# Patient Record
Sex: FEMALE | Race: WHITE | NOT HISPANIC OR LATINO | Employment: OTHER | URBAN - NONMETROPOLITAN AREA
[De-identification: names, ages, dates, MRNs, and addresses within clinical notes are randomized per-mention and may not be internally consistent; named-entity substitution may affect disease eponyms.]

---

## 2017-01-09 ENCOUNTER — TRANSCRIBE ORDERS (OUTPATIENT)
Dept: PULMONOLOGY | Facility: CLINIC | Age: 52
End: 2017-01-09

## 2017-01-09 ENCOUNTER — HOSPITAL ENCOUNTER (OUTPATIENT)
Dept: GENERAL RADIOLOGY | Facility: HOSPITAL | Age: 52
Discharge: HOME | End: 2017-01-09
Attending: INTERNAL MEDICINE | Admitting: INTERNAL MEDICINE

## 2017-01-09 DIAGNOSIS — R06.02 SOB (SHORTNESS OF BREATH): ICD-10-CM

## 2017-01-09 DIAGNOSIS — R06.02 SOB (SHORTNESS OF BREATH): Primary | ICD-10-CM

## 2017-01-09 PROCEDURE — 71020 HC CHEST PA AND LATERAL: CPT

## 2017-01-09 PROCEDURE — 71020 XR CHEST PA AND LATERAL: CPT | Performed by: RADIOLOGY

## 2017-01-11 ENCOUNTER — OFFICE VISIT (OUTPATIENT)
Dept: PULMONOLOGY | Facility: CLINIC | Age: 52
End: 2017-01-11

## 2017-01-11 VITALS
TEMPERATURE: 98.2 F | DIASTOLIC BLOOD PRESSURE: 84 MMHG | HEIGHT: 72 IN | WEIGHT: 251 LBS | HEART RATE: 90 BPM | OXYGEN SATURATION: 90 % | SYSTOLIC BLOOD PRESSURE: 142 MMHG | BODY MASS INDEX: 34 KG/M2

## 2017-01-11 DIAGNOSIS — J18.9 RECURRENT PNEUMONIA: ICD-10-CM

## 2017-01-11 DIAGNOSIS — G47.33 OSA (OBSTRUCTIVE SLEEP APNEA): ICD-10-CM

## 2017-01-11 DIAGNOSIS — R91.8 PULMONARY INFILTRATE: Primary | ICD-10-CM

## 2017-01-11 DIAGNOSIS — F17.200 SMOKER: ICD-10-CM

## 2017-01-11 DIAGNOSIS — R06.02 SHORTNESS OF BREATH: ICD-10-CM

## 2017-01-11 DIAGNOSIS — G47.33 OBSTRUCTIVE SLEEP APNEA: ICD-10-CM

## 2017-01-11 DIAGNOSIS — R91.8 LUNG MASS: ICD-10-CM

## 2017-01-11 DIAGNOSIS — R05.9 COUGH: ICD-10-CM

## 2017-01-11 PROCEDURE — 99214 OFFICE O/P EST MOD 30 MIN: CPT | Performed by: INTERNAL MEDICINE

## 2017-01-11 PROCEDURE — 99407 BEHAV CHNG SMOKING > 10 MIN: CPT | Performed by: INTERNAL MEDICINE

## 2017-01-11 RX ORDER — GUAIFENESIN 200 MG/10ML
200 LIQUID ORAL 3 TIMES DAILY PRN
Qty: 120 ML | Refills: 0 | Status: SHIPPED | OUTPATIENT
Start: 2017-01-11 | End: 2017-01-21

## 2017-01-11 NOTE — MR AVS SNAPSHOT
Lynette Stein   1/11/2017 1:40 PM   Office Visit    Dept Phone:  [phone]   Encounter #:  [id number]    Provider:  Teto Acuna MD   Department:  [facility] PULMONOLOGY CRITICAL CARE                Your Full Care Plan              Today's Medication Changes          These changes are accurate as of: 1/11/17  1:28 PM.  If you have any questions, ask your nurse or doctor.               New Medication(s)Ordered:     guaifenesin 100 MG/5ML liquid   Commonly known as:  ROBITUSSIN   Take 10 mL by mouth 3 (Three) Times a Day As Needed for cough for up to 10 days.   Started by:  Teto Acuna MD            Where to Get Your Medications      These medications were sent to [facility] [address] [place] [address] [phone] [facility] [phone]   [address] [place] [address]     Phone:  [phone]     guaifenesin 100 MG/5ML liquid                  Your Updated Medication List          This list is accurate as of: 1/11/17  1:28 PM.  Always use your most recent med list.                estradiol 2 MG tablet   Commonly known as:  ESTRACE       estrogens (conjugated) 0.45 MG tablet   Commonly known as:  PREMARIN       fenofibrate 145 MG tablet   Commonly known as:  TRICOR       guaifenesin 100 MG/5ML liquid   Commonly known as:  ROBITUSSIN   Take 10 mL by mouth 3 (Three) Times a Day As Needed for cough for up to 10 days.       hyoscyamine 0.125 MG tablet   Commonly known as:  ANASPAZ,LEVSIN       ibuprofen 800 MG tablet   Commonly known as:  ADVIL,MOTRIN       lisinopril 30 MG tablet   Commonly known as:  PRINIVIL,ZESTRIL       LORazepam 2 MG tablet   Commonly known as:  ATIVAN       metoprolol tartrate 50 MG tablet   Commonly known as:  LOPRESSOR       phenazopyridine 200 MG tablet   Commonly known as:  PYRIDIUM       pravastatin 40 MG tablet   Commonly known as:  PRAVACHOL       pregabalin 300 MG capsule   Commonly known as:  LYRICA       QUEtiapine  MG 24 hr  tablet   Commonly known as:  SEROquel XR       rosuvastatin 5 MG tablet   Commonly known as:  CRESTOR       sertraline 100 MG tablet   Commonly known as:  ZOLOFT       solifenacin 5 MG tablet   Commonly known as:  VESICARE       SPIRIVA HANDIHALER 18 MCG per inhalation capsule   Generic drug:  tiotropium       traMADol 50 MG tablet   Commonly known as:  ULTRAM       UCERIS 9 MG tablet sustained-release 24 hour   Generic drug:  Budesonide       VENTOLIN  (90 BASE) MCG/ACT inhaler   Generic drug:  albuterol               We Performed the Following     Ambulatory Referral to Cardiothoracic Surgery       You Were Diagnosed With        Codes Comments    Pulmonary infiltrate    -  Primary ICD-10-CM: R91.8  ICD-9-CM: 793.19     Lung mass     ICD-10-CM: R91.8  ICD-9-CM: 786.6     Obstructive sleep apnea     ICD-10-CM: G47.33  ICD-9-CM: 327.23     Shortness of breath     ICD-10-CM: R06.02  ICD-9-CM: 786.05     Cough     ICD-10-CM: R05  ICD-9-CM: 786.2     Recurrent pneumonia     ICD-10-CM: J18.9  ICD-9-CM: 486     DARON (obstructive sleep apnea)     ICD-10-CM: G47.33  ICD-9-CM: 327.23     Smoker     ICD-10-CM: F17.200  ICD-9-CM: 305.1       Instructions     None    Patient Instructions History      Upcoming Appointments     Visit Type Date Time Department    FOLLOW UP 1/11/2017  1:40 PM [facility]    FOLLOW UP 3/17/2017 10:30 AM [facility]    LAB 3/17/2017 10:00 AM [facility]    FOLLOW UP 4/12/2017  1:20 PM [facility]      MyChart Signup     Our records indicate that your [facility] [employer] account has been deactivated. If you would like to reactivate your account, please email [email or web address] or call [phone] to talk to our [employer] staff.             Other Info from Your Visit           Your Appointments     Jan 11, 2017  1:40 PM EST   Follow Up with Teto Acuna MD   [facility] PULMONOLOGY CRITICAL CARE (--)    [address]  [place]  "[phone]   [phone]           Arrive 15 minutes prior to appointment.            Mar 17, 2017 10:00 AM EDT   LAB with [facility] NURSE LAB   [facility] HEMATOLOGY  AND ONCOLOGY ([place])    [address] [place] [id number]   [phone]            Mar 17, 2017 10:30 AM EDT   Follow Up with Apple Menezes MD   [facility] HEMATOLOGY  AND ONCOLOGY ([place])    [address] [place] [phone]   [phone]           Arrive 15 minutes prior to appointment.            Apr 12, 2017  1:20 PM EDT   Follow Up with Teto Acuna MD   [facility] PULMONOLOGY CRITICAL CARE (--)    [address] [place] [id number]   [phone]           Arrive 15 minutes prior to appointment.              Allergies     Penicillins        Reason for Visit     Shortness of Breath           Vital Signs     Blood Pressure Pulse Temperature Height Weight Oxygen Saturation    142/84 (BP Location: Left arm, Patient Position: Sitting, Cuff Size: Adult) 90 98.2 °F (36.8 °C) (Oral) 72\" (182.9 cm) 251 lb (114 kg) 90%    Body Mass Index Smoking Status                34.04 kg/m2 Current Every Day Smoker          Problems and Diagnoses Noted     Cough    Sleep apnea    DARON (obstructive sleep apnea)    Recurrent pneumonia    Shortness of breath    Smoker    Abnormal chest x-ray    -  Primary    Lung mass            "

## 2017-01-11 NOTE — PROGRESS NOTES
Interval history since last visit: Cough    Recent hospitalizations: None    Investigations (imaging, PFT's, labs, sleep study, record requests, etc.) CXR    Have you had the Influenza Vaccine? yes    Would you like to receive this Vaccine today? no    Have you had the Pneumonia Vaccine?  yes   Would you like to receive this Vaccine today? no    Subjective    Lynette Stein presents for the following Shortness of Breath      History of Present Illness     patient is here for a follow-up visit. Currently having cough and sinus congestion.  Bronchoscopy results were discussed with her in great detail.  Chest x-ray did not reveal any major pulmonary findings.        Review of Systems   Constitutional: Negative for activity change, fatigue and unexpected weight change.   HENT: Negative for congestion, postnasal drip and rhinorrhea.    Respiratory: Positive for cough and shortness of breath. Negative for apnea, chest tightness and wheezing.    Cardiovascular: Negative for chest pain and palpitations.   Gastrointestinal: Negative for nausea.   Allergic/Immunologic: Negative for environmental allergies.   Psychiatric/Behavioral: Negative for agitation and confusion.       Active Problems:  Problem List Items Addressed This Visit        Respiratory    Obstructive sleep apnea    Shortness of breath    Cough    Recurrent pneumonia    Relevant Medications    guaifenesin (ROBITUSSIN) 100 MG/5ML liquid    DARON (obstructive sleep apnea)       Other    Smoker      Other Visit Diagnoses     Pulmonary infiltrate    -  Primary    Relevant Orders    NM Pet Skull Base To Mid Thigh    Ambulatory Referral to Cardiothoracic Surgery    Lung mass        Relevant Orders    NM Pet Skull Base To Mid Thigh          Past Medical History:  Past Medical History   Diagnosis Date   • Arthritis    • Chronic cystitis    • Colitis    • COPD (chronic obstructive pulmonary disease)    • Diabetes mellitus    • Fibromyalgia    • Hyperlipidemia    •  Hypertension        Family History:  Family History   Problem Relation Age of Onset   • Cancer Other    • Diabetes Other    • Gout Other    • Heart disease Other    • Hypertension Other    • Other Other      osteoarthritis,osteoporosis,rheumatoid arthritis   • Jaundice Father    • Cancer Sister        Social History:  Social History   Substance Use Topics   • Smoking status: Current Every Day Smoker     Packs/day: 0.50     Years: 40.00     Types: Cigarettes   • Smokeless tobacco: Not on file      Comment: 1/2 ppd   • Alcohol use No       Current Medications:  Current Outpatient Prescriptions   Medication Sig Dispense Refill   • estradiol (ESTRACE) 2 MG tablet Take 2 mg by mouth Daily.     • estrogens, conjugated, (PREMARIN) 0.45 MG tablet Take  by mouth.     • fenofibrate (TRICOR) 145 MG tablet Take  by mouth.     • guaifenesin (ROBITUSSIN) 100 MG/5ML liquid Take 10 mL by mouth 3 (Three) Times a Day As Needed for cough for up to 10 days. 120 mL 0   • hyoscyamine (ANASPAZ,LEVSIN) 0.125 MG tablet Take 0.125 mg by mouth Every 4 (Four) Hours As Needed for cramping.     • ibuprofen (ADVIL,MOTRIN) 800 MG tablet Take 800 mg by mouth Every 6 (Six) Hours As Needed for mild pain (1-3).     • lisinopril (PRINIVIL,ZESTRIL) 30 MG tablet Take  by mouth.     • LORazepam (ATIVAN) 2 MG tablet Take  by mouth.     • metoprolol tartrate (LOPRESSOR) 50 MG tablet Take 50 mg by mouth 2 (Two) Times a Day.     • phenazopyridine (PYRIDIUM) 200 MG tablet Take  by mouth.     • pravastatin (PRAVACHOL) 40 MG tablet Take 40 mg by mouth Daily.     • pregabalin (LYRICA) 300 MG capsule Take  by mouth.     • QUEtiapine XR (SEROquel XR) 200 MG 24 hr tablet Take 200 mg by mouth Every Night.     • rosuvastatin (CRESTOR) 5 MG tablet Take  by mouth.     • sertraline (ZOLOFT) 100 MG tablet Take 100 mg by mouth Daily.     • solifenacin (VESICARE) 5 MG tablet Take  by mouth.     • tiotropium (SPIRIVA HANDIHALER) 18 MCG per inhalation capsule Place 1 capsule  "into inhaler and inhale 1 (one) time daily.     • traMADol (ULTRAM) 50 MG tablet Take 50 mg by mouth Every 6 (Six) Hours As Needed for moderate pain (4-6).     • UCERIS 9 MG tablet sustained-release 24 hour   5   • VENTOLIN  (90 BASE) MCG/ACT inhaler        No current facility-administered medications for this visit.        Allergies:  Allergies   Allergen Reactions   • Penicillins        Vitals:  Visit Vitals   • /84 (BP Location: Left arm, Patient Position: Sitting, Cuff Size: Adult)   • Pulse 90   • Temp 98.2 °F (36.8 °C) (Oral)   • Ht 72\" (182.9 cm)   • Wt 251 lb (114 kg)   • SpO2 90%   • BMI 34.04 kg/m2       Imaging:    Imaging Results (most recent)     None          Pulmonary Functions Testing Results:    No results found for: FEV1, FVC, FKI5DTB, TLC, DLCO    Results for orders placed or performed during the hospital encounter of 12/01/16   Fungus Culture   Result Value Ref Range    Fungus Stain Final report     KOH/Calcofluor preparation Comment     Culture Final report     Fungus (Mycology) Result 1 Comment    AFB Culture   Result Value Ref Range    AFB Specimen Processing Concentration     Acid Fast Smear Negative    Respiratory Culture   Result Value Ref Range    Respiratory Culture Normal Respiratory Melva     Gram Stain Result No white cells  or organisms seen    Fungus Culture   Result Value Ref Range    Fungus Stain Final report     KOH/Calcofluor preparation Comment     Culture Final report     Fungus (Mycology) Result 1 Candida albicans    AFB Culture   Result Value Ref Range    AFB Specimen Processing Concentration     Acid Fast Smear Negative    Respiratory Culture   Result Value Ref Range    Respiratory Culture Normal Respiratory Melva     Gram Stain Result No WBCs or organisms seen    Fungus Culture   Result Value Ref Range    Fungus Stain Final report     KOH/Calcofluor preparation Comment     Culture Final report     Fungus (Mycology) Result 1 Comment    AFB Culture   Result Value " Ref Range    AFB Specimen Processing Concentration     Acid Fast Smear Negative    Respiratory Culture   Result Value Ref Range    Respiratory Culture Normal Respiratory Melva     Gram Stain Result No white cells  or organisms seen    Fungus Culture   Result Value Ref Range    Fungus Stain Final report     KOH/Calcofluor preparation Comment     Culture Final report     Fungus (Mycology) Result 1 Candida albicans     Fungus (Mycology) Result 2 Candida glabrata    AFB Culture   Result Value Ref Range    AFB Specimen Processing Concentration     Acid Fast Smear Negative    Respiratory Culture   Result Value Ref Range    Respiratory Culture Normal Respiratory Melva     Gram Stain Result Rare (1+) WBCs seen     Gram Stain Result       Few (2+) Gram positive cocci in pairs, chains and clusters    Gram Stain Result Rare (1+) Gram positive bacilli, extracellular    Protime-INR   Result Value Ref Range    Protime 10.0 9.8 - 11.9 Seconds    INR 0.89 0.80 - 1.10   CBC Auto Differential   Result Value Ref Range    WBC 17.07 (H) 4.50 - 12.50 10*3/mm3    RBC 4.43 4.20 - 5.40 10*6/mm3    Hemoglobin 13.0 12.0 - 16.0 g/dL    Hematocrit 40.9 37.0 - 47.0 %    MCV 92.3 80.0 - 94.0 fL    MCH 29.3 27.0 - 33.0 pg    MCHC 31.8 (L) 33.0 - 37.0 g/dL    RDW 14.7 (H) 11.5 - 14.5 %    RDW-SD 48.0 37.0 - 54.0 fl    MPV 10.5 (H) 6.0 - 10.0 fL    Platelets 453 (H) 130 - 400 10*3/mm3    Neutrophil % 53.8 30.0 - 70.0 %    Lymphocyte % 36.4 21.0 - 51.0 %    Monocyte % 7.5 0.0 - 10.0 %    Eosinophil % 1.2 0.0 - 5.0 %    Basophil % 0.2 0.0 - 2.0 %    Immature Grans % 0.9 (H) 0.0 - 0.5 %    Neutrophils, Absolute 9.20 (H) 1.40 - 6.50 10*3/mm3    Lymphocytes, Absolute 6.21 (H) 1.00 - 3.00 10*3/mm3    Monocytes, Absolute 1.28 (H) 0.10 - 0.90 10*3/mm3    Eosinophils, Absolute 0.20 0.00 - 0.70 10*3/mm3    Basophils, Absolute 0.03 0.00 - 0.30 10*3/mm3    Immature Grans, Absolute 0.15 (H) 0.00 - 0.03 10*3/mm3   Non-gynecologic Cytology   Result Value Ref Range     Case Report       [facility] Non-Gyn Cytology                           Case: [id number]                                  Authorizing Provider:  Teto Acuna MD         Collected:           12/01/2016 09:03 AM          Ordering Location:     [facility]      Received:            12/01/2016 10:23 AM                                 OPERATING ROOM DEPARTMENT                                                    Pathologist:           Ramy Jeffery MD                                                      Specimens:   1) - Lung, Right Upper Lobe, RUL BAL                                                                2) - Lung, Lingula, LINGULAR BAL                                                                    3) - Lung, Left Upper Lobe, SUSANA BAL                                                                 4) - Bronchus, BRONCH WASHINGS                                                             Embedded Images         Objective   Physical Exam     General- chronically ill in appearance, not in any acute distress    HEENT- pupils equally reactive to light, normal in size, no scleral icterus    Neck-supple    Chest-respirations normal-on auscultation no wheezing no crackles    S1 and S2 normal    Abdomen-nontender nondistended bowel sounds positive    CNS-nonfocal    Extremities-no edema    Psychiatric-mood good, good eye contact, alert awake oriented    Assessment/Plan           Shortness off breath-  Likely multifactorial related to her underlying lung disease.  Patient continues to smoke. l      Patient had pulmonary function test which did not reveal any obstructive or restrictive lung disease.  Results were shown and discussed with wes today.    2-D echo showed normal ejection fraction with normal RV function.    Sleep study showed severe sleep apnea.  She was prescribed AutoPap.  We'll check compliance on next visit.               Bronchoscopy culturese and cytology was negative. Will get a  PET/CT and refer her to cardiac thoracic surgery for VATS procedure for lung biopsy      Has history of blood disorder managed byhematology oncology.      Obstructive sleep apnea-continue AutoPap.    Educated patient on the complications associated with untreated sleep apnea -- that it increases risk of MI, stroke, depression, hypertension, heart failure, arrhythmias, coronary artery disease, and potential death due to complication of that mentioned previously.    Also patient was educated  about the hazards of driving if there sleep deprived and has sleep apnea.  Was instructed not to involving driving or any activity which involves higher level of mental function- like operating a machine-        Smoker-.  Still smoking half a pack a day. Again did extensive smoking cessation counseling over 10 minutes.  Patient is willing to quit and will cut down in next 4-6 weeks    Obesity-exercise and diet counseling not done on this visit.      · The study is technically difficult for diagnosis.  · Left ventricular function is normal. Estimated EF = 65%.  · There is no evidence of pericardial effusion.    Results for ELKIN YUAN (MRN [id number]) as of 11/10/2016 15:51   Ref. Range 5/12/2016 10:46   Hep A IgM Latest Ref Range: NonReactive  NonReactive   Hepatitis B Surface Ag Latest Ref Range: NonReactive  NonReactive   Hep B C IgM Latest Ref Range: NonReactive  NonReactive   Hep C Virus Ab Latest Ref Range: NonReactive  NonReactive   IgA Latest Ref Range: 87 - 352 mg/dL 186   IgG Latest Ref Range: 700 - 1600 mg/dL 616 (L)   IgM Latest Ref Range: 26 - 217 mg/dL 75       Recurrent pneumonia-Patient's IgG level is below normal.  Did not see the allergist and immunologist.  I asked her to make an appointment as soon as possible as patient's respiratory symptoms could be due to immunodeficiency state     cough-likely due to underlying lung disease and postnasal drip.  Will give her Robitussin.        ICD-10-CM ICD-9-CM   1.  Pulmonary infiltrate R91.8 793.19   2. Lung mass R91.8 786.6   3. Obstructive sleep apnea G47.33 327.23   4. Shortness of breath R06.02 786.05   5. Cough R05 786.2   6. Recurrent pneumonia J18.9 486   7. DARON (obstructive sleep apnea) G47.33 327.23   8. Smoker F17.200 305.1       Return in about 3 months (around 4/11/2017).

## 2017-01-20 ENCOUNTER — HOSPITAL ENCOUNTER (OUTPATIENT)
Dept: PET IMAGING | Facility: HOSPITAL | Age: 52
Discharge: HOME | End: 2017-01-20
Attending: INTERNAL MEDICINE | Admitting: INTERNAL MEDICINE

## 2017-01-20 DIAGNOSIS — R91.8 LUNG MASS: ICD-10-CM

## 2017-01-20 DIAGNOSIS — R91.8 PULMONARY INFILTRATE: ICD-10-CM

## 2017-01-20 PROCEDURE — 78815 PET IMAGE W/CT SKULL-THIGH: CPT

## 2017-01-20 PROCEDURE — A9552 F18 FDG: HCPCS | Performed by: INTERNAL MEDICINE

## 2017-01-20 PROCEDURE — 78815 PET IMAGE W/CT SKULL-THIGH: CPT | Performed by: RADIOLOGY

## 2017-01-20 PROCEDURE — 0 FLUDEOXYGLUCOSE F18 SOLUTION: Performed by: INTERNAL MEDICINE

## 2017-01-20 RX ADMIN — FLUDEOXYGLUCOSE F18 1 DOSE: 300 INJECTION INTRAVENOUS at 14:57

## 2017-02-08 ENCOUNTER — OFFICE VISIT (OUTPATIENT)
Dept: CARDIAC SURGERY | Facility: CLINIC | Age: 52
End: 2017-02-08

## 2017-02-08 VITALS
WEIGHT: 250 LBS | HEART RATE: 83 BPM | BODY MASS INDEX: 33.86 KG/M2 | SYSTOLIC BLOOD PRESSURE: 131 MMHG | OXYGEN SATURATION: 91 % | HEIGHT: 72 IN | DIASTOLIC BLOOD PRESSURE: 72 MMHG

## 2017-02-08 DIAGNOSIS — J18.9 RECURRENT PNEUMONIA: Primary | ICD-10-CM

## 2017-02-08 PROCEDURE — 99203 OFFICE O/P NEW LOW 30 MIN: CPT | Performed by: THORACIC SURGERY (CARDIOTHORACIC VASCULAR SURGERY)

## 2017-02-08 RX ORDER — METRONIDAZOLE 500 MG/1
500 TABLET ORAL 3 TIMES DAILY
COMMUNITY
End: 2019-11-19

## 2017-02-16 ENCOUNTER — HOSPITAL ENCOUNTER (OUTPATIENT)
Dept: CT IMAGING | Facility: HOSPITAL | Age: 52
Discharge: HOME | End: 2017-02-16
Admitting: THORACIC SURGERY (CARDIOTHORACIC VASCULAR SURGERY)

## 2017-02-16 DIAGNOSIS — J18.9 RECURRENT PNEUMONIA: ICD-10-CM

## 2017-02-16 LAB — CREAT BLDA-MCNC: 0.6 MG/DL (ref 0.6–1.3)

## 2017-02-16 PROCEDURE — 71270 CT THORAX DX C-/C+: CPT | Performed by: RADIOLOGY

## 2017-02-16 PROCEDURE — 71270 CT THORAX DX C-/C+: CPT

## 2017-02-16 PROCEDURE — 82565 ASSAY OF CREATININE: CPT

## 2017-02-16 PROCEDURE — 0 IOVERSOL 68 % SOLUTION: Performed by: THORACIC SURGERY (CARDIOTHORACIC VASCULAR SURGERY)

## 2017-02-16 RX ADMIN — IOVERSOL 80 ML: 678 INJECTION INTRA-ARTERIAL; INTRAVENOUS at 13:13

## 2017-02-24 ENCOUNTER — OFFICE VISIT (OUTPATIENT)
Dept: PULMONOLOGY | Facility: CLINIC | Age: 52
End: 2017-02-24

## 2017-02-24 VITALS
HEART RATE: 82 BPM | WEIGHT: 250 LBS | BODY MASS INDEX: 33.86 KG/M2 | OXYGEN SATURATION: 90 % | HEIGHT: 72 IN | DIASTOLIC BLOOD PRESSURE: 76 MMHG | SYSTOLIC BLOOD PRESSURE: 128 MMHG | TEMPERATURE: 98.1 F

## 2017-02-24 DIAGNOSIS — R05.9 COUGH: ICD-10-CM

## 2017-02-24 DIAGNOSIS — E66.01 MORBID OBESITY DUE TO EXCESS CALORIES (HCC): ICD-10-CM

## 2017-02-24 DIAGNOSIS — R06.02 SHORTNESS OF BREATH: ICD-10-CM

## 2017-02-24 DIAGNOSIS — F17.200 SMOKER: Primary | ICD-10-CM

## 2017-02-24 DIAGNOSIS — R93.89 ABNORMAL CT SCAN, CHEST: ICD-10-CM

## 2017-02-24 DIAGNOSIS — G47.33 OBSTRUCTIVE SLEEP APNEA: ICD-10-CM

## 2017-02-24 PROCEDURE — 99407 BEHAV CHNG SMOKING > 10 MIN: CPT | Performed by: INTERNAL MEDICINE

## 2017-02-24 PROCEDURE — 99214 OFFICE O/P EST MOD 30 MIN: CPT | Performed by: INTERNAL MEDICINE

## 2017-02-24 RX ORDER — LEVOFLOXACIN 500 MG/1
500 TABLET, FILM COATED ORAL DAILY
Qty: 7 TABLET | Refills: 1 | Status: SHIPPED | OUTPATIENT
Start: 2017-02-24 | End: 2017-04-24

## 2017-02-24 NOTE — PROGRESS NOTES
Interval history since last visit: Sore Throat, Swollen Glands    Recent hospitalizations: None    Investigations (imaging, PFT's, labs, sleep study, record requests, etc.) PET Scan    Have you had the Influenza Vaccine? yes    Would you like to receive this Vaccine today? no    Have you had the Pneumonia Vaccine?  yes   Would you like to receive this Vaccine today? no    Subjective    Lynette Stein presents for the following Sore Throat      History of Present Illness     Here for a follow up.Seen Ct surgery.   Repeat ct chest is same. Images seen and shown to patient.        Review of Systems   Constitutional: Negative for activity change, fatigue and unexpected weight change.   HENT: Positive for sinus pressure and sore throat. Negative for congestion, postnasal drip and rhinorrhea.    Respiratory: Positive for shortness of breath. Negative for apnea, cough, chest tightness and wheezing.    Cardiovascular: Negative for chest pain and palpitations.   Gastrointestinal: Negative for nausea.   Allergic/Immunologic: Negative for environmental allergies.   Psychiatric/Behavioral: Negative for agitation and confusion.       Active Problems:  Problem List Items Addressed This Visit        Respiratory    Obstructive sleep apnea    Shortness of breath    Cough       Digestive    Obesity       Other    Smoker - Primary    Abnormal CT scan, chest          Past Medical History:  Past Medical History   Diagnosis Date   • Anxiety and depression    • Arthritis    • Chronic cystitis    • Colitis    • COPD (chronic obstructive pulmonary disease)    • Diabetes mellitus    • Fibromyalgia    • GERD (gastroesophageal reflux disease)    • Hyperlipidemia    • Hypertension    • UTI (urinary tract infection)        Family History:  Family History   Problem Relation Age of Onset   • Cancer Other    • Diabetes Other    • Gout Other    • Heart disease Other    • Hypertension Other    • Other Other      osteoarthritis,osteoporosis,rheumatoid  arthritis   • COPD Mother    • Heart failure Mother    • Diabetes Mother    • Jaundice Father    • Heart failure Father    • Cancer Sister        Social History:  Social History   Substance Use Topics   • Smoking status: Current Every Day Smoker     Packs/day: 0.50     Years: 40.00     Types: Cigarettes   • Smokeless tobacco: Never Used      Comment: 1/2 ppd   • Alcohol use No       Current Medications:  Current Outpatient Prescriptions   Medication Sig Dispense Refill   • estradiol (ESTRACE) 2 MG tablet Take 2 mg by mouth Daily.     • estrogens, conjugated, (PREMARIN) 0.45 MG tablet Take  by mouth.     • fenofibrate (TRICOR) 145 MG tablet Take  by mouth.     • hyoscyamine (ANASPAZ,LEVSIN) 0.125 MG tablet Take 0.125 mg by mouth Every 4 (Four) Hours As Needed for cramping.     • ibuprofen (ADVIL,MOTRIN) 800 MG tablet Take 800 mg by mouth Every 6 (Six) Hours As Needed for mild pain (1-3).     • levoFLOXacin (LEVAQUIN) 500 MG tablet Take 1 tablet by mouth Daily. 7 tablet 1   • lisinopril (PRINIVIL,ZESTRIL) 30 MG tablet Take  by mouth.     • LORazepam (ATIVAN) 2 MG tablet Take  by mouth.     • metoprolol tartrate (LOPRESSOR) 50 MG tablet Take 50 mg by mouth 2 (Two) Times a Day.     • metroNIDAZOLE (FLAGYL) 500 MG tablet Take 500 mg by mouth 3 (Three) Times a Day.     • phenazopyridine (PYRIDIUM) 200 MG tablet Take  by mouth.     • pravastatin (PRAVACHOL) 40 MG tablet Take 40 mg by mouth Daily.     • pregabalin (LYRICA) 300 MG capsule Take  by mouth.     • QUEtiapine XR (SEROquel XR) 200 MG 24 hr tablet Take 200 mg by mouth Every Night.     • rosuvastatin (CRESTOR) 5 MG tablet Take  by mouth.     • sertraline (ZOLOFT) 100 MG tablet Take 100 mg by mouth Daily.     • solifenacin (VESICARE) 5 MG tablet Take  by mouth.     • terconazole (TERAZOL 3) 0.8 % vaginal cream Insert 1 applicator into the vagina Every Night.     • tiotropium (SPIRIVA HANDIHALER) 18 MCG per inhalation capsule Place 1 capsule into inhaler and inhale 1  "(one) time daily.     • traMADol (ULTRAM) 50 MG tablet Take 50 mg by mouth Every 6 (Six) Hours As Needed for moderate pain (4-6).     • UCERIS 9 MG tablet sustained-release 24 hour   5   • VENTOLIN  (90 BASE) MCG/ACT inhaler        No current facility-administered medications for this visit.        Allergies:  Allergies   Allergen Reactions   • Penicillins        Vitals:  Visit Vitals   • /76 (BP Location: Left arm, Patient Position: Sitting, Cuff Size: Adult)   • Pulse 82   • Temp 98.1 °F (36.7 °C) (Oral)   • Ht 72\" (182.9 cm)   • Wt 250 lb (113 kg)   • SpO2 90%   • BMI 33.91 kg/m2       Imaging:    Imaging Results (most recent)     None          Pulmonary Functions Testing Results:    No results found for: FEV1, FVC, [id number], TLC, DLCO    Results for orders placed or performed during the hospital encounter of 02/16/17   POC Creatinine   Result Value Ref Range    Creatinine 0.60 0.60 - 1.30 mg/dL       Objective   Physical Exam   General- normal in appearance, not in any acute distress ,obese     HEENT- pupils equally reactive to light, normal in size, no scleral icterus    Neck-supple    Chest-respirations normal-on auscultation no wheezing no crackles    S1 and S2 normal    Abdomen-nontender nondistended bowel sounds positive    CNS-nonfocal    Extremities-no edema    Psychiatric-mood good, good eye contact, alert awake oriented    Assessment/Plan      Abnormal CT chest-     REASON FOR EXAM: Pneumonia, infiltrate; J18.9-Pneumonia, unspecified  organism.      COMPARISON: Today's CT of the chest is compared with a previous CT from  November 2016.      FINDINGS: Scans were obtained from the apices of the lung and extended  to the diaphragm. On the lung windows, there are persistent patchy areas  of alveolar infiltrate predominantly in the left lung. Radiographically,  these have not changed from November 2016. No new areas have developed.  There were no pleural effusions. In the mediastinum, there were " no  enlarged lymph nodes or masses. The liver continues to show marked  diffuse fatty infiltration.      IMPRESSION:  Patchy scattered areas of alveolar infiltrate persist within  the lungs predominantly on the left, radiographically unchanged from  November. There is moderate fatty change throughout the liver.      The radiation dose reduction device was utilized for each scan per the  ALARA (as low as reasonably achievable) protocol.    Bronchoscopy culturese and cytology was negative. Results reviewed again.      Will discuss case with CT surgery    Has history of blood disorder managed by hematology oncology.      Obstructive sleep apnea- compliant. Continue current treatment.        Educated patient on the complications associated with untreated sleep apnea -- that it increases risk of MI, stroke, depression, hypertension, heart failure, arrhythmias, coronary artery disease, and potential death due to complication of that mentioned previously.    Also patient was educated  about the hazards of driving if there sleep deprived and has sleep apnea.  Was instructed not to involving driving or any activity which involves higher level of mental function- like operating a machine.          Smoker-.  I advised the patient of the risks in continuing to use tobacco, and I provided this patient with smoking cessation educational materials.  I also discussed how to quit smoking and the patient has expressed the willingness to quit.      During this visit, I spent 11 minutes counseling the patient regarding smoking cessation.       Obesity-exercise and diet counseling again not done on this visit.      · The study is technically difficult for diagnosis.  · Left ventricular function is normal. Estimated EF = 65%.  · There is no evidence of pericardial effusion.    Results for ELKIN YUAN (MRN [id number]) as of 11/10/2016 15:51   Ref. Range 5/12/2016 10:46   Hep A IgM Latest Ref Range: NonReactive  NonReactive   Hepatitis  B Surface Ag Latest Ref Range: NonReactive  NonReactive   Hep B C IgM Latest Ref Range: NonReactive  NonReactive   Hep C Virus Ab Latest Ref Range: NonReactive  NonReactive   IgA Latest Ref Range: 87 - 352 mg/dL 186   IgG Latest Ref Range: 700 - 1600 mg/dL 616 (L)   IgM Latest Ref Range: 26 - 217 mg/dL 75       Recurrent pneumonia-Patient's IgG level is below normal.  Again didn't see immunologist. Will refer her again. Her IgG is low.         Cough- better but still present. Smokers cough. Again asked her to quit smoking.    PFT- Normal spirometry with no significant bronchodilator response seen on this occasion. Normal lung volumes and normal dlco.  Flow volume loop is normal  Will repeat on next visit        ICD-10-CM ICD-9-CM   1. Smoker F17.200 305.1   2. Abnormal CT scan, chest R93.8 793.2   3. Obstructive sleep apnea G47.33 327.23   4. Shortness of breath R06.02 786.05   5. Cough R05 786.2   6. Morbid obesity due to excess calories E66.01 278.01       Return in about 3 months (around 5/24/2017).

## 2017-03-24 ENCOUNTER — TRANSCRIBE ORDERS (OUTPATIENT)
Dept: ADMINISTRATIVE | Facility: HOSPITAL | Age: 52
End: 2017-03-24

## 2017-03-24 DIAGNOSIS — Z12.31 VISIT FOR SCREENING MAMMOGRAM: Primary | ICD-10-CM

## 2017-03-24 DIAGNOSIS — Z13.820 SCREENING FOR OSTEOPOROSIS: ICD-10-CM

## 2017-04-03 ENCOUNTER — TRANSCRIBE ORDERS (OUTPATIENT)
Dept: ADMINISTRATIVE | Facility: HOSPITAL | Age: 52
End: 2017-04-03

## 2017-04-03 DIAGNOSIS — Z12.31 VISIT FOR SCREENING MAMMOGRAM: Primary | ICD-10-CM

## 2017-04-24 ENCOUNTER — LAB (OUTPATIENT)
Dept: ONCOLOGY | Facility: CLINIC | Age: 52
End: 2017-04-24

## 2017-04-24 ENCOUNTER — OFFICE VISIT (OUTPATIENT)
Dept: ONCOLOGY | Facility: CLINIC | Age: 52
End: 2017-04-24

## 2017-04-24 VITALS
SYSTOLIC BLOOD PRESSURE: 121 MMHG | BODY MASS INDEX: 34.75 KG/M2 | OXYGEN SATURATION: 96 % | RESPIRATION RATE: 18 BRPM | DIASTOLIC BLOOD PRESSURE: 70 MMHG | TEMPERATURE: 97.7 F | WEIGHT: 256.2 LBS | HEART RATE: 88 BPM

## 2017-04-24 DIAGNOSIS — D72.820 LYMPHOCYTOSIS: Primary | ICD-10-CM

## 2017-04-24 DIAGNOSIS — Z72.0 TOBACCO ABUSE: ICD-10-CM

## 2017-04-24 DIAGNOSIS — B99.9 RECURRENT INFECTIONS: ICD-10-CM

## 2017-04-24 DIAGNOSIS — D75.839 THROMBOCYTOSIS: ICD-10-CM

## 2017-04-24 DIAGNOSIS — D72.820 LYMPHOCYTOSIS: ICD-10-CM

## 2017-04-24 LAB
BASOPHILS # BLD AUTO: 0.05 10*3/MM3 (ref 0–0.3)
BASOPHILS NFR BLD AUTO: 0.3 % (ref 0–2)
DEPRECATED RDW RBC AUTO: 44.5 FL (ref 37–54)
EOSINOPHIL # BLD AUTO: 0.29 10*3/MM3 (ref 0–0.7)
EOSINOPHIL NFR BLD AUTO: 1.7 % (ref 0–5)
ERYTHROCYTE [DISTWIDTH] IN BLOOD BY AUTOMATED COUNT: 13.7 % (ref 11.5–14.5)
HCT VFR BLD AUTO: 42.5 % (ref 37–47)
HGB BLD-MCNC: 13.8 G/DL (ref 12–16)
IMM GRANULOCYTES # BLD: 0.18 10*3/MM3 (ref 0–0.03)
IMM GRANULOCYTES NFR BLD: 1 % (ref 0–0.5)
LYMPHOCYTES # BLD AUTO: 6.57 10*3/MM3 (ref 1–3)
LYMPHOCYTES NFR BLD AUTO: 38.3 % (ref 21–51)
MCH RBC QN AUTO: 29.4 PG (ref 27–33)
MCHC RBC AUTO-ENTMCNC: 32.5 G/DL (ref 33–37)
MCV RBC AUTO: 90.4 FL (ref 80–94)
MONOCYTES # BLD AUTO: 1.07 10*3/MM3 (ref 0.1–0.9)
MONOCYTES NFR BLD AUTO: 6.2 % (ref 0–10)
NEUTROPHILS # BLD AUTO: 8.99 10*3/MM3 (ref 1.4–6.5)
NEUTROPHILS NFR BLD AUTO: 52.5 % (ref 30–70)
PLATELET # BLD AUTO: 425 10*3/MM3 (ref 130–400)
PMV BLD AUTO: 10.9 FL (ref 6–10)
RBC # BLD AUTO: 4.7 10*6/MM3 (ref 4.2–5.4)
WBC NRBC COR # BLD: 17.15 10*3/MM3 (ref 4.5–12.5)

## 2017-04-24 PROCEDURE — 85025 COMPLETE CBC W/AUTO DIFF WBC: CPT | Performed by: INTERNAL MEDICINE

## 2017-04-24 PROCEDURE — 99214 OFFICE O/P EST MOD 30 MIN: CPT | Performed by: INTERNAL MEDICINE

## 2017-04-24 PROCEDURE — 36415 COLL VENOUS BLD VENIPUNCTURE: CPT | Performed by: INTERNAL MEDICINE

## 2017-04-24 NOTE — PROGRESS NOTES
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    Ms. Stein reports that she was discovered to have leukocytosis during the time of her initial consultation but believes that she may have actually had an elevated white blood cell count for quite some time prior to that visit. When she initially saw me she was diagnosed with bronchitis and had recently completed a course of antibiotic treatment. She has a history of chronic cystitis and is on antibiotics at least once a month for urinary tract infections. She also has issues with chronic abdominal pain and diarrhea and was diagnosed with collagenous colitis in 2015 and has been following with Dr. Steel. Upon further review of our medical system she has had thrombocytosis since 2007 and a leukocytosis since 2013 which at times has been normal. I did preform a flow cytometry as well as a peripheral blood smear which was significant for a monotypical CD5+/- B cell population (~4% of leukocytes) with no specific immunophenotype, but variant B cell SLL or mantle cell lymphoma could not be ruled out. Given these findings I did perform a bone marrow biopsy to assess for an underlying hematological disorder. There was a continued presence of a monotypic B cell population which was around 2% of leukocytes however there was no evidence of a hematological neoplasia or overt lymphoma that was seen.     Since her last visit with me she has had pneumonia as well as recurrent urinary tract infections and most recently completed  antibiotics last week with improvement in her symptoms. She reports chronic allergies and has daily sinus pressure from it. She denies of any fevers, night sweats, weight loss, or lymphadenopathy.          Allergies   Allergen Reactions   • Penicillins        Past Medical History:   Diagnosis Date   • Anxiety and depression    • Arthritis    • Chronic cystitis    • Colitis    • COPD (chronic obstructive pulmonary disease)    • Diabetes mellitus    • Fibromyalgia    • GERD (gastroesophageal reflux disease)    • Hyperlipidemia    • Hypertension    • UTI (urinary tract infection)        Past Surgical History:   Procedure Laterality Date   • BRONCHOSCOPY N/A 12/1/2016    Procedure: BRONCHOSCOPY;  Surgeon: Teto Acuna MD;  Location: [facility];  Service:    • CHOLECYSTECTOMY     • CYSTOSCOPY BLADDER BIOPSY     • FACIAL RECONSTRUCTION SURGERY     • HAND SURGERY Right     middle and ring finger   • HYSTERECTOMY  2013   • PILONIDAL CYST / SINUS EXCISION         Social History     Social History   • Marital status:      Spouse name: N/A   • Number of children: 0   • Years of education: N/A     Occupational History   •  Disabled          Social History Main Topics   • Smoking status: Current Every Day Smoker     Packs/day: 0.50     Years: 40.00     Types: Cigarettes   • Smokeless tobacco: Never Used      Comment: 1/2 ppd   • Alcohol use No   • Drug use: No   • Sexual activity: Defer     Other Topics Concern   • Not on file     Social History Narrative    L[place] in [place] [facility] with [place]       Family History   Problem Relation Age of Onset   • Cancer Other    • Diabetes Other    • Gout Other    • Heart disease Other    • Hypertension Other    • Other Other      osteoarthritis,osteoporosis,rheumatoid arthritis   • COPD Mother    • Heart failure Mother    • Diabetes Mother    • Jaundice Father    • Heart failure Father    • Cancer Sister          Current Outpatient Prescriptions:   •  estradiol (ESTRACE) 2 MG  tablet, Take 2 mg by mouth Daily., Disp: , Rfl:   •  estrogens, conjugated, (PREMARIN) 0.45 MG tablet, Take  by mouth., Disp: , Rfl:   •  fenofibrate (TRICOR) 145 MG tablet, Take  by mouth., Disp: , Rfl:   •  hyoscyamine (ANASPAZ,LEVSIN) 0.125 MG tablet, Take 0.125 mg by mouth Every 4 (Four) Hours As Needed for cramping., Disp: , Rfl:   •  ibuprofen (ADVIL,MOTRIN) 800 MG tablet, Take 800 mg by mouth Every 6 (Six) Hours As Needed for mild pain (1-3)., Disp: , Rfl:   •  lisinopril (PRINIVIL,ZESTRIL) 30 MG tablet, Take  by mouth., Disp: , Rfl:   •  LORazepam (ATIVAN) 2 MG tablet, Take  by mouth., Disp: , Rfl:   •  metoprolol tartrate (LOPRESSOR) 50 MG tablet, Take 50 mg by mouth 2 (Two) Times a Day., Disp: , Rfl:   •  metroNIDAZOLE (FLAGYL) 500 MG tablet, Take 500 mg by mouth 3 (Three) Times a Day., Disp: , Rfl:   •  phenazopyridine (PYRIDIUM) 200 MG tablet, Take  by mouth., Disp: , Rfl:   •  pravastatin (PRAVACHOL) 40 MG tablet, Take 40 mg by mouth Daily., Disp: , Rfl:   •  pregabalin (LYRICA) 300 MG capsule, Take  by mouth., Disp: , Rfl:   •  QUEtiapine XR (SEROquel XR) 200 MG 24 hr tablet, Take 200 mg by mouth Every Night., Disp: , Rfl:   •  rosuvastatin (CRESTOR) 5 MG tablet, Take  by mouth., Disp: , Rfl:   •  sertraline (ZOLOFT) 100 MG tablet, Take 100 mg by mouth Daily., Disp: , Rfl:   •  solifenacin (VESICARE) 5 MG tablet, Take  by mouth., Disp: , Rfl:   •  terconazole (TERAZOL 3) 0.8 % vaginal cream, Insert 1 applicator into the vagina Every Night., Disp: , Rfl:   •  tiotropium (SPIRIVA HANDIHALER) 18 MCG per inhalation capsule, Place 1 capsule into inhaler and inhale 1 (one) time daily., Disp: , Rfl:   •  traMADol (ULTRAM) 50 MG tablet, Take 50 mg by mouth Every 6 (Six) Hours As Needed for moderate pain (4-6)., Disp: , Rfl:   •  UCERIS 9 MG tablet sustained-release 24 hour, , Disp: , Rfl: 5  •  VENTOLIN  (90 BASE) MCG/ACT inhaler, , Disp: , Rfl:         Review of Systems  Constitutional: no fevers and  chills, no night sweats or weight loss.   HEENT: +sinus headaches, vision changes or hearing changes, no sinus drainage, sore throat.   Cardiovascular:  No palpitations, chest pain, syncopal episodes or edema.   Pulmonary:  + shortness of breath and cough improved.   Gastrointestinal:  No nausea or vomiting.  + chronic diarrhea. No change in appetite. +chronic abdominal pain. No melena or hematochezia.   Genitourinary:  No hematuria, chronic UTIs  Musculoskeletal:  +chronic arthritic pain  Skin: No rashes or pruritus.   Endocrine:  No hot flashes or chills   Hematologic:  No history of free bleeding, spontaneous bleeding or clotting problems. No lymphadenopathy.    Immunologic:  +seasonal allergies and frequent infections.   Neurologic: No numbness, tingling, or weakness.   Psychiatric:  +anxiety      Physical Exam  Vital Signs: These were reviewed and listed as per patient’s electronic medical chart  Vitals:    04/24/17 1033   BP: 121/70   Pulse: 88   Resp: 18   Temp: 97.7 °F (36.5 °C)   SpO2: 96%     General: Awake, alert and oriented, in no distress   HEENT: Head is atraumatic, normocephalic, extraocular movements full, oropharynx clear, no scleral icterus, pink moist mucous membranes   Neck: supple, no jvd, lymphadenopathy or masses   Cardiovascular: regular rate and rhythm without murmurs, rubs or gallops   Pulmonary: clear to auscultation bilaterally   Abdomen: soft, non tender no guarding, non-distended, normal active bowel sounds present, no organomegaly however difficult to palpate given size   Extremities: No clubbing, cyanosis or edema  Neurologic: Mental status as above, alert, awake and oriented, grossly non-focal exam   Skin: warm, dry, intact  Lymph nodes: no cervical, supraclavicular, axillary lymph nodes      Labs & Studies:   PATHOLOGY:   04/11/16: BCR ABL PCR: <0.001%   04/11/16: Peripheral Smear: Leukocytosis with absolute monocytosis and lymphocytosis. Neutrophils   are also increased in  absolute numbers. No significant atypia is noted in the circulating white blood cells.   The findings combined with thrombocytosis suggests possible response to infection/inflammation.   Essentially normochromic, normocytic red blood cells without anemia. Mild thrombocytosis with normal   platelet morphology.   16: Flow Cytometry PB: Monotypical CD5+/- B cell population (~4% of leukocytes). The   immunophenotype is not specific, but variant B cell SLL or mantle cell lymphoma cannot be ruled out.   16: CLL FISH Panel: The CLL FISH panel analysis was normal. There were no cells with CCND1-IGH   fusion.   16: JAK2 V617F Mutation: Negative   16: COURTNEY Exon 12 Mutation: Negative   16: Bone Marrow Aspirate Flow Cytometry: Continued presence of a monotypic B cell population (~2% of leukocytes)   Fish Report BM: No assay specific abnormalities detected by CLL and MPN/CML panels   BM Aspira te and clot sections: No morphologic diagnostic evidence of hematopoietic neoplasia or overt lymphoma identified (~2% monotypic B cell population with a nonspecific immunophenotype by flow analysis). 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 Submandibular glands are symmetric. No evidence of neck adenopathy. No prevertebral soft tissue swelling is seen. No extrinsic deviation of the airway. IMPRESSION- Unremarkable CT appearance of the neck. No adenopathy.     16: CT Chest: Small mediastinal lymph nodes below threshold. No pericardial or pleural effusion is present. The lung window setting images show airspace disease in the left upper lobe which has a CT appearance suggestive of pneumonia.  No focal mass identified. IMPRESSION- Airspace disease in the left upper lobe on image #63 and 64 of the axial series. CT appearance is most suggestive of pneumonia. I would recommend follow-up to resolution.     05/11/16: CT A/P: Lung bases are clear, no pleural effusions. Liver is homogeneous, but decreased in attenuation compatible with fatty infiltration of the liver. The spleen is homogeneous. No peripancreatic stranding or pancreatic head mass. No adrenal enlargement. No evidence of hydronephrosis or hydroureter or distal ureteral stones. No free fluid or walled off fluid collections. No bowel wall thickening or mesenteric stranding. There is arthritic change in the spine. No mesenteric or retroperitoneal adenopathy is noted. IMPRESSION- No definitive source for the patient's symptoms identified on today's exam. Arthritic change in the lumbar spine.     06/27/16: PET/CT: The CT and PET images through the neck area were unremarkable. In the chest the CT showed no enlarged lymph nodes. There were some patchy areas of alveolar infiltrate in the upper lobes of both lungs that has somewhat of a groundglass appearance. These areas have some very minimal glucose uptake just above background. Radiographically this would be consistent with some inflammatory infiltrate in the lung. No lung nodules or pleural effusions are seen. In the abdomen the liver is enlarged but shows homogeneous glucose uptake. The spleen was normal. There was no adenopathy or hypermetabolic activity in the retroperitoneal or mesenteric lymph nodes. In the pelvis there were no masses or fluid collections. IMPRESSION: There are patchy areas of groundglass alveolar infiltrate in both upper lobes which show very mild increased glucose uptake slightly above background. These are in the 1 cm size range and felt to be inflammatory in nature.       LABWORK:   03/01/16: CBC: WBC 16.1, Hb 13.9, Hct 42.6, Plt 445, ANC 8.8, ALC 6.9, AMC 0.4   04/11/16: CRP:  0.71, ESR: 16   CBC: WBC 19.6, Hb 14.5, Hct 44.5, MCV 91, Plt 475, ANC 10, ALC 7.8, AMC 1.4   16: Beta-2 Microglobulin: 1.6, LDH: 128, Uric Acid: 7.2   CMP: BUN 8, Cr 0.65, Ca 9.6, AST 28, ALT 51, , TBil 0.5, TPro 7.4, Alb 4.5   CBC: WBC 13.8, Hb 13.6, Hct 41.6, MCV 90.4, Plt 447, ANC 8.3, ALC 4.4, AMC 0.9   16: Immunoglobulins: IgM: 75, Ig (L), IgA: 186   Acute Hepatitis Panel: Non reactive, HIV: Non reactive   CBC: WBC 14.5, Hb 12.8, Hct 40.8, MCV 93, Plt 426, ANC 5.8, ALC 7.6, AMC 0.8   16: CBC: WBC 16, Hb 13.5, Hct 41.4, MCV 92, Plt 423, ANC 7.1, ALC 7.5, AMC 1.1             Assessment/Plan   Ms. 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    1. 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 A BCR ABL PCR was obtained which was <0.001% as well as a flow cytometry which was concerning for a monotypical CD5+ B cell population with nonspecific immunophenotype, but significant for a variant B cell SLL or mantle cell lymphoma that could not be ruled out. CLL FISH was then obtained which was negative and CCND1/IGH - t(11:14) was not detected. Given her leukocytosis and thrombocytosis I did assess for JAK2 V617 and JAK2 exon 12 mutation which were negative. A CT N/C/A/P was obtained showing left upper lobe airspace disease that was concerning for pneumonia and small lymph nodes that were below threshold otherwise no other lymphadenopathy seen, she did have fatty liver disease but no splenomegaly. Acute hepatitis panel and HIV test were also nonreactive. Given the findings of her flow cytometry and thus far negative workup I performed a bone marrow biopsy to  assess for an underlying hematological disorder that may be contributing. There was continued presence of a monotypic B cell population which was around 2% of leukocytes however there was no evidence of a hematological neoplasia or overt lymphoma that was seen. She also had an abnormal female karyotype which is not associated with any particular hematological disorder but can be indicative of a neoplastic process in the marrow. 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 Given the negative findings of her CT scan I did obtain a PET scan to assess for hypermetabolic lymphadenopathy, which was negative for lymphadenopathy and therefore I will continue to monitor complete blood counts to assess for progression of B cell lymphocytosis of undetermined significance. 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    2. Decreased Immunoglobulins/Recurrent infections   Given her recurrent infections I also obtained an immunoglobulin panel to further evaluate IgG was slightly low however >400. Patient was previously referred to an immunologist by Dr. Hahn office however she reports that she never received an appointment. Will re-refer to see if she may benefit from IVIG or further evaluation.    3. Lung infiltrate  Patient did have pneumonia on last scan and most recent PET shows inflammatory alveolar changes. Given her ongoing symptoms and findings on CT scan I did refer her to pulmonary for further evaluation and she has been following with Dr. Acuna and has undergone bronchoscopy. She has also been seen by Dr. Quintero in CT surgery in regards to these abnormalities.     4.  Tobacco Abuse   She has been advised of the possible detrimental health affects on what tobacco abuse may have on ones health, and was again counseled on tobacco cessation. She currently has cut down from 1/2ppd to 8 cigarettes daily and is currently utilizing nicotine patches.       I will have Ms. Stein return in follow up appointment in 4 months. She understands that should she have any questions or concerns prior to her appointment she should give us a call at any time. It was a pleasure to see Ms. Stein in clinic today, thank you for allowing me to participate in the care of this patient.     I spent 25 minutes in regards to this patient's care today. More than 18 minutes of the time was spent in direct interaction explaining and discussing the above problems with the patient.       Apple Menezes MD  04/24/17  10:49 AM

## 2017-04-28 ENCOUNTER — TRANSCRIBE ORDERS (OUTPATIENT)
Dept: ADMINISTRATIVE | Facility: HOSPITAL | Age: 52
End: 2017-04-28

## 2017-05-18 ENCOUNTER — HOSPITAL ENCOUNTER (OUTPATIENT)
Dept: MAMMOGRAPHY | Facility: HOSPITAL | Age: 52
End: 2017-05-18

## 2017-07-10 DIAGNOSIS — J18.9 RECURRENT PNEUMONIA: Primary | ICD-10-CM

## 2017-07-24 ENCOUNTER — TRANSCRIBE ORDERS (OUTPATIENT)
Dept: ADMINISTRATIVE | Facility: HOSPITAL | Age: 52
End: 2017-07-24

## 2017-07-24 DIAGNOSIS — J18.9 RECURRENT PNEUMONIA: Primary | ICD-10-CM

## 2017-07-25 ENCOUNTER — OFFICE VISIT (OUTPATIENT)
Dept: PULMONOLOGY | Facility: CLINIC | Age: 52
End: 2017-07-25

## 2017-07-25 VITALS
OXYGEN SATURATION: 92 % | HEART RATE: 95 BPM | WEIGHT: 252 LBS | DIASTOLIC BLOOD PRESSURE: 80 MMHG | BODY MASS INDEX: 34.13 KG/M2 | TEMPERATURE: 98.1 F | SYSTOLIC BLOOD PRESSURE: 132 MMHG | HEIGHT: 72 IN

## 2017-07-25 DIAGNOSIS — R06.02 SHORTNESS OF BREATH: ICD-10-CM

## 2017-07-25 DIAGNOSIS — G47.33 OBSTRUCTIVE SLEEP APNEA: Primary | ICD-10-CM

## 2017-07-25 DIAGNOSIS — F17.200 SMOKER: ICD-10-CM

## 2017-07-25 DIAGNOSIS — J18.9 RECURRENT PNEUMONIA: ICD-10-CM

## 2017-07-25 DIAGNOSIS — R05.9 COUGH: ICD-10-CM

## 2017-07-25 PROCEDURE — 99214 OFFICE O/P EST MOD 30 MIN: CPT | Performed by: NURSE PRACTITIONER

## 2017-07-25 PROCEDURE — 99407 BEHAV CHNG SMOKING > 10 MIN: CPT | Performed by: NURSE PRACTITIONER

## 2017-07-25 RX ORDER — ALBUTEROL SULFATE 90 UG/1
2 AEROSOL, METERED RESPIRATORY (INHALATION) EVERY 4 HOURS PRN
Qty: 1 INHALER | Refills: 11 | Status: SHIPPED | OUTPATIENT
Start: 2017-07-25 | End: 2018-11-09

## 2017-07-25 NOTE — PROGRESS NOTES
Interval history since last visit: None    Recent hospitalizations: None    Investigations (imaging, PFT's, labs, sleep study, record requests, etc.) Labs    Have you had the Influenza Vaccine? yes    Would you like to receive this Vaccine today? no    Have you had the Pneumonia Vaccine?  yes   Would you like to receive this Vaccine today? no    Subjective    Lynette Stein presents for the following Shortness of Breath      History of Present Illness     Ms. Stein is here today to follow-up on shortness of breath.  She states that since her last visit she has been feeling well.  She reports that she had some lab work done at [facility].  We'll see if we can obtain records for these.  She states that she is short of breath but it is no different than her baseline.  She reports no recent illnesses or hospitalizations.  Review of Systems   Constitutional: Negative for activity change, fatigue and unexpected weight change.   HENT: Negative for congestion, postnasal drip and rhinorrhea.    Respiratory: Positive for cough, shortness of breath and wheezing. Negative for apnea and chest tightness.    Cardiovascular: Negative for chest pain and palpitations.   Gastrointestinal: Negative for nausea.   Allergic/Immunologic: Negative for environmental allergies.   Psychiatric/Behavioral: Negative for agitation and confusion.       Active Problems:  Problem List Items Addressed This Visit     Obstructive sleep apnea - Primary    Shortness of breath    Relevant Orders    Full Pulmonary Function Test With Bronchodilator    Smoker    Cough    Relevant Orders    Full Pulmonary Function Test With Bronchodilator    Recurrent pneumonia    Relevant Medications    tiotropium (SPIRIVA HANDIHALER) 18 MCG per inhalation capsule    VENTOLIN  (90 BASE) MCG/ACT inhaler          Past Medical History:  Past Medical History:   Diagnosis Date   • Anxiety and depression    • Arthritis    • Chronic cystitis    • Colitis    • COPD  (chronic obstructive pulmonary disease)    • Diabetes mellitus    • Fibromyalgia    • GERD (gastroesophageal reflux disease)    • Hyperlipidemia    • Hypertension    • Pulmonary infiltrate    • UTI (urinary tract infection)        Family History:  Family History   Problem Relation Age of Onset   • Cancer Other    • Diabetes Other    • Gout Other    • Heart disease Other    • Hypertension Other    • Other Other      osteoarthritis,osteoporosis,rheumatoid arthritis   • COPD Mother    • Heart failure Mother    • Diabetes Mother    • Jaundice Father    • Heart failure Father    • Cancer Sister        Social History:  Social History   Substance Use Topics   • Smoking status: Current Every Day Smoker     Packs/day: 0.50     Years: 40.00     Types: Cigarettes   • Smokeless tobacco: Never Used      Comment: 1/2 ppd   • Alcohol use No       Current Medications:  Current Outpatient Prescriptions   Medication Sig Dispense Refill   • estradiol (ESTRACE) 2 MG tablet Take 2 mg by mouth Daily.     • estrogens, conjugated, (PREMARIN) 0.45 MG tablet Take  by mouth.     • fenofibrate (TRICOR) 145 MG tablet Take  by mouth.     • hyoscyamine (ANASPAZ,LEVSIN) 0.125 MG tablet Take 0.125 mg by mouth Every 4 (Four) Hours As Needed for cramping.     • ibuprofen (ADVIL,MOTRIN) 800 MG tablet Take 800 mg by mouth Every 6 (Six) Hours As Needed for mild pain (1-3).     • lisinopril (PRINIVIL,ZESTRIL) 30 MG tablet Take  by mouth.     • LORazepam (ATIVAN) 2 MG tablet Take  by mouth.     • metFORMIN (GLUCOPHAGE) 250 MG half tablet Take 250 mg by mouth 2 (Two) Times a Day With Meals.     • metoprolol tartrate (LOPRESSOR) 50 MG tablet Take 50 mg by mouth 2 (Two) Times a Day.     • metroNIDAZOLE (FLAGYL) 500 MG tablet Take 500 mg by mouth 3 (Three) Times a Day.     • phenazopyridine (PYRIDIUM) 200 MG tablet Take  by mouth.     • pravastatin (PRAVACHOL) 40 MG tablet Take 40 mg by mouth Daily.     • pregabalin (LYRICA) 300 MG capsule Take  by mouth.    "  • QUEtiapine XR (SEROquel XR) 200 MG 24 hr tablet Take 200 mg by mouth Every Night.     • rosuvastatin (CRESTOR) 5 MG tablet Take  by mouth.     • sertraline (ZOLOFT) 100 MG tablet Take 100 mg by mouth Daily.     • solifenacin (VESICARE) 5 MG tablet Take  by mouth.     • terconazole (TERAZOL 3) 0.8 % vaginal cream Insert 1 applicator into the vagina Every Night.     • tiotropium (SPIRIVA HANDIHALER) 18 MCG per inhalation capsule Place 1 capsule into inhaler and inhale Daily. 30 capsule 11   • traMADol (ULTRAM) 50 MG tablet Take 50 mg by mouth Every 6 (Six) Hours As Needed for moderate pain (4-6).     • UCERIS 9 MG tablet sustained-release 24 hour   5   • VENTOLIN  (90 BASE) MCG/ACT inhaler Inhale 2 puffs Every 4 (Four) Hours As Needed for Wheezing. 1 inhaler 11   • nicotine ([employer] NICOTINE) 7 MG/24HR patch Place 1 patch on the skin Daily. 30 patch 1     No current facility-administered medications for this visit.        Allergies:  Allergies   Allergen Reactions   • Penicillins        Vitals:  /80 (BP Location: Left arm, Patient Position: Sitting, Cuff Size: Adult)  Pulse 95  Temp 98.1 °F (36.7 °C) (Oral)   Ht 72\" (182.9 cm)  Wt 252 lb (114 kg)  SpO2 92%  BMI 34.18 kg/m2    Imaging:    Imaging Results (most recent)     None          Pulmonary Functions Testing Results:    No results found for: FEV1, FVC, CIO8EGT, TLC, DLCO    Results for orders placed or performed in visit on 04/24/17   CBC Auto Differential   Result Value Ref Range    WBC 17.15 (H) 4.50 - 12.50 10*3/mm3    RBC 4.70 4.20 - 5.40 10*6/mm3    Hemoglobin 13.8 12.0 - 16.0 g/dL    Hematocrit 42.5 37.0 - 47.0 %    MCV 90.4 80.0 - 94.0 fL    MCH 29.4 27.0 - 33.0 pg    MCHC 32.5 (L) 33.0 - 37.0 g/dL    RDW 13.7 11.5 - 14.5 %    RDW-SD 44.5 37.0 - 54.0 fl    MPV 10.9 (H) 6.0 - 10.0 fL    Platelets 425 (H) 130 - 400 10*3/mm3    Neutrophil % 52.5 30.0 - 70.0 %    Lymphocyte % 38.3 21.0 - 51.0 %    Monocyte % 6.2 0.0 - 10.0 %    Eosinophil % " 1.7 0.0 - 5.0 %    Basophil % 0.3 0.0 - 2.0 %    Immature Grans % 1.0 (H) 0.0 - 0.5 %    Neutrophils, Absolute 8.99 (H) 1.40 - 6.50 10*3/mm3    Lymphocytes, Absolute 6.57 (H) 1.00 - 3.00 10*3/mm3    Monocytes, Absolute 1.07 (H) 0.10 - 0.90 10*3/mm3    Eosinophils, Absolute 0.29 0.00 - 0.70 10*3/mm3    Basophils, Absolute 0.05 0.00 - 0.30 10*3/mm3    Immature Grans, Absolute 0.18 (H) 0.00 - 0.03 10*3/mm3       Objective   Physical Exam     GENERAL APPEARANCE: Well developed, well nourished, alert and cooperative, and appears to be in no acute distress.    HEAD: normocephalic.    EYES: PERRL, EOMI. Fundi normal, vision is grossly intact.    THROAT: Oral cavity and pharynx normal. No inflammation, swelling, exudate, or lesions.     NECK: Neck supple.     CARDIAC: Normal S1 and S2. No S3, S4 or murmurs. Rhythm is regular.     RESPIRATORY: Clear to auscultation without rales, rhonchi, wheezing or diminished breath sounds.    GI: Positive bowel sounds. Soft, nondistended, nontender.     MUSCULOSKELETAL: No significant deformity or joint abnormality. No edema. Peripheral pulses intact. No varicosities.    NEUROLOGICAL: Strength and sensation symmetric and intact throughout.     PSYCHIATRIC: The mental examination revealed the patient was oriented to person, place, and time.       Assessment/Plan        Shortness of breath: Likely related to recurrent pneumonia, body habitus, smoking history, and deconditioning.    We will repeat PFT.  PFT that was done last year shows normal spirometry.    Obstructive sleep apnea: Reports that she is not using her CPAP machine at this time the cause it ayo her.  Asked patient to call her [employer] company to bring her a different type of mask to see if that helps.    Educated patient on the complications associated with untreated sleep apnea -- that it increases risk of MI, stroke, depression, hypertension, heart failure, arrhythmias, coronary artery disease, and potential death due to  complication of that mentioned previously.    Also patient was educated  about the hazards of driving if there sleep deprived and has sleep apnea.  Was instructed not to involving driving or any activity which involves higher level of mental function- like operating a machine-  if they are sleep deprived and not  Wearing  their CPAP and sleep apnea is not treated.        Smoker:  Patient reports that she tried to quit using patches, but that she did not do well with this.  Smoking cessation counseling done for over 10 minutes.      Obesity:  Discussed diet and exercise with patient.  Encouraged her to create an exercise regimen that she can follow and she's healthy foods as her shortness of breath could likely be related to her body habitus.    Recurrent pneumonia: Patient's IgG level is low.  She still has not seen an immunologist.  Discussed this with her in detail.  Right now she does not want to go see anyone else she wants to see if she gets pneumonia again.  Educated her that she is immunodeficient and could likely be treated so that her risk of getting pneumonia again decreases.    Patient follows with Dr. Menezes for a history of a blood disorder.               ICD-10-CM ICD-9-CM   1. Obstructive sleep apnea G47.33 327.23   2. Shortness of breath R06.02 786.05   3. Cough R05 786.2   4. Recurrent pneumonia J18.9 486   5. Smoker F17.200 305.1       Return in about 3 months (around 10/25/2017).

## 2017-07-28 ENCOUNTER — APPOINTMENT (OUTPATIENT)
Dept: CT IMAGING | Facility: HOSPITAL | Age: 52
End: 2017-07-28

## 2017-07-28 ENCOUNTER — HOSPITAL ENCOUNTER (OUTPATIENT)
Dept: CT IMAGING | Facility: HOSPITAL | Age: 52
Discharge: HOME | End: 2017-07-28
Admitting: PHYSICIAN ASSISTANT

## 2017-07-28 DIAGNOSIS — J18.9 RECURRENT PNEUMONIA: ICD-10-CM

## 2017-07-28 LAB — CREAT BLDA-MCNC: 1 MG/DL (ref 0.6–1.3)

## 2017-07-28 PROCEDURE — 71270 CT THORAX DX C-/C+: CPT

## 2017-07-28 PROCEDURE — 0 IOPAMIDOL 61 % SOLUTION: Performed by: PHYSICIAN ASSISTANT

## 2017-07-28 PROCEDURE — 71270 CT THORAX DX C-/C+: CPT | Performed by: RADIOLOGY

## 2017-07-28 PROCEDURE — 82565 ASSAY OF CREATININE: CPT

## 2017-07-28 RX ADMIN — IOPAMIDOL 100 ML: 612 INJECTION, SOLUTION INTRAVENOUS at 10:45

## 2017-08-02 ENCOUNTER — OFFICE VISIT (OUTPATIENT)
Dept: CARDIAC SURGERY | Facility: CLINIC | Age: 52
End: 2017-08-02

## 2017-08-02 VITALS
HEIGHT: 72 IN | SYSTOLIC BLOOD PRESSURE: 107 MMHG | BODY MASS INDEX: 34 KG/M2 | OXYGEN SATURATION: 94 % | DIASTOLIC BLOOD PRESSURE: 68 MMHG | HEART RATE: 88 BPM | WEIGHT: 251 LBS

## 2017-08-02 DIAGNOSIS — R91.8 PULMONARY INFILTRATES: ICD-10-CM

## 2017-08-02 DIAGNOSIS — R93.89 ABNORMAL CT SCAN, CHEST: Primary | ICD-10-CM

## 2017-08-02 PROCEDURE — 99406 BEHAV CHNG SMOKING 3-10 MIN: CPT | Performed by: THORACIC SURGERY (CARDIOTHORACIC VASCULAR SURGERY)

## 2017-08-02 PROCEDURE — 99213 OFFICE O/P EST LOW 20 MIN: CPT | Performed by: THORACIC SURGERY (CARDIOTHORACIC VASCULAR SURGERY)

## 2017-08-02 RX ORDER — ONDANSETRON 4 MG/1
4 TABLET, FILM COATED ORAL EVERY 8 HOURS PRN
COMMUNITY
End: 2022-01-01

## 2017-08-02 RX ORDER — CETIRIZINE HYDROCHLORIDE 10 MG/1
10 TABLET ORAL DAILY
Status: ON HOLD | COMMUNITY
End: 2018-05-31 | Stop reason: ALTCHOICE

## 2017-08-02 RX ORDER — OMEPRAZOLE 40 MG/1
40 CAPSULE, DELAYED RELEASE ORAL 2 TIMES DAILY
COMMUNITY
End: 2022-01-01 | Stop reason: HOSPADM

## 2017-08-02 RX ORDER — SULFAMETHOXAZOLE AND TRIMETHOPRIM 800; 160 MG/1; MG/1
1 TABLET ORAL 2 TIMES DAILY
COMMUNITY
End: 2018-05-22

## 2017-08-02 RX ORDER — FLUCONAZOLE 150 MG/1
150 TABLET ORAL ONCE
COMMUNITY
End: 2019-11-19

## 2017-08-02 RX ORDER — FEXOFENADINE HCL AND PSEUDOEPHEDRINE HCI 60; 120 MG/1; MG/1
1 TABLET, EXTENDED RELEASE ORAL 2 TIMES DAILY
COMMUNITY
End: 2019-12-17

## 2017-08-02 RX ORDER — HYDROCORTISONE ACETATE 25 MG/1
25 SUPPOSITORY RECTAL 2 TIMES DAILY
COMMUNITY
End: 2022-01-01

## 2017-08-02 RX ORDER — OXYBUTYNIN CHLORIDE 10 MG/1
10 TABLET, EXTENDED RELEASE ORAL DAILY
COMMUNITY
End: 2022-01-01 | Stop reason: HOSPADM

## 2017-09-04 PROBLEM — R91.8 PULMONARY INFILTRATES: Status: ACTIVE | Noted: 2017-09-04

## 2017-09-11 ENCOUNTER — TRANSCRIBE ORDERS (OUTPATIENT)
Dept: ADMINISTRATIVE | Facility: HOSPITAL | Age: 52
End: 2017-09-11

## 2017-09-11 DIAGNOSIS — Z12.31 VISIT FOR SCREENING MAMMOGRAM: Primary | ICD-10-CM

## 2017-09-13 ENCOUNTER — OFFICE VISIT (OUTPATIENT)
Dept: ONCOLOGY | Facility: CLINIC | Age: 52
End: 2017-09-13

## 2017-09-13 VITALS
OXYGEN SATURATION: 95 % | SYSTOLIC BLOOD PRESSURE: 172 MMHG | BODY MASS INDEX: 34.45 KG/M2 | TEMPERATURE: 97.6 F | DIASTOLIC BLOOD PRESSURE: 91 MMHG | RESPIRATION RATE: 16 BRPM | WEIGHT: 254 LBS | HEART RATE: 94 BPM

## 2017-09-13 DIAGNOSIS — R76.8 LOW IMMUNOGLOBULIN LEVEL: ICD-10-CM

## 2017-09-13 DIAGNOSIS — D75.839 THROMBOCYTOSIS: ICD-10-CM

## 2017-09-13 DIAGNOSIS — D72.820 LYMPHOCYTOSIS: Primary | ICD-10-CM

## 2017-09-13 LAB
ALBUMIN SERPL-MCNC: 4.4 G/DL (ref 3.5–5)
ALBUMIN/GLOB SERPL: 1.7 G/DL (ref 1.5–2.5)
ALP SERPL-CCNC: 132 U/L (ref 35–104)
ALT SERPL W P-5'-P-CCNC: 75 U/L (ref 10–36)
ANION GAP SERPL CALCULATED.3IONS-SCNC: 7 MMOL/L (ref 3.6–11.2)
AST SERPL-CCNC: 55 U/L (ref 10–30)
BASOPHILS # BLD AUTO: 0.05 10*3/MM3 (ref 0–0.3)
BASOPHILS NFR BLD AUTO: 0.3 % (ref 0–2)
BILIRUB SERPL-MCNC: 0.2 MG/DL (ref 0.2–1.8)
BUN BLD-MCNC: 10 MG/DL (ref 7–21)
BUN/CREAT SERPL: 15.4 (ref 7–25)
CALCIUM SPEC-SCNC: 9.4 MG/DL (ref 7.7–10)
CHLORIDE SERPL-SCNC: 105 MMOL/L (ref 99–112)
CO2 SERPL-SCNC: 27 MMOL/L (ref 24.3–31.9)
CREAT BLD-MCNC: 0.65 MG/DL (ref 0.43–1.29)
DEPRECATED RDW RBC AUTO: 46.6 FL (ref 37–54)
EOSINOPHIL # BLD AUTO: 0.24 10*3/MM3 (ref 0–0.7)
EOSINOPHIL NFR BLD AUTO: 1.5 % (ref 0–5)
ERYTHROCYTE [DISTWIDTH] IN BLOOD BY AUTOMATED COUNT: 14.6 % (ref 11.5–14.5)
GFR SERPL CREATININE-BSD FRML MDRD: 96 ML/MIN/1.73
GLOBULIN UR ELPH-MCNC: 2.6 GM/DL
GLUCOSE BLD-MCNC: 125 MG/DL (ref 70–110)
HCT VFR BLD AUTO: 39.6 % (ref 37–47)
HGB BLD-MCNC: 12.4 G/DL (ref 12–16)
IMM GRANULOCYTES # BLD: 0.12 10*3/MM3 (ref 0–0.03)
IMM GRANULOCYTES NFR BLD: 0.7 % (ref 0–0.5)
LYMPHOCYTES # BLD AUTO: 5.69 10*3/MM3 (ref 1–3)
LYMPHOCYTES NFR BLD AUTO: 34.7 % (ref 21–51)
MCH RBC QN AUTO: 28.3 PG (ref 27–33)
MCHC RBC AUTO-ENTMCNC: 31.3 G/DL (ref 33–37)
MCV RBC AUTO: 90.4 FL (ref 80–94)
MONOCYTES # BLD AUTO: 1.14 10*3/MM3 (ref 0.1–0.9)
MONOCYTES NFR BLD AUTO: 6.9 % (ref 0–10)
NEUTROPHILS # BLD AUTO: 9.17 10*3/MM3 (ref 1.4–6.5)
NEUTROPHILS NFR BLD AUTO: 55.9 % (ref 30–70)
OSMOLALITY SERPL CALC.SUM OF ELEC: 278.1 MOSM/KG (ref 273–305)
PLATELET # BLD AUTO: 477 10*3/MM3 (ref 130–400)
PMV BLD AUTO: 10.5 FL (ref 6–10)
POTASSIUM BLD-SCNC: 3.8 MMOL/L (ref 3.5–5.3)
PROT SERPL-MCNC: 7 G/DL (ref 6–8)
RBC # BLD AUTO: 4.38 10*6/MM3 (ref 4.2–5.4)
SODIUM BLD-SCNC: 139 MMOL/L (ref 135–153)
WBC NRBC COR # BLD: 16.41 10*3/MM3 (ref 4.5–12.5)

## 2017-09-13 PROCEDURE — 99214 OFFICE O/P EST MOD 30 MIN: CPT | Performed by: INTERNAL MEDICINE

## 2017-09-13 PROCEDURE — 85025 COMPLETE CBC W/AUTO DIFF WBC: CPT | Performed by: INTERNAL MEDICINE

## 2017-09-13 PROCEDURE — 80053 COMPREHEN METABOLIC PANEL: CPT | Performed by: INTERNAL MEDICINE

## 2017-09-13 NOTE — PROGRESS NOTES
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    Ms. Stein reports that she was discovered to have leukocytosis during the time of her initial consultation but believes that she may have actually had an elevated white blood cell count for quite some time prior to that visit. When she initially saw me she was diagnosed with bronchitis and had recently completed a course of antibiotic treatment. She has a history of chronic cystitis and is on antibiotics at least once a month for urinary tract infections. She also has issues with chronic abdominal pain and diarrhea and was diagnosed with collagenous colitis in 2015 and has been following with Dr. Steel. Upon further review of our medical system she has had thrombocytosis since 2007 and a leukocytosis since 2013 which at times has been normal. I did preform a flow cytometry as well as a peripheral blood smear which was significant for a monotypical CD5+/- B cell population (~4% of leukocytes) with no specific immunophenotype, but variant B cell SLL or mantle cell lymphoma could not be ruled out. Given these findings I did perform a bone marrow biopsy to assess for an underlying hematological disorder. There was a continued presence of a monotypic B cell population which was around 2% of leukocytes however there was no evidence of a hematological neoplasia or overt lymphoma that was seen.     Since has had frequent upper respiratory tract infections as well as pneumonias and recurrent urinary tract infections in the last one year.  However she notes that this has improved in the last couple of months and has not required any antibiotics. She denies of any fevers, weight loss, or lymphadenopathy. She does feel hot all the time in which she wears a fan around her neck and does complain of night sweats. She has been following with Dr. Quintero and Dr. Acuna for bilateral lung infiltrates.      Allergies   Allergen Reactions   • Penicillins        Past Medical History:   Diagnosis Date   • Anxiety and depression    • Arthritis    • Chronic cystitis    • Colitis    • COPD (chronic obstructive pulmonary disease)    • Diabetes mellitus    • Fibromyalgia    • GERD (gastroesophageal reflux disease)    • Hemorrhoids    • Hyperlipidemia    • Hypertension    • Pulmonary infiltrate    • UTI (urinary tract infection)        Past Surgical History:   Procedure Laterality Date   • BRONCHOSCOPY N/A 12/1/2016    Procedure: BRONCHOSCOPY;  Surgeon: Teto Acuna MD;  Location: [facility];  Service:    • CHOLECYSTECTOMY     • CYSTOSCOPY BLADDER BIOPSY     • FACIAL RECONSTRUCTION SURGERY     • HAND SURGERY Right     middle and ring finger   • HYSTERECTOMY  2013   • PILONIDAL CYST / SINUS EXCISION         Social History     Social History   • Marital status:      Spouse name: N/A   • Number of children: 0   • Years of education: N/A     Occupational History   •  Disabled          Social History Main Topics   • Smoking status: Current Every Day Smoker     Packs/day: 0.50     Years: 40.00     Types: Cigarettes   • Smokeless tobacco: Never Used      Comment: 1/2 ppd   • Alcohol use No   • Drug use: No   • Sexual activity: Defer     Other Topics Concern   • Not on file     Social History Narrative    Lives in [facility] with [facility]       Family History   Problem Relation Age of Onset   • Cancer Other    • Diabetes Other    • Gout Other    • Heart disease Other    • Hypertension Other    • Other Other      osteoarthritis,osteoporosis,rheumatoid arthritis    • COPD Mother    • Heart failure Mother    • Diabetes Mother    • Jaundice Father    • Heart failure Father    • Cancer Sister          Current Outpatient Prescriptions:   •  cetirizine (zyrTEC) 10 MG tablet, Take 10 mg by mouth Daily., Disp: , Rfl:   •  DICLOFENAC SODIUM PO, Take  by mouth. 75 mg, Disp: , Rfl:   •  estradiol (ESTRACE) 2 MG tablet, Take 2 mg by mouth Daily., Disp: , Rfl:   •  estrogens, conjugated, (PREMARIN) 0.45 MG tablet, Take  by mouth., Disp: , Rfl:   •  fenofibrate (TRICOR) 145 MG tablet, Take  by mouth., Disp: , Rfl:   •  fexofenadine-pseudoephedrine (ALLEGRA-D)  MG per 12 hr tablet, Take 1 tablet by mouth 2 (Two) Times a Day., Disp: , Rfl:   •  fluconazole (DIFLUCAN) 150 MG tablet, Take 150 mg by mouth 1 (One) Time., Disp: , Rfl:   •  hydrocortisone (ANUSOL-HC) 25 MG suppository, Insert 25 mg into the rectum 2 (Two) Times a Day., Disp: , Rfl:   •  hyoscyamine (ANASPAZ,LEVSIN) 0.125 MG tablet, Take 0.125 mg by mouth Every 4 (Four) Hours As Needed for cramping., Disp: , Rfl:   •  ibuprofen (ADVIL,MOTRIN) 800 MG tablet, Take 800 mg by mouth Every 6 (Six) Hours As Needed for mild pain (1-3)., Disp: , Rfl:   •  lisinopril (PRINIVIL,ZESTRIL) 30 MG tablet, Take  by mouth., Disp: , Rfl:   •  LORazepam (ATIVAN) 2 MG tablet, Take  by mouth., Disp: , Rfl:   •  metFORMIN (GLUCOPHAGE) 250 MG half tablet, Take 250 mg by mouth 2 (Two) Times a Day With Meals., Disp: , Rfl:   •  metoprolol tartrate (LOPRESSOR) 50 MG tablet, Take 50 mg by mouth 2 (Two) Times a Day., Disp: , Rfl:   •  metroNIDAZOLE (FLAGYL) 500 MG tablet, Take 500 mg by mouth 3 (Three) Times a Day., Disp: , Rfl:   •  mupirocin (BACTROBAN) 2 % ointment, Apply  topically 3 (Three) Times a Day., Disp: , Rfl:   •  nicotine ([employer] NICOTINE) 7 MG/24HR patch, Place 1 patch on the skin Daily., Disp: 30 patch, Rfl: 1  •  omeprazole (priLOSEC) 40 MG capsule, Take 40 mg by mouth Daily., Disp: , Rfl:   •  ondansetron (ZOFRAN) 4 MG tablet, Take 4 mg  by mouth Every 8 (Eight) Hours As Needed for Nausea or Vomiting., Disp: , Rfl:   •  oxybutynin XL (DITROPAN-XL) 10 MG 24 hr tablet, Take 10 mg by mouth Daily., Disp: , Rfl:   •  phenazopyridine (PYRIDIUM) 200 MG tablet, Take  by mouth., Disp: , Rfl:   •  pravastatin (PRAVACHOL) 40 MG tablet, Take 40 mg by mouth Daily., Disp: , Rfl:   •  pregabalin (LYRICA) 300 MG capsule, Take  by mouth., Disp: , Rfl:   •  QUEtiapine XR (SEROquel XR) 200 MG 24 hr tablet, Take 200 mg by mouth Every Night., Disp: , Rfl:   •  rosuvastatin (CRESTOR) 5 MG tablet, Take  by mouth., Disp: , Rfl:   •  sertraline (ZOLOFT) 100 MG tablet, Take 100 mg by mouth Daily., Disp: , Rfl:   •  solifenacin (VESICARE) 5 MG tablet, Take  by mouth., Disp: , Rfl:   •  sulfamethoxazole-trimethoprim (BACTRIM DS,SEPTRA DS) 800-160 MG per tablet, Take 1 tablet by mouth 2 (Two) Times a Day., Disp: , Rfl:   •  terconazole (TERAZOL 3) 0.8 % vaginal cream, Insert 1 applicator into the vagina Every Night., Disp: , Rfl:   •  tiotropium (SPIRIVA HANDIHALER) 18 MCG per inhalation capsule, Place 1 capsule into inhaler and inhale Daily., Disp: 30 capsule, Rfl: 11  •  traMADol (ULTRAM) 50 MG tablet, Take 50 mg by mouth Every 6 (Six) Hours As Needed for moderate pain (4-6)., Disp: , Rfl:   •  UCERIS 9 MG tablet sustained-release 24 hour, , Disp: , Rfl: 5  •  VENTOLIN  (90 BASE) MCG/ACT inhaler, Inhale 2 puffs Every 4 (Four) Hours As Needed for Wheezing., Disp: 1 inhaler, Rfl: 11        Review of Systems  Constitutional: no fevers and chills, no night sweats or weight loss.   HEENT: no headaches, vision changes or hearing changes, no sinus drainage, sore throat.   Cardiovascular:  No palpitations, chest pain, syncopal episodes or edema.   Pulmonary:  No SOA or cough  Gastrointestinal:  No nausea or vomiting.  +chronic changes in bowel movements. No change in appetite. +chronic abdominal pain. No melena or hematochezia.   Genitourinary:  No hematuria, chronic  UTIs  Musculoskeletal:  +chronic arthritic pain  Skin: No rashes or pruritus.   Endocrine:  No hot flashes or chills   Hematologic:  No history of free bleeding, spontaneous bleeding or clotting problems. No lymphadenopathy.    Immunologic:  +seasonal allergies and frequent infections.   Neurologic: No numbness, tingling, or weakness.   Psychiatric:  +anxiety      Physical Exam  Vital Signs: These were reviewed and listed as per patient’s electronic medical chart  Vitals:    09/13/17 1132   BP: 172/91   Pulse: 94   Resp: 16   Temp: 97.6 °F (36.4 °C)   SpO2: 95%     General: Awake, alert and oriented, in no distress, obese   HEENT: Head is atraumatic, normocephalic, extraocular movements full, oropharynx clear, no scleral icterus, pink moist mucous membranes   Neck: supple, no jvd, lymphadenopathy or masses   Cardiovascular: regular rate and rhythm without murmurs, rubs or gallops   Pulmonary: clear to auscultation bilaterally   Abdomen: soft, non tender no guarding, non-distended, normal active bowel sounds present, no organomegaly however difficult to palpate given size   Extremities: No clubbing, cyanosis or edema  Neurologic: Mental status as above, alert, awake and oriented, grossly non-focal exam   Skin: warm, dry, intact  Lymph nodes: no cervical, supraclavicular lymph nodes      Labs & Studies:   PATHOLOGY:   04/11/16: BCR ABL PCR: <0.001%   04/11/16: Peripheral Smear: Leukocytosis with absolute monocytosis and lymphocytosis. Neutrophils   are also increased in absolute numbers. No significant atypia is noted in the circulating white blood cells.   The findings combined with thrombocytosis suggests possible response to infection/inflammation.   Essentially normochromic, normocytic red blood cells without anemia. Mild thrombocytosis with normal   platelet morphology.   04/11/16: Flow Cytometry PB: Monotypical CD5+/- B cell population (~4% of leukocytes). The   immunophenotype is not specific, but variant B  cell SLL or mantle cell lymphoma cannot be ruled out.   16: CLL FISH Panel: The CLL FISH panel analysis was normal. There were no cells with CCND1-IGH   fusion.   16: JAK2 V617F Mutation: Negative   16: COURTNEY Exon 12 Mutation: Negative   16: Bone Marrow Aspirate Flow Cytometry: Continued presence of a monotypic B cell population (~2% of leukocytes)   Fish Report BM: No assay specific abnormalities detected by CLL and MPN/CML panels   BM Aspira te and clot sections: No morphologic diagnostic evidence of hematopoietic neoplasia or overt lymphoma identified (~2% monotypic B cell population with a nonspecific immunophenotype by flow analysis). 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 Submandibular glands are symmetric. No evidence of neck adenopathy. No prevertebral soft tissue swelling is seen. No extrinsic deviation of the airway. IMPRESSION- Unremarkable CT appearance of the neck. No adenopathy.     16: CT Chest: Small mediastinal lymph nodes below threshold. No pericardial or pleural effusion is present. The lung window setting images show airspace disease in the left upper lobe which has a CT appearance suggestive of pneumonia. No focal mass identified. IMPRESSION- Airspace disease in the left upper lobe on image #63 and 64 of the axial series. CT appearance is most suggestive of pneumonia. I would recommend follow-up to resolution.     16: CT A/P: Lung bases are clear, no pleural effusions. Liver is homogeneous, but decreased in attenuation compatible with fatty infiltration of the liver. The spleen is homogeneous. No peripancreatic stranding or pancreatic head  mass. No adrenal enlargement. No evidence of hydronephrosis or hydroureter or distal ureteral stones. No free fluid or walled off fluid collections. No bowel wall thickening or mesenteric stranding. There is arthritic change in the spine. No mesenteric or retroperitoneal adenopathy is noted. IMPRESSION- No definitive source for the patient's symptoms identified on today's exam. Arthritic change in the lumbar spine.     16: PET/CT: The CT and PET images through the neck area were unremarkable. In the chest the CT showed no enlarged lymph nodes. There were some patchy areas of alveolar infiltrate in the upper lobes of both lungs that has somewhat of a groundglass appearance. These areas have some very minimal glucose uptake just above background. Radiographically this would be consistent with some inflammatory infiltrate in the lung. No lung nodules or pleural effusions are seen. In the abdomen the liver is enlarged but shows homogeneous glucose uptake. The spleen was normal. There was no adenopathy or hypermetabolic activity in the retroperitoneal or mesenteric lymph nodes. In the pelvis there were no masses or fluid collections. IMPRESSION: There are patchy areas of groundglass alveolar infiltrate in both upper lobes which show very mild increased glucose uptake slightly above background. These are in the 1 cm size range and felt to be inflammatory in nature.       LABWORK:   16: CBC: WBC 16.1, Hb 13.9, Hct 42.6, Plt 445, ANC 8.8, ALC 6.9, AMC 0.4   16: CRP: 0.71, ESR: 16   CBC: WBC 19.6, Hb 14.5, Hct 44.5, MCV 91, Plt 475, ANC 10, ALC 7.8, AMC 1.4   16: Beta-2 Microglobulin: 1.6, LDH: 128, Uric Acid: 7.2   CMP: BUN 8, Cr 0.65, Ca 9.6, AST 28, ALT 51, , TBil 0.5, TPro 7.4, Alb 4.5   CBC: WBC 13.8, Hb 13.6, Hct 41.6, MCV 90.4, Plt 447, ANC 8.3, ALC 4.4, AMC 0.9   16: Immunoglobulins: IgM: 75, Ig (L), IgA: 186   Acute Hepatitis Panel: Non reactive, HIV: Non reactive   CBC: WBC  14.5, Hb 12.8, Hct 40.8, MCV 93, Plt 426, ANC 5.8, ALC 7.6, AMC 0.8   05/25/16: CBC: WBC 16, Hb 13.5, Hct 41.4, MCV 92, Plt 423, ANC 7.1, ALC 7.5, AMC 1.1     Results for ELKIN YUAN (MRN [id number]) as of 9/15/2017 07:24   Ref. Range 9/13/2017 12:13   Glucose Latest Ref Range: 70 - 110 mg/dL 125 (H)   Sodium Latest Ref Range: 135 - 153 mmol/L 139   Potassium Latest Ref Range: 3.5 - 5.3 mmol/L 3.8   CO2 Latest Ref Range: 24.3 - 31.9 mmol/L 27.0   Chloride Latest Ref Range: 99 - 112 mmol/L 105   Anion Gap Latest Ref Range: 3.6 - 11.2 mmol/L 7.0   Creatinine Latest Ref Range: 0.43 - 1.29 mg/dL 0.65   BUN Latest Ref Range: 7 - 21 mg/dL 10   BUN/Creatinine Ratio Latest Ref Range: 7.0 - 25.0  15.4   Calcium Latest Ref Range: 7.7 - 10.0 mg/dL 9.4   eGFR Non African Amer Latest Ref Range: >60 mL/min/1.73 96   Alkaline Phosphatase Latest Ref Range: 35 - 104 U/L 132 (H)   Total Protein Latest Ref Range: 6.0 - 8.0 g/dL 7.0   ALT (SGPT) Latest Ref Range: 10 - 36 U/L 75 (H)   AST (SGOT) Latest Ref Range: 10 - 30 U/L 55 (H)   Total Bilirubin Latest Ref Range: 0.2 - 1.8 mg/dL 0.2   Albumin Latest Ref Range: 3.50 - 5.00 g/dL 4.40   Globulin Latest Units: gm/dL 2.6   A/G Ratio Latest Ref Range: 1.5 - 2.5 g/dL 1.7   Osmolality Calc Latest Ref Range: 273.0 - 305.0 mOsm/kg 278.1   WBC Latest Ref Range: 4.50 - 12.50 10*3/mm3 16.41 (H)   RBC Latest Ref Range: 4.20 - 5.40 10*6/mm3 4.38   Hemoglobin Latest Ref Range: 12.0 - 16.0 g/dL 12.4   Hematocrit Latest Ref Range: 37.0 - 47.0 % 39.6   RDW Latest Ref Range: 11.5 - 14.5 % 14.6 (H)   MCV Latest Ref Range: 80.0 - 94.0 fL 90.4   MCH Latest Ref Range: 27.0 - 33.0 pg 28.3   MCHC Latest Ref Range: 33.0 - 37.0 g/dL 31.3 (L)   MPV Latest Ref Range: 6.0 - 10.0 fL 10.5 (H)   Platelets Latest Ref Range: 130 - 400 10*3/mm3 477 (H)   RDW-SD Latest Ref Range: 37.0 - 54.0 fl 46.6   Neutrophil % Latest Ref Range: 30.0 - 70.0 % 55.9   Lymphocyte % Latest Ref Range: 21.0 - 51.0 % 34.7   Monocyte %  Latest Ref Range: 0.0 - 10.0 % 6.9   Eosinophil % Latest Ref Range: 0.0 - 5.0 % 1.5   Basophil % Latest Ref Range: 0.0 - 2.0 % 0.3   Immature Grans % Latest Ref Range: 0.0 - 0.5 % 0.7 (H)   Neutrophils, Absolute Latest Ref Range: 1.40 - 6.50 10*3/mm3 9.17 (H)   Lymphocytes, Absolute Latest Ref Range: 1.00 - 3.00 10*3/mm3 5.69 (H)   Monocytes, Absolute Latest Ref Range: 0.10 - 0.90 10*3/mm3 1.14 (H)   Eosinophils, Absolute Latest Ref Range: 0.00 - 0.70 10*3/mm3 0.24   Basophils, Absolute Latest Ref Range: 0.00 - 0.30 10*3/mm3 0.05   Immature Grans, Absolute Latest Ref Range: 0.00 - 0.03 10*3/mm3 0.12 (H)   IgA Latest Ref Range: 87 - 352 mg/dL 203   IgG Latest Ref Range: 700 - 1600 mg/dL 523 (L)   IgM Latest Ref Range: 26 - 217 mg/dL 71           Assessment/Plan   Ms. 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    1. 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 A BCR ABL PCR was obtained which was <0.001% as well as a flow cytometry which was concerning for a monotypical CD5+ B cell population with nonspecific immunophenotype, but significant for a variant B cell SLL or mantle cell lymphoma that could not be ruled out. CLL FISH was then obtained which was negative and CCND1/IGH - t(11:14) was not detected. Given her leukocytosis and thrombocytosis I did assess for JAK2 V617 and JAK2 exon 12 mutation which were negative. A CT N/C/A/P was obtained showing left upper lobe airspace disease that was concerning for pneumonia and small lymph nodes that were below threshold otherwise no other lymphadenopathy seen, she did have fatty liver disease but no splenomegaly. Acute hepatitis panel and  HIV test were also nonreactive. Given the findings of her flow cytometry and thus far negative workup I performed a bone marrow biopsy to assess for an underlying hematological disorder that may be contributing. There was continued presence of a monotypic B cell population which was around 2% of leukocytes however there was no evidence of a hematological neoplasia or overt lymphoma that was seen. She also had an abnormal female karyotype which is not associated with any particular hematological disorder but can be indicative of a neoplastic process in the marrow. 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 Given the negative findings of her CT scan I did obtain a PET scan to assess for hypermetabolic lymphadenopathy, which was negative for lymphadenopathy and therefore I will continue to monitor complete blood counts to assess for progression of B cell lymphocytosis of undetermined significance. 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 If she continues to have thrombocytosis will also order CALR and MPL mutations.     2. Decreased Immunoglobulins/Recurrent infections   Given her recurrent infections I also obtained an immunoglobulin panel to further evaluate IgG was slightly low however >400. Patient was previously referred to an immunologist by Dr. Hahn office however she reports that she never received an appointment. Will once again re-refer to see if she may benefit from IVIG or further evaluation as she still has not followed with them.    3. Lung infiltrate  Patient did have pneumonia on last scan and most recent PET shows inflammatory alveolar changes. Given  her ongoing symptoms and findings on CT scan I did refer her to pulmonary for further evaluation and she has been following with Dr. Acuna and has undergone bronchoscopy. She has also been seen by Dr. Quintero in CT surgery in regards to these abnormalities.     4. Tobacco Abuse   She has been advised of the possible detrimental health affects on what tobacco abuse may have on ones health, and was again counseled on tobacco cessation. She currently has cut down from and had been utilizing nicotine patches.       I will have Ms. Stein return in follow up appointment in 4 months. She understands that should she have any questions or concerns prior to her appointment she should give us a call at any time. It was a pleasure to see Ms. Stein in clinic today, thank you for allowing me to participate in the care of this patient.     I spent 26 minutes in regards to this patient's care today. More than 19 minutes of the time was spent in direct interaction explaining and discussing the above problems with the patient.       Apple Menezes MD  09/13/17  11:49 AM

## 2017-09-14 LAB
IGA SERPL-MCNC: 203 MG/DL (ref 87–352)
IGG SERPL-MCNC: 523 MG/DL (ref 700–1600)
IGM SERPL-MCNC: 71 MG/DL (ref 26–217)

## 2017-10-25 ENCOUNTER — OFFICE VISIT (OUTPATIENT)
Dept: PULMONOLOGY | Facility: CLINIC | Age: 52
End: 2017-10-25

## 2017-10-25 VITALS
OXYGEN SATURATION: 91 % | HEART RATE: 97 BPM | DIASTOLIC BLOOD PRESSURE: 78 MMHG | TEMPERATURE: 98.2 F | SYSTOLIC BLOOD PRESSURE: 148 MMHG | WEIGHT: 252 LBS | BODY MASS INDEX: 34.13 KG/M2 | HEIGHT: 72 IN

## 2017-10-25 DIAGNOSIS — R09.02 HYPOXIA: ICD-10-CM

## 2017-10-25 DIAGNOSIS — R91.8 PULMONARY INFILTRATES: ICD-10-CM

## 2017-10-25 DIAGNOSIS — R05.9 COUGH: ICD-10-CM

## 2017-10-25 DIAGNOSIS — R06.02 SHORTNESS OF BREATH: Primary | ICD-10-CM

## 2017-10-25 DIAGNOSIS — E66.09 CLASS 1 OBESITY DUE TO EXCESS CALORIES WITH BODY MASS INDEX (BMI) OF 34.0 TO 34.9 IN ADULT, UNSPECIFIED WHETHER SERIOUS COMORBIDITY PRESENT: ICD-10-CM

## 2017-10-25 DIAGNOSIS — G47.33 OSA (OBSTRUCTIVE SLEEP APNEA): ICD-10-CM

## 2017-10-25 PROCEDURE — 94664 DEMO&/EVAL PT USE INHALER: CPT | Performed by: INTERNAL MEDICINE

## 2017-10-25 PROCEDURE — 99407 BEHAV CHNG SMOKING > 10 MIN: CPT | Performed by: INTERNAL MEDICINE

## 2017-10-25 PROCEDURE — 99214 OFFICE O/P EST MOD 30 MIN: CPT | Performed by: INTERNAL MEDICINE

## 2017-10-25 RX ORDER — PREDNISONE 10 MG/1
TABLET ORAL
Qty: 42 TABLET | Refills: 0 | Status: ON HOLD | OUTPATIENT
Start: 2017-10-25 | End: 2018-05-31

## 2017-10-25 RX ORDER — AZITHROMYCIN 250 MG/1
TABLET, FILM COATED ORAL
Qty: 6 TABLET | Refills: 0 | Status: SHIPPED | OUTPATIENT
Start: 2017-10-25 | End: 2018-05-22

## 2017-10-25 NOTE — PROGRESS NOTES
Subjective    Lynette Stein presents for the following Cough      History of Present Illness     Interval history since last visit:    Mrs. Stein is a known patient to the clinic who presents today for cough and congestion going on for a few days to a week. She denies fever, non productive cough.     Recent hospitalizations: None    Investigations (imaging, PFT's, labs, sleep study, record requests, etc.) None    Have you had the Influenza Vaccine? no   Would you like to receive this Vaccine today? no    Have you had the Pneumonia Vaccine?  no  Would you like to receive this Vaccine today? no      Review of Systems   Constitutional: Negative for activity change, fatigue and unexpected weight change.   HENT: Positive for congestion, ear pain, postnasal drip, sneezing and sore throat. Negative for rhinorrhea.    Respiratory: Positive for cough, shortness of breath and wheezing. Negative for apnea and chest tightness.    Cardiovascular: Negative for chest pain and palpitations.   Gastrointestinal: Negative for nausea.   Allergic/Immunologic: Negative for environmental allergies.   Psychiatric/Behavioral: Negative for agitation and confusion.       Active Problems:  Problem List Items Addressed This Visit     Shortness of breath - Primary    Relevant Orders    XR Chest 2 View    Cough    Obesity    DARON (obstructive sleep apnea)    Pulmonary infiltrates    Hypoxia    Relevant Orders    Miscellaneous DME          Past Medical History:  Past Medical History:   Diagnosis Date   • Anxiety and depression    • Arthritis    • Chronic cystitis    • Colitis    • COPD (chronic obstructive pulmonary disease)    • Diabetes mellitus    • Fibromyalgia    • GERD (gastroesophageal reflux disease)    • Hemorrhoids    • Hyperlipidemia    • Hypertension    • Pulmonary infiltrate    • UTI (urinary tract infection)        Family History:  Family History   Problem Relation Age of Onset   • Cancer Other    • Diabetes Other    • Gout Other     • Heart disease Other    • Hypertension Other    • Other Other      osteoarthritis,osteoporosis,rheumatoid arthritis   • COPD Mother    • Heart failure Mother    • Diabetes Mother    • Jaundice Father    • Heart failure Father    • Cancer Sister        Social History:  Social History   Substance Use Topics   • Smoking status: Current Every Day Smoker     Packs/day: 0.50     Years: 40.00     Types: Cigarettes   • Smokeless tobacco: Never Used      Comment: 1/2 ppd   • Alcohol use No       Current Medications:  Current Outpatient Prescriptions   Medication Sig Dispense Refill   • cetirizine (zyrTEC) 10 MG tablet Take 10 mg by mouth Daily.     • DICLOFENAC SODIUM PO Take  by mouth. 75 mg     • estradiol (ESTRACE) 2 MG tablet Take 2 mg by mouth Daily.     • estrogens, conjugated, (PREMARIN) 0.45 MG tablet Take  by mouth.     • fenofibrate (TRICOR) 145 MG tablet Take  by mouth.     • fexofenadine-pseudoephedrine (ALLEGRA-D)  MG per 12 hr tablet Take 1 tablet by mouth 2 (Two) Times a Day.     • fluconazole (DIFLUCAN) 150 MG tablet Take 150 mg by mouth 1 (One) Time.     • hydrocortisone (ANUSOL-HC) 25 MG suppository Insert 25 mg into the rectum 2 (Two) Times a Day.     • hyoscyamine (ANASPAZ,LEVSIN) 0.125 MG tablet Take 0.125 mg by mouth Every 4 (Four) Hours As Needed for cramping.     • ibuprofen (ADVIL,MOTRIN) 800 MG tablet Take 800 mg by mouth Every 6 (Six) Hours As Needed for mild pain (1-3).     • lisinopril (PRINIVIL,ZESTRIL) 30 MG tablet Take  by mouth.     • LORazepam (ATIVAN) 2 MG tablet Take  by mouth.     • metFORMIN (GLUCOPHAGE) 250 MG half tablet Take 250 mg by mouth 2 (Two) Times a Day With Meals.     • metoprolol tartrate (LOPRESSOR) 50 MG tablet Take 50 mg by mouth 2 (Two) Times a Day.     • metroNIDAZOLE (FLAGYL) 500 MG tablet Take 500 mg by mouth 3 (Three) Times a Day.     • mupirocin (BACTROBAN) 2 % ointment Apply  topically 3 (Three) Times a Day.     • nicotine ([facility] NICOTINE) 7 MG/24HR patch  "Place 1 patch on the skin Daily. 30 patch 1   • omeprazole (priLOSEC) 40 MG capsule Take 40 mg by mouth Daily.     • ondansetron (ZOFRAN) 4 MG tablet Take 4 mg by mouth Every 8 (Eight) Hours As Needed for Nausea or Vomiting.     • oxybutynin XL (DITROPAN-XL) 10 MG 24 hr tablet Take 10 mg by mouth Daily.     • phenazopyridine (PYRIDIUM) 200 MG tablet Take  by mouth.     • pravastatin (PRAVACHOL) 40 MG tablet Take 40 mg by mouth Daily.     • pregabalin (LYRICA) 300 MG capsule Take  by mouth.     • QUEtiapine XR (SEROquel XR) 200 MG 24 hr tablet Take 200 mg by mouth Every Night.     • rosuvastatin (CRESTOR) 5 MG tablet Take  by mouth.     • sertraline (ZOLOFT) 100 MG tablet Take 100 mg by mouth Daily.     • solifenacin (VESICARE) 5 MG tablet Take  by mouth.     • sulfamethoxazole-trimethoprim (BACTRIM DS,SEPTRA DS) 800-160 MG per tablet Take 1 tablet by mouth 2 (Two) Times a Day.     • terconazole (TERAZOL 3) 0.8 % vaginal cream Insert 1 applicator into the vagina Every Night.     • tiotropium (SPIRIVA HANDIHALER) 18 MCG per inhalation capsule Place 1 capsule into inhaler and inhale Daily. 30 capsule 11   • traMADol (ULTRAM) 50 MG tablet Take 50 mg by mouth Every 6 (Six) Hours As Needed for moderate pain (4-6).     • UCERIS 9 MG tablet sustained-release 24 hour   5   • VENTOLIN  (90 BASE) MCG/ACT inhaler Inhale 2 puffs Every 4 (Four) Hours As Needed for Wheezing. 1 inhaler 11   • azithromycin (ZITHROMAX) 250 MG tablet Take 2 by mouth today then 1 daily for 4 days 6 tablet 0   • predniSONE (DELTASONE) 10 MG tablet 6 daily x 2 days, 5 daily x 2 days, 4 daily x 2 days, 3 daily x 2 days, 2 daily x 2 days, 1 daily x 2 days 42 tablet 0     No current facility-administered medications for this visit.        Allergies:  Allergies   Allergen Reactions   • Penicillins        Vitals:  /78  Pulse 97  Temp 98.2 °F (36.8 °C) (Oral)   Ht 72\" (182.9 cm)  Wt 252 lb (114 kg)  SpO2 91%  BMI 34.18 " kg/m2    Imaging:    Imaging Results (most recent)     None          Pulmonary Functions Testing Results:    No results found for: FEV1, FVC, RVF3OCO, TLC, DLCO    Results for orders placed or performed in visit on 09/13/17   Comprehensive Metabolic Panel   Result Value Ref Range    Glucose 125 (H) 70 - 110 mg/dL    BUN 10 7 - 21 mg/dL    Creatinine 0.65 0.43 - 1.29 mg/dL    Sodium 139 135 - 153 mmol/L    Potassium 3.8 3.5 - 5.3 mmol/L    Chloride 105 99 - 112 mmol/L    CO2 27.0 24.3 - 31.9 mmol/L    Calcium 9.4 7.7 - 10.0 mg/dL    Total Protein 7.0 6.0 - 8.0 g/dL    Albumin 4.40 3.50 - 5.00 g/dL    ALT (SGPT) 75 (H) 10 - 36 U/L    AST (SGOT) 55 (H) 10 - 30 U/L    Alkaline Phosphatase 132 (H) 35 - 104 U/L    Total Bilirubin 0.2 0.2 - 1.8 mg/dL    eGFR Non African Amer 96 >60 mL/min/1.73    Globulin 2.6 gm/dL    A/G Ratio 1.7 1.5 - 2.5 g/dL    BUN/Creatinine Ratio 15.4 7.0 - 25.0    Anion Gap 7.0 3.6 - 11.2 mmol/L   IgG   Result Value Ref Range    IgG 523 (L) 700 - 1600 mg/dL   IgM   Result Value Ref Range    IgM 71 26 - 217 mg/dL   IgA   Result Value Ref Range    IgA 203 87 - 352 mg/dL   CBC Auto Differential   Result Value Ref Range    WBC 16.41 (H) 4.50 - 12.50 10*3/mm3    RBC 4.38 4.20 - 5.40 10*6/mm3    Hemoglobin 12.4 12.0 - 16.0 g/dL    Hematocrit 39.6 37.0 - 47.0 %    MCV 90.4 80.0 - 94.0 fL    MCH 28.3 27.0 - 33.0 pg    MCHC 31.3 (L) 33.0 - 37.0 g/dL    RDW 14.6 (H) 11.5 - 14.5 %    RDW-SD 46.6 37.0 - 54.0 fl    MPV 10.5 (H) 6.0 - 10.0 fL    Platelets 477 (H) 130 - 400 10*3/mm3    Neutrophil % 55.9 30.0 - 70.0 %    Lymphocyte % 34.7 21.0 - 51.0 %    Monocyte % 6.9 0.0 - 10.0 %    Eosinophil % 1.5 0.0 - 5.0 %    Basophil % 0.3 0.0 - 2.0 %    Immature Grans % 0.7 (H) 0.0 - 0.5 %    Neutrophils, Absolute 9.17 (H) 1.40 - 6.50 10*3/mm3    Lymphocytes, Absolute 5.69 (H) 1.00 - 3.00 10*3/mm3    Monocytes, Absolute 1.14 (H) 0.10 - 0.90 10*3/mm3    Eosinophils, Absolute 0.24 0.00 - 0.70 10*3/mm3    Basophils,  Absolute 0.05 0.00 - 0.30 10*3/mm3    Immature Grans, Absolute 0.12 (H) 0.00 - 0.03 10*3/mm3   Osmolality, Calculated   Result Value Ref Range    Osmolality Calc 278.1 273.0 - 305.0 mOsm/kg       Objective   Physical Exam    General- normal in appearance, not in any acute distress    HEENT- pupils equally reactive to light, normal in size, no scleral icterus    Neck-supple    Respiratory-respirations normal-on auscultation no wheezing no crackles,     Cardiovascular-  Normal S1 and S2. No S3, S4 or murmurs. No JVD, no carotid bruit and no edema, pulses normal bilaterally     GI-nontender nondistended bowel sounds positive    CNS-nonfocal    Musculoskeletal -no edema    Psychiatric-mood good, good eye contact, alert awake oriented      Assessment/Plan      Shortness of breath: likely related to body habitus, smoking and recurrent pneumonia and deconditioning. Continue current inhalers.  Inhalational technique was checked in the office today and is appropriate.    We will order a PFT at her next visit.     Obstructive sleep apnea:  Patient reports she is still not using her AutoPap.    Educated patient on the complications associated with untreated sleep apnea -- that it increases risk of MI, stroke, depression, hypertension, heart failure, arrhythmias, coronary artery disease, and potential death due to complication of that mentioned previously.    Also patient was educated  about the hazards of driving if there sleep deprived and has sleep apnea.  Was instructed not to involving driving or any activity which involves higher level of mental function- like operating a machine-  if they are sleep deprived and not  Wearing  their CPAP and sleep apnea is not treated.    Smoker:  Patient educated and counseled on the dangers of smoking and urged to quit. >10 minutes spent.     Obesity:  Weight loss encouraged, diet and exercise discussed.     Recurrent pneumonia: patient is coughing and not feeling well again today. No  fever. We have ordered a chest xray and sent her a zpack and prednisone to the pharmacy. We will see her back in week.     Discussed with her that if her breathing worsens, develops a fever or no further improvement over the next 24-48 hours report back sooner or go to the ER.         WILL START HER ON IVIG ON next visit      Hypoxia: ambulatory pulse ox completed, while sitting was 91-92%, on ambulation she did drop to 84%. Order sent for oxygen with portable tank.    Patient follows with Dr. Menezes for leukocytosis/ thrombocytosis      ICD-10-CM ICD-9-CM   1. Shortness of breath R06.02 786.05   2. Hypoxia R09.02 799.02   3. Class 1 obesity due to excess calories with body mass index (BMI) of 34.0 to 34.9 in adult, unspecified whether serious comorbidity present E66.09 278.00    Z68.34 V85.34   4. Pulmonary infiltrates R91.8 793.19   5. DARON (obstructive sleep apnea) G47.33 327.23   6. Cough R05 786.2       Return in about 1 week (around 11/1/2017).     Scribed for Dr. Acuna by ALDEN Rebolledo.          I, Teto Acuna M.D. attest that the above note accurately reflects the work and decisions made  by me.  Patient was seen and evaluated by Dr. Acuna, including history of present illness, physical exam, assessment, and treatment plan.  The above note was reviewed and edited by Dr. Acuna.

## 2017-10-26 ENCOUNTER — HOSPITAL ENCOUNTER (OUTPATIENT)
Dept: GENERAL RADIOLOGY | Facility: HOSPITAL | Age: 52
Discharge: HOME | End: 2017-10-26
Admitting: NURSE PRACTITIONER

## 2017-10-26 DIAGNOSIS — R06.02 SHORTNESS OF BREATH: ICD-10-CM

## 2017-10-26 PROCEDURE — 71020 HC CHEST PA AND LATERAL: CPT

## 2017-10-26 PROCEDURE — 71020 XR CHEST 2 VW: CPT | Performed by: RADIOLOGY

## 2017-11-03 ENCOUNTER — OFFICE VISIT (OUTPATIENT)
Dept: PULMONOLOGY | Facility: CLINIC | Age: 52
End: 2017-11-03

## 2017-11-03 VITALS
TEMPERATURE: 98.4 F | OXYGEN SATURATION: 93 % | SYSTOLIC BLOOD PRESSURE: 138 MMHG | BODY MASS INDEX: 32.78 KG/M2 | DIASTOLIC BLOOD PRESSURE: 78 MMHG | HEART RATE: 84 BPM | HEIGHT: 72 IN | WEIGHT: 242 LBS

## 2017-11-03 DIAGNOSIS — J18.9 RECURRENT PNEUMONIA: ICD-10-CM

## 2017-11-03 DIAGNOSIS — G47.33 OBSTRUCTIVE SLEEP APNEA: ICD-10-CM

## 2017-11-03 DIAGNOSIS — R06.02 SHORTNESS OF BREATH: Primary | ICD-10-CM

## 2017-11-03 DIAGNOSIS — R05.9 COUGH: ICD-10-CM

## 2017-11-03 PROCEDURE — 99213 OFFICE O/P EST LOW 20 MIN: CPT | Performed by: NURSE PRACTITIONER

## 2017-11-03 PROCEDURE — 99407 BEHAV CHNG SMOKING > 10 MIN: CPT | Performed by: NURSE PRACTITIONER

## 2017-11-03 NOTE — PROGRESS NOTES
"Interval history since last visit: Says she is feeling \"out of her mind\"     Recent hospitalizations: None    Investigations (imaging, PFT's, labs, sleep study, record requests, etc.) CXR    Have you had the Influenza Vaccine? no   Would you like to receive this Vaccine today? yes     Have you had the Pneumonia Vaccine?  no  Would you like to receive this Vaccine today? yes     Subjective    Lynette Stein presents for the following Shortness of Breath      History of Present Illness     Ms. Stein is here today to follow up on shortness of breath.  She was seen in the office last week and sent home with a steroid taper, z pack and chest ray order.  She states that today she is feeling some better.  She has completed z pack and steroids.  Chest xray was discussed in detail.    Review of Systems   Constitutional: Negative for activity change, fatigue and unexpected weight change.   HENT: Negative for congestion, postnasal drip and rhinorrhea.    Respiratory: Positive for cough and shortness of breath. Negative for apnea, chest tightness and wheezing.    Cardiovascular: Negative for chest pain and palpitations.   Gastrointestinal: Negative for nausea.   Allergic/Immunologic: Negative for environmental allergies.   Psychiatric/Behavioral: Positive for confusion. Negative for agitation.       Active Problems:  Problem List Items Addressed This Visit     Obstructive sleep apnea    Shortness of breath - Primary    Cough    Recurrent pneumonia          Past Medical History:  Past Medical History:   Diagnosis Date   • Anxiety and depression    • Arthritis    • Chronic cystitis    • Colitis    • COPD (chronic obstructive pulmonary disease)    • Diabetes mellitus    • Fibromyalgia    • GERD (gastroesophageal reflux disease)    • Hemorrhoids    • Hyperlipidemia    • Hypertension    • Pulmonary infiltrate    • UTI (urinary tract infection)        Family History:  Family History   Problem Relation Age of Onset   • Cancer Other  "   • Diabetes Other    • Gout Other    • Heart disease Other    • Hypertension Other    • Other Other      osteoarthritis,osteoporosis,rheumatoid arthritis   • COPD Mother    • Heart failure Mother    • Diabetes Mother    • Jaundice Father    • Heart failure Father    • Cancer Sister        Social History:  Social History   Substance Use Topics   • Smoking status: Current Every Day Smoker     Packs/day: 0.50     Years: 40.00     Types: Cigarettes   • Smokeless tobacco: Never Used      Comment: 1/2 ppd   • Alcohol use No       Current Medications:  Current Outpatient Prescriptions   Medication Sig Dispense Refill   • azithromycin (ZITHROMAX) 250 MG tablet Take 2 by mouth today then 1 daily for 4 days 6 tablet 0   • cetirizine (zyrTEC) 10 MG tablet Take 10 mg by mouth Daily.     • DICLOFENAC SODIUM PO Take  by mouth. 75 mg     • estradiol (ESTRACE) 2 MG tablet Take 2 mg by mouth Daily.     • estrogens, conjugated, (PREMARIN) 0.45 MG tablet Take  by mouth.     • fenofibrate (TRICOR) 145 MG tablet Take  by mouth.     • fexofenadine-pseudoephedrine (ALLEGRA-D)  MG per 12 hr tablet Take 1 tablet by mouth 2 (Two) Times a Day.     • fluconazole (DIFLUCAN) 150 MG tablet Take 150 mg by mouth 1 (One) Time.     • hydrocortisone (ANUSOL-HC) 25 MG suppository Insert 25 mg into the rectum 2 (Two) Times a Day.     • hyoscyamine (ANASPAZ,LEVSIN) 0.125 MG tablet Take 0.125 mg by mouth Every 4 (Four) Hours As Needed for cramping.     • ibuprofen (ADVIL,MOTRIN) 800 MG tablet Take 800 mg by mouth Every 6 (Six) Hours As Needed for mild pain (1-3).     • lisinopril (PRINIVIL,ZESTRIL) 30 MG tablet Take  by mouth.     • LORazepam (ATIVAN) 2 MG tablet Take  by mouth.     • metFORMIN (GLUCOPHAGE) 250 MG half tablet Take 250 mg by mouth 2 (Two) Times a Day With Meals.     • metoprolol tartrate (LOPRESSOR) 50 MG tablet Take 50 mg by mouth 2 (Two) Times a Day.     • metroNIDAZOLE (FLAGYL) 500 MG tablet Take 500 mg by mouth 3 (Three) Times a  Day.     • mupirocin (BACTROBAN) 2 % ointment Apply  topically 3 (Three) Times a Day.     • nicotine ([facility] NICOTINE) 7 MG/24HR patch Place 1 patch on the skin Daily. 30 patch 1   • omeprazole (priLOSEC) 40 MG capsule Take 40 mg by mouth Daily.     • ondansetron (ZOFRAN) 4 MG tablet Take 4 mg by mouth Every 8 (Eight) Hours As Needed for Nausea or Vomiting.     • oxybutynin XL (DITROPAN-XL) 10 MG 24 hr tablet Take 10 mg by mouth Daily.     • phenazopyridine (PYRIDIUM) 200 MG tablet Take  by mouth.     • pravastatin (PRAVACHOL) 40 MG tablet Take 40 mg by mouth Daily.     • predniSONE (DELTASONE) 10 MG tablet 6 daily x 2 days, 5 daily x 2 days, 4 daily x 2 days, 3 daily x 2 days, 2 daily x 2 days, 1 daily x 2 days 42 tablet 0   • pregabalin (LYRICA) 300 MG capsule Take  by mouth.     • QUEtiapine XR (SEROquel XR) 200 MG 24 hr tablet Take 200 mg by mouth Every Night.     • rosuvastatin (CRESTOR) 5 MG tablet Take  by mouth.     • sertraline (ZOLOFT) 100 MG tablet Take 100 mg by mouth Daily.     • solifenacin (VESICARE) 5 MG tablet Take  by mouth.     • sulfamethoxazole-trimethoprim (BACTRIM DS,SEPTRA DS) 800-160 MG per tablet Take 1 tablet by mouth 2 (Two) Times a Day.     • terconazole (TERAZOL 3) 0.8 % vaginal cream Insert 1 applicator into the vagina Every Night.     • tiotropium (SPIRIVA HANDIHALER) 18 MCG per inhalation capsule Place 1 capsule into inhaler and inhale Daily. 30 capsule 11   • traMADol (ULTRAM) 50 MG tablet Take 50 mg by mouth Every 6 (Six) Hours As Needed for moderate pain (4-6).     • UCERIS 9 MG tablet sustained-release 24 hour   5   • VENTOLIN  (90 BASE) MCG/ACT inhaler Inhale 2 puffs Every 4 (Four) Hours As Needed for Wheezing. 1 inhaler 11     No current facility-administered medications for this visit.        Allergies:  Allergies   Allergen Reactions   • Penicillins        Vitals:  /78 (BP Location: Right arm, Patient Position: Sitting, Cuff Size: Adult)  Pulse 84  Temp 98.4 °F  "(36.9 °C)  Ht 72\" (182.9 cm)  Wt 242 lb (110 kg)  SpO2 93%  BMI 32.82 kg/m2    Imaging:    Imaging Results (most recent)     None          Pulmonary Functions Testing Results:    No results found for: FEV1, FVC, [id number], TLC, DLCO    Results for orders placed or performed in visit on 09/13/17   Comprehensive Metabolic Panel   Result Value Ref Range    Glucose 125 (H) 70 - 110 mg/dL    BUN 10 7 - 21 mg/dL    Creatinine 0.65 0.43 - 1.29 mg/dL    Sodium 139 135 - 153 mmol/L    Potassium 3.8 3.5 - 5.3 mmol/L    Chloride 105 99 - 112 mmol/L    CO2 27.0 24.3 - 31.9 mmol/L    Calcium 9.4 7.7 - 10.0 mg/dL    Total Protein 7.0 6.0 - 8.0 g/dL    Albumin 4.40 3.50 - 5.00 g/dL    ALT (SGPT) 75 (H) 10 - 36 U/L    AST (SGOT) 55 (H) 10 - 30 U/L    Alkaline Phosphatase 132 (H) 35 - 104 U/L    Total Bilirubin 0.2 0.2 - 1.8 mg/dL    eGFR Non African Amer 96 >60 mL/min/1.73    Globulin 2.6 gm/dL    A/G Ratio 1.7 1.5 - 2.5 g/dL    BUN/Creatinine Ratio 15.4 7.0 - 25.0    Anion Gap 7.0 3.6 - 11.2 mmol/L   IgG   Result Value Ref Range    IgG 523 (L) 700 - 1600 mg/dL   IgM   Result Value Ref Range    IgM 71 26 - 217 mg/dL   IgA   Result Value Ref Range    IgA 203 87 - 352 mg/dL   CBC Auto Differential   Result Value Ref Range    WBC 16.41 (H) 4.50 - 12.50 10*3/mm3    RBC 4.38 4.20 - 5.40 10*6/mm3    Hemoglobin 12.4 12.0 - 16.0 g/dL    Hematocrit 39.6 37.0 - 47.0 %    MCV 90.4 80.0 - 94.0 fL    MCH 28.3 27.0 - 33.0 pg    MCHC 31.3 (L) 33.0 - 37.0 g/dL    RDW 14.6 (H) 11.5 - 14.5 %    RDW-SD 46.6 37.0 - 54.0 fl    MPV 10.5 (H) 6.0 - 10.0 fL    Platelets 477 (H) 130 - 400 10*3/mm3    Neutrophil % 55.9 30.0 - 70.0 %    Lymphocyte % 34.7 21.0 - 51.0 %    Monocyte % 6.9 0.0 - 10.0 %    Eosinophil % 1.5 0.0 - 5.0 %    Basophil % 0.3 0.0 - 2.0 %    Immature Grans % 0.7 (H) 0.0 - 0.5 %    Neutrophils, Absolute 9.17 (H) 1.40 - 6.50 10*3/mm3    Lymphocytes, Absolute 5.69 (H) 1.00 - 3.00 10*3/mm3    Monocytes, Absolute 1.14 (H) 0.10 - 0.90 " 10*3/mm3    Eosinophils, Absolute 0.24 0.00 - 0.70 10*3/mm3    Basophils, Absolute 0.05 0.00 - 0.30 10*3/mm3    Immature Grans, Absolute 0.12 (H) 0.00 - 0.03 10*3/mm3   Osmolality, Calculated   Result Value Ref Range    Osmolality Calc 278.1 273.0 - 305.0 mOsm/kg       Objective   Physical Exam     GENERAL APPEARANCE: Well developed, well nourished, alert and cooperative, and appears to be in no acute distress.    HEAD: normocephalic.    EYES: PERRL, EOMI. Fundi normal, vision is grossly intact.    THROAT: Oral cavity and pharynx normal. No inflammation, swelling, exudate, or lesions.     NECK: Neck supple.     CARDIAC: Normal S1 and S2. No S3, S4 or murmurs. Rhythm is regular. There is no peripheral edema, cyanosis or pallor. Extremities are warm and well perfused. Capillary refill is less than 2 seconds. No carotid bruits.    RESPIRATORY: Clear to auscultation without rales, rhonchi, wheezing or diminished breath sounds.    GI: Positive bowel sounds. Soft, nondistended, nontender.     MUSCULOSKELETAL: No significant deformity or joint abnormality. No edema. Peripheral pulses intact. No varicosities.    NEUROLOGICAL: Strength and sensation symmetric and intact throughout.     PSYCHIATRIC: The mental examination revealed the patient was oriented to person, place, and time.       Assessment/Plan      Shortness of breath: likely related to body habitus, smoking and recurrent pneumonia and deconditioning. Continue current inhalers.  Inhalational technique was checked in the office today and is appropriate.    We will order a PFT at her next visit.     Obstructive sleep apnea:  Patient reports she is still not using her AutoPap.    Educated patient on the complications associated with untreated sleep apnea -- that it increases risk of MI, stroke, depression, hypertension, heart failure, arrhythmias, coronary artery disease, and potential death due to complication of that mentioned previously.    Also patient was  educated  about the hazards of driving if there sleep deprived and has sleep apnea.  Was instructed not to involving driving or any activity which involves higher level of mental function- like operating a machine-  if they are sleep deprived and not  Wearing  their CPAP and sleep apnea is not treated.    Smoker:  Patient educated and counseled on the dangers of smoking and urged to quit. >10 minutes spent.     Obesity:  Weight loss encouraged, diet and exercise discussed.     Recurrent pneumonia: patient states that she is feeling some better today.  She has completed z pack and steroids that she was given last week.  Reviewed her chest xray and discussed it with her in detail.  Educated her to continue to use her oxygen and that if she gets worse or starts to feel bad again to go to the ED.        ICD-10-CM ICD-9-CM   1. Shortness of breath R06.02 786.05   2. Obstructive sleep apnea G47.33 327.23   3. Cough R05 786.2   4. Recurrent pneumonia J18.9 486       Return in about 3 months (around 2/3/2018).

## 2018-01-15 ENCOUNTER — TELEPHONE (OUTPATIENT)
Dept: CARDIAC SURGERY | Facility: CLINIC | Age: 53
End: 2018-01-15

## 2018-02-14 ENCOUNTER — TELEPHONE (OUTPATIENT)
Dept: CARDIAC SURGERY | Facility: CLINIC | Age: 53
End: 2018-02-14

## 2018-02-16 ENCOUNTER — TELEPHONE (OUTPATIENT)
Dept: CARDIAC SURGERY | Facility: CLINIC | Age: 53
End: 2018-02-16

## 2018-02-16 ENCOUNTER — OFFICE VISIT (OUTPATIENT)
Dept: PULMONOLOGY | Facility: CLINIC | Age: 53
End: 2018-02-16

## 2018-02-16 VITALS
DIASTOLIC BLOOD PRESSURE: 70 MMHG | HEIGHT: 72 IN | BODY MASS INDEX: 32.72 KG/M2 | OXYGEN SATURATION: 93 % | TEMPERATURE: 98.1 F | SYSTOLIC BLOOD PRESSURE: 132 MMHG | HEART RATE: 91 BPM | WEIGHT: 241.6 LBS

## 2018-02-16 DIAGNOSIS — R09.02 HYPOXIA: ICD-10-CM

## 2018-02-16 DIAGNOSIS — G47.33 OBSTRUCTIVE SLEEP APNEA: ICD-10-CM

## 2018-02-16 DIAGNOSIS — J18.9 RECURRENT PNEUMONIA: ICD-10-CM

## 2018-02-16 DIAGNOSIS — R06.02 SHORTNESS OF BREATH: Primary | ICD-10-CM

## 2018-02-16 DIAGNOSIS — R91.8 LUNG NODULES: Primary | ICD-10-CM

## 2018-02-16 PROCEDURE — 99407 BEHAV CHNG SMOKING > 10 MIN: CPT | Performed by: NURSE PRACTITIONER

## 2018-02-16 PROCEDURE — 99214 OFFICE O/P EST MOD 30 MIN: CPT | Performed by: NURSE PRACTITIONER

## 2018-02-16 NOTE — PROGRESS NOTES
Interval history since last visit:     Recent hospitalizations: none    Investigations (imaging, PFT's, labs, sleep study, record requests, etc.) none    Have you had the Influenza Vaccine? no   Would you like to receive this Vaccine today? no    Have you had the Pneumonia Vaccine?  no  Would you like to receive this Vaccine today? no    Subjective    Lynette Stein presents for the following Sleep Apnea  .    History of Present Illness     Ms. Stein is here for follow up on sleep apnea. She state that she has not been wearing her CPAP machine because she feels like she is smothering when she has it on.  She reports no illnesses or hospitalizations since her last visit.    Review of Systems   Constitutional: Negative for appetite change, chills, diaphoresis and unexpected weight change.   HENT: Negative for sore throat, trouble swallowing and voice change.    Eyes: Negative for visual disturbance.   Respiratory: Negative for apnea, cough, choking, shortness of breath and wheezing.    Cardiovascular: Negative for chest pain, palpitations and leg swelling.   Gastrointestinal: Negative for abdominal pain, constipation, diarrhea, nausea and vomiting.   Endocrine: Negative for cold intolerance, heat intolerance, polydipsia, polyphagia and polyuria.   Genitourinary: Negative for difficulty urinating and dysuria.   Musculoskeletal: Negative for gait problem.   Skin: Negative for rash and wound.   Neurological: Negative for syncope and light-headedness.   Hematological: Negative for adenopathy.   Psychiatric/Behavioral: Negative for agitation, behavioral problems and confusion.   All other systems reviewed and are negative.      Active Problems:  Problem List Items Addressed This Visit        Respiratory    Obstructive sleep apnea    Shortness of breath - Primary    Relevant Orders    Full Pulmonary Function Test Without Bronchodilator    Recurrent pneumonia    Relevant Medications    Tiotropium Bromide Monohydrate 2.5  MCG/ACT aerosol solution    Hypoxia          Past Medical History:  Past Medical History:   Diagnosis Date   • Anxiety and depression    • Arthritis    • Chronic cystitis    • Colitis    • COPD (chronic obstructive pulmonary disease)    • Diabetes mellitus    • Fibromyalgia    • GERD (gastroesophageal reflux disease)    • Hemorrhoids    • Hyperlipidemia    • Hypertension    • Pulmonary infiltrate    • UTI (urinary tract infection)        Family History:  Family History   Problem Relation Age of Onset   • Cancer Other    • Diabetes Other    • Gout Other    • Heart disease Other    • Hypertension Other    • Other Other      osteoarthritis,osteoporosis,rheumatoid arthritis   • COPD Mother    • Heart failure Mother    • Diabetes Mother    • Jaundice Father    • Heart failure Father    • Cancer Sister        Social History:  Social History   Substance Use Topics   • Smoking status: Current Every Day Smoker     Packs/day: 0.50     Years: 45.00     Types: Cigarettes   • Smokeless tobacco: Never Used      Comment: 1/2 ppd   • Alcohol use No       Current Medications:  Current Outpatient Prescriptions   Medication Sig Dispense Refill   • cetirizine (zyrTEC) 10 MG tablet Take 10 mg by mouth Daily.     • DICLOFENAC SODIUM PO Take  by mouth. 75 mg     • estradiol (ESTRACE) 2 MG tablet Take 2 mg by mouth Daily.     • estrogens, conjugated, (PREMARIN) 0.45 MG tablet Take  by mouth.     • fenofibrate (TRICOR) 145 MG tablet Take  by mouth.     • fexofenadine-pseudoephedrine (ALLEGRA-D)  MG per 12 hr tablet Take 1 tablet by mouth 2 (Two) Times a Day.     • hydrocortisone (ANUSOL-HC) 25 MG suppository Insert 25 mg into the rectum 2 (Two) Times a Day.     • hyoscyamine (ANASPAZ,LEVSIN) 0.125 MG tablet Take 0.125 mg by mouth Every 4 (Four) Hours As Needed for cramping.     • ibuprofen (ADVIL,MOTRIN) 800 MG tablet Take 800 mg by mouth Every 6 (Six) Hours As Needed for mild pain (1-3).     • lisinopril (PRINIVIL,ZESTRIL) 30 MG  tablet Take  by mouth.     • LORazepam (ATIVAN) 2 MG tablet Take  by mouth.     • metFORMIN (GLUCOPHAGE) 250 MG half tablet Take 250 mg by mouth 2 (Two) Times a Day With Meals.     • metoprolol tartrate (LOPRESSOR) 50 MG tablet Take 50 mg by mouth 2 (Two) Times a Day.     • metroNIDAZOLE (FLAGYL) 500 MG tablet Take 500 mg by mouth 3 (Three) Times a Day.     • mupirocin (BACTROBAN) 2 % ointment Apply  topically 3 (Three) Times a Day.     • nicotine ([facility] NICOTINE) 7 MG/24HR patch Place 1 patch on the skin Daily. 30 patch 1   • omeprazole (priLOSEC) 40 MG capsule Take 40 mg by mouth Daily.     • ondansetron (ZOFRAN) 4 MG tablet Take 4 mg by mouth Every 8 (Eight) Hours As Needed for Nausea or Vomiting.     • oxybutynin XL (DITROPAN-XL) 10 MG 24 hr tablet Take 10 mg by mouth Daily.     • phenazopyridine (PYRIDIUM) 200 MG tablet Take  by mouth.     • pravastatin (PRAVACHOL) 40 MG tablet Take 40 mg by mouth Daily.     • pregabalin (LYRICA) 300 MG capsule Take  by mouth.     • QUEtiapine XR (SEROquel XR) 200 MG 24 hr tablet Take 200 mg by mouth Every Night.     • rosuvastatin (CRESTOR) 5 MG tablet Take  by mouth.     • sertraline (ZOLOFT) 100 MG tablet Take 100 mg by mouth Daily.     • solifenacin (VESICARE) 5 MG tablet Take  by mouth.     • sulfamethoxazole-trimethoprim (BACTRIM DS,SEPTRA DS) 800-160 MG per tablet Take 1 tablet by mouth 2 (Two) Times a Day.     • terconazole (TERAZOL 3) 0.8 % vaginal cream Insert 1 applicator into the vagina Every Night.     • tiotropium (SPIRIVA HANDIHALER) 18 MCG per inhalation capsule Place 1 capsule into inhaler and inhale Daily. 30 capsule 11   • traMADol (ULTRAM) 50 MG tablet Take 50 mg by mouth Every 6 (Six) Hours As Needed for moderate pain (4-6).     • UCERIS 9 MG tablet sustained-release 24 hour   5   • VENTOLIN  (90 BASE) MCG/ACT inhaler Inhale 2 puffs Every 4 (Four) Hours As Needed for Wheezing. 1 inhaler 11   • azithromycin (ZITHROMAX) 250 MG tablet Take 2 by mouth  "today then 1 daily for 4 days 6 tablet 0   • fluconazole (DIFLUCAN) 150 MG tablet Take 150 mg by mouth 1 (One) Time.     • predniSONE (DELTASONE) 10 MG tablet 6 daily x 2 days, 5 daily x 2 days, 4 daily x 2 days, 3 daily x 2 days, 2 daily x 2 days, 1 daily x 2 days 42 tablet 0   • Tiotropium Bromide Monohydrate 2.5 MCG/ACT aerosol solution Inhale 1 puff Daily. 1 inhaler 11     No current facility-administered medications for this visit.        Allergies:  Allergies   Allergen Reactions   • Penicillins        Vitals:  /70 (BP Location: Left arm, Patient Position: Sitting)  Pulse 91  Temp 98.1 °F (36.7 °C)  Ht 182.9 cm (72.01\")  Wt 110 kg (241 lb 9.6 oz)  SpO2 93%  BMI 32.76 kg/m2    Imaging:    Imaging Results (most recent)     None          Pulmonary Functions Testing Results:    No results found for: FEV1, FVC, XUP1INA, TLC, DLCO    Results for orders placed or performed in visit on 09/13/17   Comprehensive Metabolic Panel   Result Value Ref Range    Glucose 125 (H) 70 - 110 mg/dL    BUN 10 7 - 21 mg/dL    Creatinine 0.65 0.43 - 1.29 mg/dL    Sodium 139 135 - 153 mmol/L    Potassium 3.8 3.5 - 5.3 mmol/L    Chloride 105 99 - 112 mmol/L    CO2 27.0 24.3 - 31.9 mmol/L    Calcium 9.4 7.7 - 10.0 mg/dL    Total Protein 7.0 6.0 - 8.0 g/dL    Albumin 4.40 3.50 - 5.00 g/dL    ALT (SGPT) 75 (H) 10 - 36 U/L    AST (SGOT) 55 (H) 10 - 30 U/L    Alkaline Phosphatase 132 (H) 35 - 104 U/L    Total Bilirubin 0.2 0.2 - 1.8 mg/dL    eGFR Non African Amer 96 >60 mL/min/1.73    Globulin 2.6 gm/dL    A/G Ratio 1.7 1.5 - 2.5 g/dL    BUN/Creatinine Ratio 15.4 7.0 - 25.0    Anion Gap 7.0 3.6 - 11.2 mmol/L   IgG   Result Value Ref Range    IgG 523 (L) 700 - 1600 mg/dL   IgM   Result Value Ref Range    IgM 71 26 - 217 mg/dL   IgA   Result Value Ref Range    IgA 203 87 - 352 mg/dL   CBC Auto Differential   Result Value Ref Range    WBC 16.41 (H) 4.50 - 12.50 10*3/mm3    RBC 4.38 4.20 - 5.40 10*6/mm3    Hemoglobin 12.4 12.0 - 16.0 " g/dL    Hematocrit 39.6 37.0 - 47.0 %    MCV 90.4 80.0 - 94.0 fL    MCH 28.3 27.0 - 33.0 pg    MCHC 31.3 (L) 33.0 - 37.0 g/dL    RDW 14.6 (H) 11.5 - 14.5 %    RDW-SD 46.6 37.0 - 54.0 fl    MPV 10.5 (H) 6.0 - 10.0 fL    Platelets 477 (H) 130 - 400 10*3/mm3    Neutrophil % 55.9 30.0 - 70.0 %    Lymphocyte % 34.7 21.0 - 51.0 %    Monocyte % 6.9 0.0 - 10.0 %    Eosinophil % 1.5 0.0 - 5.0 %    Basophil % 0.3 0.0 - 2.0 %    Immature Grans % 0.7 (H) 0.0 - 0.5 %    Neutrophils, Absolute 9.17 (H) 1.40 - 6.50 10*3/mm3    Lymphocytes, Absolute 5.69 (H) 1.00 - 3.00 10*3/mm3    Monocytes, Absolute 1.14 (H) 0.10 - 0.90 10*3/mm3    Eosinophils, Absolute 0.24 0.00 - 0.70 10*3/mm3    Basophils, Absolute 0.05 0.00 - 0.30 10*3/mm3    Immature Grans, Absolute 0.12 (H) 0.00 - 0.03 10*3/mm3   Osmolality, Calculated   Result Value Ref Range    Osmolality Calc 278.1 273.0 - 305.0 mOsm/kg       Objective   Physical Exam     GENERAL APPEARANCE: Well developed, well nourished, alert and cooperative, and appears to be in no acute distress.    HEAD: normocephalic.    EYES: PERRL, EOMI. Fundi normal, vision is grossly intact.    THROAT: Oral cavity and pharynx normal. No inflammation, swelling, exudate, or lesions.     NECK: Neck supple.     CARDIAC: Normal S1 and S2. No S3, S4 or murmurs. Rhythm is regular. There is no peripheral edema, cyanosis or pallor. Extremities are warm and well perfused. Capillary refill is less than 2 seconds. No carotid bruits.    RESPIRATORY: Clear to auscultation without rales, rhonchi, wheezing or diminished breath sounds.    GI: Positive bowel sounds. Soft, nondistended, nontender.     MUSCULOSKELETAL: No significant deformity or joint abnormality. No edema. Peripheral pulses intact. No varicosities.    NEUROLOGICAL: Strength and sensation symmetric and intact throughout.     PSYCHIATRIC: The mental examination revealed the patient was oriented to person, place, and time.     Assessment/Plan  spiriva  respimat    Shortness of breath: likely related to body habitus, smoking and recurrent pneumonia and deconditioning.  PFT ordered.Continue current inhalers.  Refills of spiriva sent.    Obstructive sleep apnea: patient states that she does not use her cpap machine because she feels like she is smothering.    Educated patient on the complications associated with untreated sleep apnea -- that it increases risk of MI, stroke, depression, hypertension, heart failure, arrhythmias, coronary artery disease, and potential death due to complication of that mentioned previously.    Also patient was educated  about the hazards of driving if there sleep deprived and has sleep apnea.  Was instructed not to involving driving or any activity which involves higher level of mental function- like operating a machine-  if they are sleep deprived and not  Wearing  their CPAP and sleep apnea is not treated.    Hypoxia:  Uses oxygen at night.    Educated patient on the importance of wearing oxygen as prescribed, otherwise could become hypoxic, faint, fall, hit his/her head, cause a brain hemorrhage, and potentially die.    Smoker:  Still smoking about a 1/2 pack a day. Smoking cessation counseling done for over 10 minutes.    Obesity:  Diet and exercise counseling done.    Recurrent pneumonia: has been doing well.  No illnesses since her last visit.    Abnormal CT scan:  Has been following with Dr. Quintero. Will repeat CT scan of chest at next visit.        ICD-10-CM ICD-9-CM   1. Shortness of breath R06.02 786.05   2. Obstructive sleep apnea G47.33 327.23   3. Recurrent pneumonia J18.9 486   4. Hypoxia R09.02 799.02       Return in about 6 months (around 8/16/2018).

## 2018-03-07 ENCOUNTER — HOSPITAL ENCOUNTER (OUTPATIENT)
Dept: CT IMAGING | Facility: HOSPITAL | Age: 53
Discharge: HOME | End: 2018-03-07
Admitting: PHYSICIAN ASSISTANT

## 2018-03-07 DIAGNOSIS — R91.8 LUNG NODULES: ICD-10-CM

## 2018-03-07 LAB — CREAT BLDA-MCNC: 0.8 MG/DL (ref 0.6–1.3)

## 2018-03-07 PROCEDURE — 0 IOPAMIDOL 61 % SOLUTION: Performed by: PHYSICIAN ASSISTANT

## 2018-03-07 PROCEDURE — 71260 CT THORAX DX C+: CPT | Performed by: RADIOLOGY

## 2018-03-07 PROCEDURE — 71260 CT THORAX DX C+: CPT

## 2018-03-07 PROCEDURE — 82565 ASSAY OF CREATININE: CPT

## 2018-03-07 RX ADMIN — IOPAMIDOL 80 ML: 612 INJECTION, SOLUTION INTRAVENOUS at 11:15

## 2018-05-22 ENCOUNTER — OFFICE VISIT (OUTPATIENT)
Dept: SURGERY | Facility: CLINIC | Age: 53
End: 2018-05-22

## 2018-05-22 VITALS
WEIGHT: 241 LBS | BODY MASS INDEX: 32.64 KG/M2 | HEART RATE: 88 BPM | SYSTOLIC BLOOD PRESSURE: 90 MMHG | HEIGHT: 72 IN | DIASTOLIC BLOOD PRESSURE: 56 MMHG

## 2018-05-22 DIAGNOSIS — E66.09 CLASS 1 OBESITY DUE TO EXCESS CALORIES WITH BODY MASS INDEX (BMI) OF 34.0 TO 34.9 IN ADULT, UNSPECIFIED WHETHER SERIOUS COMORBIDITY PRESENT: Primary | ICD-10-CM

## 2018-05-22 DIAGNOSIS — K60.2 ANAL FISSURE: ICD-10-CM

## 2018-05-22 PROCEDURE — 99203 OFFICE O/P NEW LOW 30 MIN: CPT | Performed by: SURGERY

## 2018-05-22 NOTE — PROGRESS NOTES
Subjective   Lynette Stein is a 53 y.o. female.     History of Present Illness She had an anal fissure several years ago. She had botox once and surgery and may have been a sphincterotomy. She has some form of colitis that may be inflammatory disease . Her stools are loose.    The following portions of the patient's history were reviewed and updated as appropriate: current medications, past family history, past medical history, past social history, past surgical history and problem list.    Review of Systems   Constitutional: Negative for activity change, appetite change, chills, fever and unexpected weight change.   HENT: Negative for congestion, facial swelling and sore throat.    Eyes: Negative for photophobia and visual disturbance.   Respiratory: Negative for chest tightness, shortness of breath and wheezing.    Cardiovascular: Negative for chest pain, palpitations and leg swelling.   Gastrointestinal: Positive for anal bleeding and rectal pain. Negative for abdominal distention, abdominal pain, blood in stool, constipation, diarrhea, nausea and vomiting.   Endocrine: Negative for cold intolerance, heat intolerance, polydipsia and polyuria.   Genitourinary: Negative for difficulty urinating, dysuria, flank pain and urgency.   Musculoskeletal: Negative for back pain and myalgias.   Skin: Negative for rash and wound.   Allergic/Immunologic: Negative for immunocompromised state.   Neurological: Negative for dizziness, seizures, syncope, light-headedness, numbness and headaches.   Hematological: Negative for adenopathy. Does not bruise/bleed easily.   Psychiatric/Behavioral: Negative for behavioral problems and confusion. The patient is not nervous/anxious.        Objective   Physical Exam   Constitutional: She is oriented to person, place, and time. She appears well-developed and well-nourished. She does not appear ill. No distress.       HENT:   Head: Normocephalic. Head is without laceration. Hair is normal.    Right Ear: Hearing and ear canal normal.   Left Ear: Hearing and ear canal normal.   Nose: Nose normal. No sinus tenderness. No epistaxis. Right sinus exhibits no maxillary sinus tenderness and no frontal sinus tenderness. Left sinus exhibits no maxillary sinus tenderness and no frontal sinus tenderness.   Eyes: Conjunctivae and lids are normal. Pupils are equal, round, and reactive to light.   Neck: Normal range of motion. No JVD present. No tracheal tenderness present. No tracheal deviation present. No thyroid mass and no thyromegaly present.   Cardiovascular: Normal rate and regular rhythm.  Exam reveals no gallop.    No murmur heard.  Pulmonary/Chest: Effort normal and breath sounds normal. No stridor. She has no wheezes. She exhibits no tenderness.   Abdominal: Soft. Bowel sounds are normal. She exhibits no distension, no ascites and no mass. There is no tenderness. There is no rebound and no guarding. No hernia.   Musculoskeletal: She exhibits no edema or deformity.   Lymphadenopathy:     She has no cervical adenopathy.     She has no axillary adenopathy.        Right: No inguinal and no supraclavicular adenopathy present.        Left: No inguinal and no supraclavicular adenopathy present.   Neurological: She is alert and oriented to person, place, and time. She exhibits normal muscle tone.   Skin: Skin is warm, dry and intact. No rash noted. No erythema. No pallor.   Psychiatric: She has a normal mood and affect. Her behavior is normal. Thought content normal.   Vitals reviewed.      Assessment/Plan   Lynette was seen today for anal fistula.    Diagnoses and all orders for this visit:    Class 1 obesity due to excess calories with body mass index (BMI) of 34.0 to 34.9 in adult, unspecified whether serious comorbidity present    Anal fissure    Patient's Body mass index is 32.69 kg/m². BMI is above normal parameters. Recommendations include: nutrition counseling.    Anoscopy and dilation and try xylocaine

## 2018-05-30 RX ORDER — DICLOFENAC SODIUM 75 MG/1
75 TABLET, DELAYED RELEASE ORAL 2 TIMES DAILY
Status: ON HOLD | COMMUNITY
End: 2018-05-31 | Stop reason: ALTCHOICE

## 2018-05-31 ENCOUNTER — ANESTHESIA EVENT (OUTPATIENT)
Dept: PERIOP | Facility: HOSPITAL | Age: 53
End: 2018-05-31

## 2018-05-31 ENCOUNTER — ANESTHESIA (OUTPATIENT)
Dept: PERIOP | Facility: HOSPITAL | Age: 53
End: 2018-05-31

## 2018-05-31 ENCOUNTER — HOSPITAL ENCOUNTER (OUTPATIENT)
Facility: HOSPITAL | Age: 53
Setting detail: HOSPITAL OUTPATIENT SURGERY
Discharge: HOME | End: 2018-05-31
Attending: SURGERY | Admitting: SURGERY

## 2018-05-31 VITALS
WEIGHT: 230 LBS | SYSTOLIC BLOOD PRESSURE: 138 MMHG | OXYGEN SATURATION: 94 % | HEIGHT: 72 IN | BODY MASS INDEX: 31.15 KG/M2 | HEART RATE: 85 BPM | TEMPERATURE: 97.5 F | DIASTOLIC BLOOD PRESSURE: 68 MMHG | RESPIRATION RATE: 16 BRPM

## 2018-05-31 LAB — GLUCOSE BLDC GLUCOMTR-MCNC: 133 MG/DL (ref 70–130)

## 2018-05-31 PROCEDURE — 25010000002 MIDAZOLAM PER 1 MG: Performed by: NURSE ANESTHETIST, CERTIFIED REGISTERED

## 2018-05-31 PROCEDURE — 25010000002 PROPOFOL 10 MG/ML EMULSION: Performed by: NURSE ANESTHETIST, CERTIFIED REGISTERED

## 2018-05-31 PROCEDURE — 25010000002 FENTANYL CITRATE (PF) 100 MCG/2ML SOLUTION: Performed by: NURSE ANESTHETIST, CERTIFIED REGISTERED

## 2018-05-31 PROCEDURE — 94640 AIRWAY INHALATION TREATMENT: CPT

## 2018-05-31 PROCEDURE — 94760 N-INVAS EAR/PLS OXIMETRY 1: CPT

## 2018-05-31 PROCEDURE — 46946 INT HRHC LIG 2+HROID W/O IMG: CPT | Performed by: SURGERY

## 2018-05-31 PROCEDURE — 82962 GLUCOSE BLOOD TEST: CPT

## 2018-05-31 PROCEDURE — 94799 UNLISTED PULMONARY SVC/PX: CPT

## 2018-05-31 RX ORDER — OXYCODONE HYDROCHLORIDE AND ACETAMINOPHEN 5; 325 MG/1; MG/1
1 TABLET ORAL ONCE AS NEEDED
Status: DISCONTINUED | OUTPATIENT
Start: 2018-05-31 | End: 2018-05-31 | Stop reason: HOSPADM

## 2018-05-31 RX ORDER — PROPOFOL 10 MG/ML
VIAL (ML) INTRAVENOUS AS NEEDED
Status: DISCONTINUED | OUTPATIENT
Start: 2018-05-31 | End: 2018-05-31 | Stop reason: SURG

## 2018-05-31 RX ORDER — SODIUM CHLORIDE 0.9 % (FLUSH) 0.9 %
1-10 SYRINGE (ML) INJECTION AS NEEDED
Status: DISCONTINUED | OUTPATIENT
Start: 2018-05-31 | End: 2018-05-31 | Stop reason: HOSPADM

## 2018-05-31 RX ORDER — PROPOFOL 10 MG/ML
VIAL (ML) INTRAVENOUS CONTINUOUS PRN
Status: DISCONTINUED | OUTPATIENT
Start: 2018-05-31 | End: 2018-05-31 | Stop reason: SURG

## 2018-05-31 RX ORDER — MIDAZOLAM HYDROCHLORIDE 1 MG/ML
INJECTION INTRAMUSCULAR; INTRAVENOUS AS NEEDED
Status: DISCONTINUED | OUTPATIENT
Start: 2018-05-31 | End: 2018-05-31 | Stop reason: SURG

## 2018-05-31 RX ORDER — ONDANSETRON 2 MG/ML
4 INJECTION INTRAMUSCULAR; INTRAVENOUS ONCE AS NEEDED
Status: DISCONTINUED | OUTPATIENT
Start: 2018-05-31 | End: 2018-05-31 | Stop reason: HOSPADM

## 2018-05-31 RX ORDER — LIDOCAINE HYDROCHLORIDE 10 MG/ML
INJECTION, SOLUTION INFILTRATION; PERINEURAL AS NEEDED
Status: DISCONTINUED | OUTPATIENT
Start: 2018-05-31 | End: 2018-05-31 | Stop reason: SURG

## 2018-05-31 RX ORDER — HYDROCODONE BITARTRATE AND ACETAMINOPHEN 5; 325 MG/1; MG/1
1 TABLET ORAL EVERY 4 HOURS PRN
Status: DISCONTINUED | OUTPATIENT
Start: 2018-05-31 | End: 2018-05-31 | Stop reason: HOSPADM

## 2018-05-31 RX ORDER — FENTANYL CITRATE 50 UG/ML
INJECTION, SOLUTION INTRAMUSCULAR; INTRAVENOUS AS NEEDED
Status: DISCONTINUED | OUTPATIENT
Start: 2018-05-31 | End: 2018-05-31 | Stop reason: SURG

## 2018-05-31 RX ORDER — MAGNESIUM HYDROXIDE 1200 MG/15ML
LIQUID ORAL AS NEEDED
Status: DISCONTINUED | OUTPATIENT
Start: 2018-05-31 | End: 2018-05-31 | Stop reason: HOSPADM

## 2018-05-31 RX ORDER — HYDROCODONE BITARTRATE AND ACETAMINOPHEN 5; 325 MG/1; MG/1
1 TABLET ORAL EVERY 4 HOURS PRN
Qty: 20 TABLET | Refills: 0 | Status: SHIPPED | OUTPATIENT
Start: 2018-05-31 | End: 2018-06-10

## 2018-05-31 RX ORDER — SODIUM CHLORIDE, SODIUM LACTATE, POTASSIUM CHLORIDE, CALCIUM CHLORIDE 600; 310; 30; 20 MG/100ML; MG/100ML; MG/100ML; MG/100ML
125 INJECTION, SOLUTION INTRAVENOUS CONTINUOUS
Status: DISCONTINUED | OUTPATIENT
Start: 2018-05-31 | End: 2018-05-31 | Stop reason: HOSPADM

## 2018-05-31 RX ORDER — MEPERIDINE HYDROCHLORIDE 50 MG/ML
12.5 INJECTION INTRAMUSCULAR; INTRAVENOUS; SUBCUTANEOUS
Status: DISCONTINUED | OUTPATIENT
Start: 2018-05-31 | End: 2018-05-31 | Stop reason: HOSPADM

## 2018-05-31 RX ORDER — FENTANYL CITRATE 50 UG/ML
50 INJECTION, SOLUTION INTRAMUSCULAR; INTRAVENOUS
Status: DISCONTINUED | OUTPATIENT
Start: 2018-05-31 | End: 2018-05-31 | Stop reason: HOSPADM

## 2018-05-31 RX ORDER — IPRATROPIUM BROMIDE AND ALBUTEROL SULFATE 2.5; .5 MG/3ML; MG/3ML
3 SOLUTION RESPIRATORY (INHALATION) ONCE
Status: COMPLETED | OUTPATIENT
Start: 2018-05-31 | End: 2018-05-31

## 2018-05-31 RX ORDER — IPRATROPIUM BROMIDE AND ALBUTEROL SULFATE 2.5; .5 MG/3ML; MG/3ML
3 SOLUTION RESPIRATORY (INHALATION) ONCE AS NEEDED
Status: DISCONTINUED | OUTPATIENT
Start: 2018-05-31 | End: 2018-05-31 | Stop reason: HOSPADM

## 2018-05-31 RX ADMIN — PROPOFOL 50 MG: 10 INJECTION, EMULSION INTRAVENOUS at 11:12

## 2018-05-31 RX ADMIN — LIDOCAINE HYDROCHLORIDE 60 MG: 10 INJECTION, SOLUTION INFILTRATION; PERINEURAL at 11:12

## 2018-05-31 RX ADMIN — MIDAZOLAM HYDROCHLORIDE 2 MG: 1 INJECTION, SOLUTION INTRAMUSCULAR; INTRAVENOUS at 11:08

## 2018-05-31 RX ADMIN — FENTANYL CITRATE 50 MCG: 50 INJECTION INTRAMUSCULAR; INTRAVENOUS at 11:43

## 2018-05-31 RX ADMIN — OXYCODONE HYDROCHLORIDE AND ACETAMINOPHEN 1 TABLET: 5; 325 TABLET ORAL at 12:13

## 2018-05-31 RX ADMIN — SODIUM CHLORIDE, POTASSIUM CHLORIDE, SODIUM LACTATE AND CALCIUM CHLORIDE 125 ML/HR: 600; 310; 30; 20 INJECTION, SOLUTION INTRAVENOUS at 10:37

## 2018-05-31 RX ADMIN — PROPOFOL 140 MCG/KG/MIN: 10 INJECTION, EMULSION INTRAVENOUS at 11:12

## 2018-05-31 RX ADMIN — IPRATROPIUM BROMIDE AND ALBUTEROL SULFATE 3 ML: .5; 3 SOLUTION RESPIRATORY (INHALATION) at 10:53

## 2018-05-31 RX ADMIN — FENTANYL CITRATE 100 MCG: 50 INJECTION INTRAMUSCULAR; INTRAVENOUS at 11:08

## 2018-05-31 NOTE — ANESTHESIA POSTPROCEDURE EVALUATION
Patient: Lynette Stein    Procedure Summary     Date:  05/31/18 Room / Location:   [facility] /  [facility]    Anesthesia Start:  1108 Anesthesia Stop:  1132    Procedure:  ANAL SCOPE, [place] Hemorroids (N/A ) Diagnosis:       Anal fissure      (Anal fissure [K60.2])    Surgeon:  Abel Flor MD Provider:  Reagan Glover MD    Anesthesia Type:  general ASA Status:  3          Anesthesia Type: general  Last vitals  BP   129/64 (05/31/18 1148)   Temp   97 °F (36.1 °C) (05/31/18 1148)   Pulse   83 (05/31/18 1148)   Resp   16 (05/31/18 1148)     SpO2   94 % (05/31/18 1148)     Post Anesthesia Care and Evaluation    Patient location during evaluation: PHASE II  Patient participation: complete - patient participated  Level of consciousness: awake and alert  Pain score: 1  Pain management: adequate  Airway patency: patent  Anesthetic complications: No anesthetic complications  PONV Status: controlled  Cardiovascular status: acceptable  Respiratory status: acceptable  Hydration status: acceptable

## 2018-05-31 NOTE — ANESTHESIA PREPROCEDURE EVALUATION
Anesthesia Evaluation     Patient summary reviewed and Nursing notes reviewed   no history of anesthetic complications:  NPO Solid Status: > 8 hours  NPO Liquid Status: > 8 hours           Airway   Mallampati: I  TM distance: <3 FB  Neck ROM: full  no difficulty expected  Dental - normal exam     Pulmonary - normal exam   (+) pneumonia , a smoker Current Smoked day of surgery, COPD, shortness of breath, sleep apnea, decreased breath sounds,   (-) asthma  Cardiovascular - normal exam  Exercise tolerance: good (4-7 METS)    NYHA Classification: I  Patient on routine beta blocker and Beta blocker given within 24 hours of surgery    (+) hypertension, dysrhythmias, SALAZAR, hyperlipidemia,   (-) past MI, angina, CHF      Neuro/Psych  (+) psychiatric history Depression and Anxiety,     (-) seizures, CVA  GI/Hepatic/Renal/Endo    (+) obesity,  GERD,  diabetes mellitus,   (-) morbid obesity, liver disease, no renal disease    Musculoskeletal     (+) back pain, chronic pain, myalgias,   Abdominal  - normal exam    Bowel sounds: normal.   Substance History - negative use  (-) alcohol use, drug use     OB/GYN negative ob/gyn ROS         Other   (+) blood dyscrasia, arthritis   history of cancer                      Anesthesia Plan    ASA 3     general     intravenous induction   Anesthetic plan and risks discussed with patient and spouse/significant other.  Use of blood products discussed with patient and spouse/significant other  Consented to blood products.   Plan discussed with CRNA.

## 2018-06-18 ENCOUNTER — OFFICE VISIT (OUTPATIENT)
Dept: SURGERY | Facility: CLINIC | Age: 53
End: 2018-06-18

## 2018-06-18 VITALS — WEIGHT: 230 LBS | BODY MASS INDEX: 31.15 KG/M2 | HEIGHT: 72 IN

## 2018-06-18 DIAGNOSIS — K60.2 ANAL FISSURE: Primary | ICD-10-CM

## 2018-06-18 PROCEDURE — 99024 POSTOP FOLLOW-UP VISIT: CPT | Performed by: SURGERY

## 2018-06-18 NOTE — PROGRESS NOTES
Subjective   Lynette Stein is a 53 y.o. female.     History of Present Illness She had the anal dilation a couple of weeks ago and does have some hemorrhoids. Her bowels are working well and she is feeling.     The following portions of the patient's history were reviewed and updated as appropriate: current medications, past family history, past medical history, past social history, past surgical history and problem list.    Review of Systems anal fissure    Objective   Physical Exam hemorrhoids    Assessment/Plan   Lynette was seen today for post-op follow-up.    Diagnoses and all orders for this visit:    Anal fissure    Currently doing well will see her prn

## 2018-08-08 ENCOUNTER — OFFICE VISIT (OUTPATIENT)
Dept: CARDIAC SURGERY | Facility: CLINIC | Age: 53
End: 2018-08-08

## 2018-08-08 VITALS
DIASTOLIC BLOOD PRESSURE: 65 MMHG | BODY MASS INDEX: 30.85 KG/M2 | WEIGHT: 227.8 LBS | HEART RATE: 91 BPM | SYSTOLIC BLOOD PRESSURE: 115 MMHG | HEIGHT: 72 IN | OXYGEN SATURATION: 93 %

## 2018-08-08 DIAGNOSIS — R91.8 PULMONARY INFILTRATES: Primary | ICD-10-CM

## 2018-08-08 PROCEDURE — 99212 OFFICE O/P EST SF 10 MIN: CPT | Performed by: THORACIC SURGERY (CARDIOTHORACIC VASCULAR SURGERY)

## 2018-08-08 PROCEDURE — 99406 BEHAV CHNG SMOKING 3-10 MIN: CPT | Performed by: THORACIC SURGERY (CARDIOTHORACIC VASCULAR SURGERY)
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
## 2018-11-09 ENCOUNTER — OFFICE VISIT (OUTPATIENT)
Dept: PULMONOLOGY | Facility: CLINIC | Age: 53
End: 2018-11-09

## 2018-11-09 VITALS
OXYGEN SATURATION: 93 % | TEMPERATURE: 98.4 F | DIASTOLIC BLOOD PRESSURE: 74 MMHG | BODY MASS INDEX: 30.75 KG/M2 | WEIGHT: 227 LBS | HEIGHT: 72 IN | SYSTOLIC BLOOD PRESSURE: 119 MMHG | HEART RATE: 94 BPM

## 2018-11-09 DIAGNOSIS — R06.02 SHORTNESS OF BREATH: Primary | ICD-10-CM

## 2018-11-09 DIAGNOSIS — G47.33 OBSTRUCTIVE SLEEP APNEA: ICD-10-CM

## 2018-11-09 DIAGNOSIS — R09.02 HYPOXIA: ICD-10-CM

## 2018-11-09 DIAGNOSIS — R93.89 ABNORMAL CT SCAN, CHEST: ICD-10-CM

## 2018-11-09 DIAGNOSIS — F17.210 CIGARETTE NICOTINE DEPENDENCE WITHOUT COMPLICATION: ICD-10-CM

## 2018-11-09 PROCEDURE — 99407 BEHAV CHNG SMOKING > 10 MIN: CPT | Performed by: NURSE PRACTITIONER

## 2018-11-09 PROCEDURE — 99214 OFFICE O/P EST MOD 30 MIN: CPT | Performed by: NURSE PRACTITIONER

## 2018-11-09 PROCEDURE — 94664 DEMO&/EVAL PT USE INHALER: CPT | Performed by: NURSE PRACTITIONER

## 2018-11-09 RX ORDER — ALBUTEROL SULFATE 90 UG/1
2 AEROSOL, METERED RESPIRATORY (INHALATION) EVERY 4 HOURS PRN
Qty: 18 G | Refills: 5 | Status: SHIPPED | OUTPATIENT
Start: 2018-11-09 | End: 2021-01-08 | Stop reason: SDUPTHER

## 2018-11-09 NOTE — PROGRESS NOTES
Interval history since last visit: none     Recent hospitalizations:none   Investigations (imaging, PFT's, labs, sleep study, record requests, etc.) none     Have you had the Influenza Vaccine? no   Would you like to receive this Vaccine today? no    Have you had the Pneumonia Vaccine?  no  Would you like to receive this Vaccine today? no    Subjective    Lynette Stein presents for the following Shortness of Breath  .    History of Present Illness     Ms. Stein is here for a follow up on shortness of breath.  She state that her breathing has been at baseline since her last visit.  She states that she continues to use Spiriva daily and her rescue inhaler as needed.  She is still smoking about 1/2 ppd.  She is following with Dr. Quintero for an abnormal CT scan and seen him last in August 2018. He deferred a VATS procedure at this time and recommended further follow up with pulmonology and infectious disease.  She does not want a flu or pneumonia vaccines today. She reports no recent illnesses or hospitalizations.  She states that she is not using her cpap machine at night because it causes her to smother, but that she is using her supplemental oxygen.    Review of Systems   Constitutional: Positive for chills and fatigue. Negative for appetite change, diaphoresis and unexpected weight change.   HENT: Negative for sore throat, trouble swallowing and voice change.    Eyes: Negative for visual disturbance.   Respiratory: Positive for cough, shortness of breath and wheezing. Negative for apnea and choking.    Cardiovascular: Positive for leg swelling. Negative for chest pain and palpitations.   Gastrointestinal: Negative for abdominal pain, constipation, diarrhea, nausea and vomiting.   Endocrine: Negative for cold intolerance, heat intolerance, polydipsia, polyphagia and polyuria.   Genitourinary: Negative for difficulty urinating and dysuria.   Musculoskeletal: Negative for gait problem.   Skin: Negative for rash and  wound.   Neurological: Positive for headaches. Negative for syncope and light-headedness.   Hematological: Negative for adenopathy.   Psychiatric/Behavioral: Negative for agitation, behavioral problems and confusion.   All other systems reviewed and are negative.      Active Problems:  Problem List Items Addressed This Visit        Respiratory    Obstructive sleep apnea    Shortness of breath - Primary    Relevant Orders    Bronchial Challenge With Methacholine    Hypoxia       Other    Cigarette nicotine dependence without complication    Abnormal CT scan, chest          Past Medical History:  Past Medical History:   Diagnosis Date   • Anal fissure    • Anxiety and depression    • Arthritis    • Cancer (CMS/HCC)    • Chronic cystitis    • Chronic leukemia (CMS/HCC)    • Chronic pain    • Colitis    • COPD (chronic obstructive pulmonary disease) (CMS/HCC)    • Diabetes mellitus (CMS/HCC)    • Elevated cholesterol    • Fibromyalgia    • GERD (gastroesophageal reflux disease)    • Hemorrhoids    • History of pilonidal cyst    • Hyperlipidemia    • Hypertension    • Leukemia (CMS/HCC)    • Pulmonary infiltrate    • Sleep apnea    • Ulcerative colitis (CMS/HCC)    • UTI (urinary tract infection)        Family History:  Family History   Problem Relation Age of Onset   • Cancer Other    • Diabetes Other    • Gout Other    • Heart disease Other    • Hypertension Other    • Other Other         osteoarthritis,osteoporosis,rheumatoid arthritis   • COPD Mother    • Heart failure Mother    • Diabetes Mother    • Jaundice Father    • Heart failure Father    • Cancer Sister        Social History:  Social History   Substance Use Topics   • Smoking status: Current Every Day Smoker     Packs/day: 0.50     Years: 45.00     Types: Cigarettes   • Smokeless tobacco: Never Used      Comment: 1/2 ppd   • Alcohol use No       Current Medications:  Current Outpatient Prescriptions   Medication Sig Dispense Refill   • estradiol (ESTRACE) 2  MG tablet Take 2 mg by mouth Daily.     • fenofibrate (TRICOR) 145 MG tablet Take  by mouth.     • fexofenadine-pseudoephedrine (ALLEGRA-D)  MG per 12 hr tablet Take 1 tablet by mouth 2 (Two) Times a Day.     • fluconazole (DIFLUCAN) 150 MG tablet Take 150 mg by mouth 1 (One) Time.     • hydrocortisone (ANUSOL-HC) 25 MG suppository Insert 25 mg into the rectum 2 (Two) Times a Day.     • hyoscyamine (ANASPAZ,LEVSIN) 0.125 MG tablet Take 0.125 mg by mouth Every 4 (Four) Hours As Needed for cramping.     • ibuprofen (ADVIL,MOTRIN) 800 MG tablet Take 800 mg by mouth Every 6 (Six) Hours As Needed for mild pain (1-3).     • lisinopril (PRINIVIL,ZESTRIL) 30 MG tablet Take  by mouth.     • LORazepam (ATIVAN) 2 MG tablet Take  by mouth.     • metFORMIN (GLUCOPHAGE) 250 MG half tablet Take 250 mg by mouth 2 (Two) Times a Day With Meals.     • metoprolol tartrate (LOPRESSOR) 50 MG tablet Take 50 mg by mouth 2 (Two) Times a Day.     • metroNIDAZOLE (FLAGYL) 500 MG tablet Take 500 mg by mouth 3 (Three) Times a Day.     • mupirocin (BACTROBAN) 2 % ointment Apply  topically 3 (Three) Times a Day.     • nicotine ([facility] NICOTINE) 7 MG/24HR patch Place 1 patch on the skin Daily. 30 patch 1   • omeprazole (priLOSEC) 40 MG capsule Take 40 mg by mouth Daily.     • ondansetron (ZOFRAN) 4 MG tablet Take 4 mg by mouth Every 8 (Eight) Hours As Needed for Nausea or Vomiting.     • oxybutynin XL (DITROPAN-XL) 10 MG 24 hr tablet Take 10 mg by mouth Daily.     • Phenazopyridine HCl (AZO TABS PO) Take  by mouth.     • pravastatin (PRAVACHOL) 40 MG tablet Take 40 mg by mouth Daily.     • pregabalin (LYRICA) 300 MG capsule Take  by mouth.     • QUEtiapine XR (SEROquel XR) 200 MG 24 hr tablet Take 200 mg by mouth Every Night.     • rosuvastatin (CRESTOR) 5 MG tablet Take  by mouth.     • sertraline (ZOLOFT) 100 MG tablet Take 100 mg by mouth Daily.     • terconazole (TERAZOL 3) 0.8 % vaginal cream Insert 1 applicator into the vagina Every  "Night.     • traMADol (ULTRAM) 50 MG tablet Take 50 mg by mouth Every 6 (Six) Hours As Needed for moderate pain (4-6).     • UCERIS 9 MG tablet sustained-release 24 hour   5   • albuterol (PROVENTIL HFA;VENTOLIN HFA) 108 (90 Base) MCG/ACT inhaler Inhale 2 puffs Every 4 (Four) Hours As Needed for Wheezing. 18 g 5   • tiotropium bromide monohydrate (SPIRIVA RESPIMAT) 2.5 MCG/ACT aerosol solution inhaler Inhale 2 puffs Daily. 4 g 11     No current facility-administered medications for this visit.        Allergies:  Allergies   Allergen Reactions   • Penicillins Unknown (See Comments)     Pt states was in coma and does not know reaction         Vitals:  /74   Pulse 94   Temp 98.4 °F (36.9 °C)   Ht 185.4 cm (73\")   Wt 103 kg (227 lb)   SpO2 93%   BMI 29.95 kg/m²     Imaging:    Imaging Results (most recent)     None          Pulmonary Functions Testing Results:    No results found for: FEV1, FVC, [id number], TLC, DLCO    Results for orders placed or performed during the hospital encounter of 05/31/18   POC Glucose Once   Result Value Ref Range    Glucose 133 (H) 70 - 130 mg/dL       Objective   Physical Exam     GENERAL APPEARANCE: Well developed, well nourished, alert and cooperative, and appears to be in no acute distress.    HEAD: normocephalic.    EYES: PERRL, EOMI. Fundi normal, vision is grossly intact.    THROAT: Oral cavity and pharynx normal. No inflammation, swelling, exudate, or lesions.     NECK: Neck supple.     CARDIAC: Normal S1 and S2. No S3, S4 or murmurs. Rhythm is regular. There is no peripheral edema, cyanosis or pallor. Extremities are warm and well perfused. Capillary refill is less than 2 seconds. No carotid bruits.    RESPIRATORY: Clear to auscultation without rales, rhonchi, wheezing or diminished breath sounds.    GI: Positive bowel sounds. Soft, nondistended, nontender.     MUSCULOSKELETAL: No significant deformity or joint abnormality. No edema. Peripheral pulses intact. No " varicosities.    NEUROLOGICAL: Strength and sensation symmetric and intact throughout.     PSYCHIATRIC: The mental examination revealed the patient was oriented to person, place, and time.     Assessment/Plan       Shortness of breath:  Breathing is at baseline. She has no major complaints of shortness of breath.  She states that she is still using her Spiriva and needs refills of that and her albuterol inhaler. Will send these to the pharmacy.  Last PFT in 2016 showed a normal spirometry with no bronchodilator response.  DLCO was normal.  Will order another PFT with metacholine challenge to reassess lung function due to continued smoking and previous normal PFT.       - Inhaler technique demonstration/discussion:  I have extensively discussed the steps.  In summary, the steps were discussed in the following order.Patient was advised to rinse the mouth after steroid inhalation to prevent fungal mucositis.  · Remove the cap from the inhaler and shake well.    · Breathe out all the way.    · Place the mouthpiece of the inhaler between your teeth and seal your lips tightly around it.    · As you start to bleed in slowly, press down on the canister one time.   · Keep breathing in as slowly and deeply as you can.    · It should take about 5 seconds for you to completely breathe in.    · Hold your breath for 10 seconds(count to 10 slowly) to allow the medication to reach the airways of the lung.    · Repeat the above steps for each puff.    · Wait about 1 minute between the puffs.    · Replaced the cap on the inhaler when finished.      Obstructive sleep apnea: non compliant.  She states that she tries to use it but that it ayo her.    Educated patient on the complications associated with untreated sleep apnea -- that it increases risk of MI, stroke, depression, hypertension, heart failure, arrhythmias, coronary artery disease, and potential death due to complication of that mentioned previously.    Also patient was  educated  about the hazards of driving if they are sleep deprived and have sleep apnea.  Was instructed not to involving driving or any activity which involves higher level of mental function- like operating a machine-  if they are sleep deprived and not wearing  their CPAP and sleep apnea is not treated.    Hypoxia:  Complaint with oxygen use at night.  She does not wear it during the day.  Oxygen saturation is 93% on room air.    Educated patient on the importance of wearing oxygen as prescribed, otherwise she could become hypoxic, faint, fall, hit her head, cause a brain hemorrhage, and potentially die.    Smoker:  Smokes about 1/2 ppd. I advised Lynette of the risks of continuing to use tobacco, and I provided her with tobacco cessation educational materials in the After Visit Summary.     During this visit, I spent greater than 10 minutes counseling the patient regarding tobacco cessation.    Obesity:  Patient's Body mass index is 29.95 kg/m². BMI is above normal parameters. Recommendations include: exercise counseling and nutrition counseling.    Abnormal CT scan:  Following with Dr. Quintero.  He saw her in August 2018.  Deferred a VATS procedure at this time.  Will repeat CT scan at next visit.        ICD-10-CM ICD-9-CM   1. Shortness of breath R06.02 786.05   2. Obstructive sleep apnea G47.33 327.23   3. Hypoxia R09.02 799.02   4. Abnormal CT scan, chest R93.89 793.2   5. Cigarette nicotine dependence without complication F17.210 305.1       Return in about 6 months (around 5/9/2019).

## 2018-11-12 DIAGNOSIS — R06.02 SHORTNESS OF BREATH: Primary | ICD-10-CM

## 2019-05-14 ENCOUNTER — OFFICE VISIT (OUTPATIENT)
Dept: PULMONOLOGY | Facility: CLINIC | Age: 54
End: 2019-05-14

## 2019-05-14 VITALS
WEIGHT: 230 LBS | TEMPERATURE: 98 F | DIASTOLIC BLOOD PRESSURE: 66 MMHG | SYSTOLIC BLOOD PRESSURE: 132 MMHG | HEART RATE: 87 BPM | HEIGHT: 60 IN | BODY MASS INDEX: 45.16 KG/M2

## 2019-05-14 DIAGNOSIS — F17.210 CIGARETTE NICOTINE DEPENDENCE, UNCOMPLICATED: ICD-10-CM

## 2019-05-14 DIAGNOSIS — Z23 NEED FOR VACCINATION: ICD-10-CM

## 2019-05-14 DIAGNOSIS — J43.2 CENTRILOBULAR EMPHYSEMA (HCC): Primary | ICD-10-CM

## 2019-05-14 DIAGNOSIS — G47.33 OSA (OBSTRUCTIVE SLEEP APNEA): ICD-10-CM

## 2019-05-14 DIAGNOSIS — R91.8 GROUND GLASS OPACITY PRESENT ON IMAGING OF LUNG: ICD-10-CM

## 2019-05-14 DIAGNOSIS — G47.34 NOCTURNAL OXYGEN DESATURATION: ICD-10-CM

## 2019-05-14 PROCEDURE — 90732 PPSV23 VACC 2 YRS+ SUBQ/IM: CPT | Performed by: INTERNAL MEDICINE

## 2019-05-14 PROCEDURE — 94618 PULMONARY STRESS TESTING: CPT | Performed by: INTERNAL MEDICINE

## 2019-05-14 PROCEDURE — 99407 BEHAV CHNG SMOKING > 10 MIN: CPT | Performed by: INTERNAL MEDICINE

## 2019-05-14 PROCEDURE — 94664 DEMO&/EVAL PT USE INHALER: CPT | Performed by: INTERNAL MEDICINE

## 2019-05-14 PROCEDURE — 99214 OFFICE O/P EST MOD 30 MIN: CPT | Performed by: INTERNAL MEDICINE

## 2019-05-14 PROCEDURE — G0009 ADMIN PNEUMOCOCCAL VACCINE: HCPCS | Performed by: INTERNAL MEDICINE

## 2019-05-14 NOTE — PROGRESS NOTES
Subjective    Lynette Stein presents for the following Shortness of Breath  .    History of Present Illness   Ms. Stein is a 54 year old female with medical history of obstructive sleep apnea, obesity, and current smoking history.  She presents to pulmonary outpatient clinic for the evaluation and management of shortness of breath for last 8 months.      She was apparently well 9 months ago.  She started feeling shortness of breath 8 months ago.  The shortness of breath started gradually.  The shortness of breath initially started on exertion which has progressed to shortness of breath even at rest in last 6 months. The symptoms of shortness of breath are intermittent in nature.  Shortness of breath is not associated with chest pain.  Shortness of breath is associated with intermittent wheezing and dry cough from last 4 months.  She denies complains of subjective fevers and chills.  The symptoms of shortness of breath increases with activity and decreases on rest.  She denies discomfort while laying down flat.  She denies any attacks of severe shortness of breath and coughing at nighttime.  She denies any night sweats or weight loss.  She denies any history of blood clot in the leg or in the lung.     Cough is dry. Sputum color is white and has mucoid consistency. Patient denies any blood in the sputum.     Wheezing happens during both day and night. Wheezing responses to bronchodilators.    She does not complain of runny nose, nasal stuffiness, headache and symptoms of excess mucus that drips down the back of nose and throat.     She does not complain of morning headaches, excessive daytime sleepiness and unrefreshed sleep.     She does not complain symptoms of heartburn, stomach getting bloated, feel heavy after meal and bitterly quit taste coming up to the throat.         Review of system  General ROS: negative for - chills, fatigue, fever, malaise, night sweats or sleep disturbance  Psychological ROS: negative  for - anxiety, behavioral disorder, depression or memory difficulties  Ophthalmic ROS: negative for - blurry vision, decreased vision, double vision or dry eyes  ENT ROS: negative for - epistaxis, hearing change or sneezing  Allergy and Immunology ROS: negative for - hives, itchy/watery eyes or nasal congestion  Hematological and Lymphatic ROS: negative for - bleeding problems, blood clots, blood transfusions, jaundice or night sweats  Endocrine ROS: negative for - malaise/lethargy, mood swings, polydipsia/polyuria or temperature intolerance  Respiratory ROS: positive for shortness of breath on exertion.  Positive for dry cough and wheezing  negative for - orthopnea, pleuritic pain or sputum changes  Cardiovascular ROS: no chest pain.  Positive for dyspnea on exertion  Gastrointestinal ROS: no abdominal pain, change in bowel habits, or black or bloody stools  Musculoskeletal ROS: negative for - joint pain, joint stiffness or joint swelling  Neurological ROS: no TIA or stroke symptoms  Dermatological ROS: negative for acne, dry skin and eczema      Past Medical History:  Past Medical History:   Diagnosis Date   • Anal fissure    • Anxiety and depression    • Arthritis    • Cancer (CMS/HCC)    • Chronic cystitis    • Chronic leukemia (CMS/HCC)    • Chronic pain    • Colitis    • COPD (chronic obstructive pulmonary disease) (CMS/HCC)    • Diabetes mellitus (CMS/HCC)    • Elevated cholesterol    • Fibromyalgia    • GERD (gastroesophageal reflux disease)    • Hemorrhoids    • History of pilonidal cyst    • Hyperlipidemia    • Hypertension    • Leukemia (CMS/HCC)    • Pulmonary infiltrate    • Sleep apnea    • Ulcerative colitis (CMS/HCC)    • UTI (urinary tract infection)        Family History:  Family History   Problem Relation Age of Onset   • Cancer Other    • Diabetes Other    • Gout Other    • Heart disease Other    • Hypertension Other    • Other Other         osteoarthritis,osteoporosis,rheumatoid arthritis   •  COPD Mother    • Heart failure Mother    • Diabetes Mother    • Jaundice Father    • Heart failure Father    • Cancer Sister        Social History:  Social History     Tobacco Use   • Smoking status: Current Every Day Smoker     Packs/day: 0.50     Years: 45.00     Pack years: 22.50     Types: Cigarettes   • Smokeless tobacco: Never Used   • Tobacco comment: 1/2 ppd   Substance Use Topics   • Alcohol use: No       Current Medications:  Current Outpatient Medications   Medication Sig Dispense Refill   • albuterol (PROVENTIL HFA;VENTOLIN HFA) 108 (90 Base) MCG/ACT inhaler Inhale 2 puffs Every 4 (Four) Hours As Needed for Wheezing. 18 g 5   • estradiol (ESTRACE) 2 MG tablet Take 2 mg by mouth Daily.     • fenofibrate (TRICOR) 145 MG tablet Take  by mouth.     • fexofenadine-pseudoephedrine (ALLEGRA-D)  MG per 12 hr tablet Take 1 tablet by mouth 2 (Two) Times a Day.     • fluconazole (DIFLUCAN) 150 MG tablet Take 150 mg by mouth 1 (One) Time.     • hydrocortisone (ANUSOL-HC) 25 MG suppository Insert 25 mg into the rectum 2 (Two) Times a Day.     • hyoscyamine (ANASPAZ,LEVSIN) 0.125 MG tablet Take 0.125 mg by mouth Every 4 (Four) Hours As Needed for cramping.     • ibuprofen (ADVIL,MOTRIN) 800 MG tablet Take 800 mg by mouth Every 6 (Six) Hours As Needed for mild pain (1-3).     • lisinopril (PRINIVIL,ZESTRIL) 30 MG tablet Take  by mouth.     • LORazepam (ATIVAN) 2 MG tablet Take  by mouth.     • metFORMIN (GLUCOPHAGE) 250 MG half tablet Take 250 mg by mouth 2 (Two) Times a Day With Meals.     • metoprolol tartrate (LOPRESSOR) 50 MG tablet Take 50 mg by mouth 2 (Two) Times a Day.     • metroNIDAZOLE (FLAGYL) 500 MG tablet Take 500 mg by mouth 3 (Three) Times a Day.     • mupirocin (BACTROBAN) 2 % ointment Apply  topically 3 (Three) Times a Day.     • nicotine ([facility] NICOTINE) 7 MG/24HR patch Place 1 patch on the skin Daily. 30 patch 1   • omeprazole (priLOSEC) 40 MG capsule Take 40 mg by mouth Daily.     • ondansetron  "(ZOFRAN) 4 MG tablet Take 4 mg by mouth Every 8 (Eight) Hours As Needed for Nausea or Vomiting.     • oxybutynin XL (DITROPAN-XL) 10 MG 24 hr tablet Take 10 mg by mouth Daily.     • Phenazopyridine HCl (AZO TABS PO) Take  by mouth.     • pravastatin (PRAVACHOL) 40 MG tablet Take 40 mg by mouth Daily.     • pregabalin (LYRICA) 300 MG capsule Take  by mouth.     • QUEtiapine XR (SEROquel XR) 200 MG 24 hr tablet Take 200 mg by mouth Every Night.     • rosuvastatin (CRESTOR) 5 MG tablet Take  by mouth.     • sertraline (ZOLOFT) 100 MG tablet Take 100 mg by mouth Daily.     • terconazole (TERAZOL 3) 0.8 % vaginal cream Insert 1 applicator into the vagina Every Night.     • tiotropium bromide monohydrate (SPIRIVA RESPIMAT) 2.5 MCG/ACT aerosol solution inhaler Inhale 2 puffs Daily. 4 g 11   • traMADol (ULTRAM) 50 MG tablet Take 50 mg by mouth Every 6 (Six) Hours As Needed for moderate pain (4-6).     • UCERIS 9 MG tablet sustained-release 24 hour   5     No current facility-administered medications for this visit.        Allergies:  Allergies   Allergen Reactions   • Penicillins Unknown (See Comments)     Pt states was in coma and does not know reaction         Vitals:  /66   Pulse 87   Temp 98 °F (36.7 °C)   Ht 152.4 cm (60\")   Wt 104 kg (230 lb)   PF 93 L/min   BMI 44.92 kg/m²     Results for orders placed or performed during the hospital encounter of 05/31/18   POC Glucose Once   Result Value Ref Range    Glucose 133 (H) 70 - 130 mg/dL       Objective   Physical Exam:  Constitutional:  oriented to person, place, and time. appears well-developed and well-nourished. No distress.   HENT:   Head: Normocephalic and atraumatic.   Right Ear: External ear normal.   Left Ear: External ear normal.   Nose: Nose normal.   Eyes: Pupils are equal, round, and reactive to light. Conjunctivae and EOM are normal. Right eye exhibits no discharge. Left eye exhibits no discharge. No scleral icterus.   Neck: Normal range of " motion. Neck supple. No thyromegaly present.   Cardiovascular: Normal rate, regular rhythm, normal heart sounds and intact distal pulses.  Exam reveals no friction rub.    No murmur heard.  Pulmonary/Chest: No stridor.   Bilateral air entry equal.  Decreased breath sounds in left and right posterior lower lung zones.  No rhonchi heard.  Wheezing absent.  No crackles appreciated.    Abdominal: Soft. Bowel sounds are normal.exhibits no distension. There is no tenderness.   Musculoskeletal: Normal range of motion. exhibits no deformity.     No lower extremity edema bilateral.   Neurological: alert and oriented to person, place, and time. No cranial nerve deficit. Coordination normal.   Skin: Skin is warm and dry. Capillary refill takes less than 2 seconds. not diaphoretic. No erythema.   Psychiatric:   normal mood and affect.   behavior is normal.         I have reviewed the past medical history, past surgical history, family history and social history of the patient.     I have personally reviewed following imaging and labs    Labs:  No results found for: PHART, UEZ3TLR, PO2ART, KDA6CGY, BASEEXCESS, S8YBGCVV  Lab Results   Component Value Date    GLUCOSE 125 (H) 09/13/2017    CALCIUM 9.4 09/13/2017     09/13/2017    K 3.8 09/13/2017    CO2 27.0 09/13/2017     09/13/2017    BUN 10 09/13/2017    CREATININE 0.80 03/07/2018    EGFRIFNONA 96 09/13/2017    BCR 15.4 09/13/2017    ANIONGAP 7.0 09/13/2017     Lab Results   Component Value Date    WBC 16.41 (H) 09/13/2017    HGB 12.4 09/13/2017    HCT 39.6 09/13/2017    MCV 90.4 09/13/2017     (H) 09/13/2017         Imaging  I have reviewed the images of CT scan of the chest that was performed on 3/7/2018 which showed increasing patchy groundglass nodular opacity of bilateral upper lobes.      Assessment/Plan    DARON (obstructive sleep apnea)  -Continue on positive airway pressure therapy.         Centrilobular emphysema (CMS/HCC)   -Ordered for pulmonary  function test with bronchodilator  -Patient underwent walk oximetry during clinic visit.  -Continue with albuterol nebs  -Continue with Spiriva nebs         Nocturnal oxygen desaturation      -Continue on 2 to 3 L/min of supplemental oxygen while sleeping to avoid nocturnal desaturation.      Ground glass opacity present on imaging of lung      -Ordered CT scan of the chest without contrast.      Need for vaccination        Pneumococcal Polysaccharide Vaccine 23-Valent (PPSV23) Greater Than or Equal To 1yo Subcutaneous / IM (Completed)    Cigarette nicotine dependence, uncomplicated        Ambulatory Referral to Specialty Pharmacy            Immunization:  Patient underwent immunization for pneumonia during clinic visit.    Walk oximetry during clinic visit  Patient underwent walk oximetry during clinic visit.  The results will be scanned in epic.    Smoking cessation counseling:  I extensively discussed consequences of smoking namely cardiovascular/metabolic, lung cancer, mouth cancer, pulmonary disorder including COPD and reduced quality of life.  At the end of the conversation, patient voices understanding of the risk involved in smoking.  Patient also understands the benefits of quitting.  I provided the patient smoking cessation material. Patient displayed understanding and stated that she will try to quit smoking.  During this visit I spent 13 minutes counseling the patient regarding the smoking cessation.  I advised Lynette of the risks of continuing to use tobacco, and I provided her with tobacco cessation educational materials in the After Visit Summary.     During this visit, I spent 13 minutes counseling the patient regarding tobacco cessation.      Inhaler technique demonstration/discussion:  I have extensively discussed the steps.  In summary, the steps were discussed in the following order. Patient was advised to rinse the mouth after steroid inhalation to prevent fungal mucositis.  · Remove the cap  from the inhaler and shake well.    · Breathe out all the way.    · Place the mouthpiece of the inhaler between your teeth and seal your lips tightly around it.    · As you start to breath in slowly, press down on the canister one time.   · Keep breathing in as slowly and deeply as you can.    · It should take about 5 seconds for you to completely breathe in.    · Hold your breath for 10 seconds(count to 10 slowly) to allow the medication to reach the airways of the lung.    · Repeat the above steps for each puff.    · Wait about 1 minute between the puffs.    · Replace the cap on the inhaler when finished.    Sleep apnea counseling  Patient was educated on positive airway pressure treatment.  As per CMS guidelines, more than 4 hours on 70% of observed nights is considered adherence. Patient was strongly encouraged to use CPAP as much as possible during sleep as more CPAP use is equal to more benefit. The patient was extensively educated on the consequences of untreated obstructive sleep apnea namely cardiovascular/metabolic disorder, neurocognitive deficit, daytime sleepiness, motor vehicle accidents, depression, mood disorders and reduced quality of life.  At the end of conversation, the patient voices understanding of the disease process and treatment modality.  Patient also understands the risk of untreated obstructive sleep apnea and benefit benefits of the treatment.      Follow up in 6 months and as needed.

## 2019-11-12 ENCOUNTER — OFFICE VISIT (OUTPATIENT)
Dept: PULMONOLOGY | Facility: CLINIC | Age: 54
End: 2019-11-12

## 2019-11-12 VITALS
HEART RATE: 96 BPM | BODY MASS INDEX: 32.23 KG/M2 | SYSTOLIC BLOOD PRESSURE: 102 MMHG | OXYGEN SATURATION: 93 % | HEIGHT: 72 IN | TEMPERATURE: 98.2 F | WEIGHT: 238 LBS | DIASTOLIC BLOOD PRESSURE: 62 MMHG

## 2019-11-12 DIAGNOSIS — R91.8 GROUND GLASS OPACITY PRESENT ON IMAGING OF LUNG: ICD-10-CM

## 2019-11-12 DIAGNOSIS — F17.210 CIGARETTE NICOTINE DEPENDENCE WITHOUT COMPLICATION: ICD-10-CM

## 2019-11-12 DIAGNOSIS — J43.2 CENTRILOBULAR EMPHYSEMA (HCC): ICD-10-CM

## 2019-11-12 DIAGNOSIS — G47.33 OSA (OBSTRUCTIVE SLEEP APNEA): Primary | ICD-10-CM

## 2019-11-12 DIAGNOSIS — E66.9 OBESITY (BMI 30-39.9): ICD-10-CM

## 2019-11-12 PROCEDURE — 99214 OFFICE O/P EST MOD 30 MIN: CPT | Performed by: NURSE PRACTITIONER

## 2019-11-12 NOTE — PROGRESS NOTES
Interval history since last visit: None    Recent hospitalizations: None    Investigations (imaging, PFT's, labs, sleep study, record requests, etc.) None    Have you had the Influenza Vaccine? no   Would you like to receive this Vaccine today? no    Have you had the Pneumonia Vaccine?  yes   Would you like to receive this Vaccine today? no    Subjective    Lynette Stein presents for the following Shortness of Breath      History of Present Illness     Ms. Stein is a 54 year old female that has a medical history significant for COPD, diabetes, fibromyalgia, GERD, hyperlipidemia, hypertension, and sleep apnea.    She presents today for a follow up on sleep apnea and COPD.  She states that she has been doing well since her last visit.  She denies any shortness of breath or coughing today.  She continues to take Spiriva once daily.  She is also using oxygen at night.  She states that she is not using a cpap due to claustrophobia.      Review of Systems   Constitutional: Negative for activity change, fatigue and unexpected weight change.   HENT: Negative for congestion, postnasal drip and rhinorrhea.    Respiratory: Negative for apnea, cough, chest tightness, shortness of breath and wheezing.    Cardiovascular: Negative for chest pain and palpitations.   Gastrointestinal: Negative for nausea.   Allergic/Immunologic: Negative for environmental allergies.   Psychiatric/Behavioral: Negative for agitation and confusion.       Active Problems:  Problem List Items Addressed This Visit        Respiratory    DARON (obstructive sleep apnea) - Primary    Centrilobular emphysema (CMS/HCC)       Digestive    Obesity (BMI 30-39.9)       Other    Cigarette nicotine dependence without complication    Ground glass opacity present on imaging of lung          Past Medical History:  Past Medical History:   Diagnosis Date   • Anal fissure    • Anxiety and depression    • Arthritis    • Cancer (CMS/HCC)    • Chronic cystitis    • Chronic  leukemia (CMS/HCC)    • Chronic pain    • Colitis    • COPD (chronic obstructive pulmonary disease) (CMS/HCC)    • Diabetes mellitus (CMS/HCC)    • Elevated cholesterol    • Fibromyalgia    • GERD (gastroesophageal reflux disease)    • Hemorrhoids    • History of pilonidal cyst    • Hyperlipidemia    • Hypertension    • Leukemia (CMS/HCC)    • Pulmonary infiltrate    • Sleep apnea    • Ulcerative colitis (CMS/HCC)    • UTI (urinary tract infection)        Family History:  Family History   Problem Relation Age of Onset   • Cancer Other    • Diabetes Other    • Gout Other    • Heart disease Other    • Hypertension Other    • Other Other         osteoarthritis,osteoporosis,rheumatoid arthritis   • COPD Mother    • Heart failure Mother    • Diabetes Mother    • Jaundice Father    • Heart failure Father    • Cancer Sister        Social History:  Social History     Tobacco Use   • Smoking status: Current Every Day Smoker     Packs/day: 0.50     Years: 45.00     Pack years: 22.50     Types: Cigarettes   • Smokeless tobacco: Never Used   • Tobacco comment: 1/2 ppd   Substance Use Topics   • Alcohol use: No       Current Medications:  Current Outpatient Medications   Medication Sig Dispense Refill   • albuterol (PROVENTIL HFA;VENTOLIN HFA) 108 (90 Base) MCG/ACT inhaler Inhale 2 puffs Every 4 (Four) Hours As Needed for Wheezing. 18 g 5   • estradiol (ESTRACE) 2 MG tablet Take 2 mg by mouth Daily.     • fenofibrate (TRICOR) 145 MG tablet Take  by mouth.     • fexofenadine-pseudoephedrine (ALLEGRA-D)  MG per 12 hr tablet Take 1 tablet by mouth 2 (Two) Times a Day.     • ibuprofen (ADVIL,MOTRIN) 800 MG tablet Take 800 mg by mouth Every 6 (Six) Hours As Needed for mild pain (1-3).     • lisinopril (PRINIVIL,ZESTRIL) 30 MG tablet Take  by mouth.     • LORazepam (ATIVAN) 2 MG tablet Take  by mouth.     • metFORMIN (GLUCOPHAGE) 250 MG half tablet Take 250 mg by mouth 2 (Two) Times a Day With Meals.     • metoprolol tartrate  (LOPRESSOR) 50 MG tablet Take 50 mg by mouth 2 (Two) Times a Day.     • metroNIDAZOLE (FLAGYL) 500 MG tablet Take 500 mg by mouth 3 (Three) Times a Day.     • omeprazole (priLOSEC) 40 MG capsule Take 40 mg by mouth Daily.     • ondansetron (ZOFRAN) 4 MG tablet Take 4 mg by mouth Every 8 (Eight) Hours As Needed for Nausea or Vomiting.     • oxybutynin XL (DITROPAN-XL) 10 MG 24 hr tablet Take 10 mg by mouth Daily.     • Phenazopyridine HCl (AZO TABS PO) Take  by mouth.     • pravastatin (PRAVACHOL) 40 MG tablet Take 40 mg by mouth Daily.     • pregabalin (LYRICA) 300 MG capsule Take  by mouth.     • QUEtiapine XR (SEROquel XR) 200 MG 24 hr tablet Take 200 mg by mouth Every Night.     • rosuvastatin (CRESTOR) 5 MG tablet Take  by mouth.     • sertraline (ZOLOFT) 100 MG tablet Take 100 mg by mouth Daily.     • terconazole (TERAZOL 3) 0.8 % vaginal cream Insert 1 applicator into the vagina Every Night.     • tiotropium bromide monohydrate (SPIRIVA RESPIMAT) 2.5 MCG/ACT aerosol solution inhaler Inhale 2 puffs Daily. 4 g 11   • traMADol (ULTRAM) 50 MG tablet Take 50 mg by mouth Every 6 (Six) Hours As Needed for moderate pain (4-6).     • UCERIS 9 MG tablet sustained-release 24 hour   5   • fluconazole (DIFLUCAN) 150 MG tablet Take 150 mg by mouth 1 (One) Time.     • hydrocortisone (ANUSOL-HC) 25 MG suppository Insert 25 mg into the rectum 2 (Two) Times a Day.     • hyoscyamine (ANASPAZ,LEVSIN) 0.125 MG tablet Take 0.125 mg by mouth Every 4 (Four) Hours As Needed for cramping.     • mupirocin (BACTROBAN) 2 % ointment Apply  topically 3 (Three) Times a Day.     • nicotine ([facility] NICOTINE) 7 MG/24HR patch Place 1 patch on the skin Daily. 30 patch 1     No current facility-administered medications for this visit.        Allergies:  Allergies   Allergen Reactions   • Penicillins Unknown (See Comments)     Pt states was in coma and does not know reaction         Vitals:  /62   Pulse 96   Temp 98.2 °F (36.8 °C) (Oral)   Ht  "182.9 cm (72\")   Wt 108 kg (238 lb)   SpO2 93%   BMI 32.28 kg/m²     Imaging:    Imaging Results (Most Recent)     None          Pulmonary Functions Testing Results:    No results found for: FEV1, FVC, OOU3RWI, TLC, DLCO    Results for orders placed or performed during the hospital encounter of 05/31/18   POC Glucose Once   Result Value Ref Range    Glucose 133 (H) 70 - 130 mg/dL       Objective   Physical Exam   GENERAL APPEARANCE: Well developed, well nourished, alert and cooperative, and appears to be in no acute distress.    HEAD: normocephalic. Atraumatic.    EYES: PERRL, EOMI.  vision is grossly intact.    THROAT: Oral cavity and pharynx normal. No inflammation, swelling, exudate, or lesions.     NECK: Neck supple.  No thyromegaly.    CARDIAC: Normal S1 and S2. No S3, S4 or murmurs. Rhythm is regular. There is no peripheral edema, cyanosis or pallor. Extremities are warm and well perfused. Capillary refill is less than 2 seconds. No carotid bruits.    RESPIRATORY:Bilateral air entry positive. Bilateral diminished breath sounds. No wheezing, crackles or rhonchi noted.    GI: Positive bowel sounds. Soft, nondistended, nontender.     MUSCULOSKELETAL: No significant deformity or joint abnormality. No edema. Peripheral pulses intact. No varicosities.    NEUROLOGICAL: Strength and sensation symmetric and intact throughout.     PSYCHIATRIC: The mental examination revealed the patient was oriented to person, place, and time.       Assessment/Plan      DARON:  - not using cpap due to claustrophobia.  -Will continue supplemental oxygen at night.  -Patient's Body mass index is 32.28 kg/m². BMI is above normal parameters. Recommendations include: exercise counseling and nutrition counseling.  - Patient was educated on positive airway pressure treatment.  As per CMS guidelines, more than 4 hours on 70% of observed nights is considered adherence. Patient was strongly encouraged to use CPAP as much as possible during sleep " as more CPAP use is equal to more benefit. Use of heated humidification in positive airway pressure treatment to improve the adherence to the device.  In case of claustrophobia, we will provide the patient cognitive behavioral therapy and desensitization. Oral appliances use will be discussed with the patient in case of mild to moderate sleep apnea or if the patient with severe disease fail positive airway pressure treatment.       The patient was extensively educated on the consequences of untreated obstructive sleep apnea namely cardiovascular/metabolic disorder, neurocognitive deficit, daytime sleepiness, motor vehicle accidents, depression, mood disorders and reduced quality of life.  At the end of conversation, the patient voices understanding of the disease process and treatment modality.  Patient also understands the risk of untreated obstructive sleep apnea and benefit benefits of the treatment.    Counseling time was greater than 10 minutes.      Centrilobular emphysema:  -Will continue Spirivia once daily, refills sent to pharmacy.  - PFT has never been completed by patient.  PFT was ordered at last visit.  Advised patient to call and schedule this test.  -Continue albuterol PRN.    Ground glass opacity present on imaging of lung  -CT was not completed.  Advised patient to call and schedule this test.    Vaccinations are up to date.    Current Smoker:  Lynette Stein is a current cigarettes user.  She currently smokes 0.5 pack of cigarettes per day for a duration of 45 years. I have educated her on the risk of diseases from using tobacco products such as cancer and heart diease and COPD.    I advised her to quit and she is not willing to quit.          ICD-10-CM ICD-9-CM   1. DARON (obstructive sleep apnea) G47.33 327.23   2. Cigarette nicotine dependence without complication F17.210 305.1   3. Ground glass opacity present on imaging of lung R91.8 793.19   4. Obesity (BMI 30-39.9) E66.9 278.00   5.  Centrilobular emphysema (CMS/HCC) J43.2 492.8       Return in about 8 months (around 7/12/2020).

## 2019-11-15 PROBLEM — R91.8 GROUND GLASS OPACITY PRESENT ON IMAGING OF LUNG: Status: ACTIVE | Noted: 2019-11-15

## 2019-11-15 PROBLEM — J43.2 CENTRILOBULAR EMPHYSEMA: Status: ACTIVE | Noted: 2019-11-15

## 2019-11-15 PROBLEM — E66.9 OBESITY (BMI 30-39.9): Status: ACTIVE | Noted: 2019-11-15

## 2019-11-19 ENCOUNTER — HOSPITAL ENCOUNTER (EMERGENCY)
Facility: HOSPITAL | Age: 54
Discharge: HOME | End: 2019-11-19
Attending: EMERGENCY MEDICINE | Admitting: EMERGENCY MEDICINE

## 2019-11-19 ENCOUNTER — APPOINTMENT (OUTPATIENT)
Dept: CT IMAGING | Facility: HOSPITAL | Age: 54
End: 2019-11-19

## 2019-11-19 VITALS
WEIGHT: 232 LBS | SYSTOLIC BLOOD PRESSURE: 143 MMHG | OXYGEN SATURATION: 95 % | TEMPERATURE: 98.4 F | HEART RATE: 96 BPM | DIASTOLIC BLOOD PRESSURE: 61 MMHG | BODY MASS INDEX: 31.42 KG/M2 | RESPIRATION RATE: 16 BRPM | HEIGHT: 72 IN

## 2019-11-19 DIAGNOSIS — K11.21 ACUTE PAROTITIS: Primary | ICD-10-CM

## 2019-11-19 LAB
ALBUMIN SERPL-MCNC: 4.06 G/DL (ref 3.5–5.2)
ALBUMIN/GLOB SERPL: 1.3 G/DL
ALP SERPL-CCNC: 121 U/L (ref 39–117)
ALT SERPL W P-5'-P-CCNC: 22 U/L (ref 1–33)
ANION GAP SERPL CALCULATED.3IONS-SCNC: 14.8 MMOL/L (ref 5–15)
AST SERPL-CCNC: 16 U/L (ref 1–32)
BASOPHILS # BLD AUTO: 0.06 10*3/MM3 (ref 0–0.2)
BASOPHILS NFR BLD AUTO: 0.5 % (ref 0–1.5)
BILIRUB SERPL-MCNC: 0.2 MG/DL (ref 0.2–1.2)
BUN BLD-MCNC: 5 MG/DL (ref 6–20)
BUN/CREAT SERPL: 8.8 (ref 7–25)
CALCIUM SPEC-SCNC: 9.5 MG/DL (ref 8.6–10.5)
CHLORIDE SERPL-SCNC: 104 MMOL/L (ref 98–107)
CO2 SERPL-SCNC: 22.2 MMOL/L (ref 22–29)
CREAT BLD-MCNC: 0.57 MG/DL (ref 0.57–1)
DEPRECATED RDW RBC AUTO: 44.6 FL (ref 37–54)
EOSINOPHIL # BLD AUTO: 0.77 10*3/MM3 (ref 0–0.4)
EOSINOPHIL NFR BLD AUTO: 5.9 % (ref 0.3–6.2)
ERYTHROCYTE [DISTWIDTH] IN BLOOD BY AUTOMATED COUNT: 13.6 % (ref 12.3–15.4)
GFR SERPL CREATININE-BSD FRML MDRD: 111 ML/MIN/1.73
GLOBULIN UR ELPH-MCNC: 3.1 GM/DL
GLUCOSE BLD-MCNC: 115 MG/DL (ref 65–99)
HCT VFR BLD AUTO: 35.4 % (ref 34–46.6)
HGB BLD-MCNC: 11.3 G/DL (ref 12–15.9)
IMM GRANULOCYTES # BLD AUTO: 0.1 10*3/MM3 (ref 0–0.05)
IMM GRANULOCYTES NFR BLD AUTO: 0.8 % (ref 0–0.5)
LYMPHOCYTES # BLD AUTO: 3.89 10*3/MM3 (ref 0.7–3.1)
LYMPHOCYTES NFR BLD AUTO: 29.7 % (ref 19.6–45.3)
MCH RBC QN AUTO: 28.3 PG (ref 26.6–33)
MCHC RBC AUTO-ENTMCNC: 31.9 G/DL (ref 31.5–35.7)
MCV RBC AUTO: 88.7 FL (ref 79–97)
MONOCYTES # BLD AUTO: 0.98 10*3/MM3 (ref 0.1–0.9)
MONOCYTES NFR BLD AUTO: 7.5 % (ref 5–12)
NEUTROPHILS # BLD AUTO: 7.3 10*3/MM3 (ref 1.7–7)
NEUTROPHILS NFR BLD AUTO: 55.6 % (ref 42.7–76)
NRBC BLD AUTO-RTO: 0 /100 WBC (ref 0–0.2)
PLATELET # BLD AUTO: 388 10*3/MM3 (ref 140–450)
PMV BLD AUTO: 10.1 FL (ref 6–12)
POTASSIUM BLD-SCNC: 3.8 MMOL/L (ref 3.5–5.2)
PROT SERPL-MCNC: 7.2 G/DL (ref 6–8.5)
RBC # BLD AUTO: 3.99 10*6/MM3 (ref 3.77–5.28)
SODIUM BLD-SCNC: 141 MMOL/L (ref 136–145)
WBC NRBC COR # BLD: 13.1 10*3/MM3 (ref 3.4–10.8)

## 2019-11-19 PROCEDURE — 96375 TX/PRO/DX INJ NEW DRUG ADDON: CPT

## 2019-11-19 PROCEDURE — 96366 THER/PROPH/DIAG IV INF ADDON: CPT

## 2019-11-19 PROCEDURE — 85025 COMPLETE CBC W/AUTO DIFF WBC: CPT | Performed by: EMERGENCY MEDICINE

## 2019-11-19 PROCEDURE — 70491 CT SOFT TISSUE NECK W/DYE: CPT | Performed by: RADIOLOGY

## 2019-11-19 PROCEDURE — 96365 THER/PROPH/DIAG IV INF INIT: CPT

## 2019-11-19 PROCEDURE — 0 IOVERSOL 68 % SOLUTION: Performed by: EMERGENCY MEDICINE

## 2019-11-19 PROCEDURE — 70491 CT SOFT TISSUE NECK W/DYE: CPT

## 2019-11-19 PROCEDURE — 87040 BLOOD CULTURE FOR BACTERIA: CPT | Performed by: EMERGENCY MEDICINE

## 2019-11-19 PROCEDURE — 80053 COMPREHEN METABOLIC PANEL: CPT | Performed by: EMERGENCY MEDICINE

## 2019-11-19 PROCEDURE — 99284 EMERGENCY DEPT VISIT MOD MDM: CPT

## 2019-11-19 PROCEDURE — 25010000002 VANCOMYCIN 5 G RECONSTITUTED SOLUTION 5,000 MG VIAL: Performed by: EMERGENCY MEDICINE

## 2019-11-19 RX ORDER — IBUPROFEN 400 MG/1
800 TABLET ORAL ONCE
Status: COMPLETED | OUTPATIENT
Start: 2019-11-19 | End: 2019-11-19

## 2019-11-19 RX ORDER — CLINDAMYCIN PHOSPHATE 600 MG/50ML
600 INJECTION INTRAVENOUS ONCE
Status: COMPLETED | OUTPATIENT
Start: 2019-11-19 | End: 2019-11-19

## 2019-11-19 RX ORDER — SODIUM CHLORIDE 0.9 % (FLUSH) 0.9 %
10 SYRINGE (ML) INJECTION AS NEEDED
Status: DISCONTINUED | OUTPATIENT
Start: 2019-11-19 | End: 2019-11-19 | Stop reason: HOSPADM

## 2019-11-19 RX ORDER — CLINDAMYCIN HYDROCHLORIDE 300 MG/1
300 CAPSULE ORAL 3 TIMES DAILY
Qty: 30 CAPSULE | Refills: 0 | Status: SHIPPED | OUTPATIENT
Start: 2019-11-19 | End: 2019-12-17

## 2019-11-19 RX ADMIN — CLINDAMYCIN IN 5 PERCENT DEXTROSE 600 MG: 12 INJECTION, SOLUTION INTRAVENOUS at 14:49

## 2019-11-19 RX ADMIN — IOVERSOL 100 ML: 678 INJECTION INTRA-ARTERIAL; INTRAVENOUS at 15:23

## 2019-11-19 RX ADMIN — VANCOMYCIN HYDROCHLORIDE 2000 MG: 5 INJECTION, POWDER, LYOPHILIZED, FOR SOLUTION INTRAVENOUS at 15:28

## 2019-11-19 RX ADMIN — IBUPROFEN 800 MG: 400 TABLET, FILM COATED ORAL at 14:49
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
## 2019-11-24 LAB
BACTERIA SPEC AEROBE CULT: NORMAL
BACTERIA SPEC AEROBE CULT: NORMAL

## 2019-12-17 ENCOUNTER — DOCUMENTATION (OUTPATIENT)
Dept: ONCOLOGY | Facility: HOSPITAL | Age: 54
End: 2019-12-17

## 2019-12-17 ENCOUNTER — CONSULT (OUTPATIENT)
Dept: ONCOLOGY | Facility: CLINIC | Age: 54
End: 2019-12-17

## 2019-12-17 VITALS
RESPIRATION RATE: 20 BRPM | OXYGEN SATURATION: 96 % | BODY MASS INDEX: 32.22 KG/M2 | DIASTOLIC BLOOD PRESSURE: 77 MMHG | HEART RATE: 79 BPM | SYSTOLIC BLOOD PRESSURE: 124 MMHG | WEIGHT: 237.9 LBS | TEMPERATURE: 98.2 F | HEIGHT: 72 IN

## 2019-12-17 DIAGNOSIS — D80.1 HYPOGAMMAGLOBULINEMIA (HCC): ICD-10-CM

## 2019-12-17 DIAGNOSIS — D72.829 LEUKOCYTOSIS, UNSPECIFIED TYPE: Primary | ICD-10-CM

## 2019-12-17 DIAGNOSIS — D75.839 THROMBOCYTOSIS: ICD-10-CM

## 2019-12-17 LAB
ALBUMIN SERPL-MCNC: 4.07 G/DL (ref 3.5–5.2)
ALBUMIN/GLOB SERPL: 1.4 G/DL
ALP SERPL-CCNC: 96 U/L (ref 39–117)
ALT SERPL W P-5'-P-CCNC: 36 U/L (ref 1–33)
ANION GAP SERPL CALCULATED.3IONS-SCNC: 15.3 MMOL/L (ref 5–15)
AST SERPL-CCNC: 28 U/L (ref 1–32)
BASOPHILS # BLD MANUAL: 0.37 10*3/MM3 (ref 0–0.2)
BASOPHILS NFR BLD AUTO: 3 % (ref 0–1.5)
BILIRUB SERPL-MCNC: 0.2 MG/DL (ref 0.2–1.2)
BUN BLD-MCNC: 8 MG/DL (ref 6–20)
BUN/CREAT SERPL: 9.9 (ref 7–25)
CALCIUM SPEC-SCNC: 9.3 MG/DL (ref 8.6–10.5)
CHLORIDE SERPL-SCNC: 104 MMOL/L (ref 98–107)
CO2 SERPL-SCNC: 20.7 MMOL/L (ref 22–29)
CREAT BLD-MCNC: 0.81 MG/DL (ref 0.57–1)
DEPRECATED RDW RBC AUTO: 45.1 FL (ref 37–54)
EOSINOPHIL # BLD MANUAL: 0.25 10*3/MM3 (ref 0–0.4)
EOSINOPHIL NFR BLD MANUAL: 2 % (ref 0.3–6.2)
ERYTHROCYTE [DISTWIDTH] IN BLOOD BY AUTOMATED COUNT: 14.1 % (ref 12.3–15.4)
GFR SERPL CREATININE-BSD FRML MDRD: 74 ML/MIN/1.73
GLOBULIN UR ELPH-MCNC: 2.9 GM/DL
GLUCOSE BLD-MCNC: 194 MG/DL (ref 65–99)
HCT VFR BLD AUTO: 36.6 % (ref 34–46.6)
HGB BLD-MCNC: 11.4 G/DL (ref 12–15.9)
HYPOCHROMIA BLD QL: ABNORMAL
IGA1 MFR SER: 192 MG/DL (ref 70–400)
IGG1 SER-MCNC: 630 MG/DL (ref 700–1600)
IGM SERPL-MCNC: 60 MG/DL (ref 40–230)
LYMPHOCYTES # BLD MANUAL: 5.69 10*3/MM3 (ref 0.7–3.1)
LYMPHOCYTES NFR BLD MANUAL: 3 % (ref 5–12)
LYMPHOCYTES NFR BLD MANUAL: 46 % (ref 19.6–45.3)
MCH RBC QN AUTO: 27.9 PG (ref 26.6–33)
MCHC RBC AUTO-ENTMCNC: 31.1 G/DL (ref 31.5–35.7)
MCV RBC AUTO: 89.5 FL (ref 79–97)
MONOCYTES # BLD AUTO: 0.37 10*3/MM3 (ref 0.1–0.9)
NEUTROPHILS # BLD AUTO: 5.69 10*3/MM3 (ref 1.7–7)
NEUTROPHILS NFR BLD MANUAL: 45 % (ref 42.7–76)
NEUTS BAND NFR BLD MANUAL: 1 % (ref 0–5)
PLATELET # BLD AUTO: 352 10*3/MM3 (ref 140–450)
PMV BLD AUTO: 10.1 FL (ref 6–12)
POTASSIUM BLD-SCNC: 3.1 MMOL/L (ref 3.5–5.2)
PROT SERPL-MCNC: 7 G/DL (ref 6–8.5)
RBC # BLD AUTO: 4.09 10*6/MM3 (ref 3.77–5.28)
SCAN SLIDE: NORMAL
SMALL PLATELETS BLD QL SMEAR: ADEQUATE
SODIUM BLD-SCNC: 140 MMOL/L (ref 136–145)
WBC NRBC COR # BLD: 12.36 10*3/MM3 (ref 3.4–10.8)

## 2019-12-17 PROCEDURE — 99214 OFFICE O/P EST MOD 30 MIN: CPT | Performed by: NURSE PRACTITIONER

## 2019-12-17 PROCEDURE — 80053 COMPREHEN METABOLIC PANEL: CPT | Performed by: NURSE PRACTITIONER

## 2019-12-17 PROCEDURE — 36415 COLL VENOUS BLD VENIPUNCTURE: CPT | Performed by: NURSE PRACTITIONER

## 2019-12-17 PROCEDURE — 85025 COMPLETE CBC W/AUTO DIFF WBC: CPT | Performed by: NURSE PRACTITIONER

## 2019-12-17 PROCEDURE — 85007 BL SMEAR W/DIFF WBC COUNT: CPT | Performed by: NURSE PRACTITIONER

## 2019-12-17 PROCEDURE — 82784 ASSAY IGA/IGD/IGG/IGM EACH: CPT | Performed by: NURSE PRACTITIONER

## 2019-12-17 NOTE — PROGRESS NOTES
"SS initiated social service assessment with patient this date.  Pt lives at home with spouse Shine and plans to return home with spouse.  Pt doesn't utilize any home health or durable medical equipment.      Advance Care Planning:  The patient and I discussed care planning \"Conversations that Matter.\" This service was offered, free of charge, for development of advance directives with a certified ACP facilitator. The patient does not have an up-to-date advance directive. The patient is not interested in an appointment with one of our facilitators to create or update their advanced directives.  Patient doesn't have a power of .    The patient and I discussed the results of her PHQ depression screening.    PHQ-9 Total Score:  16    Patient states that she takes Zoloft for depression, ativan for panic attacks, and Seroquel for sleep.  Pt declines services at [facility].      Pt's family provides transportation as needed.    Pt receives social security disability.  Pt and spouse have no children.    SS will follow as needed.  "
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
## 2020-07-27 ENCOUNTER — DOCUMENTATION (OUTPATIENT)
Dept: ONCOLOGY | Facility: HOSPITAL | Age: 55
End: 2020-07-27

## 2020-07-27 NOTE — PROGRESS NOTES
SS received call from patient this date [phone] who wanted to know if I could give her Dr. Steel phone number.  SS provided phone number [phone].  Patient states that she had my name and number from where she was seen in the office and had enjoyed talking to me.  Patient went on to say that she had recently lost her baby brother and talked about how hard that was.  No other needs at this time.    SS will follow.

## 2020-08-17 ENCOUNTER — HOSPITAL ENCOUNTER (OUTPATIENT)
Dept: WOUND CARE | Facility: HOSPITAL | Age: 55
Discharge: HOME | End: 2020-08-17
Admitting: NURSE PRACTITIONER

## 2020-08-17 VITALS
HEIGHT: 72 IN | BODY MASS INDEX: 30.88 KG/M2 | HEART RATE: 84 BPM | WEIGHT: 228 LBS | SYSTOLIC BLOOD PRESSURE: 121 MMHG | RESPIRATION RATE: 18 BRPM | DIASTOLIC BLOOD PRESSURE: 85 MMHG | TEMPERATURE: 98.7 F

## 2020-08-17 DIAGNOSIS — R23.8 OTHER SKIN CHANGES: Primary | ICD-10-CM

## 2020-08-17 PROCEDURE — G0463 HOSPITAL OUTPT CLINIC VISIT: HCPCS

## 2020-08-17 NOTE — PATIENT INSTRUCTIONS
APPLY BETADINE TO ALL ULCERS DAILY. WRAP LEGS WITH KERLEX IF ULCERS ARE DRAINING. RETURN IN ONE WEEK.
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
## 2020-08-24 ENCOUNTER — HOSPITAL ENCOUNTER (OUTPATIENT)
Dept: WOUND CARE | Facility: HOSPITAL | Age: 55
Discharge: HOME | End: 2020-08-24
Admitting: NURSE PRACTITIONER

## 2020-08-24 ENCOUNTER — HOSPITAL ENCOUNTER (OUTPATIENT)
Dept: GENERAL RADIOLOGY | Facility: HOSPITAL | Age: 55
Discharge: HOME | End: 2020-08-24

## 2020-08-24 VITALS
TEMPERATURE: 98.9 F | SYSTOLIC BLOOD PRESSURE: 144 MMHG | DIASTOLIC BLOOD PRESSURE: 97 MMHG | HEART RATE: 84 BPM | RESPIRATION RATE: 18 BRPM

## 2020-08-24 DIAGNOSIS — R60.9 EDEMA, UNSPECIFIED TYPE: Primary | ICD-10-CM

## 2020-08-24 PROCEDURE — 73630 X-RAY EXAM OF FOOT: CPT | Performed by: RADIOLOGY

## 2020-08-24 PROCEDURE — 73630 X-RAY EXAM OF FOOT: CPT

## 2020-08-24 RX ORDER — LIDOCAINE HYDROCHLORIDE 20 MG/ML
JELLY TOPICAL AS NEEDED
Status: DISCONTINUED | OUTPATIENT
Start: 2020-08-24 | End: 2020-08-25 | Stop reason: HOSPADM

## 2020-08-24 RX ADMIN — LIDOCAINE HYDROCHLORIDE 4 ML: 20 JELLY TOPICAL at 11:00

## 2020-08-26 LAB
LAB AP CASE REPORT: NORMAL
LAB AP CASE REPORT: NORMAL
PATH REPORT.FINAL DX SPEC: NORMAL
PATH REPORT.FINAL DX SPEC: NORMAL

## 2020-08-31 ENCOUNTER — HOSPITAL ENCOUNTER (OUTPATIENT)
Dept: CT IMAGING | Facility: HOSPITAL | Age: 55
Discharge: HOME | End: 2020-08-31
Admitting: NURSE PRACTITIONER

## 2020-08-31 ENCOUNTER — APPOINTMENT (OUTPATIENT)
Dept: CT IMAGING | Facility: HOSPITAL | Age: 55
End: 2020-08-31

## 2020-08-31 DIAGNOSIS — R60.9 EDEMA, UNSPECIFIED TYPE: ICD-10-CM

## 2020-08-31 PROCEDURE — 73700 CT LOWER EXTREMITY W/O DYE: CPT | Performed by: RADIOLOGY

## 2020-08-31 PROCEDURE — 73700 CT LOWER EXTREMITY W/O DYE: CPT

## 2021-01-01 ENCOUNTER — TELEPHONE (OUTPATIENT)
Dept: PULMONOLOGY | Facility: CLINIC | Age: 56
End: 2021-01-01

## 2021-01-01 ENCOUNTER — OFFICE VISIT (OUTPATIENT)
Dept: PULMONOLOGY | Facility: CLINIC | Age: 56
End: 2021-01-01

## 2021-01-01 ENCOUNTER — LAB (OUTPATIENT)
Dept: LAB | Facility: HOSPITAL | Age: 56
End: 2021-01-01

## 2021-01-01 ENCOUNTER — OFFICE VISIT (OUTPATIENT)
Dept: GASTROENTEROLOGY | Facility: CLINIC | Age: 56
End: 2021-01-01

## 2021-01-01 ENCOUNTER — HOSPITAL ENCOUNTER (OUTPATIENT)
Dept: WOUND CARE | Facility: HOSPITAL | Age: 56
Discharge: HOME | End: 2021-12-28
Admitting: NURSE PRACTITIONER

## 2021-01-01 ENCOUNTER — HOSPITAL ENCOUNTER (OUTPATIENT)
Dept: WOUND CARE | Facility: HOSPITAL | Age: 56
Discharge: HOME | End: 2021-12-06
Admitting: NURSE PRACTITIONER

## 2021-01-01 ENCOUNTER — HOSPITAL ENCOUNTER (OUTPATIENT)
Dept: RESPIRATORY THERAPY | Facility: HOSPITAL | Age: 56
Discharge: HOME | End: 2021-10-08
Admitting: PHYSICIAN ASSISTANT

## 2021-01-01 ENCOUNTER — HOSPITAL ENCOUNTER (OUTPATIENT)
Dept: CT IMAGING | Facility: HOSPITAL | Age: 56
Discharge: HOME | End: 2021-10-25
Admitting: PHYSICIAN ASSISTANT

## 2021-01-01 ENCOUNTER — HOSPITAL ENCOUNTER (OUTPATIENT)
Dept: WOUND CARE | Facility: HOSPITAL | Age: 56
Discharge: HOME | End: 2021-11-08
Admitting: NURSE PRACTITIONER

## 2021-01-01 ENCOUNTER — HOSPITAL ENCOUNTER (OUTPATIENT)
Dept: WOUND CARE | Facility: HOSPITAL | Age: 56
Discharge: HOME | End: 2021-11-22
Admitting: NURSE PRACTITIONER

## 2021-01-01 ENCOUNTER — APPOINTMENT (OUTPATIENT)
Dept: WOUND CARE | Facility: HOSPITAL | Age: 56
End: 2021-01-01

## 2021-01-01 ENCOUNTER — HOSPITAL ENCOUNTER (OUTPATIENT)
Dept: CT IMAGING | Facility: HOSPITAL | Age: 56
Discharge: HOME | End: 2021-11-17
Admitting: NURSE PRACTITIONER

## 2021-01-01 ENCOUNTER — TELEPHONE (OUTPATIENT)
Dept: GASTROENTEROLOGY | Facility: CLINIC | Age: 56
End: 2021-01-01

## 2021-01-01 VITALS
TEMPERATURE: 97.6 F | DIASTOLIC BLOOD PRESSURE: 56 MMHG | SYSTOLIC BLOOD PRESSURE: 108 MMHG | RESPIRATION RATE: 17 BRPM | HEART RATE: 76 BPM

## 2021-01-01 VITALS
WEIGHT: 234.4 LBS | BODY MASS INDEX: 31.75 KG/M2 | DIASTOLIC BLOOD PRESSURE: 62 MMHG | HEIGHT: 72 IN | SYSTOLIC BLOOD PRESSURE: 110 MMHG | HEART RATE: 82 BPM

## 2021-01-01 VITALS
HEART RATE: 87 BPM | WEIGHT: 233.6 LBS | DIASTOLIC BLOOD PRESSURE: 69 MMHG | HEIGHT: 72 IN | BODY MASS INDEX: 31.64 KG/M2 | SYSTOLIC BLOOD PRESSURE: 109 MMHG

## 2021-01-01 VITALS
DIASTOLIC BLOOD PRESSURE: 67 MMHG | HEART RATE: 98 BPM | SYSTOLIC BLOOD PRESSURE: 118 MMHG | RESPIRATION RATE: 18 BRPM | TEMPERATURE: 97.4 F

## 2021-01-01 VITALS
TEMPERATURE: 98.6 F | HEART RATE: 75 BPM | SYSTOLIC BLOOD PRESSURE: 143 MMHG | DIASTOLIC BLOOD PRESSURE: 80 MMHG | RESPIRATION RATE: 18 BRPM

## 2021-01-01 VITALS
OXYGEN SATURATION: 93 % | WEIGHT: 234 LBS | TEMPERATURE: 96.6 F | SYSTOLIC BLOOD PRESSURE: 126 MMHG | BODY MASS INDEX: 31.69 KG/M2 | DIASTOLIC BLOOD PRESSURE: 66 MMHG | HEART RATE: 87 BPM | HEIGHT: 72 IN

## 2021-01-01 VITALS — RESPIRATION RATE: 20 BRPM | OXYGEN SATURATION: 93 % | HEART RATE: 98 BPM

## 2021-01-01 DIAGNOSIS — Z01.818 OTHER SPECIFIED PRE-OPERATIVE EXAMINATION: Primary | ICD-10-CM

## 2021-01-01 DIAGNOSIS — T14.8XXD WOUND HEALING, DELAYED: Primary | ICD-10-CM

## 2021-01-01 DIAGNOSIS — E08.621 DIABETES MELLITUS DUE TO UNDERLYING CONDITION WITH FOOT ULCER, WITH LONG-TERM CURRENT USE OF INSULIN (HCC): ICD-10-CM

## 2021-01-01 DIAGNOSIS — D50.0 IRON DEFICIENCY ANEMIA DUE TO CHRONIC BLOOD LOSS: ICD-10-CM

## 2021-01-01 DIAGNOSIS — G47.33 OSA (OBSTRUCTIVE SLEEP APNEA): ICD-10-CM

## 2021-01-01 DIAGNOSIS — L97.509 DIABETES MELLITUS DUE TO UNDERLYING CONDITION WITH FOOT ULCER, WITH LONG-TERM CURRENT USE OF INSULIN (HCC): ICD-10-CM

## 2021-01-01 DIAGNOSIS — F17.210 CIGARETTE NICOTINE DEPENDENCE WITHOUT COMPLICATION: ICD-10-CM

## 2021-01-01 DIAGNOSIS — Z01.818 OTHER SPECIFIED PRE-OPERATIVE EXAMINATION: ICD-10-CM

## 2021-01-01 DIAGNOSIS — J96.11 CHRONIC RESPIRATORY FAILURE WITH HYPOXIA (HCC): ICD-10-CM

## 2021-01-01 DIAGNOSIS — R91.8 GROUND GLASS OPACITY PRESENT ON IMAGING OF LUNG: ICD-10-CM

## 2021-01-01 DIAGNOSIS — K59.04 CHRONIC IDIOPATHIC CONSTIPATION: Primary | ICD-10-CM

## 2021-01-01 DIAGNOSIS — E11.621 DIABETIC ULCER OF LEFT FOOT ASSOCIATED WITH TYPE 2 DIABETES MELLITUS, WITH FAT LAYER EXPOSED, UNSPECIFIED PART OF FOOT: Primary | ICD-10-CM

## 2021-01-01 DIAGNOSIS — Z79.4 DIABETES MELLITUS DUE TO UNDERLYING CONDITION WITH FOOT ULCER, WITH LONG-TERM CURRENT USE OF INSULIN (HCC): ICD-10-CM

## 2021-01-01 DIAGNOSIS — R23.8 OTHER SKIN CHANGES: ICD-10-CM

## 2021-01-01 DIAGNOSIS — J43.2 CENTRILOBULAR EMPHYSEMA (HCC): Primary | ICD-10-CM

## 2021-01-01 DIAGNOSIS — E08.621 DIABETIC ULCER OF TOE OF RIGHT FOOT ASSOCIATED WITH DIABETES MELLITUS DUE TO UNDERLYING CONDITION, LIMITED TO BREAKDOWN OF SKIN: ICD-10-CM

## 2021-01-01 DIAGNOSIS — R23.4 SCAB: ICD-10-CM

## 2021-01-01 DIAGNOSIS — T14.8XXD WOUND HEALING, DELAYED: ICD-10-CM

## 2021-01-01 DIAGNOSIS — J43.2 CENTRILOBULAR EMPHYSEMA (HCC): ICD-10-CM

## 2021-01-01 DIAGNOSIS — R07.9 CHEST PAIN, UNSPECIFIED TYPE: ICD-10-CM

## 2021-01-01 DIAGNOSIS — L97.522 DIABETIC ULCER OF LEFT FOOT ASSOCIATED WITH TYPE 2 DIABETES MELLITUS, WITH FAT LAYER EXPOSED, UNSPECIFIED PART OF FOOT: Primary | ICD-10-CM

## 2021-01-01 DIAGNOSIS — L97.511 DIABETIC ULCER OF TOE OF RIGHT FOOT ASSOCIATED WITH DIABETES MELLITUS DUE TO UNDERLYING CONDITION, LIMITED TO BREAKDOWN OF SKIN: ICD-10-CM

## 2021-01-01 LAB
ALBUMIN SERPL-MCNC: 3.9 G/DL (ref 3.5–5.2)
ALBUMIN/GLOB SERPL: 1.1 G/DL
ALP SERPL-CCNC: 117 U/L (ref 39–117)
ALT SERPL W P-5'-P-CCNC: 25 U/L (ref 1–33)
ANION GAP SERPL CALCULATED.3IONS-SCNC: 13.4 MMOL/L (ref 5–15)
AST SERPL-CCNC: 28 U/L (ref 1–32)
BILIRUB SERPL-MCNC: <0.2 MG/DL (ref 0–1.2)
BUN SERPL-MCNC: 6 MG/DL (ref 6–20)
BUN/CREAT SERPL: 7.6 (ref 7–25)
CALCIUM SPEC-SCNC: 9.4 MG/DL (ref 8.6–10.5)
CHLORIDE SERPL-SCNC: 104 MMOL/L (ref 98–107)
CO2 SERPL-SCNC: 20.6 MMOL/L (ref 22–29)
CREAT SERPL-MCNC: 0.79 MG/DL (ref 0.57–1)
DEPRECATED RDW RBC AUTO: 56.7 FL (ref 37–54)
ERYTHROCYTE [DISTWIDTH] IN BLOOD BY AUTOMATED COUNT: 20.2 % (ref 12.3–15.4)
FERRITIN SERPL-MCNC: 6.63 NG/ML (ref 13–150)
FLUAV RNA RESP QL NAA+PROBE: NOT DETECTED
FLUBV RNA RESP QL NAA+PROBE: NOT DETECTED
GFR SERPL CREATININE-BSD FRML MDRD: 76 ML/MIN/1.73
GLOBULIN UR ELPH-MCNC: 3.4 GM/DL
GLUCOSE SERPL-MCNC: 136 MG/DL (ref 65–99)
HCT VFR BLD AUTO: 34.7 % (ref 34–46.6)
HGB BLD-MCNC: 10.4 G/DL (ref 12–15.9)
IRON 24H UR-MRATE: 20 MCG/DL (ref 37–145)
IRON 24H UR-MRATE: 44 MCG/DL (ref 37–145)
IRON SATN MFR SERPL: 10 % (ref 20–50)
IRON SATN MFR SERPL: 4 % (ref 20–50)
MCH RBC QN AUTO: 24 PG (ref 26.6–33)
MCHC RBC AUTO-ENTMCNC: 30 G/DL (ref 31.5–35.7)
MCV RBC AUTO: 80.1 FL (ref 79–97)
PLATELET # BLD AUTO: 461 10*3/MM3 (ref 140–450)
PMV BLD AUTO: 11.4 FL (ref 6–12)
POTASSIUM SERPL-SCNC: 4 MMOL/L (ref 3.5–5.2)
PROT SERPL-MCNC: 7.3 G/DL (ref 6–8.5)
PTH-INTACT SERPL-MCNC: 38.8 PG/ML (ref 15–65)
RBC # BLD AUTO: 4.33 10*6/MM3 (ref 3.77–5.28)
SARS-COV-2 RNA RESP QL NAA+PROBE: NOT DETECTED
SODIUM SERPL-SCNC: 138 MMOL/L (ref 136–145)
TIBC SERPL-MCNC: 443 MCG/DL (ref 298–536)
TIBC SERPL-MCNC: 560 MCG/DL (ref 298–536)
TRANSFERRIN SERPL-MCNC: 297 MG/DL (ref 200–360)
TRANSFERRIN SERPL-MCNC: 376 MG/DL (ref 200–360)
WBC NRBC COR # BLD: 12.44 10*3/MM3 (ref 3.4–10.8)

## 2021-01-01 PROCEDURE — 73700 CT LOWER EXTREMITY W/O DYE: CPT | Performed by: RADIOLOGY

## 2021-01-01 PROCEDURE — 83540 ASSAY OF IRON: CPT | Performed by: PHYSICIAN ASSISTANT

## 2021-01-01 PROCEDURE — 99214 OFFICE O/P EST MOD 30 MIN: CPT | Performed by: PHYSICIAN ASSISTANT

## 2021-01-01 PROCEDURE — 85027 COMPLETE CBC AUTOMATED: CPT | Performed by: PHYSICIAN ASSISTANT

## 2021-01-01 PROCEDURE — 94727 GAS DIL/WSHOT DETER LNG VOL: CPT | Performed by: INTERNAL MEDICINE

## 2021-01-01 PROCEDURE — 84466 ASSAY OF TRANSFERRIN: CPT | Performed by: PHYSICIAN ASSISTANT

## 2021-01-01 PROCEDURE — C9803 HOPD COVID-19 SPEC COLLECT: HCPCS

## 2021-01-01 PROCEDURE — 71250 CT THORAX DX C-: CPT | Performed by: RADIOLOGY

## 2021-01-01 PROCEDURE — 71250 CT THORAX DX C-: CPT

## 2021-01-01 PROCEDURE — 97602 WOUND(S) CARE NON-SELECTIVE: CPT

## 2021-01-01 PROCEDURE — 87636 SARSCOV2 & INF A&B AMP PRB: CPT

## 2021-01-01 PROCEDURE — 94060 EVALUATION OF WHEEZING: CPT

## 2021-01-01 PROCEDURE — 83970 ASSAY OF PARATHORMONE: CPT | Performed by: NURSE PRACTITIONER

## 2021-01-01 PROCEDURE — 94729 DIFFUSING CAPACITY: CPT

## 2021-01-01 PROCEDURE — 94727 GAS DIL/WSHOT DETER LNG VOL: CPT

## 2021-01-01 PROCEDURE — 82728 ASSAY OF FERRITIN: CPT | Performed by: PHYSICIAN ASSISTANT

## 2021-01-01 PROCEDURE — 94060 EVALUATION OF WHEEZING: CPT | Performed by: INTERNAL MEDICINE

## 2021-01-01 PROCEDURE — G0463 HOSPITAL OUTPT CLINIC VISIT: HCPCS

## 2021-01-01 PROCEDURE — 94799 UNLISTED PULMONARY SVC/PX: CPT

## 2021-01-01 PROCEDURE — 99213 OFFICE O/P EST LOW 20 MIN: CPT | Performed by: PHYSICIAN ASSISTANT

## 2021-01-01 PROCEDURE — 73700 CT LOWER EXTREMITY W/O DYE: CPT

## 2021-01-01 PROCEDURE — 94640 AIRWAY INHALATION TREATMENT: CPT

## 2021-01-01 PROCEDURE — 80053 COMPREHEN METABOLIC PANEL: CPT | Performed by: PHYSICIAN ASSISTANT

## 2021-01-01 PROCEDURE — 94729 DIFFUSING CAPACITY: CPT | Performed by: INTERNAL MEDICINE

## 2021-01-01 RX ORDER — SODIUM HYPOCHLORITE 2.5 MG/ML
1 SOLUTION TOPICAL AS NEEDED
Status: CANCELLED | OUTPATIENT
Start: 2021-01-01

## 2021-01-01 RX ORDER — PNV NO.95/FERROUS FUM/FOLIC AC 28MG-0.8MG
325 TABLET ORAL 2 TIMES DAILY
Qty: 60 TABLET | Refills: 0 | Status: SHIPPED | OUTPATIENT
Start: 2021-01-01 | End: 2022-01-01

## 2021-01-01 RX ORDER — CASTOR OIL AND BALSAM, PERU 788; 87 MG/G; MG/G
1 OINTMENT TOPICAL AS NEEDED
Status: CANCELLED | OUTPATIENT
Start: 2021-01-01

## 2021-01-01 RX ORDER — SODIUM HYPOCHLORITE 1.25 MG/ML
1 SOLUTION TOPICAL AS NEEDED
Status: CANCELLED | OUTPATIENT
Start: 2021-01-01

## 2021-01-01 RX ORDER — SULFAMETHOXAZOLE AND TRIMETHOPRIM 800; 160 MG/1; MG/1
1 TABLET ORAL 2 TIMES DAILY
Qty: 20 TABLET | Refills: 0 | Status: SHIPPED | OUTPATIENT
Start: 2021-01-01 | End: 2021-01-01

## 2021-01-01 RX ORDER — LIDOCAINE HYDROCHLORIDE 20 MG/ML
JELLY TOPICAL AS NEEDED
Status: CANCELLED | OUTPATIENT
Start: 2021-01-01

## 2021-01-01 RX ORDER — LIDOCAINE HYDROCHLORIDE 20 MG/ML
JELLY TOPICAL AS NEEDED
Status: DISCONTINUED | OUTPATIENT
Start: 2021-01-01 | End: 2021-01-01 | Stop reason: HOSPADM

## 2021-01-01 RX ORDER — ALBUTEROL SULFATE 2.5 MG/3ML
2.5 SOLUTION RESPIRATORY (INHALATION) ONCE
Status: COMPLETED | OUTPATIENT
Start: 2021-01-01 | End: 2021-01-01

## 2021-01-01 RX ORDER — ALBUTEROL SULFATE 90 UG/1
2 AEROSOL, METERED RESPIRATORY (INHALATION) EVERY 4 HOURS PRN
Qty: 18 G | Refills: 5 | Status: SHIPPED | OUTPATIENT
Start: 2021-01-01 | End: 2022-01-01

## 2021-01-01 RX ORDER — MULTIVIT WITH MINERALS/LUTEIN
250 TABLET ORAL 2 TIMES DAILY
Qty: 60 TABLET | Refills: 0 | Status: SHIPPED | OUTPATIENT
Start: 2021-01-01 | End: 2022-01-01

## 2021-01-01 RX ADMIN — COLLAGENASE SANTYL 1 APPLICATION: 250 OINTMENT TOPICAL at 14:49

## 2021-01-01 RX ADMIN — ALBUTEROL SULFATE 2.5 MG: 2.5 SOLUTION RESPIRATORY (INHALATION) at 11:22

## 2021-01-08 ENCOUNTER — OFFICE VISIT (OUTPATIENT)
Dept: PULMONOLOGY | Facility: CLINIC | Age: 56
End: 2021-01-08

## 2021-01-08 VITALS
HEART RATE: 83 BPM | WEIGHT: 238 LBS | HEIGHT: 72 IN | TEMPERATURE: 97.7 F | OXYGEN SATURATION: 93 % | SYSTOLIC BLOOD PRESSURE: 118 MMHG | DIASTOLIC BLOOD PRESSURE: 72 MMHG | BODY MASS INDEX: 32.23 KG/M2

## 2021-01-08 DIAGNOSIS — R91.8 GROUND GLASS OPACITY PRESENT ON IMAGING OF LUNG: ICD-10-CM

## 2021-01-08 DIAGNOSIS — J96.11 CHRONIC RESPIRATORY FAILURE WITH HYPOXIA (HCC): ICD-10-CM

## 2021-01-08 DIAGNOSIS — G47.33 OSA (OBSTRUCTIVE SLEEP APNEA): ICD-10-CM

## 2021-01-08 DIAGNOSIS — J43.2 CENTRILOBULAR EMPHYSEMA (HCC): Primary | ICD-10-CM

## 2021-01-08 DIAGNOSIS — F17.200 CURRENT SMOKER: ICD-10-CM

## 2021-01-08 PROCEDURE — 90471 IMMUNIZATION ADMIN: CPT | Performed by: INTERNAL MEDICINE

## 2021-01-08 PROCEDURE — 90686 IIV4 VACC NO PRSV 0.5 ML IM: CPT | Performed by: INTERNAL MEDICINE

## 2021-01-08 PROCEDURE — 99214 OFFICE O/P EST MOD 30 MIN: CPT | Performed by: INTERNAL MEDICINE

## 2021-01-08 RX ORDER — ALBUTEROL SULFATE 90 UG/1
2 AEROSOL, METERED RESPIRATORY (INHALATION) EVERY 4 HOURS PRN
Qty: 18 G | Refills: 5 | Status: SHIPPED | OUTPATIENT
Start: 2021-01-08 | End: 2021-01-01 | Stop reason: SDUPTHER

## 2021-01-08 NOTE — PROGRESS NOTES
Chief complaint:   Chief Complaint   Patient presents with   • Emphysema       History of present illness:   Ms. Stein is a very pleasant 55-year-old female with a past medical history of COPD, currently on rescue inhaler Spiriva Respimat inhaler, DARON; nonadherent to CPAP machine and chronic hypoxic respiratory failure; use supplemental oxygen as needed.  She presents to pulmonary outpatient clinic as a regular follow-up.  She reports that her shortness of breath has improved since the initiation of Spiriva Respimat inhaler.  Her shortness of breath is at baseline.  Shortness of breath agree with activity and improved with inhalers.  She denies any fever chills rigors night sweats weight loss, cough or wheezing.    Review of Systems:   General ROS: negative for chills, fatigue or fever  Psychological ROS: negative for anxiety or depression  ENT ROS: negative for headaches, visual changes or vocal changes  Respiratory ROS: Negative for cough.  Baseline shortness of breath  Cardiovascular ROS: Negative for chest pain.  Positive for dyspnea on exertion  Gastrointestinal ROS: no abdominal pain, change in bowel habits, or black or bloody stools  Musculoskeletal ROS: negative for joint pain, joint stiffness or joint swelling  Neurological ROS: no TIA or stroke symptoms  Heme- no bleeding, no lumps and bumps in armpit  Skin- no bruises, no rash    Past medical history, past surgical history, social history and family history:  •  has a past medical history of Anal fissure, Anxiety and depression, Arthritis, Cancer (CMS/HCC), Chronic cystitis, Chronic leukemia (CMS/HCC), Chronic pain, Colitis, COPD (chronic obstructive pulmonary disease) (CMS/HCC), Diabetes mellitus (CMS/HCC), Elevated cholesterol, Fibromyalgia, GERD (gastroesophageal reflux disease), Heart disease, Hemorrhoids, History of pilonidal cyst, Hyperlipidemia, Hypertension, Leukemia (CMS/HCC), Migraines, Pulmonary infiltrate, Sleep apnea, Ulcerative colitis  (CMS/HCC), and UTI (urinary tract infection).  •  has a past surgical history that includes Cholecystectomy; Hand surgery (Right); Pilonidal cyst / sinus excision; cystoscopy bladder biopsy; Hysterectomy (2013); Bronchoscopy (N/A, 12/1/2016); Facial reconstruction surgery; Abdominal surgery; Colonoscopy; Esophagogastroduodenoscopy; Fracture surgery; Cosmetic surgery; and Anal Scope (N/A, 5/31/2018).  •  reports that she has been smoking cigarettes. She has a 22.50 pack-year smoking history. She has never used smokeless tobacco. She reports that she does not drink alcohol or use drugs.  • family history includes COPD in her mother; Cancer in her sister and another family member; Diabetes in her mother and another family member; Gout in an other family member; Heart disease in an other family member; Heart failure in her father and mother; Hypertension in an other family member; Jaundice in her father; Other in an other family member.     Medication and allergies:  • Active medication:  Current Outpatient Medications on File Prior to Visit   Medication Sig   • estradiol (ESTRACE) 2 MG tablet Take 2 mg by mouth Daily.   • fenofibrate (TRICOR) 145 MG tablet Take  by mouth.   • hydrocortisone (ANUSOL-HC) 25 MG suppository Insert 25 mg into the rectum 2 (Two) Times a Day.   • hyoscyamine (ANASPAZ,LEVSIN) 0.125 MG tablet Take 0.125 mg by mouth Every 4 (Four) Hours As Needed for cramping.   • ibuprofen (ADVIL,MOTRIN) 800 MG tablet Take 800 mg by mouth Every 6 (Six) Hours As Needed for mild pain (1-3).   • lisinopril (PRINIVIL,ZESTRIL) 30 MG tablet Take 30 mg by mouth Daily.   • Loratadine-Pseudoephedrine (CLARITIN-D 24 HOUR PO) Take  by mouth Daily.   • LORazepam (ATIVAN) 2 MG tablet Take  by mouth.   • metFORMIN (GLUCOPHAGE) 250 MG half tablet Take 250 mg by mouth 2 (Two) Times a Day With Meals.   • metoprolol tartrate (LOPRESSOR) 50 MG tablet Take 50 mg by mouth 2 (Two) Times a Day.   • mupirocin (BACTROBAN) 2 % ointment  "Apply  topically 3 (Three) Times a Day.   • omeprazole (priLOSEC) 40 MG capsule Take 40 mg by mouth Daily.   • ondansetron (ZOFRAN) 4 MG tablet Take 4 mg by mouth Every 8 (Eight) Hours As Needed for Nausea or Vomiting.   • oxybutynin XL (DITROPAN-XL) 10 MG 24 hr tablet Take 10 mg by mouth Daily.   • Phenazopyridine HCl (AZO TABS PO) Take  by mouth.   • pravastatin (PRAVACHOL) 40 MG tablet Take 40 mg by mouth Daily.   • pregabalin (LYRICA) 300 MG capsule Take  by mouth.   • QUEtiapine XR (SEROquel XR) 200 MG 24 hr tablet Take 200 mg by mouth Every Night.   • rosuvastatin (CRESTOR) 5 MG tablet Take  by mouth.   • sertraline (ZOLOFT) 100 MG tablet Take 100 mg by mouth Daily.   • terconazole (TERAZOL 3) 0.8 % vaginal cream Insert 1 applicator into the vagina Every Night.   • traMADol (ULTRAM) 50 MG tablet Take 50 mg by mouth Every 6 (Six) Hours As Needed for moderate pain (4-6).   • UCERIS 9 MG tablet sustained-release 24 hour    • [DISCONTINUED] albuterol (PROVENTIL HFA;VENTOLIN HFA) 108 (90 Base) MCG/ACT inhaler Inhale 2 puffs Every 4 (Four) Hours As Needed for Wheezing.   • [DISCONTINUED] tiotropium bromide monohydrate (SPIRIVA RESPIMAT) 2.5 MCG/ACT aerosol solution inhaler Inhale 2 puffs Daily.     No current facility-administered medications on file prior to visit.         Allergies:    Penicillins      Vital Signs    height is 182.9 cm (72\") and weight is 108 kg (238 lb). Her temporal temperature is 97.7 °F (36.5 °C). Her blood pressure is 118/72 and her pulse is 83. Her oxygen saturation is 93%.      Physical Exam:  Constitutional:  oriented to person, place, and time. No distress.   Head: Normocephalic and atraumatic.   Right Ear and left Ear : External ear normal.   Nose: Nose normal.   Eyes: Pupils are equal, round, and reactive to light.  No scleral icterus.   Neck: Normal range of motion. Neck supple.    Cardiovascular: Normal rate, regular rhythm, normal heart sounds. No murmur heard.  Pulmonary: Bilateral " air entry equal.  No wheezing.  No crackles.  Abdominal: Soft. Bowel sounds are normal. There is no tenderness.   Musculoskeletal: Normal range of motion. Exhibits no deformity.   Neurological: alert and oriented to person, place, and time. No cranial nerve deficit. Coordination normal.   Skin: Skin is warm and dry. No erythema.   Psychiatric:Normal mood and affect.   behavior is normal.     Result review: I reviewed the images of the CT scan of the chest that was performed.  In year 2018 which showed groundglass opacities       Assessment and plan:   Diagnosis Plan   1. Centrilobular emphysema (CMS/HCC)  Full Pulmonary Function Test With Bronchodilator    tiotropium bromide monohydrate (SPIRIVA RESPIMAT) 2.5 MCG/ACT aerosol solution inhaler    albuterol sulfate  (90 Base) MCG/ACT inhaler   2. Current smoker   smoking cessation counseling performed   3. Chronic respiratory failure with hypoxia (CMS/HCC)  Walking Oximetry  Titrate FiO2 to keep SPO2 around 90%.   4. DARON (obstructive sleep apnea)   patient is currently nonadherent with CPAP machine.   5. Ground glass opacity present on imaging of lung  CT Chest Without Contrast       Patient is receiving influenza vaccination during clinic visit.     Walk oximetry during clinic visit  Patient underwent walk oximetry during clinic visit.  The results will be scanned in the Epic EMR.     Smoking cessation counseling:  Encouraged smoking cessation. I have educated the patient on the risk of diseases from using tobacco product such as but not limited to cancer, COPD, heart diseases, and cataracts.  I strongly advised the patient to quit.  After thorough discussion, patient is willing to quit smoking.      Obstructive sleep apnea management counseling  I explained the patient that sleep apnea is a condition that makes you stop breathing for short, while you are asleep.  There are 2 type of sleep apnea.  One is called obstructive sleep apnea and the other is called  central sleep apnea.  In obstructive sleep apnea, you stop breathing because your throat narrows or closes.  In central sleep apnea, you stop breathing because your brain does not send the right signals to your muscles to make you breathe.  When people talk about sleep apnea, the are usually referring to obstructive sleep apnea.  The main symptoms of sleep apnea are loud snoring, tiredness and daytime sleepiness.  Weight loss can help if you are overweight or obese.  But losing weight can be challenging, it may take time to lose enough weight to help with your sleep apnea.  Most people need other treatment while they work on losing weight.  The most effective treatment for sleep apnea is a device that keeps your airway open while you sleep.  The treatment with device is cold continuous positive airway pressure on CPAP.  People getting CPAP we are of the face mask at night that keeps them breathing.  The patient with sleep apnea who used the CPAP machine feel more rested and generally feels better.  Untreated sleep apnea can be dangerous.  People with sleep apnea do not get good quality sleep, so they are often tired and not alert.  This puts them at risk for car accident and other type of accident.  Plus, studies show that people with sleep apnea are more likely than others to have high blood pressure, heart attacks, mood disorders, stroke and other serious heart problems.  In people with severe sleep apnea, getting treated can help prevent some of these problems.  As per CMS guidelines, more than 4 hours on 70% of observed nights is considered adherence. At the end of conversation, the patient voices understanding of the disease and treatment modality.  Patient also understands the risk of untreated obstructive sleep apnea and benefits of the treatment.        Follow up in 6 months and as needed.       Thank you for involving us in the care of the patient.  Ricardo Salinas MD  Pulmonary and Critical Care  Medicine

## 2021-02-09 ENCOUNTER — TRANSCRIBE ORDERS (OUTPATIENT)
Dept: ADMINISTRATIVE | Facility: HOSPITAL | Age: 56
End: 2021-02-09

## 2021-02-09 DIAGNOSIS — Z01.818 OTHER SPECIFIED PRE-OPERATIVE EXAMINATION: Primary | ICD-10-CM

## 2021-09-29 PROBLEM — J96.11 CHRONIC RESPIRATORY FAILURE WITH HYPOXIA: Status: ACTIVE | Noted: 2021-01-01

## 2021-09-29 NOTE — PROGRESS NOTES
"Chief Complaint  Emphysema    Subjective          Lynette Stein presents to [facility]  History of Present Illness     Patient presents today for follow-up of emphysema, chronic hypoxic respiratory failure, history of DARON noncompliant with AutoPap,, and cigarette nicotine dependence.  She has noted an approximately 40-year history with 1/2 pack/day average.  She states that she was never a heavy smoker.  She continues to smoke 1/2 pack/day currently.  Not interested in cessation at this time.  She reports some chest tightness today that has been ongoing over several weeks.  No recent wheezing or acute shortness of breath noted.  She requires supplemental oxygen as needed during the daytime and wears 2 to 3 L at nighttime.  Previously diagnosed with obstructive sleep apnea but not able to tolerate positive airway pressure therapy.  She is notable for history of recurrent pneumonia and groundglass opacities on CT imaging.  She underwent a bronchoscopy by Dr. Acuna in 2016 with cultures negative for infective source and cytology report overall negative.  She was referred to Dr. Quintero, who also recommended continued monitoring.  She continues to use Spiriva Respimat (via samples due to high cost) and reports symptom improvement with use.         Objective   Vital Signs:   /66   Pulse 87   Temp 96.6 °F (35.9 °C) (Temporal)   Ht 182.9 cm (72\")   Wt 106 kg (234 lb)   SpO2 93%   BMI 31.74 kg/m²     Physical Exam  Vitals reviewed.   Constitutional:       General: She is not in acute distress.     Appearance: She is well-developed. She is not diaphoretic.   HENT:      Head: Normocephalic and atraumatic.   Eyes:      Pupils: Pupils are equal, round, and reactive to light.   Neck:      Thyroid: No thyromegaly.   Cardiovascular:      Rate and Rhythm: Normal rate and regular rhythm.      Heart sounds: Normal heart sounds, S1 normal and S2 normal.   Pulmonary:    "   Effort: Pulmonary effort is normal.      Breath sounds: No wheezing, rhonchi or rales.   Neurological:      Mental Status: She is alert and oriented to person, place, and time.   Psychiatric:         Behavior: Behavior normal.          Result Review :   The following data was reviewed by: Margie Mera PA-C on 09/29/2021:  Reviewed previous culture results, cytology/pathology report from the bronchoscopy in 2016.   Data reviewed: Radiologic studies Previous CT chest, Cardiology studies Previous echocardiogram and Previous PFT       Assessment and Plan    Diagnoses and all orders for this visit:    1. Centrilobular emphysema (CMS/HCC) (Primary)  -     Full Pulmonary Function Test With Bronchodilator; Future  -     CT Chest Without Contrast; Future  -     albuterol sulfate  (90 Base) MCG/ACT inhaler; Inhale 2 puffs Every 4 (Four) Hours As Needed for Wheezing.  Dispense: 18 g; Refill: 5    2. Chronic respiratory failure with hypoxia (CMS/HCC)  -     Full Pulmonary Function Test With Bronchodilator; Future    3. DARON (obstructive sleep apnea)    4. Ground glass opacity present on imaging of lung  -     CT Chest Without Contrast; Future    5. Cigarette nicotine dependence without complication  -     CT Chest Without Contrast; Future    6. Chest pain, unspecified type  -     ECG 12 Lead; Future  -     CT Chest Without Contrast; Future    7. Centrilobular emphysema (HCC)  -     Full Pulmonary Function Test With Bronchodilator; Future  -     CT Chest Without Contrast; Future  -     albuterol sulfate  (90 Base) MCG/ACT inhaler; Inhale 2 puffs Every 4 (Four) Hours As Needed for Wheezing.  Dispense: 18 g; Refill: 5         COPD:  · Reordered PFT  · Continue albuterol inhaler as needed  · Continue Spiriva Respimat 2.5 mcg 2 puffs daily.      Chronic hypoxic respiratory failure:  · Patient reports use of supplemental oxygen as needed during daytime and 2 to 3 L/min at nighttime.  · Requested portable oxygen  concentrator machine.  Order placed, will fax to [employer].   POC device needed for ambulatory use on 2 - 3 L/m.       Groundglass opacities:  · Ordered repeat follow-up CT  · Previous order was not completed.      Cigarette nicotine dependence:  Continues to use 1/2 pack/day.  History notable for approximately 40-year use with 1/2 pack/day of average.  Not interested in complete cessation at this time.  · Does not qualify for low-dose CT chest due to pack per year average.      Follow Up   Return in about 3 months (around 12/29/2021), or as needed, for Next scheduled follow up.  Patient was given instructions and counseling regarding her condition or for health maintenance advice. Please see specific information pulled into the AVS if appropriate.

## 2021-10-22 NOTE — TELEPHONE ENCOUNTER
Called to update the patient concerning PFT results.  No true obstruction, only some restrictive findings with mildly reduced DLCO.    · We will continue with current inhalers  · Continue supplemental oxygen supplies as previously discussed.  Trying to obtain [employer] machine preferred over local supplier

## 2021-11-01 NOTE — TELEPHONE ENCOUNTER
Attempted to call patient with CT scan results.  Left a message requesting callback.  Number provided.    CT scan shows resolved opacities, noted on scan in 2018.  · We will plan to resume low-dose CT scan lung cancer screening.  This can be completed next October 2022.    We will discuss and order closer to that time if patient is agreeable.    I also contacted [employer] on 10/29. They will be faxing a paper to our office with more information (not yet received).    They stated that she would also need to update her information (phone or paper) before the POC can be ordered.

## 2021-11-02 NOTE — TELEPHONE ENCOUNTER
Discussed CT scan results and [employer] machine details with her today.     We will fax office note, previous PFT to [employer] ([phone]) as fax was not received.     If we have not heard from then in the next few weeks, asked Mrs. Stein to contact our office and we would order from a local company.
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
## 2021-11-09 PROBLEM — L97.911 ULCERS OF BOTH LOWER LEGS, LIMITED TO BREAKDOWN OF SKIN (HCC): Chronic | Status: ACTIVE | Noted: 2021-01-01

## 2021-11-09 PROBLEM — L97.519 DIABETIC ULCER OF RIGHT FOOT ASSOCIATED WITH TYPE 2 DIABETES MELLITUS (HCC): Status: ACTIVE | Noted: 2021-01-01

## 2021-11-09 PROBLEM — E11.621 DIABETIC ULCER OF RIGHT FOOT ASSOCIATED WITH TYPE 2 DIABETES MELLITUS: Status: ACTIVE | Noted: 2021-01-01

## 2021-11-09 PROBLEM — E11.621 DIABETIC ULCER OF LEFT FOOT ASSOCIATED WITH TYPE 2 DIABETES MELLITUS (HCC): Status: ACTIVE | Noted: 2021-01-01

## 2021-11-09 PROBLEM — L97.529 DIABETIC ULCER OF LEFT FOOT ASSOCIATED WITH TYPE 2 DIABETES MELLITUS: Status: ACTIVE | Noted: 2021-01-01

## 2021-11-09 PROBLEM — L97.921 ULCERS OF BOTH LOWER LEGS, LIMITED TO BREAKDOWN OF SKIN (HCC): Chronic | Status: ACTIVE | Noted: 2021-01-01
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
## 2021-12-20 NOTE — TELEPHONE ENCOUNTER
----- Message from Shun Herbert PA-C sent at 12/17/2021  1:11 PM EST -----  Can you please let Savanah know that her hemoglobin has dropped from 11.4 months ago to 10.4 the day she was in office?  Let her know that we can either go ahead and get her scheduled for an EGD/colonoscopy to review causes of anemia or wait another month and recheck levels.  ----- Message -----  From: Lab, Background User  Sent: 12/15/2021   5:53 AM EST  To: Shun Herbert PA-C

## 2022-01-01 ENCOUNTER — APPOINTMENT (OUTPATIENT)
Dept: RESPIRATORY THERAPY | Facility: HOSPITAL | Age: 57
End: 2022-01-01

## 2022-01-01 ENCOUNTER — APPOINTMENT (OUTPATIENT)
Dept: GENERAL RADIOLOGY | Facility: HOSPITAL | Age: 57
End: 2022-01-01

## 2022-01-01 ENCOUNTER — APPOINTMENT (OUTPATIENT)
Dept: NEUROLOGY | Facility: HOSPITAL | Age: 57
End: 2022-01-01

## 2022-01-01 ENCOUNTER — APPOINTMENT (OUTPATIENT)
Dept: CT IMAGING | Facility: HOSPITAL | Age: 57
End: 2022-01-01

## 2022-01-01 ENCOUNTER — APPOINTMENT (OUTPATIENT)
Dept: WOUND CARE | Facility: HOSPITAL | Age: 57
End: 2022-01-01

## 2022-01-01 ENCOUNTER — HOSPITAL ENCOUNTER (EMERGENCY)
Facility: HOSPITAL | Age: 57
Discharge: PSYCHIATRIC OR SUBSTANCE USE FACILITY | End: 2022-01-21
Attending: STUDENT IN AN ORGANIZED HEALTH CARE EDUCATION/TRAINING PROGRAM | Admitting: EMERGENCY MEDICINE

## 2022-01-01 ENCOUNTER — APPOINTMENT (OUTPATIENT)
Dept: ULTRASOUND IMAGING | Facility: HOSPITAL | Age: 57
End: 2022-01-01

## 2022-01-01 ENCOUNTER — HOSPITAL ENCOUNTER (INPATIENT)
Facility: HOSPITAL | Age: 57
LOS: 1 days | End: 2022-02-24
Attending: INTERNAL MEDICINE | Admitting: INTERNAL MEDICINE

## 2022-01-01 ENCOUNTER — APPOINTMENT (OUTPATIENT)
Dept: NUCLEAR MEDICINE | Facility: HOSPITAL | Age: 57
End: 2022-01-01

## 2022-01-01 ENCOUNTER — APPOINTMENT (OUTPATIENT)
Dept: MRI IMAGING | Facility: HOSPITAL | Age: 57
End: 2022-01-01

## 2022-01-01 ENCOUNTER — HOSPITAL ENCOUNTER (INPATIENT)
Facility: HOSPITAL | Age: 57
LOS: 19 days | Discharge: ANOTHER HOSPITAL | End: 2022-02-10
Attending: HOSPITALIST | Admitting: HOSPITALIST

## 2022-01-01 ENCOUNTER — APPOINTMENT (OUTPATIENT)
Dept: INTERVENTIONAL RADIOLOGY/VASCULAR | Facility: HOSPITAL | Age: 57
End: 2022-01-01

## 2022-01-01 ENCOUNTER — HOSPITAL ENCOUNTER (INPATIENT)
Facility: HOSPITAL | Age: 57
LOS: 1 days | Discharge: ANOTHER HOSPITAL | End: 2022-01-22
Attending: PSYCHIATRY & NEUROLOGY | Admitting: PSYCHIATRY & NEUROLOGY

## 2022-01-01 ENCOUNTER — APPOINTMENT (OUTPATIENT)
Dept: CARDIOLOGY | Facility: HOSPITAL | Age: 57
End: 2022-01-01

## 2022-01-01 ENCOUNTER — HOSPITAL ENCOUNTER (INPATIENT)
Facility: HOSPITAL | Age: 57
LOS: 13 days | Discharge: HOSPICE | End: 2022-02-23
Attending: INTERNAL MEDICINE | Admitting: INTERNAL MEDICINE

## 2022-01-01 VITALS
DIASTOLIC BLOOD PRESSURE: 64 MMHG | RESPIRATION RATE: 23 BRPM | HEART RATE: 148 BPM | SYSTOLIC BLOOD PRESSURE: 119 MMHG | OXYGEN SATURATION: 73 % | TEMPERATURE: 102.2 F

## 2022-01-01 VITALS
HEIGHT: 72 IN | OXYGEN SATURATION: 92 % | RESPIRATION RATE: 20 BRPM | BODY MASS INDEX: 29.2 KG/M2 | SYSTOLIC BLOOD PRESSURE: 143 MMHG | TEMPERATURE: 101.7 F | HEART RATE: 116 BPM | DIASTOLIC BLOOD PRESSURE: 83 MMHG | WEIGHT: 215.61 LBS

## 2022-01-01 VITALS
DIASTOLIC BLOOD PRESSURE: 104 MMHG | TEMPERATURE: 98.9 F | SYSTOLIC BLOOD PRESSURE: 180 MMHG | HEART RATE: 155 BPM | OXYGEN SATURATION: 97 % | RESPIRATION RATE: 42 BRPM

## 2022-01-01 VITALS
HEART RATE: 89 BPM | OXYGEN SATURATION: 94 % | WEIGHT: 233 LBS | SYSTOLIC BLOOD PRESSURE: 145 MMHG | BODY MASS INDEX: 32.62 KG/M2 | RESPIRATION RATE: 18 BRPM | HEIGHT: 71 IN | DIASTOLIC BLOOD PRESSURE: 85 MMHG | TEMPERATURE: 97.8 F

## 2022-01-01 VITALS
WEIGHT: 209.44 LBS | RESPIRATION RATE: 24 BRPM | BODY MASS INDEX: 28.37 KG/M2 | HEART RATE: 135 BPM | TEMPERATURE: 100.4 F | OXYGEN SATURATION: 87 % | DIASTOLIC BLOOD PRESSURE: 96 MMHG | SYSTOLIC BLOOD PRESSURE: 192 MMHG | HEIGHT: 72 IN

## 2022-01-01 DIAGNOSIS — E11.621 DIABETIC ULCER OF LEFT FOOT ASSOCIATED WITH TYPE 2 DIABETES MELLITUS, WITH FAT LAYER EXPOSED, UNSPECIFIED PART OF FOOT: Primary | ICD-10-CM

## 2022-01-01 DIAGNOSIS — L97.522 DIABETIC ULCER OF LEFT FOOT ASSOCIATED WITH TYPE 2 DIABETES MELLITUS, WITH FAT LAYER EXPOSED, UNSPECIFIED PART OF FOOT: Primary | ICD-10-CM

## 2022-01-01 DIAGNOSIS — F23 ACUTE PSYCHOSIS: Primary | ICD-10-CM

## 2022-01-01 LAB
A PHAGOCYTOPH IGG TITR SER IF: NEGATIVE {TITER}
A PHAGOCYTOPH IGM TITR SER IF: NEGATIVE {TITER}
A-A DO2: 103.2 MMHG (ref 0–300)
A-A DO2: 104.5 MMHG (ref 0–300)
A-A DO2: 106.5 MMHG (ref 0–300)
A-A DO2: 113.5 MMHG (ref 0–300)
A-A DO2: 116.6 MMHG (ref 0–300)
A-A DO2: 117 MMHG (ref 0–300)
A-A DO2: 119.9 MMHG (ref 0–300)
A-A DO2: 136 MMHG (ref 0–300)
A-A DO2: 152.4 MMHG (ref 0–300)
A-A DO2: 154 MMHG (ref 0–300)
A-A DO2: 158 MMHG (ref 0–300)
A-A DO2: 172.7 MMHG (ref 0–300)
A-A DO2: 219.9 MMHG (ref 0–300)
A-A DO2: 230.4 MMHG (ref 0–300)
A-A DO2: 35.9 MMHG (ref 0–300)
A-A DO2: 427 MMHG (ref 0–300)
A-A DO2: 45.8 MMHG (ref 0–300)
A-A DO2: 509.2 MMHG (ref 0–300)
A-A DO2: 92.6 MMHG (ref 0–300)
A-A DO2: 93.9 MMHG (ref 0–300)
A-A DO2: 95.2 MMHG (ref 0–300)
A-A DO2: 99.7 MMHG (ref 0–300)
ALBUMIN CSF-MCNC: NORMAL MG/DL
ALBUMIN SERPL-MCNC: 2.5 G/DL (ref 3.5–5.2)
ALBUMIN SERPL-MCNC: 2.6 G/DL (ref 3.5–5.2)
ALBUMIN SERPL-MCNC: 2.6 G/DL (ref 3.5–5.2)
ALBUMIN SERPL-MCNC: 2.7 G/DL (ref 3.5–5.2)
ALBUMIN SERPL-MCNC: 2.86 G/DL (ref 3.5–5.2)
ALBUMIN SERPL-MCNC: 2.91 G/DL (ref 3.5–5.2)
ALBUMIN SERPL-MCNC: 3 G/DL (ref 3.5–5.2)
ALBUMIN SERPL-MCNC: 3 G/DL (ref 3.5–5.2)
ALBUMIN SERPL-MCNC: 3.05 G/DL (ref 3.5–5.2)
ALBUMIN SERPL-MCNC: 3.11 G/DL (ref 3.5–5.2)
ALBUMIN SERPL-MCNC: 3.14 G/DL (ref 3.5–5.2)
ALBUMIN SERPL-MCNC: 3.19 G/DL (ref 3.5–5.2)
ALBUMIN SERPL-MCNC: 3.2 G/DL (ref 3.5–5.2)
ALBUMIN SERPL-MCNC: 3.26 G/DL (ref 3.5–5.2)
ALBUMIN SERPL-MCNC: 3.26 G/DL (ref 3.5–5.2)
ALBUMIN SERPL-MCNC: 3.3 G/DL (ref 3.5–5.2)
ALBUMIN SERPL-MCNC: 3.3 G/DL (ref 3.5–5.2)
ALBUMIN SERPL-MCNC: 3.36 G/DL (ref 3.5–5.2)
ALBUMIN SERPL-MCNC: 3.4 G/DL (ref 3.5–5.2)
ALBUMIN SERPL-MCNC: 3.45 G/DL (ref 3.5–5.2)
ALBUMIN SERPL-MCNC: 3.5 G/DL (ref 3.5–5.2)
ALBUMIN SERPL-MCNC: 3.51 G/DL (ref 3.5–5.2)
ALBUMIN SERPL-MCNC: 3.52 G/DL (ref 3.5–5.2)
ALBUMIN SERPL-MCNC: 3.56 G/DL (ref 3.5–5.2)
ALBUMIN SERPL-MCNC: 3.62 G/DL (ref 3.5–5.2)
ALBUMIN SERPL-MCNC: 3.62 G/DL (ref 3.5–5.2)
ALBUMIN SERPL-MCNC: 3.63 G/DL (ref 3.5–5.2)
ALBUMIN SERPL-MCNC: 3.69 G/DL (ref 3.5–5.2)
ALBUMIN SERPL-MCNC: 3.73 G/DL (ref 3.5–5.2)
ALBUMIN SERPL-MCNC: 3.73 G/DL (ref 3.5–5.2)
ALBUMIN SERPL-MCNC: 3.98 G/DL (ref 3.5–5.2)
ALBUMIN SERPL-MCNC: 4.23 G/DL (ref 3.5–5.2)
ALBUMIN SERPL-MCNC: 4.29 G/DL (ref 3.5–5.2)
ALBUMIN SERPL-MCNC: 4.31 G/DL (ref 3.5–5.2)
ALBUMIN SERPL-MCNC: NORMAL G/DL
ALBUMIN/GLOB SERPL: 0.9 G/DL
ALBUMIN/GLOB SERPL: 1 G/DL
ALBUMIN/GLOB SERPL: 1.1 G/DL
ALBUMIN/GLOB SERPL: 1.2 G/DL
ALP SERPL-CCNC: 100 U/L (ref 39–117)
ALP SERPL-CCNC: 104 U/L (ref 39–117)
ALP SERPL-CCNC: 105 U/L (ref 39–117)
ALP SERPL-CCNC: 106 U/L (ref 39–117)
ALP SERPL-CCNC: 107 U/L (ref 39–117)
ALP SERPL-CCNC: 109 U/L (ref 39–117)
ALP SERPL-CCNC: 110 U/L (ref 39–117)
ALP SERPL-CCNC: 110 U/L (ref 39–117)
ALP SERPL-CCNC: 111 U/L (ref 39–117)
ALP SERPL-CCNC: 111 U/L (ref 39–117)
ALP SERPL-CCNC: 118 U/L (ref 39–117)
ALP SERPL-CCNC: 120 U/L (ref 39–117)
ALP SERPL-CCNC: 123 U/L (ref 39–117)
ALP SERPL-CCNC: 125 U/L (ref 39–117)
ALP SERPL-CCNC: 125 U/L (ref 39–117)
ALP SERPL-CCNC: 133 U/L (ref 39–117)
ALP SERPL-CCNC: 135 U/L (ref 39–117)
ALP SERPL-CCNC: 150 U/L (ref 39–117)
ALP SERPL-CCNC: 163 U/L (ref 39–117)
ALP SERPL-CCNC: 165 U/L (ref 39–117)
ALP SERPL-CCNC: 187 U/L (ref 39–117)
ALP SERPL-CCNC: 205 U/L (ref 39–117)
ALP SERPL-CCNC: 98 U/L (ref 39–117)
ALP SERPL-CCNC: 99 U/L (ref 39–117)
ALP SERPL-CCNC: 99 U/L (ref 39–117)
ALT SERPL W P-5'-P-CCNC: 12 U/L (ref 1–33)
ALT SERPL W P-5'-P-CCNC: 13 U/L (ref 1–33)
ALT SERPL W P-5'-P-CCNC: 15 U/L (ref 1–33)
ALT SERPL W P-5'-P-CCNC: 16 U/L (ref 1–33)
ALT SERPL W P-5'-P-CCNC: 16 U/L (ref 1–33)
ALT SERPL W P-5'-P-CCNC: 18 U/L (ref 1–33)
ALT SERPL W P-5'-P-CCNC: 18 U/L (ref 1–33)
ALT SERPL W P-5'-P-CCNC: 19 U/L (ref 1–33)
ALT SERPL W P-5'-P-CCNC: 19 U/L (ref 1–33)
ALT SERPL W P-5'-P-CCNC: 24 U/L (ref 1–33)
ALT SERPL W P-5'-P-CCNC: 25 U/L (ref 1–33)
ALT SERPL W P-5'-P-CCNC: 26 U/L (ref 1–33)
ALT SERPL W P-5'-P-CCNC: 27 U/L (ref 1–33)
ALT SERPL W P-5'-P-CCNC: 27 U/L (ref 1–33)
ALT SERPL W P-5'-P-CCNC: 29 U/L (ref 1–33)
ALT SERPL W P-5'-P-CCNC: 30 U/L (ref 1–33)
ALT SERPL W P-5'-P-CCNC: 31 U/L (ref 1–33)
ALT SERPL W P-5'-P-CCNC: 33 U/L (ref 1–33)
ALT SERPL W P-5'-P-CCNC: 35 U/L (ref 1–33)
ALT SERPL W P-5'-P-CCNC: 36 U/L (ref 1–33)
ALT SERPL W P-5'-P-CCNC: 37 U/L (ref 1–33)
ALT SERPL W P-5'-P-CCNC: 38 U/L (ref 1–33)
ALT SERPL W P-5'-P-CCNC: 40 U/L (ref 1–33)
ALT SERPL W P-5'-P-CCNC: 43 U/L (ref 1–33)
ALT SERPL W P-5'-P-CCNC: 44 U/L (ref 1–33)
AMMONIA BLD-SCNC: 40 UMOL/L (ref 11–51)
AMMONIA BLD-SCNC: 43 UMOL/L (ref 11–51)
AMPHET+METHAMPHET UR QL: NEGATIVE
AMPHETAMINES UR QL: NEGATIVE
ANA SER QL: NEGATIVE
ANION GAP SERPL CALCULATED.3IONS-SCNC: 10 MMOL/L (ref 5–15)
ANION GAP SERPL CALCULATED.3IONS-SCNC: 10.4 MMOL/L (ref 5–15)
ANION GAP SERPL CALCULATED.3IONS-SCNC: 10.7 MMOL/L (ref 5–15)
ANION GAP SERPL CALCULATED.3IONS-SCNC: 10.7 MMOL/L (ref 5–15)
ANION GAP SERPL CALCULATED.3IONS-SCNC: 10.9 MMOL/L (ref 5–15)
ANION GAP SERPL CALCULATED.3IONS-SCNC: 11 MMOL/L (ref 5–15)
ANION GAP SERPL CALCULATED.3IONS-SCNC: 11 MMOL/L (ref 5–15)
ANION GAP SERPL CALCULATED.3IONS-SCNC: 11.2 MMOL/L (ref 5–15)
ANION GAP SERPL CALCULATED.3IONS-SCNC: 12 MMOL/L (ref 5–15)
ANION GAP SERPL CALCULATED.3IONS-SCNC: 12.7 MMOL/L (ref 5–15)
ANION GAP SERPL CALCULATED.3IONS-SCNC: 12.8 MMOL/L (ref 5–15)
ANION GAP SERPL CALCULATED.3IONS-SCNC: 12.8 MMOL/L (ref 5–15)
ANION GAP SERPL CALCULATED.3IONS-SCNC: 13 MMOL/L (ref 5–15)
ANION GAP SERPL CALCULATED.3IONS-SCNC: 13.2 MMOL/L (ref 5–15)
ANION GAP SERPL CALCULATED.3IONS-SCNC: 13.3 MMOL/L (ref 5–15)
ANION GAP SERPL CALCULATED.3IONS-SCNC: 13.5 MMOL/L (ref 5–15)
ANION GAP SERPL CALCULATED.3IONS-SCNC: 13.7 MMOL/L (ref 5–15)
ANION GAP SERPL CALCULATED.3IONS-SCNC: 13.8 MMOL/L (ref 5–15)
ANION GAP SERPL CALCULATED.3IONS-SCNC: 14.1 MMOL/L (ref 5–15)
ANION GAP SERPL CALCULATED.3IONS-SCNC: 14.1 MMOL/L (ref 5–15)
ANION GAP SERPL CALCULATED.3IONS-SCNC: 14.2 MMOL/L (ref 5–15)
ANION GAP SERPL CALCULATED.3IONS-SCNC: 15.3 MMOL/L (ref 5–15)
ANION GAP SERPL CALCULATED.3IONS-SCNC: 16.2 MMOL/L (ref 5–15)
ANION GAP SERPL CALCULATED.3IONS-SCNC: 18 MMOL/L (ref 5–15)
ANION GAP SERPL CALCULATED.3IONS-SCNC: 18.5 MMOL/L (ref 5–15)
ANION GAP SERPL CALCULATED.3IONS-SCNC: 19.5 MMOL/L (ref 5–15)
ANION GAP SERPL CALCULATED.3IONS-SCNC: 8.8 MMOL/L (ref 5–15)
ANION GAP SERPL CALCULATED.3IONS-SCNC: 9 MMOL/L (ref 5–15)
ANION GAP SERPL CALCULATED.3IONS-SCNC: 9 MMOL/L (ref 5–15)
ANISOCYTOSIS BLD QL: ABNORMAL
ANISOCYTOSIS BLD QL: NORMAL
APAP SERPL-MCNC: <5 MCG/ML (ref 0–30)
APPEARANCE CSF: ABNORMAL
APPEARANCE CSF: ABNORMAL
APPEARANCE CSF: CLEAR
APTT PPP: 100.5 SECONDS (ref 22.7–35.4)
APTT PPP: 28 SECONDS (ref 25.5–35.4)
APTT PPP: 28.7 SECONDS (ref 25.5–35.4)
APTT PPP: 29.1 SECONDS (ref 25.5–35.4)
APTT PPP: 29.5 SECONDS (ref 25.5–35.4)
APTT PPP: 30.7 SECONDS (ref 25.5–35.4)
APTT PPP: 30.7 SECONDS (ref 25.5–35.4)
APTT PPP: 32.1 SECONDS (ref 25.5–35.4)
APTT PPP: 33 SECONDS (ref 22.7–35.4)
APTT PPP: 35.2 SECONDS (ref 25.5–35.4)
APTT PPP: 40.2 SECONDS (ref 22.7–35.4)
APTT PPP: 40.8 SECONDS (ref 25.5–35.4)
APTT PPP: 41 SECONDS (ref 25.5–35.4)
APTT PPP: 43.6 SECONDS (ref 25.5–35.4)
APTT PPP: 45.2 SECONDS (ref 25.5–35.4)
APTT PPP: 46.7 SECONDS (ref 25.5–35.4)
APTT PPP: 47.4 SECONDS (ref 25.5–35.4)
APTT PPP: 47.6 SECONDS (ref 25.5–35.4)
APTT PPP: 47.7 SECONDS (ref 25.5–35.4)
APTT PPP: 50.1 SECONDS (ref 25.5–35.4)
APTT PPP: 50.6 SECONDS (ref 25.5–35.4)
APTT PPP: 50.7 SECONDS (ref 22.7–35.4)
APTT PPP: 52 SECONDS (ref 25.5–35.4)
APTT PPP: 52.4 SECONDS (ref 25.5–35.4)
APTT PPP: 52.5 SECONDS (ref 25.5–35.4)
APTT PPP: 55.2 SECONDS (ref 25.5–35.4)
APTT PPP: 55.8 SECONDS (ref 22.7–35.4)
APTT PPP: 55.9 SECONDS (ref 25.5–35.4)
APTT PPP: 56 SECONDS (ref 25.5–35.4)
APTT PPP: 57.1 SECONDS (ref 25.5–35.4)
APTT PPP: 60.2 SECONDS (ref 22.7–35.4)
APTT PPP: 60.2 SECONDS (ref 25.5–35.4)
APTT PPP: 61.1 SECONDS (ref 25.5–35.4)
APTT PPP: 61.5 SECONDS (ref 25.5–35.4)
APTT PPP: 62.5 SECONDS (ref 25.5–35.4)
APTT PPP: 63.9 SECONDS (ref 25.5–35.4)
APTT PPP: 63.9 SECONDS (ref 25.5–35.4)
APTT PPP: 64.1 SECONDS (ref 25.5–35.4)
APTT PPP: 64.1 SECONDS (ref 25.5–35.4)
APTT PPP: 64.6 SECONDS (ref 22.7–35.4)
APTT PPP: 65.4 SECONDS (ref 22.7–35.4)
APTT PPP: 65.4 SECONDS (ref 25.5–35.4)
APTT PPP: 66.3 SECONDS (ref 25.5–35.4)
APTT PPP: 68.6 SECONDS (ref 25.5–35.4)
APTT PPP: 69.2 SECONDS (ref 25.5–35.4)
APTT PPP: 71.2 SECONDS (ref 25.5–35.4)
APTT PPP: 73.3 SECONDS (ref 25.5–35.4)
APTT PPP: 74.3 SECONDS (ref 22.7–35.4)
APTT PPP: 74.4 SECONDS (ref 25.5–35.4)
APTT PPP: 74.8 SECONDS (ref 25.5–35.4)
APTT PPP: 78 SECONDS (ref 22.7–35.4)
APTT PPP: 79.7 SECONDS (ref 22.7–35.4)
APTT PPP: 80 SECONDS (ref 25.5–35.4)
APTT PPP: 82.2 SECONDS (ref 25.5–35.4)
APTT PPP: 83.7 SECONDS (ref 25.5–35.4)
APTT PPP: 85.7 SECONDS (ref 22.7–35.4)
APTT PPP: 85.9 SECONDS (ref 25.5–35.4)
APTT PPP: 97.9 SECONDS (ref 22.7–35.4)
APTT PPP: >100 SECONDS (ref 25.5–35.4)
ARTERIAL PATENCY WRIST A: ABNORMAL
ARTERIAL PATENCY WRIST A: POSITIVE
AST SERPL-CCNC: 13 U/L (ref 1–32)
AST SERPL-CCNC: 14 U/L (ref 1–32)
AST SERPL-CCNC: 14 U/L (ref 1–32)
AST SERPL-CCNC: 17 U/L (ref 1–32)
AST SERPL-CCNC: 18 U/L (ref 1–32)
AST SERPL-CCNC: 18 U/L (ref 1–32)
AST SERPL-CCNC: 19 U/L (ref 1–32)
AST SERPL-CCNC: 20 U/L (ref 1–32)
AST SERPL-CCNC: 21 U/L (ref 1–32)
AST SERPL-CCNC: 22 U/L (ref 1–32)
AST SERPL-CCNC: 23 U/L (ref 1–32)
AST SERPL-CCNC: 23 U/L (ref 1–32)
AST SERPL-CCNC: 24 U/L (ref 1–32)
AST SERPL-CCNC: 24 U/L (ref 1–32)
AST SERPL-CCNC: 28 U/L (ref 1–32)
AST SERPL-CCNC: 31 U/L (ref 1–32)
AST SERPL-CCNC: 34 U/L (ref 1–32)
AST SERPL-CCNC: 36 U/L (ref 1–32)
AST SERPL-CCNC: 36 U/L (ref 1–32)
AST SERPL-CCNC: 39 U/L (ref 1–32)
AST SERPL-CCNC: 41 U/L (ref 1–32)
AST SERPL-CCNC: 43 U/L (ref 1–32)
AST SERPL-CCNC: 45 U/L (ref 1–32)
AST SERPL-CCNC: 56 U/L (ref 1–32)
AST SERPL-CCNC: 9 U/L (ref 1–32)
ATMOSPHERIC PRESS: 723 MMHG
ATMOSPHERIC PRESS: 724 MMHG
ATMOSPHERIC PRESS: 724 MMHG
ATMOSPHERIC PRESS: 725 MMHG
ATMOSPHERIC PRESS: 726 MMHG
ATMOSPHERIC PRESS: 726 MMHG
ATMOSPHERIC PRESS: 728 MMHG
ATMOSPHERIC PRESS: 729 MMHG
ATMOSPHERIC PRESS: 730 MMHG
ATMOSPHERIC PRESS: 732 MMHG
ATMOSPHERIC PRESS: 733 MMHG
ATMOSPHERIC PRESS: 734 MMHG
ATMOSPHERIC PRESS: 735 MMHG
ATMOSPHERIC PRESS: 736 MMHG
ATMOSPHERIC PRESS: 736 MMHG
ATMOSPHERIC PRESS: 737 MMHG
ATMOSPHERIC PRESS: 743.1 MMHG
ATMOSPHERIC PRESS: 755.3 MMHG
ATMOSPHERIC PRESS: 758.3 MMHG
B BURGDOR IGG+IGM SER-ACNC: <0.91 ISR (ref 0–0.9)
BABESIA IGG TITR SER IF: NORMAL {TITER}
BABESIA IGM TITR SER IF: NORMAL {TITER}
BACTERIA FLD CULT: NORMAL
BACTERIA FLD CULT: NORMAL
BACTERIA SPEC AEROBE CULT: ABNORMAL
BACTERIA SPEC AEROBE CULT: ABNORMAL
BACTERIA SPEC AEROBE CULT: NO GROWTH
BACTERIA SPEC AEROBE CULT: NORMAL
BACTERIA SPEC ANAEROBE CULT: NORMAL
BACTERIA SPEC RESP CULT: ABNORMAL
BACTERIA SPEC RESP CULT: NO GROWTH
BACTERIA UR QL AUTO: ABNORMAL /HPF
BARBITURATES UR QL SCN: NEGATIVE
BASE EXCESS BLDA CALC-SCNC: -0.7 MMOL/L (ref 0–2)
BASE EXCESS BLDA CALC-SCNC: -1.5 MMOL/L (ref 0–2)
BASE EXCESS BLDA CALC-SCNC: -2 MMOL/L (ref 0–2)
BASE EXCESS BLDA CALC-SCNC: -2.3 MMOL/L (ref 0–2)
BASE EXCESS BLDA CALC-SCNC: -2.4 MMOL/L (ref 0–2)
BASE EXCESS BLDA CALC-SCNC: -3.1 MMOL/L (ref 0–2)
BASE EXCESS BLDA CALC-SCNC: -3.1 MMOL/L (ref 0–2)
BASE EXCESS BLDA CALC-SCNC: -4.1 MMOL/L (ref 0–2)
BASE EXCESS BLDA CALC-SCNC: -4.4 MMOL/L (ref 0–2)
BASE EXCESS BLDA CALC-SCNC: -4.7 MMOL/L (ref 0–2)
BASE EXCESS BLDA CALC-SCNC: -6 MMOL/L (ref 0–2)
BASE EXCESS BLDA CALC-SCNC: -6.6 MMOL/L (ref 0–2)
BASE EXCESS BLDA CALC-SCNC: -8.2 MMOL/L (ref 0–2)
BASE EXCESS BLDA CALC-SCNC: 0 MMOL/L (ref 0–2)
BASE EXCESS BLDA CALC-SCNC: 0.3 MMOL/L (ref 0–2)
BASE EXCESS BLDA CALC-SCNC: 0.6 MMOL/L (ref 0–2)
BASE EXCESS BLDA CALC-SCNC: 1.1 MMOL/L (ref 0–2)
BASE EXCESS BLDA CALC-SCNC: 10.6 MMOL/L (ref 0–2)
BASE EXCESS BLDA CALC-SCNC: 3.7 MMOL/L (ref 0–2)
BASE EXCESS BLDA CALC-SCNC: 4.9 MMOL/L (ref 0–2)
BASE EXCESS BLDA CALC-SCNC: 5 MMOL/L (ref 0–2)
BASE EXCESS BLDA CALC-SCNC: 5.1 MMOL/L (ref 0–2)
BASE EXCESS BLDA CALC-SCNC: 6.8 MMOL/L (ref 0–2)
BASE EXCESS BLDA CALC-SCNC: 8.2 MMOL/L (ref 0–2)
BASE EXCESS BLDA CALC-SCNC: 8.4 MMOL/L (ref 0–2)
BASOPHILS # BLD AUTO: 0.01 10*3/MM3 (ref 0–0.2)
BASOPHILS # BLD AUTO: 0.01 10*3/MM3 (ref 0–0.2)
BASOPHILS # BLD AUTO: 0.02 10*3/MM3 (ref 0–0.2)
BASOPHILS # BLD AUTO: 0.05 10*3/MM3 (ref 0–0.2)
BASOPHILS # BLD AUTO: 0.05 10*3/MM3 (ref 0–0.2)
BASOPHILS # BLD AUTO: 0.07 10*3/MM3 (ref 0–0.2)
BASOPHILS # BLD AUTO: 0.08 10*3/MM3 (ref 0–0.2)
BASOPHILS # BLD AUTO: 0.09 10*3/MM3 (ref 0–0.2)
BASOPHILS # BLD AUTO: 0.11 10*3/MM3 (ref 0–0.2)
BASOPHILS # BLD AUTO: 0.12 10*3/MM3 (ref 0–0.2)
BASOPHILS # BLD AUTO: 0.12 10*3/MM3 (ref 0–0.2)
BASOPHILS # BLD AUTO: 0.13 10*3/MM3 (ref 0–0.2)
BASOPHILS # BLD AUTO: 0.13 10*3/MM3 (ref 0–0.2)
BASOPHILS # BLD AUTO: 0.14 10*3/MM3 (ref 0–0.2)
BASOPHILS # BLD AUTO: 0.16 10*3/MM3 (ref 0–0.2)
BASOPHILS # BLD AUTO: 0.18 10*3/MM3 (ref 0–0.2)
BASOPHILS # BLD AUTO: 0.19 10*3/MM3 (ref 0–0.2)
BASOPHILS # BLD AUTO: 0.19 10*3/MM3 (ref 0–0.2)
BASOPHILS # BLD MANUAL: 0.43 10*3/MM3 (ref 0–0.2)
BASOPHILS NFR BLD AUTO: 0.2 % (ref 0–1.5)
BASOPHILS NFR BLD AUTO: 0.3 % (ref 0–1.5)
BASOPHILS NFR BLD AUTO: 0.3 % (ref 0–1.5)
BASOPHILS NFR BLD AUTO: 0.4 % (ref 0–1.5)
BASOPHILS NFR BLD AUTO: 0.4 % (ref 0–1.5)
BASOPHILS NFR BLD AUTO: 0.5 % (ref 0–1.5)
BASOPHILS NFR BLD AUTO: 0.5 % (ref 0–1.5)
BASOPHILS NFR BLD AUTO: 0.6 % (ref 0–1.5)
BASOPHILS NFR BLD AUTO: 0.7 % (ref 0–1.5)
BASOPHILS NFR BLD AUTO: 0.9 % (ref 0–1.5)
BASOPHILS NFR BLD AUTO: 0.9 % (ref 0–1.5)
BASOPHILS NFR BLD AUTO: 1 % (ref 0–1.5)
BASOPHILS NFR BLD AUTO: 1.2 % (ref 0–1.5)
BASOPHILS NFR BLD AUTO: 1.2 % (ref 0–1.5)
BASOPHILS NFR BLD AUTO: 1.3 % (ref 0–1.5)
BASOPHILS NFR BLD AUTO: 1.3 % (ref 0–1.5)
BASOPHILS NFR BLD AUTO: 1.5 % (ref 0–1.5)
BASOPHILS NFR BLD AUTO: 1.5 % (ref 0–1.5)
BASOPHILS NFR BLD AUTO: 1.6 % (ref 0–1.5)
BASOPHILS NFR BLD MANUAL: 2 % (ref 0–1.5)
BDY SITE: ABNORMAL
BENZODIAZ UR QL SCN: POSITIVE
BH CV ECHO MEAS - % IVS THICK: 2.1 %
BH CV ECHO MEAS - % LVPW THICK: 12.3 %
BH CV ECHO MEAS - AO MAX PG: 6.7 MMHG
BH CV ECHO MEAS - AO MEAN PG: 4 MMHG
BH CV ECHO MEAS - AO V2 MAX: 129 CM/SEC
BH CV ECHO MEAS - AO V2 MEAN: 100 CM/SEC
BH CV ECHO MEAS - AO V2 VTI: 26.7 CM
BH CV ECHO MEAS - BSA(HAYCOCK): 2.2 M^2
BH CV ECHO MEAS - BSA(HAYCOCK): 2.2 M^2
BH CV ECHO MEAS - BSA: 2.2 M^2
BH CV ECHO MEAS - BSA: 2.2 M^2
BH CV ECHO MEAS - BZI_BMI: 29 KILOGRAMS/M^2
BH CV ECHO MEAS - BZI_BMI: 29.2 KILOGRAMS/M^2
BH CV ECHO MEAS - BZI_METRIC_HEIGHT: 182.9 CM
BH CV ECHO MEAS - BZI_METRIC_HEIGHT: 182.9 CM
BH CV ECHO MEAS - BZI_METRIC_WEIGHT: 97.1 KG
BH CV ECHO MEAS - BZI_METRIC_WEIGHT: 97.5 KG
BH CV ECHO MEAS - EDV(CUBED): 110.9 ML
BH CV ECHO MEAS - EDV(CUBED): 63 ML
BH CV ECHO MEAS - EDV(MOD-SP4): 54.4 ML
BH CV ECHO MEAS - EDV(MOD-SP4): 99.7 ML
BH CV ECHO MEAS - EDV(TEICH): 107.8 ML
BH CV ECHO MEAS - EDV(TEICH): 69.2 ML
BH CV ECHO MEAS - EF(CUBED): 71.7 %
BH CV ECHO MEAS - EF(CUBED): 78.3 %
BH CV ECHO MEAS - EF(MOD-SP4): 57.2 %
BH CV ECHO MEAS - EF(MOD-SP4): 64.9 %
BH CV ECHO MEAS - EF(TEICH): 63.3 %
BH CV ECHO MEAS - EF(TEICH): 71.2 %
BH CV ECHO MEAS - ESV(CUBED): 13.7 ML
BH CV ECHO MEAS - ESV(CUBED): 31.4 ML
BH CV ECHO MEAS - ESV(MOD-SP4): 19.1 ML
BH CV ECHO MEAS - ESV(MOD-SP4): 42.7 ML
BH CV ECHO MEAS - ESV(TEICH): 20 ML
BH CV ECHO MEAS - ESV(TEICH): 39.6 ML
BH CV ECHO MEAS - FS: 34.3 %
BH CV ECHO MEAS - FS: 39.9 %
BH CV ECHO MEAS - IVS/LVPW: 0.87
BH CV ECHO MEAS - IVS/LVPW: 1.1
BH CV ECHO MEAS - IVSD: 1.1 CM
BH CV ECHO MEAS - IVSD: 1.2 CM
BH CV ECHO MEAS - IVSS: 1.2 CM
BH CV ECHO MEAS - LV DIASTOLIC VOL/BSA (35-75): 24.8 ML/M^2
BH CV ECHO MEAS - LV DIASTOLIC VOL/BSA (35-75): 45.4 ML/M^2
BH CV ECHO MEAS - LV MASS(C)D: 156.2 GRAMS
BH CV ECHO MEAS - LV MASS(C)D: 201.7 GRAMS
BH CV ECHO MEAS - LV MASS(C)DI: 71.2 GRAMS/M^2
BH CV ECHO MEAS - LV MASS(C)DI: 91.8 GRAMS/M^2
BH CV ECHO MEAS - LV MASS(C)S: 118.6 GRAMS
BH CV ECHO MEAS - LV MASS(C)SI: 54 GRAMS/M^2
BH CV ECHO MEAS - LV SYSTOLIC VOL/BSA (12-30): 19.4 ML/M^2
BH CV ECHO MEAS - LV SYSTOLIC VOL/BSA (12-30): 8.7 ML/M^2
BH CV ECHO MEAS - LVIDD: 4 CM
BH CV ECHO MEAS - LVIDD: 4.8 CM
BH CV ECHO MEAS - LVIDS: 2.4 CM
BH CV ECHO MEAS - LVIDS: 3.2 CM
BH CV ECHO MEAS - LVLD AP4: 6.3 CM
BH CV ECHO MEAS - LVLD AP4: 6.9 CM
BH CV ECHO MEAS - LVLS AP4: 5.5 CM
BH CV ECHO MEAS - LVLS AP4: 5.6 CM
BH CV ECHO MEAS - LVPWD: 1.1 CM
BH CV ECHO MEAS - LVPWD: 1.2 CM
BH CV ECHO MEAS - LVPWS: 1.2 CM
BH CV ECHO MEAS - MV A MAX VEL: 67.3 CM/SEC
BH CV ECHO MEAS - MV E MAX VEL: 71.1 CM/SEC
BH CV ECHO MEAS - MV E/A: 1.1
BH CV ECHO MEAS - SI(CUBED): 22.5 ML/M^2
BH CV ECHO MEAS - SI(CUBED): 36.2 ML/M^2
BH CV ECHO MEAS - SI(MOD-SP4): 16.1 ML/M^2
BH CV ECHO MEAS - SI(MOD-SP4): 25.9 ML/M^2
BH CV ECHO MEAS - SI(TEICH): 22.4 ML/M^2
BH CV ECHO MEAS - SI(TEICH): 31 ML/M^2
BH CV ECHO MEAS - SV(CUBED): 49.4 ML
BH CV ECHO MEAS - SV(CUBED): 79.5 ML
BH CV ECHO MEAS - SV(MOD-SP4): 35.3 ML
BH CV ECHO MEAS - SV(MOD-SP4): 57 ML
BH CV ECHO MEAS - SV(TEICH): 49.2 ML
BH CV ECHO MEAS - SV(TEICH): 68.2 ML
BILIRUB SERPL-MCNC: 0.2 MG/DL (ref 0–1.2)
BILIRUB SERPL-MCNC: 0.3 MG/DL (ref 0–1.2)
BILIRUB SERPL-MCNC: 0.4 MG/DL (ref 0–1.2)
BILIRUB SERPL-MCNC: 0.4 MG/DL (ref 0–1.2)
BILIRUB SERPL-MCNC: <0.2 MG/DL (ref 0–1.2)
BILIRUB UR QL STRIP: NEGATIVE
BODY TEMPERATURE: 0 C
BUN SERPL-MCNC: 10 MG/DL (ref 6–20)
BUN SERPL-MCNC: 11 MG/DL (ref 6–20)
BUN SERPL-MCNC: 12 MG/DL (ref 6–20)
BUN SERPL-MCNC: 13 MG/DL (ref 6–20)
BUN SERPL-MCNC: 14 MG/DL (ref 6–20)
BUN SERPL-MCNC: 14 MG/DL (ref 6–20)
BUN SERPL-MCNC: 15 MG/DL (ref 6–20)
BUN SERPL-MCNC: 16 MG/DL (ref 6–20)
BUN SERPL-MCNC: 21 MG/DL (ref 6–20)
BUN SERPL-MCNC: 21 MG/DL (ref 6–20)
BUN SERPL-MCNC: 22 MG/DL (ref 6–20)
BUN SERPL-MCNC: 23 MG/DL (ref 6–20)
BUN SERPL-MCNC: 24 MG/DL (ref 6–20)
BUN SERPL-MCNC: 24 MG/DL (ref 6–20)
BUN SERPL-MCNC: 6 MG/DL (ref 6–20)
BUN SERPL-MCNC: 8 MG/DL (ref 6–20)
BUN SERPL-MCNC: 9 MG/DL (ref 6–20)
BUN/CREAT SERPL: 12.3 (ref 7–25)
BUN/CREAT SERPL: 13.3 (ref 7–25)
BUN/CREAT SERPL: 13.6 (ref 7–25)
BUN/CREAT SERPL: 13.8 (ref 7–25)
BUN/CREAT SERPL: 15.5 (ref 7–25)
BUN/CREAT SERPL: 16 (ref 7–25)
BUN/CREAT SERPL: 16.7 (ref 7–25)
BUN/CREAT SERPL: 17.2 (ref 7–25)
BUN/CREAT SERPL: 17.5 (ref 7–25)
BUN/CREAT SERPL: 17.9 (ref 7–25)
BUN/CREAT SERPL: 18 (ref 7–25)
BUN/CREAT SERPL: 19.1 (ref 7–25)
BUN/CREAT SERPL: 19.7 (ref 7–25)
BUN/CREAT SERPL: 20 (ref 7–25)
BUN/CREAT SERPL: 20.6 (ref 7–25)
BUN/CREAT SERPL: 21.2 (ref 7–25)
BUN/CREAT SERPL: 21.6 (ref 7–25)
BUN/CREAT SERPL: 23.2 (ref 7–25)
BUN/CREAT SERPL: 23.9 (ref 7–25)
BUN/CREAT SERPL: 24.6 (ref 7–25)
BUN/CREAT SERPL: 25.9 (ref 7–25)
BUN/CREAT SERPL: 26 (ref 7–25)
BUN/CREAT SERPL: 27.3 (ref 7–25)
BUN/CREAT SERPL: 27.7 (ref 7–25)
BUN/CREAT SERPL: 27.8 (ref 7–25)
BUN/CREAT SERPL: 29.6 (ref 7–25)
BUN/CREAT SERPL: 29.7 (ref 7–25)
BUN/CREAT SERPL: 29.8 (ref 7–25)
BUN/CREAT SERPL: 30.8 (ref 7–25)
BUN/CREAT SERPL: 36.8 (ref 7–25)
BUN/CREAT SERPL: 39.6 (ref 7–25)
BUN/CREAT SERPL: 40 (ref 7–25)
BUN/CREAT SERPL: 41.2 (ref 7–25)
BUN/CREAT SERPL: 43.1 (ref 7–25)
BUN/CREAT SERPL: 45.1 (ref 7–25)
BUN/CREAT SERPL: 45.5 (ref 7–25)
BUN/CREAT SERPL: 46.2 (ref 7–25)
BUN/CREAT SERPL: 57.1 (ref 7–25)
BUN/CREAT SERPL: 60 (ref 7–25)
BUN/CREAT SERPL: 60 (ref 7–25)
BUN/CREAT SERPL: ABNORMAL
BUPRENORPHINE SERPL-MCNC: NEGATIVE NG/ML
C DIFF TOX GENS STL QL NAA+PROBE: NEGATIVE
C GATTII+NEOFOR DNA CSF QL NAA+NON-PROBE: NOT DETECTED
C-ANCA TITR SER IF: NORMAL TITER
C3 SERPL-MCNC: 162 MG/DL (ref 82–167)
C4 SERPL-MCNC: 34 MG/DL (ref 14–44)
CALCIUM SPEC-SCNC: 10 MG/DL (ref 8.6–10.5)
CALCIUM SPEC-SCNC: 10.1 MG/DL (ref 8.6–10.5)
CALCIUM SPEC-SCNC: 10.1 MG/DL (ref 8.6–10.5)
CALCIUM SPEC-SCNC: 10.2 MG/DL (ref 8.6–10.5)
CALCIUM SPEC-SCNC: 10.3 MG/DL (ref 8.6–10.5)
CALCIUM SPEC-SCNC: 10.4 MG/DL (ref 8.6–10.5)
CALCIUM SPEC-SCNC: 10.4 MG/DL (ref 8.6–10.5)
CALCIUM SPEC-SCNC: 7.5 MG/DL (ref 8.6–10.5)
CALCIUM SPEC-SCNC: 7.9 MG/DL (ref 8.6–10.5)
CALCIUM SPEC-SCNC: 8 MG/DL (ref 8.6–10.5)
CALCIUM SPEC-SCNC: 8.3 MG/DL (ref 8.6–10.5)
CALCIUM SPEC-SCNC: 8.3 MG/DL (ref 8.6–10.5)
CALCIUM SPEC-SCNC: 8.4 MG/DL (ref 8.6–10.5)
CALCIUM SPEC-SCNC: 8.5 MG/DL (ref 8.6–10.5)
CALCIUM SPEC-SCNC: 8.6 MG/DL (ref 8.6–10.5)
CALCIUM SPEC-SCNC: 8.7 MG/DL (ref 8.6–10.5)
CALCIUM SPEC-SCNC: 8.9 MG/DL (ref 8.6–10.5)
CALCIUM SPEC-SCNC: 8.9 MG/DL (ref 8.6–10.5)
CALCIUM SPEC-SCNC: 9.3 MG/DL (ref 8.6–10.5)
CALCIUM SPEC-SCNC: 9.5 MG/DL (ref 8.6–10.5)
CALCIUM SPEC-SCNC: 9.5 MG/DL (ref 8.6–10.5)
CALCIUM SPEC-SCNC: 9.6 MG/DL (ref 8.6–10.5)
CALCIUM SPEC-SCNC: 9.7 MG/DL (ref 8.6–10.5)
CALCIUM SPEC-SCNC: 9.7 MG/DL (ref 8.6–10.5)
CALCIUM SPEC-SCNC: 9.8 MG/DL (ref 8.6–10.5)
CALCIUM SPEC-SCNC: 9.9 MG/DL (ref 8.6–10.5)
CANNABINOIDS SERPL QL: NEGATIVE
CCP IGA+IGG SERPL IA-ACNC: 2 UNITS (ref 0–19)
CH50 SERPL-ACNC: >60 U/ML
CHLORIDE SERPL-SCNC: 100 MMOL/L (ref 98–107)
CHLORIDE SERPL-SCNC: 102 MMOL/L (ref 98–107)
CHLORIDE SERPL-SCNC: 104 MMOL/L (ref 98–107)
CHLORIDE SERPL-SCNC: 105 MMOL/L (ref 98–107)
CHLORIDE SERPL-SCNC: 106 MMOL/L (ref 98–107)
CHLORIDE SERPL-SCNC: 107 MMOL/L (ref 98–107)
CHLORIDE SERPL-SCNC: 108 MMOL/L (ref 98–107)
CHLORIDE SERPL-SCNC: 108 MMOL/L (ref 98–107)
CHLORIDE SERPL-SCNC: 109 MMOL/L (ref 98–107)
CHLORIDE SERPL-SCNC: 110 MMOL/L (ref 98–107)
CHLORIDE SERPL-SCNC: 110 MMOL/L (ref 98–107)
CHLORIDE SERPL-SCNC: 113 MMOL/L (ref 98–107)
CHLORIDE SERPL-SCNC: 113 MMOL/L (ref 98–107)
CHLORIDE SERPL-SCNC: 114 MMOL/L (ref 98–107)
CHLORIDE SERPL-SCNC: 114 MMOL/L (ref 98–107)
CHLORIDE SERPL-SCNC: 116 MMOL/L (ref 98–107)
CHLORIDE SERPL-SCNC: 116 MMOL/L (ref 98–107)
CHLORIDE SERPL-SCNC: 118 MMOL/L (ref 98–107)
CHLORIDE SERPL-SCNC: 118 MMOL/L (ref 98–107)
CHLORIDE SERPL-SCNC: 121 MMOL/L (ref 98–107)
CHLORIDE SERPL-SCNC: 122 MMOL/L (ref 98–107)
CHLORIDE SERPL-SCNC: 122 MMOL/L (ref 98–107)
CHLORIDE SERPL-SCNC: 124 MMOL/L (ref 98–107)
CHLORIDE SERPL-SCNC: 124 MMOL/L (ref 98–107)
CHLORIDE SERPL-SCNC: 127 MMOL/L (ref 98–107)
CHLORIDE SERPL-SCNC: 92 MMOL/L (ref 98–107)
CHOLEST SERPL-MCNC: 184 MG/DL (ref 0–200)
CHROMATIN AB SERPL-ACNC: <10 IU/ML (ref 0–14)
CLARITY UR: CLEAR
CMV DNA CSF QL NAA+PROBE: NOT DETECTED
CMV DNA SPEC QL NAA+PROBE: NORMAL
CO2 BLDA-SCNC: 19.3 MMOL/L (ref 22–33)
CO2 BLDA-SCNC: 20 MMOL/L (ref 22–33)
CO2 BLDA-SCNC: 20.4 MMOL/L (ref 22–33)
CO2 BLDA-SCNC: 20.8 MMOL/L (ref 22–33)
CO2 BLDA-SCNC: 21 MMOL/L (ref 22–33)
CO2 BLDA-SCNC: 21.5 MMOL/L (ref 22–33)
CO2 BLDA-SCNC: 22 MMOL/L (ref 22–33)
CO2 BLDA-SCNC: 22.3 MMOL/L (ref 22–33)
CO2 BLDA-SCNC: 22.6 MMOL/L (ref 22–33)
CO2 BLDA-SCNC: 23.2 MMOL/L (ref 22–33)
CO2 BLDA-SCNC: 25.2 MMOL/L (ref 22–33)
CO2 BLDA-SCNC: 27.2 MMOL/L (ref 22–33)
CO2 BLDA-SCNC: 30.2 MMOL/L (ref 22–33)
CO2 BLDA-SCNC: 31.4 MMOL/L (ref 22–33)
CO2 BLDA-SCNC: 31.5 MMOL/L (ref 22–33)
CO2 BLDA-SCNC: 31.6 MMOL/L (ref 22–33)
CO2 BLDA-SCNC: 33.8 MMOL/L (ref 22–33)
CO2 BLDA-SCNC: 34.3 MMOL/L (ref 22–33)
CO2 BLDA-SCNC: 35.1 MMOL/L (ref 22–33)
CO2 BLDA-SCNC: 37.2 MMOL/L (ref 22–33)
CO2 SERPL-SCNC: 16.9 MMOL/L (ref 22–29)
CO2 SERPL-SCNC: 17 MMOL/L (ref 22–29)
CO2 SERPL-SCNC: 17.2 MMOL/L (ref 22–29)
CO2 SERPL-SCNC: 17.5 MMOL/L (ref 22–29)
CO2 SERPL-SCNC: 17.8 MMOL/L (ref 22–29)
CO2 SERPL-SCNC: 18 MMOL/L (ref 22–29)
CO2 SERPL-SCNC: 18.2 MMOL/L (ref 22–29)
CO2 SERPL-SCNC: 18.3 MMOL/L (ref 22–29)
CO2 SERPL-SCNC: 18.5 MMOL/L (ref 22–29)
CO2 SERPL-SCNC: 18.6 MMOL/L (ref 22–29)
CO2 SERPL-SCNC: 19 MMOL/L (ref 22–29)
CO2 SERPL-SCNC: 19.8 MMOL/L (ref 22–29)
CO2 SERPL-SCNC: 20 MMOL/L (ref 22–29)
CO2 SERPL-SCNC: 20.2 MMOL/L (ref 22–29)
CO2 SERPL-SCNC: 20.5 MMOL/L (ref 22–29)
CO2 SERPL-SCNC: 20.7 MMOL/L (ref 22–29)
CO2 SERPL-SCNC: 20.8 MMOL/L (ref 22–29)
CO2 SERPL-SCNC: 20.9 MMOL/L (ref 22–29)
CO2 SERPL-SCNC: 21 MMOL/L (ref 22–29)
CO2 SERPL-SCNC: 21 MMOL/L (ref 22–29)
CO2 SERPL-SCNC: 21.1 MMOL/L (ref 22–29)
CO2 SERPL-SCNC: 22 MMOL/L (ref 22–29)
CO2 SERPL-SCNC: 22.7 MMOL/L (ref 22–29)
CO2 SERPL-SCNC: 22.8 MMOL/L (ref 22–29)
CO2 SERPL-SCNC: 23 MMOL/L (ref 22–29)
CO2 SERPL-SCNC: 24 MMOL/L (ref 22–29)
CO2 SERPL-SCNC: 24 MMOL/L (ref 22–29)
CO2 SERPL-SCNC: 25.8 MMOL/L (ref 22–29)
CO2 SERPL-SCNC: 26.2 MMOL/L (ref 22–29)
CO2 SERPL-SCNC: 26.3 MMOL/L (ref 22–29)
CO2 SERPL-SCNC: 26.3 MMOL/L (ref 22–29)
CO2 SERPL-SCNC: 27.8 MMOL/L (ref 22–29)
CO2 SERPL-SCNC: 29 MMOL/L (ref 22–29)
CO2 SERPL-SCNC: 30.3 MMOL/L (ref 22–29)
COCAINE UR QL: NEGATIVE
COHGB MFR BLD: 0.2 % (ref 0–5)
COHGB MFR BLD: 0.4 % (ref 0–5)
COHGB MFR BLD: 0.5 % (ref 0–5)
COHGB MFR BLD: 1 % (ref 0–5)
COHGB MFR BLD: <0.2 % (ref 0–5)
COLOR CSF: ABNORMAL
COLOR CSF: ABNORMAL
COLOR CSF: COLORLESS
COLOR UR: ABNORMAL
CREAT SERPL-MCNC: 0.25 MG/DL (ref 0.57–1)
CREAT SERPL-MCNC: 0.26 MG/DL (ref 0.57–1)
CREAT SERPL-MCNC: 0.33 MG/DL (ref 0.57–1)
CREAT SERPL-MCNC: 0.34 MG/DL (ref 0.57–1)
CREAT SERPL-MCNC: 0.37 MG/DL (ref 0.57–1)
CREAT SERPL-MCNC: 0.37 MG/DL (ref 0.57–1)
CREAT SERPL-MCNC: 0.4 MG/DL (ref 0.57–1)
CREAT SERPL-MCNC: 0.4 MG/DL (ref 0.57–1)
CREAT SERPL-MCNC: 0.42 MG/DL (ref 0.57–1)
CREAT SERPL-MCNC: 0.45 MG/DL (ref 0.57–1)
CREAT SERPL-MCNC: 0.45 MG/DL (ref 0.57–1)
CREAT SERPL-MCNC: 0.46 MG/DL (ref 0.57–1)
CREAT SERPL-MCNC: 0.47 MG/DL (ref 0.57–1)
CREAT SERPL-MCNC: 0.47 MG/DL (ref 0.57–1)
CREAT SERPL-MCNC: 0.5 MG/DL (ref 0.57–1)
CREAT SERPL-MCNC: 0.51 MG/DL (ref 0.57–1)
CREAT SERPL-MCNC: 0.51 MG/DL (ref 0.57–1)
CREAT SERPL-MCNC: 0.52 MG/DL (ref 0.57–1)
CREAT SERPL-MCNC: 0.52 MG/DL (ref 0.57–1)
CREAT SERPL-MCNC: 0.53 MG/DL (ref 0.57–1)
CREAT SERPL-MCNC: 0.54 MG/DL (ref 0.57–1)
CREAT SERPL-MCNC: 0.54 MG/DL (ref 0.57–1)
CREAT SERPL-MCNC: 0.55 MG/DL (ref 0.57–1)
CREAT SERPL-MCNC: 0.56 MG/DL (ref 0.57–1)
CREAT SERPL-MCNC: 0.56 MG/DL (ref 0.57–1)
CREAT SERPL-MCNC: 0.57 MG/DL (ref 0.57–1)
CREAT SERPL-MCNC: 0.58 MG/DL (ref 0.57–1)
CREAT SERPL-MCNC: 0.6 MG/DL (ref 0.57–1)
CREAT SERPL-MCNC: 0.61 MG/DL (ref 0.57–1)
CREAT SERPL-MCNC: 0.61 MG/DL (ref 0.57–1)
CREAT SERPL-MCNC: 0.63 MG/DL (ref 0.57–1)
CREAT SERPL-MCNC: 0.63 MG/DL (ref 0.57–1)
CREAT SERPL-MCNC: 0.66 MG/DL (ref 0.57–1)
CREAT SERPL-MCNC: 0.68 MG/DL (ref 0.57–1)
CREAT SERPL-MCNC: 0.73 MG/DL (ref 0.57–1)
CREAT SERPL-MCNC: <0.17 MG/DL (ref 0.57–1)
CRP SERPL-MCNC: 1.62 MG/DL (ref 0–0.5)
CRP SERPL-MCNC: 1.82 MG/DL (ref 0–0.5)
CRP SERPL-MCNC: 2.16 MG/DL (ref 0–0.5)
CRP SERPL-MCNC: 5.37 MG/DL (ref 0–0.5)
CYTO UR: NORMAL
CYTOLOGIST CVX/VAG CYTO: NORMAL
D DIMER PPP FEU-MCNC: 0.69 MCGFEU/ML (ref 0–0.5)
D-LACTATE SERPL-SCNC: 0.9 MMOL/L (ref 0.5–2)
D-LACTATE SERPL-SCNC: 1.1 MMOL/L (ref 0.5–2)
D-LACTATE SERPL-SCNC: 1.4 MMOL/L (ref 0.5–2)
D-LACTATE SERPL-SCNC: 1.7 MMOL/L (ref 0.5–2)
DEPRECATED RDW RBC AUTO: 49.3 FL (ref 37–54)
DEPRECATED RDW RBC AUTO: 49.9 FL (ref 37–54)
DEPRECATED RDW RBC AUTO: 50.4 FL (ref 37–54)
DEPRECATED RDW RBC AUTO: 51.2 FL (ref 37–54)
DEPRECATED RDW RBC AUTO: 51.2 FL (ref 37–54)
DEPRECATED RDW RBC AUTO: 51.7 FL (ref 37–54)
DEPRECATED RDW RBC AUTO: 53.1 FL (ref 37–54)
DEPRECATED RDW RBC AUTO: 53.4 FL (ref 37–54)
DEPRECATED RDW RBC AUTO: 53.5 FL (ref 37–54)
DEPRECATED RDW RBC AUTO: 53.9 FL (ref 37–54)
DEPRECATED RDW RBC AUTO: 54.1 FL (ref 37–54)
DEPRECATED RDW RBC AUTO: 55.3 FL (ref 37–54)
DEPRECATED RDW RBC AUTO: 55.5 FL (ref 37–54)
DEPRECATED RDW RBC AUTO: 56 FL (ref 37–54)
DEPRECATED RDW RBC AUTO: 58.2 FL (ref 37–54)
DEPRECATED RDW RBC AUTO: 58.5 FL (ref 37–54)
DEPRECATED RDW RBC AUTO: 58.7 FL (ref 37–54)
DEPRECATED RDW RBC AUTO: 58.8 FL (ref 37–54)
DEPRECATED RDW RBC AUTO: 59.2 FL (ref 37–54)
DEPRECATED RDW RBC AUTO: 59.5 FL (ref 37–54)
DEPRECATED RDW RBC AUTO: 59.6 FL (ref 37–54)
DEPRECATED RDW RBC AUTO: 59.7 FL (ref 37–54)
DEPRECATED RDW RBC AUTO: 60.2 FL (ref 37–54)
DEPRECATED RDW RBC AUTO: 60.5 FL (ref 37–54)
DEPRECATED RDW RBC AUTO: 60.7 FL (ref 37–54)
DEPRECATED RDW RBC AUTO: 60.9 FL (ref 37–54)
DEPRECATED RDW RBC AUTO: 61.2 FL (ref 37–54)
DEPRECATED RDW RBC AUTO: 62.1 FL (ref 37–54)
DEPRECATED RDW RBC AUTO: 62.6 FL (ref 37–54)
DEPRECATED RDW RBC AUTO: 63.2 FL (ref 37–54)
DEPRECATED RDW RBC AUTO: 63.5 FL (ref 37–54)
DEPRECATED RDW RBC AUTO: 63.7 FL (ref 37–54)
DEPRECATED RDW RBC AUTO: 63.8 FL (ref 37–54)
DEPRECATED RDW RBC AUTO: 64.2 FL (ref 37–54)
DEPRECATED RDW RBC AUTO: 65.2 FL (ref 37–54)
DEPRECATED RDW RBC AUTO: 66.7 FL (ref 37–54)
E CHAFFEENSIS IGG TITR SER IF: NEGATIVE {TITER}
E CHAFFEENSIS IGM TITR SER IF: NEGATIVE {TITER}
E COLI K1 DNA CSF QL NAA+NON-PROBE: NOT DETECTED
ENA SM AB SER-ACNC: <0.2 AI (ref 0–0.9)
ENA SS-A AB SER-ACNC: 0.2 AI (ref 0–0.9)
ENA SS-B AB SER-ACNC: <0.2 AI (ref 0–0.9)
EOSINOPHIL # BLD AUTO: 0.01 10*3/MM3 (ref 0–0.4)
EOSINOPHIL # BLD AUTO: 0.04 10*3/MM3 (ref 0–0.4)
EOSINOPHIL # BLD AUTO: 0.06 10*3/MM3 (ref 0–0.4)
EOSINOPHIL # BLD AUTO: 0.09 10*3/MM3 (ref 0–0.4)
EOSINOPHIL # BLD AUTO: 0.11 10*3/MM3 (ref 0–0.4)
EOSINOPHIL # BLD AUTO: 0.12 10*3/MM3 (ref 0–0.4)
EOSINOPHIL # BLD AUTO: 0.15 10*3/MM3 (ref 0–0.4)
EOSINOPHIL # BLD AUTO: 0.18 10*3/MM3 (ref 0–0.4)
EOSINOPHIL # BLD AUTO: 0.32 10*3/MM3 (ref 0–0.4)
EOSINOPHIL # BLD AUTO: 0.33 10*3/MM3 (ref 0–0.4)
EOSINOPHIL # BLD AUTO: 0.36 10*3/MM3 (ref 0–0.4)
EOSINOPHIL # BLD AUTO: 0.39 10*3/MM3 (ref 0–0.4)
EOSINOPHIL # BLD AUTO: 0.4 10*3/MM3 (ref 0–0.4)
EOSINOPHIL # BLD AUTO: 0.41 10*3/MM3 (ref 0–0.4)
EOSINOPHIL # BLD AUTO: 0.42 10*3/MM3 (ref 0–0.4)
EOSINOPHIL # BLD AUTO: 0.5 10*3/MM3 (ref 0–0.4)
EOSINOPHIL # BLD AUTO: 0.5 10*3/MM3 (ref 0–0.4)
EOSINOPHIL # BLD AUTO: 0.55 10*3/MM3 (ref 0–0.4)
EOSINOPHIL # BLD AUTO: 0.63 10*3/MM3 (ref 0–0.4)
EOSINOPHIL # BLD AUTO: 0.66 10*3/MM3 (ref 0–0.4)
EOSINOPHIL # BLD MANUAL: 0.65 10*3/MM3 (ref 0–0.4)
EOSINOPHIL NFR BLD AUTO: 0 % (ref 0.3–6.2)
EOSINOPHIL NFR BLD AUTO: 0 % (ref 0.3–6.2)
EOSINOPHIL NFR BLD AUTO: 0.2 % (ref 0.3–6.2)
EOSINOPHIL NFR BLD AUTO: 0.3 % (ref 0.3–6.2)
EOSINOPHIL NFR BLD AUTO: 0.3 % (ref 0.3–6.2)
EOSINOPHIL NFR BLD AUTO: 0.4 % (ref 0.3–6.2)
EOSINOPHIL NFR BLD AUTO: 0.5 % (ref 0.3–6.2)
EOSINOPHIL NFR BLD AUTO: 0.7 % (ref 0.3–6.2)
EOSINOPHIL NFR BLD AUTO: 0.9 % (ref 0.3–6.2)
EOSINOPHIL NFR BLD AUTO: 1.2 % (ref 0.3–6.2)
EOSINOPHIL NFR BLD AUTO: 1.3 % (ref 0.3–6.2)
EOSINOPHIL NFR BLD AUTO: 1.9 % (ref 0.3–6.2)
EOSINOPHIL NFR BLD AUTO: 2 % (ref 0.3–6.2)
EOSINOPHIL NFR BLD AUTO: 2.7 % (ref 0.3–6.2)
EOSINOPHIL NFR BLD AUTO: 2.8 % (ref 0.3–6.2)
EOSINOPHIL NFR BLD AUTO: 2.8 % (ref 0.3–6.2)
EOSINOPHIL NFR BLD AUTO: 3.5 % (ref 0.3–6.2)
EOSINOPHIL NFR BLD AUTO: 3.6 % (ref 0.3–6.2)
EOSINOPHIL NFR BLD AUTO: 3.8 % (ref 0.3–6.2)
EOSINOPHIL NFR BLD AUTO: 4.6 % (ref 0.3–6.2)
EOSINOPHIL NFR BLD AUTO: 5 % (ref 0.3–6.2)
EOSINOPHIL NFR BLD AUTO: 5.1 % (ref 0.3–6.2)
EOSINOPHIL NFR BLD MANUAL: 3 % (ref 0.3–6.2)
ERYTHROCYTE [DISTWIDTH] IN BLOOD BY AUTOMATED COUNT: 17.5 % (ref 12.3–15.4)
ERYTHROCYTE [DISTWIDTH] IN BLOOD BY AUTOMATED COUNT: 17.6 % (ref 12.3–15.4)
ERYTHROCYTE [DISTWIDTH] IN BLOOD BY AUTOMATED COUNT: 17.6 % (ref 12.3–15.4)
ERYTHROCYTE [DISTWIDTH] IN BLOOD BY AUTOMATED COUNT: 17.9 % (ref 12.3–15.4)
ERYTHROCYTE [DISTWIDTH] IN BLOOD BY AUTOMATED COUNT: 17.9 % (ref 12.3–15.4)
ERYTHROCYTE [DISTWIDTH] IN BLOOD BY AUTOMATED COUNT: 18.2 % (ref 12.3–15.4)
ERYTHROCYTE [DISTWIDTH] IN BLOOD BY AUTOMATED COUNT: 18.5 % (ref 12.3–15.4)
ERYTHROCYTE [DISTWIDTH] IN BLOOD BY AUTOMATED COUNT: 18.5 % (ref 12.3–15.4)
ERYTHROCYTE [DISTWIDTH] IN BLOOD BY AUTOMATED COUNT: 18.6 % (ref 12.3–15.4)
ERYTHROCYTE [DISTWIDTH] IN BLOOD BY AUTOMATED COUNT: 18.7 % (ref 12.3–15.4)
ERYTHROCYTE [DISTWIDTH] IN BLOOD BY AUTOMATED COUNT: 18.8 % (ref 12.3–15.4)
ERYTHROCYTE [DISTWIDTH] IN BLOOD BY AUTOMATED COUNT: 18.8 % (ref 12.3–15.4)
ERYTHROCYTE [DISTWIDTH] IN BLOOD BY AUTOMATED COUNT: 19 % (ref 12.3–15.4)
ERYTHROCYTE [DISTWIDTH] IN BLOOD BY AUTOMATED COUNT: 19.2 % (ref 12.3–15.4)
ERYTHROCYTE [DISTWIDTH] IN BLOOD BY AUTOMATED COUNT: 19.9 % (ref 12.3–15.4)
ERYTHROCYTE [DISTWIDTH] IN BLOOD BY AUTOMATED COUNT: 19.9 % (ref 12.3–15.4)
ERYTHROCYTE [DISTWIDTH] IN BLOOD BY AUTOMATED COUNT: 20 % (ref 12.3–15.4)
ERYTHROCYTE [DISTWIDTH] IN BLOOD BY AUTOMATED COUNT: 20.1 % (ref 12.3–15.4)
ERYTHROCYTE [DISTWIDTH] IN BLOOD BY AUTOMATED COUNT: 20.1 % (ref 12.3–15.4)
ERYTHROCYTE [DISTWIDTH] IN BLOOD BY AUTOMATED COUNT: 20.2 % (ref 12.3–15.4)
ERYTHROCYTE [DISTWIDTH] IN BLOOD BY AUTOMATED COUNT: 20.2 % (ref 12.3–15.4)
ERYTHROCYTE [DISTWIDTH] IN BLOOD BY AUTOMATED COUNT: 20.3 % (ref 12.3–15.4)
ERYTHROCYTE [DISTWIDTH] IN BLOOD BY AUTOMATED COUNT: 20.4 % (ref 12.3–15.4)
ERYTHROCYTE [DISTWIDTH] IN BLOOD BY AUTOMATED COUNT: 21 % (ref 12.3–15.4)
ERYTHROCYTE [DISTWIDTH] IN BLOOD BY AUTOMATED COUNT: 21.1 % (ref 12.3–15.4)
ERYTHROCYTE [DISTWIDTH] IN BLOOD BY AUTOMATED COUNT: 21.2 % (ref 12.3–15.4)
ERYTHROCYTE [DISTWIDTH] IN BLOOD BY AUTOMATED COUNT: 21.2 % (ref 12.3–15.4)
ERYTHROCYTE [DISTWIDTH] IN BLOOD BY AUTOMATED COUNT: 21.3 % (ref 12.3–15.4)
ERYTHROCYTE [DISTWIDTH] IN BLOOD BY AUTOMATED COUNT: 21.4 % (ref 12.3–15.4)
ERYTHROCYTE [DISTWIDTH] IN BLOOD BY AUTOMATED COUNT: 21.5 % (ref 12.3–15.4)
ERYTHROCYTE [DISTWIDTH] IN BLOOD BY AUTOMATED COUNT: 21.6 % (ref 12.3–15.4)
ERYTHROCYTE [DISTWIDTH] IN BLOOD BY AUTOMATED COUNT: 21.6 % (ref 12.3–15.4)
ERYTHROCYTE [SEDIMENTATION RATE] IN BLOOD: 64 MM/HR (ref 0–30)
ETHANOL BLD-MCNC: <10 MG/DL (ref 0–10)
ETHANOL UR QL: <0.01 %
EV RNA CSF QL NAA+PROBE: NOT DETECTED
EV RNA SPEC QL NAA+PROBE: NORMAL
F TULAR IGG TITR SER: NEGATIVE {TITER}
F TULAR IGM TITR SER: NEGATIVE {TITER}
FLUAV RNA RESP QL NAA+PROBE: NOT DETECTED
FLUAV RNA RESP QL NAA+PROBE: NOT DETECTED
FLUAV SUBTYP SPEC NAA+PROBE: NOT DETECTED
FLUBV RNA ISLT QL NAA+PROBE: NOT DETECTED
FLUBV RNA RESP QL NAA+PROBE: NOT DETECTED
FLUBV RNA RESP QL NAA+PROBE: NOT DETECTED
GABAPENTIN SERPLBLD-MCNC: <1 UG/ML (ref 4–16)
GAS FLOW AIRWAY: 15 LPM
GAS FLOW AIRWAY: 3 LPM
GAS FLOW AIRWAY: 4 LPM
GFR SERPL CREATININE-BSD FRML MDRD: 102 ML/MIN/1.73
GFR SERPL CREATININE-BSD FRML MDRD: 102 ML/MIN/1.73
GFR SERPL CREATININE-BSD FRML MDRD: 104 ML/MIN/1.73
GFR SERPL CREATININE-BSD FRML MDRD: 108 ML/MIN/1.73
GFR SERPL CREATININE-BSD FRML MDRD: 110 ML/MIN/1.73
GFR SERPL CREATININE-BSD FRML MDRD: 112 ML/MIN/1.73
GFR SERPL CREATININE-BSD FRML MDRD: 112 ML/MIN/1.73
GFR SERPL CREATININE-BSD FRML MDRD: 115 ML/MIN/1.73
GFR SERPL CREATININE-BSD FRML MDRD: 117 ML/MIN/1.73
GFR SERPL CREATININE-BSD FRML MDRD: 117 ML/MIN/1.73
GFR SERPL CREATININE-BSD FRML MDRD: 120 ML/MIN/1.73
GFR SERPL CREATININE-BSD FRML MDRD: 122 ML/MIN/1.73
GFR SERPL CREATININE-BSD FRML MDRD: 122 ML/MIN/1.73
GFR SERPL CREATININE-BSD FRML MDRD: 125 ML/MIN/1.73
GFR SERPL CREATININE-BSD FRML MDRD: 125 ML/MIN/1.73
GFR SERPL CREATININE-BSD FRML MDRD: 128 ML/MIN/1.73
GFR SERPL CREATININE-BSD FRML MDRD: 138 ML/MIN/1.73
GFR SERPL CREATININE-BSD FRML MDRD: 138 ML/MIN/1.73
GFR SERPL CREATININE-BSD FRML MDRD: 141 ML/MIN/1.73
GFR SERPL CREATININE-BSD FRML MDRD: 145 ML/MIN/1.73
GFR SERPL CREATININE-BSD FRML MDRD: 145 ML/MIN/1.73
GFR SERPL CREATININE-BSD FRML MDRD: 83 ML/MIN/1.73
GFR SERPL CREATININE-BSD FRML MDRD: 90 ML/MIN/1.73
GFR SERPL CREATININE-BSD FRML MDRD: 93 ML/MIN/1.73
GFR SERPL CREATININE-BSD FRML MDRD: 98 ML/MIN/1.73
GFR SERPL CREATININE-BSD FRML MDRD: 98 ML/MIN/1.73
GFR SERPL CREATININE-BSD FRML MDRD: >150 ML/MIN/1.73
GLOBULIN UR ELPH-MCNC: 2.7 GM/DL
GLOBULIN UR ELPH-MCNC: 2.7 GM/DL
GLOBULIN UR ELPH-MCNC: 2.8 GM/DL
GLOBULIN UR ELPH-MCNC: 2.8 GM/DL
GLOBULIN UR ELPH-MCNC: 2.9 GM/DL
GLOBULIN UR ELPH-MCNC: 3 GM/DL
GLOBULIN UR ELPH-MCNC: 3 GM/DL
GLOBULIN UR ELPH-MCNC: 3.1 GM/DL
GLOBULIN UR ELPH-MCNC: 3.2 GM/DL
GLOBULIN UR ELPH-MCNC: 3.3 GM/DL
GLOBULIN UR ELPH-MCNC: 3.4 GM/DL
GLOBULIN UR ELPH-MCNC: 3.5 GM/DL
GLOBULIN UR ELPH-MCNC: 3.6 GM/DL
GLOBULIN UR ELPH-MCNC: 3.7 GM/DL
GLOBULIN UR ELPH-MCNC: 3.7 GM/DL
GLOBULIN UR ELPH-MCNC: 3.8 GM/DL
GLOBULIN UR ELPH-MCNC: 3.8 GM/DL
GLOBULIN UR ELPH-MCNC: 4 GM/DL
GLUCOSE BLDC GLUCOMTR-MCNC: 102 MG/DL (ref 70–130)
GLUCOSE BLDC GLUCOMTR-MCNC: 102 MG/DL (ref 70–130)
GLUCOSE BLDC GLUCOMTR-MCNC: 104 MG/DL (ref 70–130)
GLUCOSE BLDC GLUCOMTR-MCNC: 105 MG/DL (ref 70–130)
GLUCOSE BLDC GLUCOMTR-MCNC: 106 MG/DL (ref 70–130)
GLUCOSE BLDC GLUCOMTR-MCNC: 109 MG/DL (ref 70–130)
GLUCOSE BLDC GLUCOMTR-MCNC: 110 MG/DL (ref 70–130)
GLUCOSE BLDC GLUCOMTR-MCNC: 111 MG/DL (ref 70–130)
GLUCOSE BLDC GLUCOMTR-MCNC: 112 MG/DL (ref 70–130)
GLUCOSE BLDC GLUCOMTR-MCNC: 113 MG/DL (ref 70–130)
GLUCOSE BLDC GLUCOMTR-MCNC: 113 MG/DL (ref 70–130)
GLUCOSE BLDC GLUCOMTR-MCNC: 114 MG/DL (ref 70–130)
GLUCOSE BLDC GLUCOMTR-MCNC: 115 MG/DL (ref 70–130)
GLUCOSE BLDC GLUCOMTR-MCNC: 115 MG/DL (ref 70–130)
GLUCOSE BLDC GLUCOMTR-MCNC: 116 MG/DL (ref 70–130)
GLUCOSE BLDC GLUCOMTR-MCNC: 116 MG/DL (ref 70–130)
GLUCOSE BLDC GLUCOMTR-MCNC: 117 MG/DL (ref 70–130)
GLUCOSE BLDC GLUCOMTR-MCNC: 118 MG/DL (ref 70–130)
GLUCOSE BLDC GLUCOMTR-MCNC: 118 MG/DL (ref 70–130)
GLUCOSE BLDC GLUCOMTR-MCNC: 119 MG/DL (ref 70–130)
GLUCOSE BLDC GLUCOMTR-MCNC: 121 MG/DL (ref 70–130)
GLUCOSE BLDC GLUCOMTR-MCNC: 122 MG/DL (ref 70–130)
GLUCOSE BLDC GLUCOMTR-MCNC: 124 MG/DL (ref 70–130)
GLUCOSE BLDC GLUCOMTR-MCNC: 124 MG/DL (ref 70–130)
GLUCOSE BLDC GLUCOMTR-MCNC: 125 MG/DL (ref 70–130)
GLUCOSE BLDC GLUCOMTR-MCNC: 126 MG/DL (ref 70–130)
GLUCOSE BLDC GLUCOMTR-MCNC: 127 MG/DL (ref 70–130)
GLUCOSE BLDC GLUCOMTR-MCNC: 129 MG/DL (ref 70–130)
GLUCOSE BLDC GLUCOMTR-MCNC: 130 MG/DL (ref 70–130)
GLUCOSE BLDC GLUCOMTR-MCNC: 130 MG/DL (ref 70–130)
GLUCOSE BLDC GLUCOMTR-MCNC: 131 MG/DL (ref 70–130)
GLUCOSE BLDC GLUCOMTR-MCNC: 131 MG/DL (ref 70–130)
GLUCOSE BLDC GLUCOMTR-MCNC: 132 MG/DL (ref 70–130)
GLUCOSE BLDC GLUCOMTR-MCNC: 133 MG/DL (ref 70–130)
GLUCOSE BLDC GLUCOMTR-MCNC: 133 MG/DL (ref 70–130)
GLUCOSE BLDC GLUCOMTR-MCNC: 134 MG/DL (ref 70–130)
GLUCOSE BLDC GLUCOMTR-MCNC: 135 MG/DL (ref 70–130)
GLUCOSE BLDC GLUCOMTR-MCNC: 135 MG/DL (ref 70–130)
GLUCOSE BLDC GLUCOMTR-MCNC: 136 MG/DL (ref 70–130)
GLUCOSE BLDC GLUCOMTR-MCNC: 136 MG/DL (ref 70–130)
GLUCOSE BLDC GLUCOMTR-MCNC: 137 MG/DL (ref 70–130)
GLUCOSE BLDC GLUCOMTR-MCNC: 138 MG/DL (ref 70–130)
GLUCOSE BLDC GLUCOMTR-MCNC: 138 MG/DL (ref 70–130)
GLUCOSE BLDC GLUCOMTR-MCNC: 139 MG/DL (ref 70–130)
GLUCOSE BLDC GLUCOMTR-MCNC: 140 MG/DL (ref 70–130)
GLUCOSE BLDC GLUCOMTR-MCNC: 141 MG/DL (ref 70–130)
GLUCOSE BLDC GLUCOMTR-MCNC: 142 MG/DL (ref 70–130)
GLUCOSE BLDC GLUCOMTR-MCNC: 142 MG/DL (ref 70–130)
GLUCOSE BLDC GLUCOMTR-MCNC: 143 MG/DL (ref 70–130)
GLUCOSE BLDC GLUCOMTR-MCNC: 144 MG/DL (ref 70–130)
GLUCOSE BLDC GLUCOMTR-MCNC: 145 MG/DL (ref 70–130)
GLUCOSE BLDC GLUCOMTR-MCNC: 145 MG/DL (ref 70–130)
GLUCOSE BLDC GLUCOMTR-MCNC: 146 MG/DL (ref 70–130)
GLUCOSE BLDC GLUCOMTR-MCNC: 146 MG/DL (ref 70–130)
GLUCOSE BLDC GLUCOMTR-MCNC: 147 MG/DL (ref 70–130)
GLUCOSE BLDC GLUCOMTR-MCNC: 148 MG/DL (ref 70–130)
GLUCOSE BLDC GLUCOMTR-MCNC: 149 MG/DL (ref 70–130)
GLUCOSE BLDC GLUCOMTR-MCNC: 149 MG/DL (ref 70–130)
GLUCOSE BLDC GLUCOMTR-MCNC: 150 MG/DL (ref 70–130)
GLUCOSE BLDC GLUCOMTR-MCNC: 151 MG/DL (ref 70–130)
GLUCOSE BLDC GLUCOMTR-MCNC: 152 MG/DL (ref 70–130)
GLUCOSE BLDC GLUCOMTR-MCNC: 153 MG/DL (ref 70–130)
GLUCOSE BLDC GLUCOMTR-MCNC: 154 MG/DL (ref 70–130)
GLUCOSE BLDC GLUCOMTR-MCNC: 155 MG/DL (ref 70–130)
GLUCOSE BLDC GLUCOMTR-MCNC: 155 MG/DL (ref 70–130)
GLUCOSE BLDC GLUCOMTR-MCNC: 156 MG/DL (ref 70–130)
GLUCOSE BLDC GLUCOMTR-MCNC: 157 MG/DL (ref 70–130)
GLUCOSE BLDC GLUCOMTR-MCNC: 157 MG/DL (ref 70–130)
GLUCOSE BLDC GLUCOMTR-MCNC: 158 MG/DL (ref 70–130)
GLUCOSE BLDC GLUCOMTR-MCNC: 159 MG/DL (ref 70–130)
GLUCOSE BLDC GLUCOMTR-MCNC: 162 MG/DL (ref 70–130)
GLUCOSE BLDC GLUCOMTR-MCNC: 165 MG/DL (ref 70–130)
GLUCOSE BLDC GLUCOMTR-MCNC: 169 MG/DL (ref 70–130)
GLUCOSE BLDC GLUCOMTR-MCNC: 169 MG/DL (ref 70–130)
GLUCOSE BLDC GLUCOMTR-MCNC: 171 MG/DL (ref 70–130)
GLUCOSE BLDC GLUCOMTR-MCNC: 172 MG/DL (ref 70–130)
GLUCOSE BLDC GLUCOMTR-MCNC: 175 MG/DL (ref 70–130)
GLUCOSE BLDC GLUCOMTR-MCNC: 176 MG/DL (ref 70–130)
GLUCOSE BLDC GLUCOMTR-MCNC: 177 MG/DL (ref 70–130)
GLUCOSE BLDC GLUCOMTR-MCNC: 178 MG/DL (ref 70–130)
GLUCOSE BLDC GLUCOMTR-MCNC: 179 MG/DL (ref 70–130)
GLUCOSE BLDC GLUCOMTR-MCNC: 181 MG/DL (ref 70–130)
GLUCOSE BLDC GLUCOMTR-MCNC: 183 MG/DL (ref 70–130)
GLUCOSE BLDC GLUCOMTR-MCNC: 184 MG/DL (ref 70–130)
GLUCOSE BLDC GLUCOMTR-MCNC: 186 MG/DL (ref 70–130)
GLUCOSE BLDC GLUCOMTR-MCNC: 188 MG/DL (ref 70–130)
GLUCOSE BLDC GLUCOMTR-MCNC: 189 MG/DL (ref 70–130)
GLUCOSE BLDC GLUCOMTR-MCNC: 193 MG/DL (ref 70–130)
GLUCOSE BLDC GLUCOMTR-MCNC: 194 MG/DL (ref 70–130)
GLUCOSE BLDC GLUCOMTR-MCNC: 197 MG/DL (ref 70–130)
GLUCOSE BLDC GLUCOMTR-MCNC: 198 MG/DL (ref 70–130)
GLUCOSE BLDC GLUCOMTR-MCNC: 199 MG/DL (ref 70–130)
GLUCOSE BLDC GLUCOMTR-MCNC: 203 MG/DL (ref 70–130)
GLUCOSE BLDC GLUCOMTR-MCNC: 203 MG/DL (ref 70–130)
GLUCOSE BLDC GLUCOMTR-MCNC: 204 MG/DL (ref 70–130)
GLUCOSE BLDC GLUCOMTR-MCNC: 206 MG/DL (ref 70–130)
GLUCOSE BLDC GLUCOMTR-MCNC: 207 MG/DL (ref 70–130)
GLUCOSE BLDC GLUCOMTR-MCNC: 208 MG/DL (ref 70–130)
GLUCOSE BLDC GLUCOMTR-MCNC: 209 MG/DL (ref 70–130)
GLUCOSE BLDC GLUCOMTR-MCNC: 209 MG/DL (ref 70–130)
GLUCOSE BLDC GLUCOMTR-MCNC: 211 MG/DL (ref 70–130)
GLUCOSE BLDC GLUCOMTR-MCNC: 211 MG/DL (ref 70–130)
GLUCOSE BLDC GLUCOMTR-MCNC: 212 MG/DL (ref 70–130)
GLUCOSE BLDC GLUCOMTR-MCNC: 212 MG/DL (ref 70–130)
GLUCOSE BLDC GLUCOMTR-MCNC: 215 MG/DL (ref 70–130)
GLUCOSE BLDC GLUCOMTR-MCNC: 216 MG/DL (ref 70–130)
GLUCOSE BLDC GLUCOMTR-MCNC: 217 MG/DL (ref 70–130)
GLUCOSE BLDC GLUCOMTR-MCNC: 220 MG/DL (ref 70–130)
GLUCOSE BLDC GLUCOMTR-MCNC: 223 MG/DL (ref 70–130)
GLUCOSE BLDC GLUCOMTR-MCNC: 223 MG/DL (ref 70–130)
GLUCOSE BLDC GLUCOMTR-MCNC: 229 MG/DL (ref 70–130)
GLUCOSE BLDC GLUCOMTR-MCNC: 233 MG/DL (ref 70–130)
GLUCOSE BLDC GLUCOMTR-MCNC: 234 MG/DL (ref 70–130)
GLUCOSE BLDC GLUCOMTR-MCNC: 238 MG/DL (ref 70–130)
GLUCOSE BLDC GLUCOMTR-MCNC: 240 MG/DL (ref 70–130)
GLUCOSE BLDC GLUCOMTR-MCNC: 241 MG/DL (ref 70–130)
GLUCOSE BLDC GLUCOMTR-MCNC: 244 MG/DL (ref 70–130)
GLUCOSE BLDC GLUCOMTR-MCNC: 247 MG/DL (ref 70–130)
GLUCOSE BLDC GLUCOMTR-MCNC: 248 MG/DL (ref 70–130)
GLUCOSE BLDC GLUCOMTR-MCNC: 256 MG/DL (ref 70–130)
GLUCOSE BLDC GLUCOMTR-MCNC: 258 MG/DL (ref 70–130)
GLUCOSE BLDC GLUCOMTR-MCNC: 266 MG/DL (ref 70–130)
GLUCOSE BLDC GLUCOMTR-MCNC: 271 MG/DL (ref 70–130)
GLUCOSE BLDC GLUCOMTR-MCNC: 272 MG/DL (ref 70–130)
GLUCOSE BLDC GLUCOMTR-MCNC: 276 MG/DL (ref 70–130)
GLUCOSE BLDC GLUCOMTR-MCNC: 276 MG/DL (ref 70–130)
GLUCOSE BLDC GLUCOMTR-MCNC: 277 MG/DL (ref 70–130)
GLUCOSE BLDC GLUCOMTR-MCNC: 285 MG/DL (ref 70–130)
GLUCOSE BLDC GLUCOMTR-MCNC: 290 MG/DL (ref 70–130)
GLUCOSE BLDC GLUCOMTR-MCNC: 296 MG/DL (ref 70–130)
GLUCOSE BLDC GLUCOMTR-MCNC: 299 MG/DL (ref 70–130)
GLUCOSE BLDC GLUCOMTR-MCNC: 301 MG/DL (ref 70–130)
GLUCOSE BLDC GLUCOMTR-MCNC: 308 MG/DL (ref 70–130)
GLUCOSE BLDC GLUCOMTR-MCNC: 317 MG/DL (ref 70–130)
GLUCOSE BLDC GLUCOMTR-MCNC: 318 MG/DL (ref 70–130)
GLUCOSE BLDC GLUCOMTR-MCNC: 363 MG/DL (ref 70–130)
GLUCOSE BLDC GLUCOMTR-MCNC: 368 MG/DL (ref 70–130)
GLUCOSE BLDC GLUCOMTR-MCNC: 381 MG/DL (ref 70–130)
GLUCOSE BLDC GLUCOMTR-MCNC: 385 MG/DL (ref 70–130)
GLUCOSE BLDC GLUCOMTR-MCNC: 389 MG/DL (ref 70–130)
GLUCOSE BLDC GLUCOMTR-MCNC: 393 MG/DL (ref 70–130)
GLUCOSE BLDC GLUCOMTR-MCNC: 401 MG/DL (ref 70–130)
GLUCOSE BLDC GLUCOMTR-MCNC: 403 MG/DL (ref 70–130)
GLUCOSE BLDC GLUCOMTR-MCNC: 411 MG/DL (ref 70–130)
GLUCOSE BLDC GLUCOMTR-MCNC: 414 MG/DL (ref 70–130)
GLUCOSE BLDC GLUCOMTR-MCNC: 419 MG/DL (ref 70–130)
GLUCOSE BLDC GLUCOMTR-MCNC: 425 MG/DL (ref 70–130)
GLUCOSE BLDC GLUCOMTR-MCNC: 426 MG/DL (ref 70–130)
GLUCOSE BLDC GLUCOMTR-MCNC: 89 MG/DL (ref 70–130)
GLUCOSE BLDC GLUCOMTR-MCNC: 90 MG/DL (ref 70–130)
GLUCOSE BLDC GLUCOMTR-MCNC: 94 MG/DL (ref 70–130)
GLUCOSE BLDC GLUCOMTR-MCNC: 97 MG/DL (ref 70–130)
GLUCOSE BLDC GLUCOMTR-MCNC: 99 MG/DL (ref 70–130)
GLUCOSE CSF-MCNC: 107 MG/DL (ref 40–70)
GLUCOSE CSF-MCNC: 69 MG/DL (ref 40–70)
GLUCOSE CSF-MCNC: 76 MG/DL (ref 40–70)
GLUCOSE SERPL-MCNC: 112 MG/DL (ref 65–99)
GLUCOSE SERPL-MCNC: 118 MG/DL (ref 65–99)
GLUCOSE SERPL-MCNC: 125 MG/DL (ref 65–99)
GLUCOSE SERPL-MCNC: 128 MG/DL (ref 65–99)
GLUCOSE SERPL-MCNC: 131 MG/DL (ref 65–99)
GLUCOSE SERPL-MCNC: 132 MG/DL (ref 65–99)
GLUCOSE SERPL-MCNC: 132 MG/DL (ref 65–99)
GLUCOSE SERPL-MCNC: 136 MG/DL (ref 65–99)
GLUCOSE SERPL-MCNC: 148 MG/DL (ref 65–99)
GLUCOSE SERPL-MCNC: 150 MG/DL (ref 65–99)
GLUCOSE SERPL-MCNC: 153 MG/DL (ref 65–99)
GLUCOSE SERPL-MCNC: 158 MG/DL (ref 65–99)
GLUCOSE SERPL-MCNC: 159 MG/DL (ref 65–99)
GLUCOSE SERPL-MCNC: 161 MG/DL (ref 65–99)
GLUCOSE SERPL-MCNC: 162 MG/DL (ref 65–99)
GLUCOSE SERPL-MCNC: 163 MG/DL (ref 65–99)
GLUCOSE SERPL-MCNC: 166 MG/DL (ref 65–99)
GLUCOSE SERPL-MCNC: 167 MG/DL (ref 65–99)
GLUCOSE SERPL-MCNC: 178 MG/DL (ref 65–99)
GLUCOSE SERPL-MCNC: 183 MG/DL (ref 65–99)
GLUCOSE SERPL-MCNC: 183 MG/DL (ref 65–99)
GLUCOSE SERPL-MCNC: 186 MG/DL (ref 65–99)
GLUCOSE SERPL-MCNC: 188 MG/DL (ref 65–99)
GLUCOSE SERPL-MCNC: 192 MG/DL (ref 65–99)
GLUCOSE SERPL-MCNC: 194 MG/DL (ref 65–99)
GLUCOSE SERPL-MCNC: 202 MG/DL (ref 65–99)
GLUCOSE SERPL-MCNC: 220 MG/DL (ref 65–99)
GLUCOSE SERPL-MCNC: 232 MG/DL (ref 65–99)
GLUCOSE SERPL-MCNC: 237 MG/DL (ref 65–99)
GLUCOSE SERPL-MCNC: 244 MG/DL (ref 65–99)
GLUCOSE SERPL-MCNC: 255 MG/DL (ref 65–99)
GLUCOSE SERPL-MCNC: 259 MG/DL (ref 65–99)
GLUCOSE SERPL-MCNC: 260 MG/DL (ref 65–99)
GLUCOSE SERPL-MCNC: 261 MG/DL (ref 65–99)
GLUCOSE SERPL-MCNC: 271 MG/DL (ref 65–99)
GLUCOSE SERPL-MCNC: 273 MG/DL (ref 65–99)
GLUCOSE SERPL-MCNC: 295 MG/DL (ref 65–99)
GLUCOSE SERPL-MCNC: 352 MG/DL (ref 65–99)
GLUCOSE SERPL-MCNC: 380 MG/DL (ref 65–99)
GLUCOSE SERPL-MCNC: 452 MG/DL (ref 65–99)
GLUCOSE SERPL-MCNC: 574 MG/DL (ref 65–99)
GLUCOSE UR STRIP-MCNC: NEGATIVE MG/DL
GP B STREP DNA SPEC QL NAA+PROBE: NOT DETECTED
GRAM STN SPEC: ABNORMAL
GRAM STN SPEC: NORMAL
HAEM INFLU SEROTYP DNA SPEC NAA+PROBE: NOT DETECTED
HBA1C MFR BLD: 6.5 % (ref 4.8–5.6)
HCO3 BLDA-SCNC: 18.3 MMOL/L (ref 20–26)
HCO3 BLDA-SCNC: 19 MMOL/L (ref 20–26)
HCO3 BLDA-SCNC: 19 MMOL/L (ref 20–26)
HCO3 BLDA-SCNC: 19.8 MMOL/L (ref 20–26)
HCO3 BLDA-SCNC: 20 MMOL/L (ref 20–26)
HCO3 BLDA-SCNC: 20.6 MMOL/L (ref 20–26)
HCO3 BLDA-SCNC: 21.1 MMOL/L (ref 20–26)
HCO3 BLDA-SCNC: 21.1 MMOL/L (ref 20–26)
HCO3 BLDA-SCNC: 21.3 MMOL/L (ref 20–26)
HCO3 BLDA-SCNC: 21.4 MMOL/L (ref 20–26)
HCO3 BLDA-SCNC: 21.5 MMOL/L (ref 20–26)
HCO3 BLDA-SCNC: 22.1 MMOL/L (ref 20–26)
HCO3 BLDA-SCNC: 23.3 MMOL/L (ref 22–28)
HCO3 BLDA-SCNC: 23.7 MMOL/L (ref 22–28)
HCO3 BLDA-SCNC: 24.2 MMOL/L (ref 20–26)
HCO3 BLDA-SCNC: 25.9 MMOL/L (ref 20–26)
HCO3 BLDA-SCNC: 26 MMOL/L (ref 22–28)
HCO3 BLDA-SCNC: 28.8 MMOL/L (ref 20–26)
HCO3 BLDA-SCNC: 30 MMOL/L (ref 20–26)
HCO3 BLDA-SCNC: 30.1 MMOL/L (ref 20–26)
HCO3 BLDA-SCNC: 30.2 MMOL/L (ref 20–26)
HCO3 BLDA-SCNC: 32.3 MMOL/L (ref 20–26)
HCO3 BLDA-SCNC: 32.9 MMOL/L (ref 20–26)
HCO3 BLDA-SCNC: 33.5 MMOL/L (ref 20–26)
HCO3 BLDA-SCNC: 35.7 MMOL/L (ref 20–26)
HCT VFR BLD AUTO: 23.3 % (ref 34–46.6)
HCT VFR BLD AUTO: 26.2 % (ref 34–46.6)
HCT VFR BLD AUTO: 27.4 % (ref 34–46.6)
HCT VFR BLD AUTO: 29.5 % (ref 34–46.6)
HCT VFR BLD AUTO: 29.8 % (ref 34–46.6)
HCT VFR BLD AUTO: 30.3 % (ref 34–46.6)
HCT VFR BLD AUTO: 32.3 % (ref 34–46.6)
HCT VFR BLD AUTO: 32.4 % (ref 34–46.6)
HCT VFR BLD AUTO: 33.1 % (ref 34–46.6)
HCT VFR BLD AUTO: 33.1 % (ref 34–46.6)
HCT VFR BLD AUTO: 34.1 % (ref 34–46.6)
HCT VFR BLD AUTO: 34.8 % (ref 34–46.6)
HCT VFR BLD AUTO: 35.4 % (ref 34–46.6)
HCT VFR BLD AUTO: 35.6 % (ref 34–46.6)
HCT VFR BLD AUTO: 35.9 % (ref 34–46.6)
HCT VFR BLD AUTO: 36 % (ref 34–46.6)
HCT VFR BLD AUTO: 36.2 % (ref 34–46.6)
HCT VFR BLD AUTO: 36.7 % (ref 34–46.6)
HCT VFR BLD AUTO: 37 % (ref 34–46.6)
HCT VFR BLD AUTO: 37.1 % (ref 34–46.6)
HCT VFR BLD AUTO: 37.3 % (ref 34–46.6)
HCT VFR BLD AUTO: 37.5 % (ref 34–46.6)
HCT VFR BLD AUTO: 37.8 % (ref 34–46.6)
HCT VFR BLD AUTO: 37.8 % (ref 34–46.6)
HCT VFR BLD AUTO: 38 % (ref 34–46.6)
HCT VFR BLD AUTO: 38.8 % (ref 34–46.6)
HCT VFR BLD AUTO: 39 % (ref 34–46.6)
HCT VFR BLD AUTO: 39.1 % (ref 34–46.6)
HCT VFR BLD AUTO: 39.4 % (ref 34–46.6)
HCT VFR BLD AUTO: 39.4 % (ref 34–46.6)
HCT VFR BLD AUTO: 39.5 % (ref 34–46.6)
HCT VFR BLD AUTO: 39.7 % (ref 34–46.6)
HCT VFR BLD AUTO: 40.1 % (ref 34–46.6)
HCT VFR BLD AUTO: 40.2 % (ref 34–46.6)
HCT VFR BLD AUTO: 40.5 % (ref 34–46.6)
HCT VFR BLD AUTO: 40.5 % (ref 34–46.6)
HCT VFR BLD AUTO: 42.9 % (ref 34–46.6)
HCT VFR BLD AUTO: 43.4 % (ref 34–46.6)
HCT VFR BLD CALC: 28.6 % (ref 38–51)
HCT VFR BLD CALC: 29.7 % (ref 38–51)
HCT VFR BLD CALC: 30.3 % (ref 38–51)
HCT VFR BLD CALC: 31.2 % (ref 38–51)
HCT VFR BLD CALC: 31.4 % (ref 38–51)
HCT VFR BLD CALC: 31.5 % (ref 38–51)
HCT VFR BLD CALC: 31.6 % (ref 38–51)
HCT VFR BLD CALC: 32 % (ref 38–51)
HCT VFR BLD CALC: 33 % (ref 38–51)
HCT VFR BLD CALC: 33.5 % (ref 38–51)
HCT VFR BLD CALC: 34 % (ref 38–51)
HCT VFR BLD CALC: 34.2 % (ref 38–51)
HCT VFR BLD CALC: 34.7 % (ref 38–51)
HCT VFR BLD CALC: 34.7 % (ref 38–51)
HCT VFR BLD CALC: 34.8 % (ref 38–51)
HCT VFR BLD CALC: 35.5 % (ref 38–51)
HCT VFR BLD CALC: 35.8 % (ref 38–51)
HCT VFR BLD CALC: 35.9 % (ref 38–51)
HCT VFR BLD CALC: 36 % (ref 38–51)
HCT VFR BLD CALC: 36.7 % (ref 38–51)
HCT VFR BLD CALC: 38.8 % (ref 38–51)
HCT VFR BLD CALC: 40 % (ref 38–51)
HDLC SERPL-MCNC: 32 MG/DL (ref 40–60)
HGB BLD-MCNC: 10.1 G/DL (ref 12–15.9)
HGB BLD-MCNC: 10.2 G/DL (ref 12–15.9)
HGB BLD-MCNC: 10.2 G/DL (ref 12–15.9)
HGB BLD-MCNC: 10.4 G/DL (ref 12–15.9)
HGB BLD-MCNC: 10.4 G/DL (ref 12–15.9)
HGB BLD-MCNC: 10.5 G/DL (ref 12–15.9)
HGB BLD-MCNC: 10.5 G/DL (ref 12–15.9)
HGB BLD-MCNC: 10.9 G/DL (ref 12–15.9)
HGB BLD-MCNC: 10.9 G/DL (ref 12–15.9)
HGB BLD-MCNC: 11 G/DL (ref 12–15.9)
HGB BLD-MCNC: 11.1 G/DL (ref 12–15.9)
HGB BLD-MCNC: 11.1 G/DL (ref 12–15.9)
HGB BLD-MCNC: 11.2 G/DL (ref 12–15.9)
HGB BLD-MCNC: 11.3 G/DL (ref 12–15.9)
HGB BLD-MCNC: 11.4 G/DL (ref 12–15.9)
HGB BLD-MCNC: 11.5 G/DL (ref 12–15.9)
HGB BLD-MCNC: 11.5 G/DL (ref 12–15.9)
HGB BLD-MCNC: 11.6 G/DL (ref 12–15.9)
HGB BLD-MCNC: 11.6 G/DL (ref 12–15.9)
HGB BLD-MCNC: 11.8 G/DL (ref 12–15.9)
HGB BLD-MCNC: 11.9 G/DL (ref 12–15.9)
HGB BLD-MCNC: 11.9 G/DL (ref 12–15.9)
HGB BLD-MCNC: 12 G/DL (ref 12–15.9)
HGB BLD-MCNC: 12 G/DL (ref 12–15.9)
HGB BLD-MCNC: 12.1 G/DL (ref 12–15.9)
HGB BLD-MCNC: 12.4 G/DL (ref 12–15.9)
HGB BLD-MCNC: 12.6 G/DL (ref 12–15.9)
HGB BLD-MCNC: 12.6 G/DL (ref 12–15.9)
HGB BLD-MCNC: 7.3 G/DL (ref 12–15.9)
HGB BLD-MCNC: 8.2 G/DL (ref 12–15.9)
HGB BLD-MCNC: 8.5 G/DL (ref 12–15.9)
HGB BLD-MCNC: 8.9 G/DL (ref 12–15.9)
HGB BLD-MCNC: 9.1 G/DL (ref 12–15.9)
HGB BLD-MCNC: 9.5 G/DL (ref 12–15.9)
HGB BLD-MCNC: 9.5 G/DL (ref 12–15.9)
HGB BLD-MCNC: 9.6 G/DL (ref 12–15.9)
HGB BLDA-MCNC: 10.2 G/DL (ref 13.5–17.5)
HGB BLDA-MCNC: 10.2 G/DL (ref 13.5–17.5)
HGB BLDA-MCNC: 10.3 G/DL (ref 13.5–17.5)
HGB BLDA-MCNC: 10.3 G/DL (ref 13.5–17.5)
HGB BLDA-MCNC: 10.5 G/DL (ref 13.5–17.5)
HGB BLDA-MCNC: 10.8 G/DL (ref 13.5–17.5)
HGB BLDA-MCNC: 10.9 G/DL (ref 13.5–17.5)
HGB BLDA-MCNC: 11.1 G/DL (ref 13.5–17.5)
HGB BLDA-MCNC: 11.1 G/DL (ref 13.5–17.5)
HGB BLDA-MCNC: 11.3 G/DL (ref 13.5–17.5)
HGB BLDA-MCNC: 11.3 G/DL (ref 13.5–17.5)
HGB BLDA-MCNC: 11.4 G/DL (ref 13.5–17.5)
HGB BLDA-MCNC: 11.6 G/DL (ref 13.5–17.5)
HGB BLDA-MCNC: 11.7 G/DL (ref 13.5–17.5)
HGB BLDA-MCNC: 11.7 G/DL (ref 13.5–17.5)
HGB BLDA-MCNC: 11.8 G/DL (ref 13.5–17.5)
HGB BLDA-MCNC: 12 G/DL (ref 13.5–17.5)
HGB BLDA-MCNC: 12.7 G/DL (ref 13.5–17.5)
HGB BLDA-MCNC: 13.1 G/DL (ref 13.5–17.5)
HGB BLDA-MCNC: 9.3 G/DL (ref 13.5–17.5)
HGB BLDA-MCNC: 9.7 G/DL (ref 13.5–17.5)
HGB BLDA-MCNC: 9.9 G/DL (ref 13.5–17.5)
HGB UR QL STRIP.AUTO: NEGATIVE
HHV6 DNA CSF QL NAA+PROBE: NOT DETECTED
HLA-B27 QL NAA+PROBE: NEGATIVE
HOLD SPECIMEN: NORMAL
HSV1 DNA CSF QL NAA+PROBE: NOT DETECTED
HSV2 DNA CSF QL NAA+PROBE: NOT DETECTED
HU AB SER QL IF: NEGATIVE
HU2 AB SER QL IF: NEGATIVE
HYALINE CASTS UR QL AUTO: ABNORMAL /LPF
HYPOCHROMIA BLD QL: ABNORMAL
HYPOCHROMIA BLD QL: NORMAL
IGG CSF-MCNC: NORMAL MG/DL
IGG SERPL-MCNC: NORMAL MG/DL
IGG SYNTH RATE SER+CSF CALC-MRATE: NORMAL MG/(24.H)
IMM GRANULOCYTES # BLD AUTO: 0.03 10*3/MM3 (ref 0–0.05)
IMM GRANULOCYTES # BLD AUTO: 0.03 10*3/MM3 (ref 0–0.05)
IMM GRANULOCYTES # BLD AUTO: 0.04 10*3/MM3 (ref 0–0.05)
IMM GRANULOCYTES # BLD AUTO: 0.04 10*3/MM3 (ref 0–0.05)
IMM GRANULOCYTES # BLD AUTO: 0.05 10*3/MM3 (ref 0–0.05)
IMM GRANULOCYTES # BLD AUTO: 0.07 10*3/MM3 (ref 0–0.05)
IMM GRANULOCYTES # BLD AUTO: 0.07 10*3/MM3 (ref 0–0.05)
IMM GRANULOCYTES # BLD AUTO: 0.08 10*3/MM3 (ref 0–0.05)
IMM GRANULOCYTES # BLD AUTO: 0.09 10*3/MM3 (ref 0–0.05)
IMM GRANULOCYTES # BLD AUTO: 0.09 10*3/MM3 (ref 0–0.05)
IMM GRANULOCYTES # BLD AUTO: 0.1 10*3/MM3 (ref 0–0.05)
IMM GRANULOCYTES # BLD AUTO: 0.11 10*3/MM3 (ref 0–0.05)
IMM GRANULOCYTES # BLD AUTO: 0.11 10*3/MM3 (ref 0–0.05)
IMM GRANULOCYTES # BLD AUTO: 0.12 10*3/MM3 (ref 0–0.05)
IMM GRANULOCYTES # BLD AUTO: 0.13 10*3/MM3 (ref 0–0.05)
IMM GRANULOCYTES # BLD AUTO: 0.13 10*3/MM3 (ref 0–0.05)
IMM GRANULOCYTES # BLD AUTO: 0.16 10*3/MM3 (ref 0–0.05)
IMM GRANULOCYTES # BLD AUTO: 0.2 10*3/MM3 (ref 0–0.05)
IMM GRANULOCYTES # BLD AUTO: 0.29 10*3/MM3 (ref 0–0.05)
IMM GRANULOCYTES NFR BLD AUTO: 0.4 % (ref 0–0.5)
IMM GRANULOCYTES NFR BLD AUTO: 0.5 % (ref 0–0.5)
IMM GRANULOCYTES NFR BLD AUTO: 0.6 % (ref 0–0.5)
IMM GRANULOCYTES NFR BLD AUTO: 0.7 % (ref 0–0.5)
IMM GRANULOCYTES NFR BLD AUTO: 0.7 % (ref 0–0.5)
IMM GRANULOCYTES NFR BLD AUTO: 0.8 % (ref 0–0.5)
IMM GRANULOCYTES NFR BLD AUTO: 0.9 % (ref 0–0.5)
IMM GRANULOCYTES NFR BLD AUTO: 1 % (ref 0–0.5)
IMM GRANULOCYTES NFR BLD AUTO: 1 % (ref 0–0.5)
IMM GRANULOCYTES NFR BLD AUTO: 1.1 % (ref 0–0.5)
IMM GRANULOCYTES NFR BLD AUTO: 1.3 % (ref 0–0.5)
IMM GRANULOCYTES NFR BLD AUTO: 1.4 % (ref 0–0.5)
IMM GRANULOCYTES NFR BLD AUTO: 1.4 % (ref 0–0.5)
INHALED O2 CONCENTRATION: 100 %
INHALED O2 CONCENTRATION: 100 %
INHALED O2 CONCENTRATION: 21 %
INHALED O2 CONCENTRATION: 21 %
INHALED O2 CONCENTRATION: 30 %
INHALED O2 CONCENTRATION: 32 %
INHALED O2 CONCENTRATION: 35 %
INHALED O2 CONCENTRATION: 36 %
INHALED O2 CONCENTRATION: 40 %
INHALED O2 CONCENTRATION: 50 %
INHALED O2 CONCENTRATION: 50 %
INHALED O2 CONCENTRATION: 80 %
INR PPP: 1.07 (ref 0.9–1.1)
INR PPP: 1.18 (ref 0.9–1.1)
INR PPP: 1.25 (ref 0.9–1.1)
KETONES UR QL STRIP: ABNORMAL
L MONOCYTOG RRNA SPEC QL PROBE: NOT DETECTED
L PNEUMO1 AG UR QL IA: NEGATIVE
LAB AP CASE REPORT: NORMAL
LAB AP CLINICAL INFORMATION: NORMAL
LCMV IGG TITR CSF IF: NORMAL {TITER}
LDLC SERPL CALC-MCNC: 96 MG/DL (ref 0–100)
LDLC/HDLC SERPL: 2.66 {RATIO}
LEUKOCYTE ESTERASE UR QL STRIP.AUTO: ABNORMAL
LYMPHOCYTES # BLD AUTO: 1.18 10*3/MM3 (ref 0.7–3.1)
LYMPHOCYTES # BLD AUTO: 1.19 10*3/MM3 (ref 0.7–3.1)
LYMPHOCYTES # BLD AUTO: 1.45 10*3/MM3 (ref 0.7–3.1)
LYMPHOCYTES # BLD AUTO: 1.76 10*3/MM3 (ref 0.7–3.1)
LYMPHOCYTES # BLD AUTO: 2.35 10*3/MM3 (ref 0.7–3.1)
LYMPHOCYTES # BLD AUTO: 2.46 10*3/MM3 (ref 0.7–3.1)
LYMPHOCYTES # BLD AUTO: 3.04 10*3/MM3 (ref 0.7–3.1)
LYMPHOCYTES # BLD AUTO: 3.29 10*3/MM3 (ref 0.7–3.1)
LYMPHOCYTES # BLD AUTO: 3.32 10*3/MM3 (ref 0.7–3.1)
LYMPHOCYTES # BLD AUTO: 3.5 10*3/MM3 (ref 0.7–3.1)
LYMPHOCYTES # BLD AUTO: 3.61 10*3/MM3 (ref 0.7–3.1)
LYMPHOCYTES # BLD AUTO: 3.63 10*3/MM3 (ref 0.7–3.1)
LYMPHOCYTES # BLD AUTO: 3.64 10*3/MM3 (ref 0.7–3.1)
LYMPHOCYTES # BLD AUTO: 3.65 10*3/MM3 (ref 0.7–3.1)
LYMPHOCYTES # BLD AUTO: 3.66 10*3/MM3 (ref 0.7–3.1)
LYMPHOCYTES # BLD AUTO: 3.86 10*3/MM3 (ref 0.7–3.1)
LYMPHOCYTES # BLD AUTO: 3.91 10*3/MM3 (ref 0.7–3.1)
LYMPHOCYTES # BLD AUTO: 3.94 10*3/MM3 (ref 0.7–3.1)
LYMPHOCYTES # BLD AUTO: 3.99 10*3/MM3 (ref 0.7–3.1)
LYMPHOCYTES # BLD AUTO: 4.03 10*3/MM3 (ref 0.7–3.1)
LYMPHOCYTES # BLD AUTO: 4.05 10*3/MM3 (ref 0.7–3.1)
LYMPHOCYTES # BLD AUTO: 4.63 10*3/MM3 (ref 0.7–3.1)
LYMPHOCYTES # BLD AUTO: 4.73 10*3/MM3 (ref 0.7–3.1)
LYMPHOCYTES # BLD AUTO: 4.83 10*3/MM3 (ref 0.7–3.1)
LYMPHOCYTES # BLD MANUAL: 4.23 10*3/MM3 (ref 0.7–3.1)
LYMPHOCYTES # BLD MANUAL: 5.28 10*3/MM3 (ref 0.7–3.1)
LYMPHOCYTES # BLD MANUAL: 6.93 10*3/MM3 (ref 0.7–3.1)
LYMPHOCYTES NFR BLD AUTO: 15.9 % (ref 19.6–45.3)
LYMPHOCYTES NFR BLD AUTO: 18.8 % (ref 19.6–45.3)
LYMPHOCYTES NFR BLD AUTO: 19.3 % (ref 19.6–45.3)
LYMPHOCYTES NFR BLD AUTO: 20.8 % (ref 19.6–45.3)
LYMPHOCYTES NFR BLD AUTO: 24.6 % (ref 19.6–45.3)
LYMPHOCYTES NFR BLD AUTO: 26 % (ref 19.6–45.3)
LYMPHOCYTES NFR BLD AUTO: 27.1 % (ref 19.6–45.3)
LYMPHOCYTES NFR BLD AUTO: 27.3 % (ref 19.6–45.3)
LYMPHOCYTES NFR BLD AUTO: 27.8 % (ref 19.6–45.3)
LYMPHOCYTES NFR BLD AUTO: 28.2 % (ref 19.6–45.3)
LYMPHOCYTES NFR BLD AUTO: 28.3 % (ref 19.6–45.3)
LYMPHOCYTES NFR BLD AUTO: 28.7 % (ref 19.6–45.3)
LYMPHOCYTES NFR BLD AUTO: 28.8 % (ref 19.6–45.3)
LYMPHOCYTES NFR BLD AUTO: 29 % (ref 19.6–45.3)
LYMPHOCYTES NFR BLD AUTO: 29.7 % (ref 19.6–45.3)
LYMPHOCYTES NFR BLD AUTO: 32.2 % (ref 19.6–45.3)
LYMPHOCYTES NFR BLD AUTO: 32.6 % (ref 19.6–45.3)
LYMPHOCYTES NFR BLD AUTO: 33.6 % (ref 19.6–45.3)
LYMPHOCYTES NFR BLD AUTO: 33.9 % (ref 19.6–45.3)
LYMPHOCYTES NFR BLD AUTO: 35.2 % (ref 19.6–45.3)
LYMPHOCYTES NFR BLD AUTO: 36.4 % (ref 19.6–45.3)
LYMPHOCYTES NFR BLD AUTO: 37.9 % (ref 19.6–45.3)
LYMPHOCYTES NFR BLD AUTO: 39.7 % (ref 19.6–45.3)
LYMPHOCYTES NFR BLD AUTO: 45.6 % (ref 19.6–45.3)
LYMPHOCYTES NFR BLD MANUAL: 11 % (ref 5–12)
LYMPHOCYTES NFR BLD MANUAL: 5 % (ref 5–12)
LYMPHOCYTES NFR BLD MANUAL: 7 % (ref 5–12)
LYMPHOCYTES NFR CSF MANUAL: 48 %
LYMPHOCYTES NFR CSF MANUAL: 56 %
LYMPHOCYTES NFR CSF MANUAL: 85 %
LYMPHOCYTES NFR CSF MANUAL: 89 %
Lab: ABNORMAL
MAGNESIUM SERPL-MCNC: 1.8 MG/DL (ref 1.6–2.6)
MAGNESIUM SERPL-MCNC: 1.8 MG/DL (ref 1.6–2.6)
MAGNESIUM SERPL-MCNC: 1.9 MG/DL (ref 1.6–2.6)
MAGNESIUM SERPL-MCNC: 2 MG/DL (ref 1.6–2.6)
MAGNESIUM SERPL-MCNC: 2 MG/DL (ref 1.6–2.6)
MAGNESIUM SERPL-MCNC: 2.1 MG/DL (ref 1.6–2.6)
MAGNESIUM SERPL-MCNC: 2.1 MG/DL (ref 1.6–2.6)
MAGNESIUM SERPL-MCNC: 2.2 MG/DL (ref 1.6–2.6)
MAGNESIUM SERPL-MCNC: 2.3 MG/DL (ref 1.6–2.6)
MAGNESIUM SERPL-MCNC: 2.3 MG/DL (ref 1.6–2.6)
MAXIMAL PREDICTED HEART RATE: 165 BPM
MAXIMAL PREDICTED HEART RATE: 165 BPM
MBP CSF-MCNC: NORMAL NG/ML
MCH RBC QN AUTO: 23.9 PG (ref 26.6–33)
MCH RBC QN AUTO: 24 PG (ref 26.6–33)
MCH RBC QN AUTO: 24.1 PG (ref 26.6–33)
MCH RBC QN AUTO: 24.2 PG (ref 26.6–33)
MCH RBC QN AUTO: 24.2 PG (ref 26.6–33)
MCH RBC QN AUTO: 24.3 PG (ref 26.6–33)
MCH RBC QN AUTO: 24.4 PG (ref 26.6–33)
MCH RBC QN AUTO: 24.5 PG (ref 26.6–33)
MCH RBC QN AUTO: 24.6 PG (ref 26.6–33)
MCH RBC QN AUTO: 24.7 PG (ref 26.6–33)
MCH RBC QN AUTO: 24.7 PG (ref 26.6–33)
MCH RBC QN AUTO: 24.8 PG (ref 26.6–33)
MCH RBC QN AUTO: 24.9 PG (ref 26.6–33)
MCH RBC QN AUTO: 24.9 PG (ref 26.6–33)
MCH RBC QN AUTO: 25 PG (ref 26.6–33)
MCH RBC QN AUTO: 25 PG (ref 26.6–33)
MCH RBC QN AUTO: 25.1 PG (ref 26.6–33)
MCH RBC QN AUTO: 25.1 PG (ref 26.6–33)
MCH RBC QN AUTO: 25.2 PG (ref 26.6–33)
MCHC RBC AUTO-ENTMCNC: 28.1 G/DL (ref 31.5–35.7)
MCHC RBC AUTO-ENTMCNC: 28.6 G/DL (ref 31.5–35.7)
MCHC RBC AUTO-ENTMCNC: 28.7 G/DL (ref 31.5–35.7)
MCHC RBC AUTO-ENTMCNC: 28.7 G/DL (ref 31.5–35.7)
MCHC RBC AUTO-ENTMCNC: 29 G/DL (ref 31.5–35.7)
MCHC RBC AUTO-ENTMCNC: 29 G/DL (ref 31.5–35.7)
MCHC RBC AUTO-ENTMCNC: 29.2 G/DL (ref 31.5–35.7)
MCHC RBC AUTO-ENTMCNC: 29.2 G/DL (ref 31.5–35.7)
MCHC RBC AUTO-ENTMCNC: 29.3 G/DL (ref 31.5–35.7)
MCHC RBC AUTO-ENTMCNC: 29.4 G/DL (ref 31.5–35.7)
MCHC RBC AUTO-ENTMCNC: 29.5 G/DL (ref 31.5–35.7)
MCHC RBC AUTO-ENTMCNC: 29.5 G/DL (ref 31.5–35.7)
MCHC RBC AUTO-ENTMCNC: 29.6 G/DL (ref 31.5–35.7)
MCHC RBC AUTO-ENTMCNC: 29.6 G/DL (ref 31.5–35.7)
MCHC RBC AUTO-ENTMCNC: 29.7 G/DL (ref 31.5–35.7)
MCHC RBC AUTO-ENTMCNC: 29.7 G/DL (ref 31.5–35.7)
MCHC RBC AUTO-ENTMCNC: 29.9 G/DL (ref 31.5–35.7)
MCHC RBC AUTO-ENTMCNC: 29.9 G/DL (ref 31.5–35.7)
MCHC RBC AUTO-ENTMCNC: 30.2 G/DL (ref 31.5–35.7)
MCHC RBC AUTO-ENTMCNC: 30.4 G/DL (ref 31.5–35.7)
MCHC RBC AUTO-ENTMCNC: 30.4 G/DL (ref 31.5–35.7)
MCHC RBC AUTO-ENTMCNC: 30.5 G/DL (ref 31.5–35.7)
MCHC RBC AUTO-ENTMCNC: 30.5 G/DL (ref 31.5–35.7)
MCHC RBC AUTO-ENTMCNC: 30.6 G/DL (ref 31.5–35.7)
MCHC RBC AUTO-ENTMCNC: 30.7 G/DL (ref 31.5–35.7)
MCHC RBC AUTO-ENTMCNC: 30.7 G/DL (ref 31.5–35.7)
MCHC RBC AUTO-ENTMCNC: 30.8 G/DL (ref 31.5–35.7)
MCHC RBC AUTO-ENTMCNC: 31 G/DL (ref 31.5–35.7)
MCHC RBC AUTO-ENTMCNC: 31.3 G/DL (ref 31.5–35.7)
MCHC RBC AUTO-ENTMCNC: 31.3 G/DL (ref 31.5–35.7)
MCHC RBC AUTO-ENTMCNC: 31.4 G/DL (ref 31.5–35.7)
MCHC RBC AUTO-ENTMCNC: 31.4 G/DL (ref 31.5–35.7)
MCHC RBC AUTO-ENTMCNC: 31.6 G/DL (ref 31.5–35.7)
MCHC RBC AUTO-ENTMCNC: 31.6 G/DL (ref 31.5–35.7)
MCHC RBC AUTO-ENTMCNC: 31.7 G/DL (ref 31.5–35.7)
MCV RBC AUTO: 78 FL (ref 79–97)
MCV RBC AUTO: 78.1 FL (ref 79–97)
MCV RBC AUTO: 78.4 FL (ref 79–97)
MCV RBC AUTO: 78.5 FL (ref 79–97)
MCV RBC AUTO: 78.9 FL (ref 79–97)
MCV RBC AUTO: 78.9 FL (ref 79–97)
MCV RBC AUTO: 79 FL (ref 79–97)
MCV RBC AUTO: 79.3 FL (ref 79–97)
MCV RBC AUTO: 79.4 FL (ref 79–97)
MCV RBC AUTO: 79.5 FL (ref 79–97)
MCV RBC AUTO: 79.9 FL (ref 79–97)
MCV RBC AUTO: 80.1 FL (ref 79–97)
MCV RBC AUTO: 80.6 FL (ref 79–97)
MCV RBC AUTO: 80.6 FL (ref 79–97)
MCV RBC AUTO: 80.8 FL (ref 79–97)
MCV RBC AUTO: 80.8 FL (ref 79–97)
MCV RBC AUTO: 80.9 FL (ref 79–97)
MCV RBC AUTO: 81.5 FL (ref 79–97)
MCV RBC AUTO: 81.6 FL (ref 79–97)
MCV RBC AUTO: 81.9 FL (ref 79–97)
MCV RBC AUTO: 82 FL (ref 79–97)
MCV RBC AUTO: 82.1 FL (ref 79–97)
MCV RBC AUTO: 82.2 FL (ref 79–97)
MCV RBC AUTO: 82.5 FL (ref 79–97)
MCV RBC AUTO: 82.5 FL (ref 79–97)
MCV RBC AUTO: 82.6 FL (ref 79–97)
MCV RBC AUTO: 82.6 FL (ref 79–97)
MCV RBC AUTO: 82.7 FL (ref 79–97)
MCV RBC AUTO: 82.7 FL (ref 79–97)
MCV RBC AUTO: 83 FL (ref 79–97)
MCV RBC AUTO: 83 FL (ref 79–97)
MCV RBC AUTO: 83.1 FL (ref 79–97)
MCV RBC AUTO: 83.4 FL (ref 79–97)
MCV RBC AUTO: 83.5 FL (ref 79–97)
MCV RBC AUTO: 83.5 FL (ref 79–97)
MCV RBC AUTO: 84.2 FL (ref 79–97)
MCV RBC AUTO: 85.1 FL (ref 79–97)
MCV RBC AUTO: 85.4 FL (ref 79–97)
METAMYELOCYTES NFR BLD MANUAL: 1 % (ref 0–0)
METHADONE UR QL SCN: NEGATIVE
METHGB BLD QL: 0 % (ref 0–3)
METHGB BLD QL: 0.1 % (ref 0–3)
METHGB BLD QL: 0.2 % (ref 0–3)
METHGB BLD QL: 0.3 % (ref 0–3)
METHGB BLD QL: 0.4 % (ref 0–3)
METHGB BLD QL: 0.4 % (ref 0–3)
METHGB BLD QL: 0.6 % (ref 0–3)
METHGB BLD QL: <-0.1 % (ref 0–3)
METHOD: ABNORMAL
METHOD: ABNORMAL
MICROCYTES BLD QL: ABNORMAL
MODALITY: ABNORMAL
MONOCYTES # BLD AUTO: 0.37 10*3/MM3 (ref 0.1–0.9)
MONOCYTES # BLD AUTO: 0.45 10*3/MM3 (ref 0.1–0.9)
MONOCYTES # BLD AUTO: 0.52 10*3/MM3 (ref 0.1–0.9)
MONOCYTES # BLD AUTO: 0.77 10*3/MM3 (ref 0.1–0.9)
MONOCYTES # BLD AUTO: 0.77 10*3/MM3 (ref 0.1–0.9)
MONOCYTES # BLD AUTO: 0.8 10*3/MM3 (ref 0.1–0.9)
MONOCYTES # BLD AUTO: 0.88 10*3/MM3 (ref 0.1–0.9)
MONOCYTES # BLD AUTO: 0.96 10*3/MM3 (ref 0.1–0.9)
MONOCYTES # BLD AUTO: 0.98 10*3/MM3 (ref 0.1–0.9)
MONOCYTES # BLD AUTO: 0.99 10*3/MM3 (ref 0.1–0.9)
MONOCYTES # BLD AUTO: 1.05 10*3/MM3 (ref 0.1–0.9)
MONOCYTES # BLD AUTO: 1.19 10*3/MM3 (ref 0.1–0.9)
MONOCYTES # BLD AUTO: 1.19 10*3/MM3 (ref 0.1–0.9)
MONOCYTES # BLD AUTO: 1.23 10*3/MM3 (ref 0.1–0.9)
MONOCYTES # BLD AUTO: 1.25 10*3/MM3 (ref 0.1–0.9)
MONOCYTES # BLD AUTO: 1.28 10*3/MM3 (ref 0.1–0.9)
MONOCYTES # BLD AUTO: 1.33 10*3/MM3 (ref 0.1–0.9)
MONOCYTES # BLD AUTO: 1.35 10*3/MM3 (ref 0.1–0.9)
MONOCYTES # BLD AUTO: 1.36 10*3/MM3 (ref 0.1–0.9)
MONOCYTES # BLD AUTO: 1.4 10*3/MM3 (ref 0.1–0.9)
MONOCYTES # BLD AUTO: 1.57 10*3/MM3 (ref 0.1–0.9)
MONOCYTES # BLD AUTO: 1.6 10*3/MM3 (ref 0.1–0.9)
MONOCYTES # BLD AUTO: 1.66 10*3/MM3 (ref 0.1–0.9)
MONOCYTES # BLD AUTO: 1.69 10*3/MM3 (ref 0.1–0.9)
MONOCYTES # BLD: 1.1 10*3/MM3 (ref 0.1–0.9)
MONOCYTES # BLD: 1.52 10*3/MM3 (ref 0.1–0.9)
MONOCYTES # BLD: 1.55 10*3/MM3 (ref 0.1–0.9)
MONOCYTES NFR BLD AUTO: 10 % (ref 5–12)
MONOCYTES NFR BLD AUTO: 10.1 % (ref 5–12)
MONOCYTES NFR BLD AUTO: 10.3 % (ref 5–12)
MONOCYTES NFR BLD AUTO: 10.8 % (ref 5–12)
MONOCYTES NFR BLD AUTO: 11.1 % (ref 5–12)
MONOCYTES NFR BLD AUTO: 11.3 % (ref 5–12)
MONOCYTES NFR BLD AUTO: 11.9 % (ref 5–12)
MONOCYTES NFR BLD AUTO: 12.4 % (ref 5–12)
MONOCYTES NFR BLD AUTO: 13.6 % (ref 5–12)
MONOCYTES NFR BLD AUTO: 13.9 % (ref 5–12)
MONOCYTES NFR BLD AUTO: 14.6 % (ref 5–12)
MONOCYTES NFR BLD AUTO: 14.6 % (ref 5–12)
MONOCYTES NFR BLD AUTO: 15.6 % (ref 5–12)
MONOCYTES NFR BLD AUTO: 4.7 % (ref 5–12)
MONOCYTES NFR BLD AUTO: 6.3 % (ref 5–12)
MONOCYTES NFR BLD AUTO: 6.7 % (ref 5–12)
MONOCYTES NFR BLD AUTO: 6.7 % (ref 5–12)
MONOCYTES NFR BLD AUTO: 6.9 % (ref 5–12)
MONOCYTES NFR BLD AUTO: 7.2 % (ref 5–12)
MONOCYTES NFR BLD AUTO: 7.3 % (ref 5–12)
MONOCYTES NFR BLD AUTO: 7.4 % (ref 5–12)
MONOCYTES NFR BLD AUTO: 7.7 % (ref 5–12)
MONOCYTES NFR BLD AUTO: 9.6 % (ref 5–12)
MONOCYTES NFR BLD AUTO: 9.9 % (ref 5–12)
MONOCYTES NFR CSF MANUAL: 4 %
MONOCYTES NFR CSF MANUAL: 5 %
MONOS+MACROS NFR CSF: 13 %
MONOS+MACROS NFR CSF: 9 %
MRSA DNA SPEC QL NAA+PROBE: POSITIVE
MYELOCYTES NFR BLD MANUAL: 1 % (ref 0–0)
N MEN DNA SPEC QL NAA+PROBE: NOT DETECTED
NEURONAL NUCLEAR TYPE 1 AB [TITER] IN CEREBRAL SPINAL FLUID: NORMAL TITER
NEUTROPHILS # BLD AUTO: 12.13 10*3/MM3 (ref 1.7–7)
NEUTROPHILS # BLD AUTO: 15.39 10*3/MM3 (ref 1.7–7)
NEUTROPHILS # BLD AUTO: 8.17 10*3/MM3 (ref 1.7–7)
NEUTROPHILS NFR BLD AUTO: 1.68 10*3/MM3 (ref 1.7–7)
NEUTROPHILS NFR BLD AUTO: 1.94 10*3/MM3 (ref 1.7–7)
NEUTROPHILS NFR BLD AUTO: 10.82 10*3/MM3 (ref 1.7–7)
NEUTROPHILS NFR BLD AUTO: 13.49 10*3/MM3 (ref 1.7–7)
NEUTROPHILS NFR BLD AUTO: 15.6 10*3/MM3 (ref 1.7–7)
NEUTROPHILS NFR BLD AUTO: 15.99 10*3/MM3 (ref 1.7–7)
NEUTROPHILS NFR BLD AUTO: 2.58 10*3/MM3 (ref 1.7–7)
NEUTROPHILS NFR BLD AUTO: 4.44 10*3/MM3 (ref 1.7–7)
NEUTROPHILS NFR BLD AUTO: 4.52 10*3/MM3 (ref 1.7–7)
NEUTROPHILS NFR BLD AUTO: 4.6 10*3/MM3 (ref 1.7–7)
NEUTROPHILS NFR BLD AUTO: 43.4 % (ref 42.7–76)
NEUTROPHILS NFR BLD AUTO: 44.5 % (ref 42.7–76)
NEUTROPHILS NFR BLD AUTO: 48.3 % (ref 42.7–76)
NEUTROPHILS NFR BLD AUTO: 48.4 % (ref 42.7–76)
NEUTROPHILS NFR BLD AUTO: 49.8 % (ref 42.7–76)
NEUTROPHILS NFR BLD AUTO: 5.01 10*3/MM3 (ref 1.7–7)
NEUTROPHILS NFR BLD AUTO: 5.23 10*3/MM3 (ref 1.7–7)
NEUTROPHILS NFR BLD AUTO: 5.49 10*3/MM3 (ref 1.7–7)
NEUTROPHILS NFR BLD AUTO: 5.61 10*3/MM3 (ref 1.7–7)
NEUTROPHILS NFR BLD AUTO: 50.5 % (ref 42.7–76)
NEUTROPHILS NFR BLD AUTO: 51.3 % (ref 42.7–76)
NEUTROPHILS NFR BLD AUTO: 51.7 % (ref 42.7–76)
NEUTROPHILS NFR BLD AUTO: 52.6 % (ref 42.7–76)
NEUTROPHILS NFR BLD AUTO: 53.6 % (ref 42.7–76)
NEUTROPHILS NFR BLD AUTO: 53.8 % (ref 42.7–76)
NEUTROPHILS NFR BLD AUTO: 54.4 % (ref 42.7–76)
NEUTROPHILS NFR BLD AUTO: 54.7 % (ref 42.7–76)
NEUTROPHILS NFR BLD AUTO: 55.6 % (ref 42.7–76)
NEUTROPHILS NFR BLD AUTO: 56.1 % (ref 42.7–76)
NEUTROPHILS NFR BLD AUTO: 58.3 % (ref 42.7–76)
NEUTROPHILS NFR BLD AUTO: 58.6 % (ref 42.7–76)
NEUTROPHILS NFR BLD AUTO: 59.2 % (ref 42.7–76)
NEUTROPHILS NFR BLD AUTO: 6.09 10*3/MM3 (ref 1.7–7)
NEUTROPHILS NFR BLD AUTO: 6.25 10*3/MM3 (ref 1.7–7)
NEUTROPHILS NFR BLD AUTO: 6.26 10*3/MM3 (ref 1.7–7)
NEUTROPHILS NFR BLD AUTO: 6.86 10*3/MM3 (ref 1.7–7)
NEUTROPHILS NFR BLD AUTO: 6.87 10*3/MM3 (ref 1.7–7)
NEUTROPHILS NFR BLD AUTO: 60.1 % (ref 42.7–76)
NEUTROPHILS NFR BLD AUTO: 66.7 % (ref 42.7–76)
NEUTROPHILS NFR BLD AUTO: 68.7 % (ref 42.7–76)
NEUTROPHILS NFR BLD AUTO: 7.03 10*3/MM3 (ref 1.7–7)
NEUTROPHILS NFR BLD AUTO: 7.1 10*3/MM3 (ref 1.7–7)
NEUTROPHILS NFR BLD AUTO: 7.3 10*3/MM3 (ref 1.7–7)
NEUTROPHILS NFR BLD AUTO: 7.43 10*3/MM3 (ref 1.7–7)
NEUTROPHILS NFR BLD AUTO: 70.5 % (ref 42.7–76)
NEUTROPHILS NFR BLD AUTO: 71.1 % (ref 42.7–76)
NEUTROPHILS NFR BLD AUTO: 74.9 % (ref 42.7–76)
NEUTROPHILS NFR BLD AUTO: 8.6 10*3/MM3 (ref 1.7–7)
NEUTROPHILS NFR BLD MANUAL: 53 % (ref 42.7–76)
NEUTROPHILS NFR BLD MANUAL: 58 % (ref 42.7–76)
NEUTROPHILS NFR BLD MANUAL: 70 % (ref 42.7–76)
NEUTROPHILS NFR CSF MICRO: 2 %
NEUTROPHILS NFR CSF MICRO: 2 %
NEUTROPHILS NFR CSF MICRO: 39 %
NEUTROPHILS NFR CSF MICRO: 48 %
NEUTS BAND NFR BLD MANUAL: 3 % (ref 0–5)
NITRITE UR QL STRIP: NEGATIVE
NMDAR IGG TITR CSF IF: NORMAL {TITER}
NOTE: ABNORMAL
NOTIFIED BY: ABNORMAL
NOTIFIED WHO: ABNORMAL
NRBC BLD AUTO-RTO: 0 /100 WBC (ref 0–0.2)
NRBC BLD AUTO-RTO: 0.2 /100 WBC (ref 0–0.2)
NRBC BLD AUTO-RTO: 0.3 /100 WBC (ref 0–0.2)
NT-PROBNP SERPL-MCNC: 180 PG/ML (ref 0–900)
NT-PROBNP SERPL-MCNC: 4810 PG/ML (ref 0–900)
NT-PROBNP SERPL-MCNC: 915.8 PG/ML (ref 0–900)
NUC CELL # CSF MANUAL: 30 /MM3 (ref 0–5)
NUC CELL # CSF MANUAL: 31 /MM3 (ref 0–5)
NUC CELL # CSF MANUAL: 6 /MM3 (ref 0–5)
NUC CELL # CSF MANUAL: 8 /MM3 (ref 0–5)
NUC CELL # CSF MANUAL: NORMAL 10*3/UL
O2 A-A PPRESDIFF RESPIRATORY: 0.3 MMHG
O2 A-A PPRESDIFF RESPIRATORY: 0.4 MMHG
OLIGOCLONAL BANDS SERPL: NORMAL
OLIGOCLONAL BANDS.IT SER+CSF QL: NORMAL
OPIATES UR QL: NEGATIVE
OXYCODONE UR QL SCN: NEGATIVE
OXYHGB MFR BLDV: 85.1 % (ref 94–99)
OXYHGB MFR BLDV: 87 % (ref 94–99)
OXYHGB MFR BLDV: 88 % (ref 94–99)
OXYHGB MFR BLDV: 88 % (ref 94–99)
OXYHGB MFR BLDV: 90 % (ref 94–99)
OXYHGB MFR BLDV: 90.6 % (ref 94–99)
OXYHGB MFR BLDV: 90.8 % (ref 94–99)
OXYHGB MFR BLDV: 91.5 % (ref 94–99)
OXYHGB MFR BLDV: 92.6 % (ref 94–99)
OXYHGB MFR BLDV: 92.9 % (ref 94–99)
OXYHGB MFR BLDV: 93 % (ref 94–99)
OXYHGB MFR BLDV: 93 % (ref 94–99)
OXYHGB MFR BLDV: 93.2 % (ref 94–99)
OXYHGB MFR BLDV: 93.9 % (ref 94–99)
OXYHGB MFR BLDV: 94 % (ref 94–99)
OXYHGB MFR BLDV: 94.3 % (ref 94–99)
OXYHGB MFR BLDV: 94.5 % (ref 94–99)
OXYHGB MFR BLDV: 94.7 % (ref 94–99)
OXYHGB MFR BLDV: 94.9 % (ref 94–99)
OXYHGB MFR BLDV: 95.1 % (ref 94–99)
OXYHGB MFR BLDV: 95.3 % (ref 94–99)
OXYHGB MFR BLDV: 95.6 % (ref 94–99)
P-ANCA ATYPICAL TITR SER IF: NORMAL TITER
P-ANCA TITR SER IF: NORMAL TITER
PARECHOVIRUS A RNA CSF QL NAA+NON-PROBE: NOT DETECTED
PATH INTERP BLD-IMP: NORMAL
PATH REPORT.FINAL DX SPEC: NORMAL
PATH REPORT.GROSS SPEC: NORMAL
PCA-1 AB SER QL IF: NEGATIVE
PCO2 BLDA: 28.9 MM HG (ref 35–45)
PCO2 BLDA: 29.8 MM HG (ref 35–45)
PCO2 BLDA: 31.2 MM HG (ref 35–45)
PCO2 BLDA: 32.3 MM HG (ref 35–45)
PCO2 BLDA: 32.6 MM HG (ref 35–45)
PCO2 BLDA: 32.7 MM HG (ref 35–45)
PCO2 BLDA: 33.6 MM HG (ref 35–45)
PCO2 BLDA: 34.2 MM HG (ref 35–45)
PCO2 BLDA: 34.6 MM HG (ref 35–45)
PCO2 BLDA: 34.9 MM HG (ref 35–45)
PCO2 BLDA: 35.7 MM HG (ref 35–45)
PCO2 BLDA: 36 MM HG (ref 35–45)
PCO2 BLDA: 36.1 MM HG (ref 35–45)
PCO2 BLDA: 40.6 MM HG (ref 35–45)
PCO2 BLDA: 43.5 MM HG (ref 35–45)
PCO2 BLDA: 44.3 MM HG (ref 35–45)
PCO2 BLDA: 45.1 MM HG (ref 35–45)
PCO2 BLDA: 45.3 MM HG (ref 35–45)
PCO2 BLDA: 45.5 MM HG (ref 35–45)
PCO2 BLDA: 45.7 MM HG (ref 35–45)
PCO2 BLDA: 46.2 MM HG (ref 35–45)
PCO2 BLDA: 46.2 MM HG (ref 35–45)
PCO2 BLDA: 48.9 MM HG (ref 35–45)
PCO2 BLDA: 49.2 MM HG (ref 35–45)
PCO2 BLDA: 49.6 MM HG (ref 35–45)
PCO2 TEMP ADJ BLD: ABNORMAL MM[HG]
PCP UR QL SCN: NEGATIVE
PEEP RESPIRATORY: 10 CM[H2O]
PEEP RESPIRATORY: 5 CM[H2O]
PEEP RESPIRATORY: 7.5 CM[H2O]
PH BLDA: 7.23 PH UNITS (ref 7.35–7.45)
PH BLDA: 7.32 PH UNITS (ref 7.35–7.45)
PH BLDA: 7.34 PH UNITS (ref 7.35–7.45)
PH BLDA: 7.35 PH UNITS (ref 7.35–7.45)
PH BLDA: 7.36 PH UNITS (ref 7.35–7.45)
PH BLDA: 7.38 PH UNITS (ref 7.35–7.45)
PH BLDA: 7.38 PH UNITS (ref 7.35–7.45)
PH BLDA: 7.39 PH UNITS (ref 7.35–7.45)
PH BLDA: 7.39 PH UNITS (ref 7.35–7.45)
PH BLDA: 7.4 PH UNITS (ref 7.35–7.45)
PH BLDA: 7.4 PH UNITS (ref 7.35–7.45)
PH BLDA: 7.41 PH UNITS (ref 7.35–7.45)
PH BLDA: 7.42 PH UNITS (ref 7.35–7.45)
PH BLDA: 7.43 PH UNITS (ref 7.35–7.45)
PH BLDA: 7.43 PH UNITS (ref 7.35–7.45)
PH BLDA: 7.44 PH UNITS (ref 7.35–7.45)
PH BLDA: 7.44 PH UNITS (ref 7.35–7.45)
PH BLDA: 7.45 PH UNITS (ref 7.35–7.45)
PH BLDA: 7.45 PH UNITS (ref 7.35–7.45)
PH BLDA: 7.47 PH UNITS (ref 7.35–7.45)
PH BLDA: 7.47 PH UNITS (ref 7.35–7.45)
PH BLDA: 7.48 PH UNITS (ref 7.35–7.45)
PH BLDA: 7.48 PH UNITS (ref 7.35–7.45)
PH UR STRIP.AUTO: 7 [PH] (ref 5–8)
PH, TEMP CORRECTED: ABNORMAL
PHOSPHATE SERPL-MCNC: 2.2 MG/DL (ref 2.5–4.5)
PHOSPHATE SERPL-MCNC: 2.3 MG/DL (ref 2.5–4.5)
PHOSPHATE SERPL-MCNC: 2.8 MG/DL (ref 2.5–4.5)
PHOSPHATE SERPL-MCNC: 3.2 MG/DL (ref 2.5–4.5)
PHOSPHATE SERPL-MCNC: 3.2 MG/DL (ref 2.5–4.5)
PHOSPHATE SERPL-MCNC: 3.4 MG/DL (ref 2.5–4.5)
PHOSPHATE SERPL-MCNC: 3.5 MG/DL (ref 2.5–4.5)
PHOSPHATE SERPL-MCNC: 3.6 MG/DL (ref 2.5–4.5)
PHOSPHATE SERPL-MCNC: 3.6 MG/DL (ref 2.5–4.5)
PHOSPHATE SERPL-MCNC: 3.7 MG/DL (ref 2.5–4.5)
PHOSPHATE SERPL-MCNC: 4.1 MG/DL (ref 2.5–4.5)
PHOSPHATE SERPL-MCNC: 4.1 MG/DL (ref 2.5–4.5)
PHOSPHATE SERPL-MCNC: 4.6 MG/DL (ref 2.5–4.5)
PHOSPHATE SERPL-MCNC: 4.8 MG/DL (ref 2.5–4.5)
PLAT MORPH BLD: NORMAL
PLATELET # BLD AUTO: 202 10*3/MM3 (ref 140–450)
PLATELET # BLD AUTO: 211 10*3/MM3 (ref 140–450)
PLATELET # BLD AUTO: 216 10*3/MM3 (ref 140–450)
PLATELET # BLD AUTO: 238 10*3/MM3 (ref 140–450)
PLATELET # BLD AUTO: 252 10*3/MM3 (ref 140–450)
PLATELET # BLD AUTO: 269 10*3/MM3 (ref 140–450)
PLATELET # BLD AUTO: 279 10*3/MM3 (ref 140–450)
PLATELET # BLD AUTO: 283 10*3/MM3 (ref 140–450)
PLATELET # BLD AUTO: 295 10*3/MM3 (ref 140–450)
PLATELET # BLD AUTO: 308 10*3/MM3 (ref 140–450)
PLATELET # BLD AUTO: 314 10*3/MM3 (ref 140–450)
PLATELET # BLD AUTO: 316 10*3/MM3 (ref 140–450)
PLATELET # BLD AUTO: 317 10*3/MM3 (ref 140–450)
PLATELET # BLD AUTO: 340 10*3/MM3 (ref 140–450)
PLATELET # BLD AUTO: 343 10*3/MM3 (ref 140–450)
PLATELET # BLD AUTO: 353 10*3/MM3 (ref 140–450)
PLATELET # BLD AUTO: 369 10*3/MM3 (ref 140–450)
PLATELET # BLD AUTO: 370 10*3/MM3 (ref 140–450)
PLATELET # BLD AUTO: 372 10*3/MM3 (ref 140–450)
PLATELET # BLD AUTO: 372 10*3/MM3 (ref 140–450)
PLATELET # BLD AUTO: 384 10*3/MM3 (ref 140–450)
PLATELET # BLD AUTO: 401 10*3/MM3 (ref 140–450)
PLATELET # BLD AUTO: 406 10*3/MM3 (ref 140–450)
PLATELET # BLD AUTO: 409 10*3/MM3 (ref 140–450)
PLATELET # BLD AUTO: 421 10*3/MM3 (ref 140–450)
PLATELET # BLD AUTO: 425 10*3/MM3 (ref 140–450)
PLATELET # BLD AUTO: 461 10*3/MM3 (ref 140–450)
PLATELET # BLD AUTO: 472 10*3/MM3 (ref 140–450)
PLATELET # BLD AUTO: 485 10*3/MM3 (ref 140–450)
PLATELET # BLD AUTO: 496 10*3/MM3 (ref 140–450)
PLATELET # BLD AUTO: 496 10*3/MM3 (ref 140–450)
PLATELET # BLD AUTO: 509 10*3/MM3 (ref 140–450)
PLATELET # BLD AUTO: 522 10*3/MM3 (ref 140–450)
PLATELET # BLD AUTO: 523 10*3/MM3 (ref 140–450)
PLATELET # BLD AUTO: 554 10*3/MM3 (ref 140–450)
PLATELET # BLD AUTO: 562 10*3/MM3 (ref 140–450)
PLATELET # BLD AUTO: 596 10*3/MM3 (ref 140–450)
PLATELET # BLD AUTO: 600 10*3/MM3 (ref 140–450)
PMV BLD AUTO: 10 FL (ref 6–12)
PMV BLD AUTO: 10 FL (ref 6–12)
PMV BLD AUTO: 10.3 FL (ref 6–12)
PMV BLD AUTO: 10.3 FL (ref 6–12)
PMV BLD AUTO: 10.4 FL (ref 6–12)
PMV BLD AUTO: 10.4 FL (ref 6–12)
PMV BLD AUTO: 10.5 FL (ref 6–12)
PMV BLD AUTO: 10.6 FL (ref 6–12)
PMV BLD AUTO: 10.6 FL (ref 6–12)
PMV BLD AUTO: 10.7 FL (ref 6–12)
PMV BLD AUTO: 10.7 FL (ref 6–12)
PMV BLD AUTO: 10.8 FL (ref 6–12)
PMV BLD AUTO: 10.8 FL (ref 6–12)
PMV BLD AUTO: 11 FL (ref 6–12)
PMV BLD AUTO: 11.1 FL (ref 6–12)
PMV BLD AUTO: 11.2 FL (ref 6–12)
PMV BLD AUTO: 11.3 FL (ref 6–12)
PMV BLD AUTO: 11.3 FL (ref 6–12)
PMV BLD AUTO: 11.4 FL (ref 6–12)
PMV BLD AUTO: 11.4 FL (ref 6–12)
PMV BLD AUTO: 11.5 FL (ref 6–12)
PMV BLD AUTO: 11.7 FL (ref 6–12)
PMV BLD AUTO: 12.1 FL (ref 6–12)
PMV BLD AUTO: 12.5 FL (ref 6–12)
PMV BLD AUTO: 9.3 FL (ref 6–12)
PMV BLD AUTO: 9.7 FL (ref 6–12)
PMV BLD AUTO: 9.8 FL (ref 6–12)
PO2 BLDA: 126 MM HG (ref 83–108)
PO2 BLDA: 303.3 MM HG (ref 80–100)
PO2 BLDA: 53.4 MM HG (ref 83–108)
PO2 BLDA: 57.8 MM HG (ref 83–108)
PO2 BLDA: 62.7 MM HG (ref 83–108)
PO2 BLDA: 63.2 MM HG (ref 83–108)
PO2 BLDA: 64.9 MM HG (ref 83–108)
PO2 BLDA: 64.9 MM HG (ref 83–108)
PO2 BLDA: 66.8 MM HG (ref 83–108)
PO2 BLDA: 75 MM HG (ref 83–108)
PO2 BLDA: 75.9 MM HG (ref 83–108)
PO2 BLDA: 76.2 MM HG (ref 83–108)
PO2 BLDA: 77.6 MM HG (ref 83–108)
PO2 BLDA: 78.6 MM HG (ref 83–108)
PO2 BLDA: 80.3 MM HG (ref 83–108)
PO2 BLDA: 80.4 MM HG (ref 80–100)
PO2 BLDA: 81.7 MM HG (ref 83–108)
PO2 BLDA: 81.9 MM HG (ref 83–108)
PO2 BLDA: 82.2 MM HG (ref 83–108)
PO2 BLDA: 85.5 MM HG (ref 83–108)
PO2 BLDA: 87 MM HG (ref 80–100)
PO2 BLDA: 87.8 MM HG (ref 83–108)
PO2 BLDA: 88.7 MM HG (ref 83–108)
PO2 BLDA: 95.4 MM HG (ref 83–108)
PO2 BLDA: 95.5 MM HG (ref 83–108)
PO2 TEMP ADJ BLD: ABNORMAL MM[HG]
POTASSIUM SERPL-SCNC: 2.7 MMOL/L (ref 3.5–5.2)
POTASSIUM SERPL-SCNC: 2.8 MMOL/L (ref 3.5–5.2)
POTASSIUM SERPL-SCNC: 3 MMOL/L (ref 3.5–5.2)
POTASSIUM SERPL-SCNC: 3.1 MMOL/L (ref 3.5–5.2)
POTASSIUM SERPL-SCNC: 3.2 MMOL/L (ref 3.5–5.2)
POTASSIUM SERPL-SCNC: 3.3 MMOL/L (ref 3.5–5.2)
POTASSIUM SERPL-SCNC: 3.4 MMOL/L (ref 3.5–5.2)
POTASSIUM SERPL-SCNC: 3.5 MMOL/L (ref 3.5–5.2)
POTASSIUM SERPL-SCNC: 3.5 MMOL/L (ref 3.5–5.2)
POTASSIUM SERPL-SCNC: 3.6 MMOL/L (ref 3.5–5.2)
POTASSIUM SERPL-SCNC: 3.7 MMOL/L (ref 3.5–5.2)
POTASSIUM SERPL-SCNC: 3.8 MMOL/L (ref 3.5–5.2)
POTASSIUM SERPL-SCNC: 3.8 MMOL/L (ref 3.5–5.2)
POTASSIUM SERPL-SCNC: 3.9 MMOL/L (ref 3.5–5.2)
POTASSIUM SERPL-SCNC: 4 MMOL/L (ref 3.5–5.2)
POTASSIUM SERPL-SCNC: 4.1 MMOL/L (ref 3.5–5.2)
POTASSIUM SERPL-SCNC: 4.2 MMOL/L (ref 3.5–5.2)
POTASSIUM SERPL-SCNC: 4.3 MMOL/L (ref 3.5–5.2)
POTASSIUM SERPL-SCNC: 4.8 MMOL/L (ref 3.5–5.2)
PROCALCITONIN SERPL-MCNC: 0.09 NG/ML (ref 0–0.25)
PROCALCITONIN SERPL-MCNC: 0.11 NG/ML (ref 0–0.25)
PROCALCITONIN SERPL-MCNC: 0.14 NG/ML (ref 0–0.25)
PROCALCITONIN SERPL-MCNC: 0.15 NG/ML (ref 0–0.25)
PROCALCITONIN SERPL-MCNC: 0.19 NG/ML (ref 0–0.25)
PROPOXYPH UR QL: NEGATIVE
PROT CSF-MCNC: 55.8 MG/DL (ref 15–45)
PROT CSF-MCNC: 57.3 MG/DL (ref 15–45)
PROT CSF-MCNC: 73.9 MG/DL (ref 15–45)
PROT SERPL-MCNC: 5.8 G/DL (ref 6–8.5)
PROT SERPL-MCNC: 5.9 G/DL (ref 6–8.5)
PROT SERPL-MCNC: 6 G/DL (ref 6–8.5)
PROT SERPL-MCNC: 6 G/DL (ref 6–8.5)
PROT SERPL-MCNC: 6.2 G/DL (ref 6–8.5)
PROT SERPL-MCNC: 6.4 G/DL (ref 6–8.5)
PROT SERPL-MCNC: 6.5 G/DL (ref 6–8.5)
PROT SERPL-MCNC: 6.6 G/DL (ref 6–8.5)
PROT SERPL-MCNC: 6.7 G/DL (ref 6–8.5)
PROT SERPL-MCNC: 6.7 G/DL (ref 6–8.5)
PROT SERPL-MCNC: 6.8 G/DL (ref 6–8.5)
PROT SERPL-MCNC: 6.8 G/DL (ref 6–8.5)
PROT SERPL-MCNC: 6.9 G/DL (ref 6–8.5)
PROT SERPL-MCNC: 7.4 G/DL (ref 6–8.5)
PROT SERPL-MCNC: 7.7 G/DL (ref 6–8.5)
PROT SERPL-MCNC: 7.8 G/DL (ref 6–8.5)
PROT SERPL-MCNC: 7.8 G/DL (ref 6–8.5)
PROT SERPL-MCNC: 7.9 G/DL (ref 6–8.5)
PROT SERPL-MCNC: 8.1 G/DL (ref 6–8.5)
PROT UR QL STRIP: ABNORMAL
PROTHROMBIN TIME: 14.4 SECONDS (ref 12.8–14.5)
PROTHROMBIN TIME: 15.4 SECONDS (ref 12.8–14.5)
PROTHROMBIN TIME: 15.7 SECONDS (ref 11.7–14.2)
PSV: 8 CMH2O
QT INTERVAL: 304 MS
QT INTERVAL: 332 MS
QT INTERVAL: 346 MS
QT INTERVAL: 350 MS
QT INTERVAL: 356 MS
QT INTERVAL: 364 MS
QT INTERVAL: 378 MS
QT INTERVAL: 388 MS
QT INTERVAL: 390 MS
QT INTERVAL: 396 MS
QT INTERVAL: 430 MS
QT INTERVAL: 436 MS
QTC INTERVAL: 480 MS
QTC INTERVAL: 503 MS
QTC INTERVAL: 517 MS
QTC INTERVAL: 547 MS
R RICKETTSI IGG SER QL IA: NEGATIVE
R RICKETTSI IGM SER-ACNC: 1.06 INDEX (ref 0–0.89)
RBC # BLD AUTO: 2.93 10*6/MM3 (ref 3.77–5.28)
RBC # BLD AUTO: 3.25 10*6/MM3 (ref 3.77–5.28)
RBC # BLD AUTO: 3.39 10*6/MM3 (ref 3.77–5.28)
RBC # BLD AUTO: 3.61 10*6/MM3 (ref 3.77–5.28)
RBC # BLD AUTO: 3.72 10*6/MM3 (ref 3.77–5.28)
RBC # BLD AUTO: 3.79 10*6/MM3 (ref 3.77–5.28)
RBC # BLD AUTO: 3.97 10*6/MM3 (ref 3.77–5.28)
RBC # BLD AUTO: 3.97 10*6/MM3 (ref 3.77–5.28)
RBC # BLD AUTO: 4.14 10*6/MM3 (ref 3.77–5.28)
RBC # BLD AUTO: 4.16 10*6/MM3 (ref 3.77–5.28)
RBC # BLD AUTO: 4.17 10*6/MM3 (ref 3.77–5.28)
RBC # BLD AUTO: 4.19 10*6/MM3 (ref 3.77–5.28)
RBC # BLD AUTO: 4.22 10*6/MM3 (ref 3.77–5.28)
RBC # BLD AUTO: 4.31 10*6/MM3 (ref 3.77–5.28)
RBC # BLD AUTO: 4.38 10*6/MM3 (ref 3.77–5.28)
RBC # BLD AUTO: 4.45 10*6/MM3 (ref 3.77–5.28)
RBC # BLD AUTO: 4.48 10*6/MM3 (ref 3.77–5.28)
RBC # BLD AUTO: 4.52 10*6/MM3 (ref 3.77–5.28)
RBC # BLD AUTO: 4.56 10*6/MM3 (ref 3.77–5.28)
RBC # BLD AUTO: 4.58 10*6/MM3 (ref 3.77–5.28)
RBC # BLD AUTO: 4.59 10*6/MM3 (ref 3.77–5.28)
RBC # BLD AUTO: 4.61 10*6/MM3 (ref 3.77–5.28)
RBC # BLD AUTO: 4.61 10*6/MM3 (ref 3.77–5.28)
RBC # BLD AUTO: 4.64 10*6/MM3 (ref 3.77–5.28)
RBC # BLD AUTO: 4.65 10*6/MM3 (ref 3.77–5.28)
RBC # BLD AUTO: 4.75 10*6/MM3 (ref 3.77–5.28)
RBC # BLD AUTO: 4.76 10*6/MM3 (ref 3.77–5.28)
RBC # BLD AUTO: 4.77 10*6/MM3 (ref 3.77–5.28)
RBC # BLD AUTO: 4.79 10*6/MM3 (ref 3.77–5.28)
RBC # BLD AUTO: 4.81 10*6/MM3 (ref 3.77–5.28)
RBC # BLD AUTO: 4.85 10*6/MM3 (ref 3.77–5.28)
RBC # BLD AUTO: 4.85 10*6/MM3 (ref 3.77–5.28)
RBC # BLD AUTO: 4.86 10*6/MM3 (ref 3.77–5.28)
RBC # BLD AUTO: 4.99 10*6/MM3 (ref 3.77–5.28)
RBC # BLD AUTO: 5.02 10*6/MM3 (ref 3.77–5.28)
RBC # BLD AUTO: 5.13 10*6/MM3 (ref 3.77–5.28)
RBC # BLD AUTO: 5.22 10*6/MM3 (ref 3.77–5.28)
RBC # BLD AUTO: 5.23 10*6/MM3 (ref 3.77–5.28)
RBC # CSF MANUAL: 11 /MM3 (ref 0–0)
RBC # CSF MANUAL: 6734 /MM3 (ref 0–0)
RBC # CSF MANUAL: 7103 /MM3 (ref 0–0)
RBC # CSF MANUAL: 8 /MM3 (ref 0–0)
RBC # CSF MANUAL: NORMAL /UL
RBC # UR STRIP: ABNORMAL /HPF
REAGIN AB CSF QL: NORMAL
REF LAB TEST METHOD: ABNORMAL
REF LAB TEST METHOD: NORMAL
REFRIGERATED SORT: NORMAL
RICK TYPHUS IGG TITR SER IF: NORMAL {TITER}
RICK TYPHUS IGM TITR SER IF: NORMAL {TITER}
S AUREUS DNA SPEC QL NAA+PROBE: POSITIVE
S PNEUM AG SPEC QL LA: NEGATIVE
S PNEUM DNA CSF QL NAA+NON-PROBE: NOT DETECTED
SALICYLATES SERPL-MCNC: <0.3 MG/DL
SAO2 % BLDCOA: 85.4 % (ref 94–99)
SAO2 % BLDCOA: 88 % (ref 94–99)
SAO2 % BLDCOA: 88.1 % (ref 94–99)
SAO2 % BLDCOA: 88.3 % (ref 94–99)
SAO2 % BLDCOA: 89.5 % (ref 94–99)
SAO2 % BLDCOA: 91 % (ref 94–99)
SAO2 % BLDCOA: 91.3 % (ref 94–99)
SAO2 % BLDCOA: 91.7 % (ref 94–99)
SAO2 % BLDCOA: 92.5 % (ref 94–99)
SAO2 % BLDCOA: 92.8 % (ref 94–99)
SAO2 % BLDCOA: 93.1 % (ref 94–99)
SAO2 % BLDCOA: 93.4 % (ref 94–99)
SAO2 % BLDCOA: 93.6 % (ref 94–99)
SAO2 % BLDCOA: 93.9 % (ref 94–99)
SAO2 % BLDCOA: 94 % (ref 94–99)
SAO2 % BLDCOA: 94.4 % (ref 94–99)
SAO2 % BLDCOA: 94.6 % (ref 94–99)
SAO2 % BLDCOA: 94.7 % (ref 94–99)
SAO2 % BLDCOA: 95.2 % (ref 94–99)
SAO2 % BLDCOA: 95.5 % (ref 94–99)
SAO2 % BLDCOA: 95.8 % (ref 94–99)
SAO2 % BLDCOA: 96.1 % (ref 92–99)
SAO2 % BLDCOA: 96.1 % (ref 94–99)
SAO2 % BLDCOA: 97.1 % (ref 92–99)
SAO2 % BLDCOA: 99.9 % (ref 92–99)
SARS-COV-2 RNA PNL SPEC NAA+PROBE: DETECTED
SARS-COV-2 RNA PNL SPEC NAA+PROBE: NOT DETECTED
SARS-COV-2 RNA RESP QL NAA+PROBE: DETECTED
SARS-COV-2 RNA RESP QL NAA+PROBE: NOT DETECTED
SCAN SLIDE: NORMAL
SCAN SLIDE: NORMAL
SET MECH RESP RATE: 10
SET MECH RESP RATE: 16
SET MECH RESP RATE: 20
SMALL PLATELETS BLD QL SMEAR: ABNORMAL
SMALL PLATELETS BLD QL SMEAR: ADEQUATE
SODIUM SERPL-SCNC: 134 MMOL/L (ref 136–145)
SODIUM SERPL-SCNC: 135 MMOL/L (ref 136–145)
SODIUM SERPL-SCNC: 136 MMOL/L (ref 136–145)
SODIUM SERPL-SCNC: 136 MMOL/L (ref 136–145)
SODIUM SERPL-SCNC: 137 MMOL/L (ref 136–145)
SODIUM SERPL-SCNC: 138 MMOL/L (ref 136–145)
SODIUM SERPL-SCNC: 139 MMOL/L (ref 136–145)
SODIUM SERPL-SCNC: 140 MMOL/L (ref 136–145)
SODIUM SERPL-SCNC: 141 MMOL/L (ref 136–145)
SODIUM SERPL-SCNC: 142 MMOL/L (ref 136–145)
SODIUM SERPL-SCNC: 143 MMOL/L (ref 136–145)
SODIUM SERPL-SCNC: 144 MMOL/L (ref 136–145)
SODIUM SERPL-SCNC: 144 MMOL/L (ref 136–145)
SODIUM SERPL-SCNC: 145 MMOL/L (ref 136–145)
SODIUM SERPL-SCNC: 145 MMOL/L (ref 136–145)
SODIUM SERPL-SCNC: 146 MMOL/L (ref 136–145)
SODIUM SERPL-SCNC: 147 MMOL/L (ref 136–145)
SODIUM SERPL-SCNC: 147 MMOL/L (ref 136–145)
SODIUM SERPL-SCNC: 148 MMOL/L (ref 136–145)
SODIUM SERPL-SCNC: 148 MMOL/L (ref 136–145)
SODIUM SERPL-SCNC: 149 MMOL/L (ref 136–145)
SODIUM SERPL-SCNC: 150 MMOL/L (ref 136–145)
SODIUM SERPL-SCNC: 154 MMOL/L (ref 136–145)
SODIUM SERPL-SCNC: 157 MMOL/L (ref 136–145)
SODIUM SERPL-SCNC: 157 MMOL/L (ref 136–145)
SODIUM SERPL-SCNC: 159 MMOL/L (ref 136–145)
SP GR UR STRIP: 1.02 (ref 1–1.03)
SPECIMEN STATUS: NORMAL
SPECIMEN VOL CSF: 1 ML
SPECIMEN VOL CSF: 2 ML
SPECIMEN VOL CSF: NORMAL ML
SQUAMOUS #/AREA URNS HPF: ABNORMAL /HPF
STRESS TARGET HR: 140 BPM
STRESS TARGET HR: 140 BPM
T4 SERPL-MCNC: 9.85 MCG/DL (ref 4.5–11.7)
TOTAL RATE: 22 BREATHS/MINUTE
TOTAL RATE: 23 BREATHS/MINUTE
TOTAL RATE: 25 BREATHS/MINUTE
TRICYCLICS UR QL SCN: POSITIVE
TRIGL SERPL-MCNC: 294 MG/DL (ref 0–150)
TRIGL SERPL-MCNC: 335 MG/DL (ref 0–150)
TROPONIN T SERPL-MCNC: 0.04 NG/ML (ref 0–0.03)
TROPONIN T SERPL-MCNC: <0.01 NG/ML (ref 0–0.03)
TSH SERPL DL<=0.05 MIU/L-ACNC: 2.29 UIU/ML (ref 0.27–4.2)
TUBE # CSF: 1
TUBE # CSF: 3
TUBE # CSF: 4
TUBE # CSF: 4
TUBE # CSF: NORMAL
UROBILINOGEN UR QL STRIP: ABNORMAL
VANCOMYCIN TROUGH SERPL-MCNC: 11.2 MCG/ML (ref 5–20)
VANCOMYCIN TROUGH SERPL-MCNC: 11.2 MCG/ML (ref 5–20)
VANCOMYCIN TROUGH SERPL-MCNC: 13.3 MCG/ML (ref 5–20)
VANCOMYCIN TROUGH SERPL-MCNC: 13.5 MCG/ML (ref 5–20)
VANCOMYCIN TROUGH SERPL-MCNC: 23.3 MCG/ML (ref 5–20)
VARIANT LYMPHS NFR BLD MANUAL: 24 % (ref 19.6–45.3)
VARIANT LYMPHS NFR BLD MANUAL: 30 % (ref 19.6–45.3)
VARIANT LYMPHS NFR BLD MANUAL: 32 % (ref 19.6–45.3)
VENTILATOR MODE: ABNORMAL
VENTILATOR MODE: AC
VLDLC SERPL-MCNC: 56 MG/DL (ref 5–40)
VT ON VENT VENT: 450 ML
VZV DNA CSF QL NAA+PROBE: NOT DETECTED
VZV IGM CSF-ACNC: 0 ISR
WBC # UR STRIP: ABNORMAL /HPF
WBC NRBC COR # BLD: 10.16 10*3/MM3 (ref 3.4–10.8)
WBC NRBC COR # BLD: 10.32 10*3/MM3 (ref 3.4–10.8)
WBC NRBC COR # BLD: 10.35 10*3/MM3 (ref 3.4–10.8)
WBC NRBC COR # BLD: 10.46 10*3/MM3 (ref 3.4–10.8)
WBC NRBC COR # BLD: 10.64 10*3/MM3 (ref 3.4–10.8)
WBC NRBC COR # BLD: 10.84 10*3/MM3 (ref 3.4–10.8)
WBC NRBC COR # BLD: 10.94 10*3/MM3 (ref 3.4–10.8)
WBC NRBC COR # BLD: 11.33 10*3/MM3 (ref 3.4–10.8)
WBC NRBC COR # BLD: 11.63 10*3/MM3 (ref 3.4–10.8)
WBC NRBC COR # BLD: 12.17 10*3/MM3 (ref 3.4–10.8)
WBC NRBC COR # BLD: 12.22 10*3/MM3 (ref 3.4–10.8)
WBC NRBC COR # BLD: 12.23 10*3/MM3 (ref 3.4–10.8)
WBC NRBC COR # BLD: 12.64 10*3/MM3 (ref 3.4–10.8)
WBC NRBC COR # BLD: 12.67 10*3/MM3 (ref 3.4–10.8)
WBC NRBC COR # BLD: 13.28 10*3/MM3 (ref 3.4–10.8)
WBC NRBC COR # BLD: 13.33 10*3/MM3 (ref 3.4–10.8)
WBC NRBC COR # BLD: 14.09 10*3/MM3 (ref 3.4–10.8)
WBC NRBC COR # BLD: 14.32 10*3/MM3 (ref 3.4–10.8)
WBC NRBC COR # BLD: 16.23 10*3/MM3 (ref 3.4–10.8)
WBC NRBC COR # BLD: 18.98 10*3/MM3 (ref 3.4–10.8)
WBC NRBC COR # BLD: 2.23 10*3/MM3 (ref 3.4–10.8)
WBC NRBC COR # BLD: 2.74 10*3/MM3 (ref 3.4–10.8)
WBC NRBC COR # BLD: 20.85 10*3/MM3 (ref 3.4–10.8)
WBC NRBC COR # BLD: 21.66 10*3/MM3 (ref 3.4–10.8)
WBC NRBC COR # BLD: 21.99 10*3/MM3 (ref 3.4–10.8)
WBC NRBC COR # BLD: 22.7 10*3/MM3 (ref 3.4–10.8)
WBC NRBC COR # BLD: 3.62 10*3/MM3 (ref 3.4–10.8)
WBC NRBC COR # BLD: 3.86 10*3/MM3 (ref 3.4–10.8)
WBC NRBC COR # BLD: 4.36 10*3/MM3 (ref 3.4–10.8)
WBC NRBC COR # BLD: 4.91 10*3/MM3 (ref 3.4–10.8)
WBC NRBC COR # BLD: 7.9 10*3/MM3 (ref 3.4–10.8)
WBC NRBC COR # BLD: 8.16 10*3/MM3 (ref 3.4–10.8)
WBC NRBC COR # BLD: 8.28 10*3/MM3 (ref 3.4–10.8)
WBC NRBC COR # BLD: 8.73 10*3/MM3 (ref 3.4–10.8)
WBC NRBC COR # BLD: 8.75 10*3/MM3 (ref 3.4–10.8)
WBC NRBC COR # BLD: 9.1 10*3/MM3 (ref 3.4–10.8)
WBC NRBC COR # BLD: 9.27 10*3/MM3 (ref 3.4–10.8)
WBC NRBC COR # BLD: 9.94 10*3/MM3 (ref 3.4–10.8)
WHOLE BLOOD HOLD SPECIMEN: NORMAL
WNV IGG SPEC QL IA: NORMAL
WNV IGM CSF QL IA: NORMAL

## 2022-01-01 PROCEDURE — 82040 ASSAY OF SERUM ALBUMIN: CPT | Performed by: INTERNAL MEDICINE

## 2022-01-01 PROCEDURE — 84484 ASSAY OF TROPONIN QUANT: CPT | Performed by: INTERNAL MEDICINE

## 2022-01-01 PROCEDURE — 94799 UNLISTED PULMONARY SVC/PX: CPT

## 2022-01-01 PROCEDURE — 85730 THROMBOPLASTIN TIME PARTIAL: CPT | Performed by: INTERNAL MEDICINE

## 2022-01-01 PROCEDURE — 63710000001 INSULIN DETEMIR PER 5 UNITS: Performed by: INTERNAL MEDICINE

## 2022-01-01 PROCEDURE — 0 CEFAZOLIN IN DEXTROSE 2-4 GM/100ML-% SOLUTION: Performed by: INTERNAL MEDICINE

## 2022-01-01 PROCEDURE — 86140 C-REACTIVE PROTEIN: CPT | Performed by: EMERGENCY MEDICINE

## 2022-01-01 PROCEDURE — 70496 CT ANGIOGRAPHY HEAD: CPT | Performed by: RADIOLOGY

## 2022-01-01 PROCEDURE — 82962 GLUCOSE BLOOD TEST: CPT

## 2022-01-01 PROCEDURE — 25010000002 MORPHINE PER 10 MG: Performed by: INTERNAL MEDICINE

## 2022-01-01 PROCEDURE — 96372 THER/PROPH/DIAG INJ SC/IM: CPT

## 2022-01-01 PROCEDURE — 25010000002 ENOXAPARIN PER 10 MG: Performed by: INTERNAL MEDICINE

## 2022-01-01 PROCEDURE — 96366 THER/PROPH/DIAG IV INF ADDON: CPT

## 2022-01-01 PROCEDURE — 87075 CULTR BACTERIA EXCEPT BLOOD: CPT | Performed by: INTERNAL MEDICINE

## 2022-01-01 PROCEDURE — 36226 PLACE CATH VERTEBRAL ART: CPT | Performed by: RADIOLOGY

## 2022-01-01 PROCEDURE — 86255 FLUORESCENT ANTIBODY SCREEN: CPT | Performed by: INTERNAL MEDICINE

## 2022-01-01 PROCEDURE — 99221 1ST HOSP IP/OBS SF/LOW 40: CPT | Performed by: INTERNAL MEDICINE

## 2022-01-01 PROCEDURE — 85025 COMPLETE CBC W/AUTO DIFF WBC: CPT | Performed by: INTERNAL MEDICINE

## 2022-01-01 PROCEDURE — 25010000002 LORAZEPAM PER 2 MG: Performed by: INTERNAL MEDICINE

## 2022-01-01 PROCEDURE — 84100 ASSAY OF PHOSPHORUS: CPT | Performed by: INTERNAL MEDICINE

## 2022-01-01 PROCEDURE — 84478 ASSAY OF TRIGLYCERIDES: CPT | Performed by: INTERNAL MEDICINE

## 2022-01-01 PROCEDURE — 25010000002 HYDROMORPHONE PER 4 MG: Performed by: INTERNAL MEDICINE

## 2022-01-01 PROCEDURE — 82375 ASSAY CARBOXYHB QUANT: CPT

## 2022-01-01 PROCEDURE — 99153 MOD SED SAME PHYS/QHP EA: CPT

## 2022-01-01 PROCEDURE — 25010000002 ZIPRASIDONE MESYLATE PER 10 MG: Performed by: STUDENT IN AN ORGANIZED HEALTH CARE EDUCATION/TRAINING PROGRAM

## 2022-01-01 PROCEDURE — 0 LIDOCAINE 1 % SOLUTION: Performed by: RADIOLOGY

## 2022-01-01 PROCEDURE — 71045 X-RAY EXAM CHEST 1 VIEW: CPT

## 2022-01-01 PROCEDURE — 0 METHYLPREDNISOLONE PER 125 MG: Performed by: PSYCHIATRY & NEUROLOGY

## 2022-01-01 PROCEDURE — 81374 HLA I TYPING 1 ANTIGEN LR: CPT | Performed by: INTERNAL MEDICINE

## 2022-01-01 PROCEDURE — 83735 ASSAY OF MAGNESIUM: CPT | Performed by: INTERNAL MEDICINE

## 2022-01-01 PROCEDURE — 94760 N-INVAS EAR/PLS OXIMETRY 1: CPT

## 2022-01-01 PROCEDURE — 96375 TX/PRO/DX INJ NEW DRUG ADDON: CPT

## 2022-01-01 PROCEDURE — 88108 CYTOPATH CONCENTRATE TECH: CPT | Performed by: PSYCHIATRY & NEUROLOGY

## 2022-01-01 PROCEDURE — 25010000002 THIAMINE PER 100 MG: Performed by: INTERNAL MEDICINE

## 2022-01-01 PROCEDURE — 80069 RENAL FUNCTION PANEL: CPT | Performed by: INTERNAL MEDICINE

## 2022-01-01 PROCEDURE — 36600 WITHDRAWAL OF ARTERIAL BLOOD: CPT

## 2022-01-01 PROCEDURE — 25010000002 CEFEPIME PER 500 MG: Performed by: INTERNAL MEDICINE

## 2022-01-01 PROCEDURE — 71250 CT THORAX DX C-: CPT

## 2022-01-01 PROCEDURE — 80202 ASSAY OF VANCOMYCIN: CPT | Performed by: INTERNAL MEDICINE

## 2022-01-01 PROCEDURE — 93010 ELECTROCARDIOGRAM REPORT: CPT | Performed by: INTERNAL MEDICINE

## 2022-01-01 PROCEDURE — 99233 SBSQ HOSP IP/OBS HIGH 50: CPT | Performed by: PSYCHIATRY & NEUROLOGY

## 2022-01-01 PROCEDURE — 93005 ELECTROCARDIOGRAM TRACING: CPT | Performed by: INTERNAL MEDICINE

## 2022-01-01 PROCEDURE — 94003 VENT MGMT INPAT SUBQ DAY: CPT

## 2022-01-01 PROCEDURE — 84157 ASSAY OF PROTEIN OTHER: CPT | Performed by: INTERNAL MEDICINE

## 2022-01-01 PROCEDURE — 86255 FLUORESCENT ANTIBODY SCREEN: CPT | Performed by: PSYCHIATRY & NEUROLOGY

## 2022-01-01 PROCEDURE — 25010000002 HEPARIN (PORCINE) PER 1000 UNITS: Performed by: INTERNAL MEDICINE

## 2022-01-01 PROCEDURE — 25010000002 ZIPRASIDONE MESYLATE PER 10 MG: Performed by: EMERGENCY MEDICINE

## 2022-01-01 PROCEDURE — 71045 X-RAY EXAM CHEST 1 VIEW: CPT | Performed by: RADIOLOGY

## 2022-01-01 PROCEDURE — 83050 HGB METHEMOGLOBIN QUAN: CPT

## 2022-01-01 PROCEDURE — 25010000002 FENTANYL CITRATE (PF) 50 MCG/ML SOLUTION: Performed by: INTERNAL MEDICINE

## 2022-01-01 PROCEDURE — 94761 N-INVAS EAR/PLS OXIMETRY MLT: CPT

## 2022-01-01 PROCEDURE — 25010000002 VANCOMYCIN 5 G RECONSTITUTED SOLUTION: Performed by: INTERNAL MEDICINE

## 2022-01-01 PROCEDURE — 25010000002 HYDROMORPHONE 1 MG/ML SOLUTION: Performed by: INTERNAL MEDICINE

## 2022-01-01 PROCEDURE — 25010000002 LEVETRIRACETAM PER 10 MG: Performed by: PSYCHIATRY & NEUROLOGY

## 2022-01-01 PROCEDURE — 85610 PROTHROMBIN TIME: CPT | Performed by: STUDENT IN AN ORGANIZED HEALTH CARE EDUCATION/TRAINING PROGRAM

## 2022-01-01 PROCEDURE — 85379 FIBRIN DEGRADATION QUANT: CPT | Performed by: INTERNAL MEDICINE

## 2022-01-01 PROCEDURE — 82945 GLUCOSE OTHER FLUID: CPT | Performed by: PSYCHIATRY & NEUROLOGY

## 2022-01-01 PROCEDURE — 86140 C-REACTIVE PROTEIN: CPT | Performed by: INTERNAL MEDICINE

## 2022-01-01 PROCEDURE — 87186 SC STD MICRODIL/AGAR DIL: CPT | Performed by: SURGERY

## 2022-01-01 PROCEDURE — 99233 SBSQ HOSP IP/OBS HIGH 50: CPT | Performed by: INTERNAL MEDICINE

## 2022-01-01 PROCEDURE — 99233 SBSQ HOSP IP/OBS HIGH 50: CPT | Performed by: NURSE PRACTITIONER

## 2022-01-01 PROCEDURE — 82805 BLOOD GASES W/O2 SATURATION: CPT

## 2022-01-01 PROCEDURE — 99232 SBSQ HOSP IP/OBS MODERATE 35: CPT | Performed by: PSYCHIATRY & NEUROLOGY

## 2022-01-01 PROCEDURE — 99231 SBSQ HOSP IP/OBS SF/LOW 25: CPT | Performed by: INTERNAL MEDICINE

## 2022-01-01 PROCEDURE — 31500 INSERT EMERGENCY AIRWAY: CPT

## 2022-01-01 PROCEDURE — 87636 SARSCOV2 & INF A&B AMP PRB: CPT | Performed by: STUDENT IN AN ORGANIZED HEALTH CARE EDUCATION/TRAINING PROGRAM

## 2022-01-01 PROCEDURE — 80202 ASSAY OF VANCOMYCIN: CPT | Performed by: EMERGENCY MEDICINE

## 2022-01-01 PROCEDURE — 84157 ASSAY OF PROTEIN OTHER: CPT | Performed by: EMERGENCY MEDICINE

## 2022-01-01 PROCEDURE — 63710000001 INSULIN ASPART PER 5 UNITS: Performed by: INTERNAL MEDICINE

## 2022-01-01 PROCEDURE — 82945 GLUCOSE OTHER FLUID: CPT | Performed by: EMERGENCY MEDICINE

## 2022-01-01 PROCEDURE — 0BH17EZ INSERTION OF ENDOTRACHEAL AIRWAY INTO TRACHEA, VIA NATURAL OR ARTIFICIAL OPENING: ICD-10-PCS | Performed by: INTERNAL MEDICINE

## 2022-01-01 PROCEDURE — 25010000002 HEPARIN (PORCINE) PER 1000 UNITS: Performed by: HOSPITALIST

## 2022-01-01 PROCEDURE — 25010000002 VANCOMYCIN 5 G RECONSTITUTED SOLUTION: Performed by: EMERGENCY MEDICINE

## 2022-01-01 PROCEDURE — 80053 COMPREHEN METABOLIC PANEL: CPT | Performed by: EMERGENCY MEDICINE

## 2022-01-01 PROCEDURE — 25010000002 METHYLPREDNISOLONE PER 40 MG: Performed by: INTERNAL MEDICINE

## 2022-01-01 PROCEDURE — 25010000002 PROPOFOL 10 MG/ML EMULSION: Performed by: INTERNAL MEDICINE

## 2022-01-01 PROCEDURE — 99232 SBSQ HOSP IP/OBS MODERATE 35: CPT | Performed by: NURSE PRACTITIONER

## 2022-01-01 PROCEDURE — 80053 COMPREHEN METABOLIC PANEL: CPT | Performed by: INTERNAL MEDICINE

## 2022-01-01 PROCEDURE — 78300 BONE IMAGING LIMITED AREA: CPT

## 2022-01-01 PROCEDURE — 25010000002 LORAZEPAM PER 2 MG: Performed by: PSYCHIATRY & NEUROLOGY

## 2022-01-01 PROCEDURE — 25010000002 HEPARIN (PORCINE) 25000-0.45 UT/250ML-% SOLUTION: Performed by: INTERNAL MEDICINE

## 2022-01-01 PROCEDURE — 87205 SMEAR GRAM STAIN: CPT | Performed by: SURGERY

## 2022-01-01 PROCEDURE — 25010000002 HYDRALAZINE PER 20 MG: Performed by: INTERNAL MEDICINE

## 2022-01-01 PROCEDURE — 63710000001 INSULIN LISPRO (HUMAN) PER 5 UNITS: Performed by: INTERNAL MEDICINE

## 2022-01-01 PROCEDURE — 99291 CRITICAL CARE FIRST HOUR: CPT | Performed by: INTERNAL MEDICINE

## 2022-01-01 PROCEDURE — 95816 EEG AWAKE AND DROWSY: CPT

## 2022-01-01 PROCEDURE — 0 TECHNETIUM OXIDRONATE KIT: Performed by: INTERNAL MEDICINE

## 2022-01-01 PROCEDURE — 87102 FUNGUS ISOLATION CULTURE: CPT | Performed by: PSYCHIATRY & NEUROLOGY

## 2022-01-01 PROCEDURE — 99232 SBSQ HOSP IP/OBS MODERATE 35: CPT | Performed by: SURGERY

## 2022-01-01 PROCEDURE — 85027 COMPLETE CBC AUTOMATED: CPT | Performed by: INTERNAL MEDICINE

## 2022-01-01 PROCEDURE — U0004 COV-19 TEST NON-CDC HGH THRU: HCPCS | Performed by: INTERNAL MEDICINE

## 2022-01-01 PROCEDURE — B318YZZ FLUOROSCOPY OF BILATERAL INTERNAL CAROTID ARTERIES USING OTHER CONTRAST: ICD-10-PCS | Performed by: INTERNAL MEDICINE

## 2022-01-01 PROCEDURE — 70553 MRI BRAIN STEM W/O & W/DYE: CPT

## 2022-01-01 PROCEDURE — 84157 ASSAY OF PROTEIN OTHER: CPT | Performed by: PSYCHIATRY & NEUROLOGY

## 2022-01-01 PROCEDURE — 87205 SMEAR GRAM STAIN: CPT | Performed by: INTERNAL MEDICINE

## 2022-01-01 PROCEDURE — 25010000002 MAGNESIUM SULFATE 2 GM/50ML SOLUTION: Performed by: INTERNAL MEDICINE

## 2022-01-01 PROCEDURE — 70544 MR ANGIOGRAPHY HEAD W/O DYE: CPT

## 2022-01-01 PROCEDURE — 74018 RADEX ABDOMEN 1 VIEW: CPT

## 2022-01-01 PROCEDURE — 83605 ASSAY OF LACTIC ACID: CPT | Performed by: INTERNAL MEDICINE

## 2022-01-01 PROCEDURE — 25010000002 REMDESIVIR 100 MG/20ML SOLUTION 1 EACH VIAL: Performed by: INTERNAL MEDICINE

## 2022-01-01 PROCEDURE — 94640 AIRWAY INHALATION TREATMENT: CPT

## 2022-01-01 PROCEDURE — 0 POTASSIUM CHLORIDE 10 MEQ/100ML SOLUTION: Performed by: INTERNAL MEDICINE

## 2022-01-01 PROCEDURE — 70450 CT HEAD/BRAIN W/O DYE: CPT | Performed by: RADIOLOGY

## 2022-01-01 PROCEDURE — 86235 NUCLEAR ANTIGEN ANTIBODY: CPT | Performed by: INTERNAL MEDICINE

## 2022-01-01 PROCEDURE — 95816 EEG AWAKE AND DROWSY: CPT | Performed by: STUDENT IN AN ORGANIZED HEALTH CARE EDUCATION/TRAINING PROGRAM

## 2022-01-01 PROCEDURE — 73630 X-RAY EXAM OF FOOT: CPT | Performed by: RADIOLOGY

## 2022-01-01 PROCEDURE — 84132 ASSAY OF SERUM POTASSIUM: CPT | Performed by: INTERNAL MEDICINE

## 2022-01-01 PROCEDURE — 25010000002 PHENYLEPHRINE 10 MG/ML SOLUTION: Performed by: HOSPITALIST

## 2022-01-01 PROCEDURE — 99233 SBSQ HOSP IP/OBS HIGH 50: CPT | Performed by: STUDENT IN AN ORGANIZED HEALTH CARE EDUCATION/TRAINING PROGRAM

## 2022-01-01 PROCEDURE — 70450 CT HEAD/BRAIN W/O DYE: CPT

## 2022-01-01 PROCEDURE — 82140 ASSAY OF AMMONIA: CPT | Performed by: EMERGENCY MEDICINE

## 2022-01-01 PROCEDURE — 25010000002 CEFTRIAXONE PER 250 MG: Performed by: INTERNAL MEDICINE

## 2022-01-01 PROCEDURE — 86200 CCP ANTIBODY: CPT | Performed by: INTERNAL MEDICINE

## 2022-01-01 PROCEDURE — 83519 RIA NONANTIBODY: CPT | Performed by: PSYCHIATRY & NEUROLOGY

## 2022-01-01 PROCEDURE — 82803 BLOOD GASES ANY COMBINATION: CPT

## 2022-01-01 PROCEDURE — 80306 DRUG TEST PRSMV INSTRMNT: CPT | Performed by: STUDENT IN AN ORGANIZED HEALTH CARE EDUCATION/TRAINING PROGRAM

## 2022-01-01 PROCEDURE — 85007 BL SMEAR W/DIFF WBC COUNT: CPT | Performed by: INTERNAL MEDICINE

## 2022-01-01 PROCEDURE — 25010000002 FUROSEMIDE PER 20 MG: Performed by: INTERNAL MEDICINE

## 2022-01-01 PROCEDURE — C9803 HOPD COVID-19 SPEC COLLECT: HCPCS | Performed by: INTERNAL MEDICINE

## 2022-01-01 PROCEDURE — 84182 PROTEIN WESTERN BLOT TEST: CPT | Performed by: INTERNAL MEDICINE

## 2022-01-01 PROCEDURE — 83735 ASSAY OF MAGNESIUM: CPT | Performed by: HOSPITALIST

## 2022-01-01 PROCEDURE — 99291 CRITICAL CARE FIRST HOUR: CPT | Performed by: HOSPITALIST

## 2022-01-01 PROCEDURE — 25010000002 LEVETIRACETAM IN NACL 0.82% 500 MG/100ML SOLUTION: Performed by: INTERNAL MEDICINE

## 2022-01-01 PROCEDURE — C1751 CATH, INF, PER/CENT/MIDLINE: HCPCS

## 2022-01-01 PROCEDURE — 87070 CULTURE OTHR SPECIMN AEROBIC: CPT | Performed by: INTERNAL MEDICINE

## 2022-01-01 PROCEDURE — 25010000002 PROPOFOL 10 MG/ML EMULSION: Performed by: HOSPITALIST

## 2022-01-01 PROCEDURE — 25010000002 MIDAZOLAM PER 1 MG: Performed by: INTERNAL MEDICINE

## 2022-01-01 PROCEDURE — 25010000002 MEROPENEM PER 100 MG: Performed by: STUDENT IN AN ORGANIZED HEALTH CARE EDUCATION/TRAINING PROGRAM

## 2022-01-01 PROCEDURE — 87206 SMEAR FLUORESCENT/ACID STAI: CPT | Performed by: PSYCHIATRY & NEUROLOGY

## 2022-01-01 PROCEDURE — 87040 BLOOD CULTURE FOR BACTERIA: CPT | Performed by: STUDENT IN AN ORGANIZED HEALTH CARE EDUCATION/TRAINING PROGRAM

## 2022-01-01 PROCEDURE — P9612 CATHETERIZE FOR URINE SPEC: HCPCS

## 2022-01-01 PROCEDURE — 99222 1ST HOSP IP/OBS MODERATE 55: CPT | Performed by: SPECIALIST

## 2022-01-01 PROCEDURE — 99232 SBSQ HOSP IP/OBS MODERATE 35: CPT | Performed by: INTERNAL MEDICINE

## 2022-01-01 PROCEDURE — 87040 BLOOD CULTURE FOR BACTERIA: CPT | Performed by: INTERNAL MEDICINE

## 2022-01-01 PROCEDURE — 85730 THROMBOPLASTIN TIME PARTIAL: CPT | Performed by: HOSPITALIST

## 2022-01-01 PROCEDURE — 93321 DOPPLER ECHO F-UP/LMTD STD: CPT | Performed by: SPECIALIST

## 2022-01-01 PROCEDURE — 80053 COMPREHEN METABOLIC PANEL: CPT | Performed by: HOSPITALIST

## 2022-01-01 PROCEDURE — 84145 PROCALCITONIN (PCT): CPT | Performed by: INTERNAL MEDICINE

## 2022-01-01 PROCEDURE — 87483 CNS DNA AMP PROBE TYPE 12-25: CPT | Performed by: INTERNAL MEDICINE

## 2022-01-01 PROCEDURE — 83916 OLIGOCLONAL BANDS: CPT | Performed by: PSYCHIATRY & NEUROLOGY

## 2022-01-01 PROCEDURE — 99222 1ST HOSP IP/OBS MODERATE 55: CPT | Performed by: INTERNAL MEDICINE

## 2022-01-01 PROCEDURE — A9561 TC99M OXIDRONATE: HCPCS | Performed by: INTERNAL MEDICINE

## 2022-01-01 PROCEDURE — 99232 SBSQ HOSP IP/OBS MODERATE 35: CPT | Performed by: SPECIALIST

## 2022-01-01 PROCEDURE — 63710000001 INSULIN ASPART PER 5 UNITS: Performed by: HOSPITALIST

## 2022-01-01 PROCEDURE — C1894 INTRO/SHEATH, NON-LASER: HCPCS

## 2022-01-01 PROCEDURE — 70553 MRI BRAIN STEM W/O & W/DYE: CPT | Performed by: RADIOLOGY

## 2022-01-01 PROCEDURE — 71250 CT THORAX DX C-: CPT | Performed by: RADIOLOGY

## 2022-01-01 PROCEDURE — 86038 ANTINUCLEAR ANTIBODIES: CPT | Performed by: INTERNAL MEDICINE

## 2022-01-01 PROCEDURE — 0 CEFAZOLIN SODIUM-DEXTROSE 2-3 GM-%(50ML) RECONSTITUTED SOLUTION: Performed by: INTERNAL MEDICINE

## 2022-01-01 PROCEDURE — 85610 PROTHROMBIN TIME: CPT | Performed by: INTERNAL MEDICINE

## 2022-01-01 PROCEDURE — A9577 INJ MULTIHANCE: HCPCS | Performed by: INTERNAL MEDICINE

## 2022-01-01 PROCEDURE — 87483 CNS DNA AMP PROBE TYPE 12-25: CPT | Performed by: PSYCHIATRY & NEUROLOGY

## 2022-01-01 PROCEDURE — 84484 ASSAY OF TROPONIN QUANT: CPT | Performed by: HOSPITALIST

## 2022-01-01 PROCEDURE — 87070 CULTURE OTHR SPECIMN AEROBIC: CPT | Performed by: EMERGENCY MEDICINE

## 2022-01-01 PROCEDURE — 82784 ASSAY IGA/IGD/IGG/IGM EACH: CPT | Performed by: INTERNAL MEDICINE

## 2022-01-01 PROCEDURE — 86037 ANCA TITER EACH ANTIBODY: CPT | Performed by: INTERNAL MEDICINE

## 2022-01-01 PROCEDURE — 86753 PROTOZOA ANTIBODY NOS: CPT | Performed by: EMERGENCY MEDICINE

## 2022-01-01 PROCEDURE — 86317 IMMUNOASSAY INFECTIOUS AGENT: CPT | Performed by: INTERNAL MEDICINE

## 2022-01-01 PROCEDURE — 99283 EMERGENCY DEPT VISIT LOW MDM: CPT | Performed by: PSYCHIATRY & NEUROLOGY

## 2022-01-01 PROCEDURE — 96367 TX/PROPH/DG ADDL SEQ IV INF: CPT

## 2022-01-01 PROCEDURE — 93923 UPR/LXTR ART STDY 3+ LVLS: CPT

## 2022-01-01 PROCEDURE — 0 POTASSIUM CHLORIDE PER 2 MEQ: Performed by: INTERNAL MEDICINE

## 2022-01-01 PROCEDURE — 80202 ASSAY OF VANCOMYCIN: CPT | Performed by: HOSPITALIST

## 2022-01-01 PROCEDURE — 25010000002 HALOPERIDOL LACTATE PER 5 MG: Performed by: PSYCHIATRY & NEUROLOGY

## 2022-01-01 PROCEDURE — 83605 ASSAY OF LACTIC ACID: CPT | Performed by: HOSPITALIST

## 2022-01-01 PROCEDURE — 87070 CULTURE OTHR SPECIMN AEROBIC: CPT | Performed by: SURGERY

## 2022-01-01 PROCEDURE — B31GYZZ FLUOROSCOPY OF BILATERAL VERTEBRAL ARTERIES USING OTHER CONTRAST: ICD-10-PCS | Performed by: INTERNAL MEDICINE

## 2022-01-01 PROCEDURE — 95819 EEG AWAKE AND ASLEEP: CPT

## 2022-01-01 PROCEDURE — 62328 DX LMBR SPI PNXR W/FLUOR/CT: CPT | Performed by: RADIOLOGY

## 2022-01-01 PROCEDURE — 87640 STAPH A DNA AMP PROBE: CPT | Performed by: INTERNAL MEDICINE

## 2022-01-01 PROCEDURE — 63710000001 PREDNISONE PER 1 MG: Performed by: INTERNAL MEDICINE

## 2022-01-01 PROCEDURE — 25010000002 DIPHENHYDRAMINE PER 50 MG

## 2022-01-01 PROCEDURE — 99152 MOD SED SAME PHYS/QHP 5/>YRS: CPT

## 2022-01-01 PROCEDURE — 86162 COMPLEMENT TOTAL (CH50): CPT | Performed by: INTERNAL MEDICINE

## 2022-01-01 PROCEDURE — 81001 URINALYSIS AUTO W/SCOPE: CPT | Performed by: STUDENT IN AN ORGANIZED HEALTH CARE EDUCATION/TRAINING PROGRAM

## 2022-01-01 PROCEDURE — 99239 HOSP IP/OBS DSCHRG MGMT >30: CPT | Performed by: INTERNAL MEDICINE

## 2022-01-01 PROCEDURE — 87899 AGENT NOS ASSAY W/OPTIC: CPT | Performed by: INTERNAL MEDICINE

## 2022-01-01 PROCEDURE — 009U3ZX DRAINAGE OF SPINAL CANAL, PERCUTANEOUS APPROACH, DIAGNOSTIC: ICD-10-PCS | Performed by: PSYCHIATRY & NEUROLOGY

## 2022-01-01 PROCEDURE — 87086 URINE CULTURE/COLONY COUNT: CPT | Performed by: STUDENT IN AN ORGANIZED HEALTH CARE EDUCATION/TRAINING PROGRAM

## 2022-01-01 PROCEDURE — 95720 EEG PHY/QHP EA INCR W/VEEG: CPT | Performed by: STUDENT IN AN ORGANIZED HEALTH CARE EDUCATION/TRAINING PROGRAM

## 2022-01-01 PROCEDURE — 86160 COMPLEMENT ANTIGEN: CPT | Performed by: INTERNAL MEDICINE

## 2022-01-01 PROCEDURE — 93321 DOPPLER ECHO F-UP/LMTD STD: CPT

## 2022-01-01 PROCEDURE — 93308 TTE F-UP OR LMTD: CPT

## 2022-01-01 PROCEDURE — 86618 LYME DISEASE ANTIBODY: CPT | Performed by: EMERGENCY MEDICINE

## 2022-01-01 PROCEDURE — XW033E5 INTRODUCTION OF REMDESIVIR ANTI-INFECTIVE INTO PERIPHERAL VEIN, PERCUTANEOUS APPROACH, NEW TECHNOLOGY GROUP 5: ICD-10-PCS | Performed by: INTERNAL MEDICINE

## 2022-01-01 PROCEDURE — 25010000002 MORPHINE SULFATE (PF) 2 MG/ML SOLUTION: Performed by: INTERNAL MEDICINE

## 2022-01-01 PROCEDURE — 25010000002 MIDAZOLAM PER 1 MG: Performed by: RADIOLOGY

## 2022-01-01 PROCEDURE — 80050 GENERAL HEALTH PANEL: CPT | Performed by: STUDENT IN AN ORGANIZED HEALTH CARE EDUCATION/TRAINING PROGRAM

## 2022-01-01 PROCEDURE — 87015 SPECIMEN INFECT AGNT CONCNTJ: CPT | Performed by: PSYCHIATRY & NEUROLOGY

## 2022-01-01 PROCEDURE — 93308 TTE F-UP OR LMTD: CPT | Performed by: SPECIALIST

## 2022-01-01 PROCEDURE — 99222 1ST HOSP IP/OBS MODERATE 55: CPT | Performed by: NURSE PRACTITIONER

## 2022-01-01 PROCEDURE — 93923 UPR/LXTR ART STDY 3+ LVLS: CPT | Performed by: RADIOLOGY

## 2022-01-01 PROCEDURE — 87070 CULTURE OTHR SPECIMN AEROBIC: CPT | Performed by: PSYCHIATRY & NEUROLOGY

## 2022-01-01 PROCEDURE — 99223 1ST HOSP IP/OBS HIGH 75: CPT | Performed by: INTERNAL MEDICINE

## 2022-01-01 PROCEDURE — 70496 CT ANGIOGRAPHY HEAD: CPT

## 2022-01-01 PROCEDURE — 0 IODIXANOL PER 1 ML: Performed by: INTERNAL MEDICINE

## 2022-01-01 PROCEDURE — 25010000002 IOPAMIDOL 61 % SOLUTION: Performed by: INTERNAL MEDICINE

## 2022-01-01 PROCEDURE — 86757 RICKETTSIA ANTIBODY: CPT | Performed by: EMERGENCY MEDICINE

## 2022-01-01 PROCEDURE — 86788 WEST NILE VIRUS AB IGM: CPT | Performed by: INTERNAL MEDICINE

## 2022-01-01 PROCEDURE — 63710000001 INSULIN REGULAR HUMAN PER 5 UNITS: Performed by: INTERNAL MEDICINE

## 2022-01-01 PROCEDURE — 25010000002 PROPOFOL 10 MG/ML EMULSION

## 2022-01-01 PROCEDURE — 25010000002 FUROSEMIDE PER 20 MG

## 2022-01-01 PROCEDURE — 86341 ISLET CELL ANTIBODY: CPT | Performed by: PSYCHIATRY & NEUROLOGY

## 2022-01-01 PROCEDURE — 99231 SBSQ HOSP IP/OBS SF/LOW 25: CPT | Performed by: NURSE PRACTITIONER

## 2022-01-01 PROCEDURE — 0 POTASSIUM CHLORIDE 10 MEQ/100ML SOLUTION: Performed by: NURSE PRACTITIONER

## 2022-01-01 PROCEDURE — 70498 CT ANGIOGRAPHY NECK: CPT | Performed by: RADIOLOGY

## 2022-01-01 PROCEDURE — 85652 RBC SED RATE AUTOMATED: CPT | Performed by: INTERNAL MEDICINE

## 2022-01-01 PROCEDURE — 80061 LIPID PANEL: CPT | Performed by: PSYCHIATRY & NEUROLOGY

## 2022-01-01 PROCEDURE — 82077 ASSAY SPEC XCP UR&BREATH IA: CPT | Performed by: STUDENT IN AN ORGANIZED HEALTH CARE EDUCATION/TRAINING PROGRAM

## 2022-01-01 PROCEDURE — 25010000002 FUROSEMIDE PER 20 MG: Performed by: NURSE PRACTITIONER

## 2022-01-01 PROCEDURE — 85730 THROMBOPLASTIN TIME PARTIAL: CPT | Performed by: STUDENT IN AN ORGANIZED HEALTH CARE EDUCATION/TRAINING PROGRAM

## 2022-01-01 PROCEDURE — C1769 GUIDE WIRE: HCPCS

## 2022-01-01 PROCEDURE — 89050 BODY FLUID CELL COUNT: CPT | Performed by: EMERGENCY MEDICINE

## 2022-01-01 PROCEDURE — 85060 BLOOD SMEAR INTERPRETATION: CPT | Performed by: INTERNAL MEDICINE

## 2022-01-01 PROCEDURE — 86592 SYPHILIS TEST NON-TREP QUAL: CPT | Performed by: INTERNAL MEDICINE

## 2022-01-01 PROCEDURE — 89051 BODY FLUID CELL COUNT: CPT | Performed by: INTERNAL MEDICINE

## 2022-01-01 PROCEDURE — 86787 VARICELLA-ZOSTER ANTIBODY: CPT | Performed by: PSYCHIATRY & NEUROLOGY

## 2022-01-01 PROCEDURE — 82042 OTHER SOURCE ALBUMIN QUAN EA: CPT | Performed by: INTERNAL MEDICINE

## 2022-01-01 PROCEDURE — 78300 BONE IMAGING LIMITED AREA: CPT | Performed by: RADIOLOGY

## 2022-01-01 PROCEDURE — 86431 RHEUMATOID FACTOR QUANT: CPT | Performed by: INTERNAL MEDICINE

## 2022-01-01 PROCEDURE — 96361 HYDRATE IV INFUSION ADD-ON: CPT

## 2022-01-01 PROCEDURE — 83605 ASSAY OF LACTIC ACID: CPT | Performed by: STUDENT IN AN ORGANIZED HEALTH CARE EDUCATION/TRAINING PROGRAM

## 2022-01-01 PROCEDURE — 99291 CRITICAL CARE FIRST HOUR: CPT | Performed by: PSYCHIATRY & NEUROLOGY

## 2022-01-01 PROCEDURE — 87498 ENTEROVIRUS PROBE&REVRS TRNS: CPT | Performed by: INTERNAL MEDICINE

## 2022-01-01 PROCEDURE — 25010000002 MIDAZOLAM PER 1 MG

## 2022-01-01 PROCEDURE — 25010000002 MEROPENEM PER 100 MG: Performed by: EMERGENCY MEDICINE

## 2022-01-01 PROCEDURE — 36224 PLACE CATH CAROTD ART: CPT | Performed by: RADIOLOGY

## 2022-01-01 PROCEDURE — 25010000002 LORAZEPAM PER 2 MG

## 2022-01-01 PROCEDURE — 99231 SBSQ HOSP IP/OBS SF/LOW 25: CPT | Performed by: SURGERY

## 2022-01-01 PROCEDURE — 83873 ASSAY OF CSF PROTEIN: CPT | Performed by: INTERNAL MEDICINE

## 2022-01-01 PROCEDURE — 0HBKXZX EXCISION OF RIGHT LOWER LEG SKIN, EXTERNAL APPROACH, DIAGNOSTIC: ICD-10-PCS | Performed by: SURGERY

## 2022-01-01 PROCEDURE — 25010000002 VANCOMYCIN 5 G RECONSTITUTED SOLUTION: Performed by: HOSPITALIST

## 2022-01-01 PROCEDURE — 93005 ELECTROCARDIOGRAM TRACING: CPT | Performed by: EMERGENCY MEDICINE

## 2022-01-01 PROCEDURE — 87147 CULTURE TYPE IMMUNOLOGIC: CPT | Performed by: SURGERY

## 2022-01-01 PROCEDURE — 25010000002 LEVOFLOXACIN PER 250 MG: Performed by: INTERNAL MEDICINE

## 2022-01-01 PROCEDURE — 5A1955Z RESPIRATORY VENTILATION, GREATER THAN 96 CONSECUTIVE HOURS: ICD-10-PCS | Performed by: INTERNAL MEDICINE

## 2022-01-01 PROCEDURE — 84132 ASSAY OF SERUM POTASSIUM: CPT | Performed by: HOSPITALIST

## 2022-01-01 PROCEDURE — 86140 C-REACTIVE PROTEIN: CPT | Performed by: HOSPITALIST

## 2022-01-01 PROCEDURE — 83880 ASSAY OF NATRIURETIC PEPTIDE: CPT | Performed by: INTERNAL MEDICINE

## 2022-01-01 PROCEDURE — 80048 BASIC METABOLIC PNL TOTAL CA: CPT | Performed by: INTERNAL MEDICINE

## 2022-01-01 PROCEDURE — 84145 PROCALCITONIN (PCT): CPT | Performed by: STUDENT IN AN ORGANIZED HEALTH CARE EDUCATION/TRAINING PROGRAM

## 2022-01-01 PROCEDURE — 82140 ASSAY OF AMMONIA: CPT | Performed by: NURSE PRACTITIONER

## 2022-01-01 PROCEDURE — 80179 DRUG ASSAY SALICYLATE: CPT | Performed by: STUDENT IN AN ORGANIZED HEALTH CARE EDUCATION/TRAINING PROGRAM

## 2022-01-01 PROCEDURE — 74176 CT ABD & PELVIS W/O CONTRAST: CPT

## 2022-01-01 PROCEDURE — 0 GADOBENATE DIMEGLUMINE 529 MG/ML SOLUTION: Performed by: INTERNAL MEDICINE

## 2022-01-01 PROCEDURE — 0 LEVETIRACETAM IN NACL 0.75% 1000 MG/100ML SOLUTION: Performed by: INTERNAL MEDICINE

## 2022-01-01 PROCEDURE — 85025 COMPLETE CBC W/AUTO DIFF WBC: CPT | Performed by: EMERGENCY MEDICINE

## 2022-01-01 PROCEDURE — 87186 SC STD MICRODIL/AGAR DIL: CPT | Performed by: INTERNAL MEDICINE

## 2022-01-01 PROCEDURE — 25010000002 LORAZEPAM PER 2 MG: Performed by: STUDENT IN AN ORGANIZED HEALTH CARE EDUCATION/TRAINING PROGRAM

## 2022-01-01 PROCEDURE — C1760 CLOSURE DEV, VASC: HCPCS

## 2022-01-01 PROCEDURE — 73630 X-RAY EXAM OF FOOT: CPT

## 2022-01-01 PROCEDURE — 93005 ELECTROCARDIOGRAM TRACING: CPT | Performed by: STUDENT IN AN ORGANIZED HEALTH CARE EDUCATION/TRAINING PROGRAM

## 2022-01-01 PROCEDURE — 73700 CT LOWER EXTREMITY W/O DYE: CPT

## 2022-01-01 PROCEDURE — 25010000002 HALOPERIDOL LACTATE PER 5 MG

## 2022-01-01 PROCEDURE — 36415 COLL VENOUS BLD VENIPUNCTURE: CPT

## 2022-01-01 PROCEDURE — 87496 CYTOMEG DNA AMP PROBE: CPT | Performed by: INTERNAL MEDICINE

## 2022-01-01 PROCEDURE — 96365 THER/PROPH/DIAG IV INF INIT: CPT

## 2022-01-01 PROCEDURE — 87205 SMEAR GRAM STAIN: CPT | Performed by: PSYCHIATRY & NEUROLOGY

## 2022-01-01 PROCEDURE — 87636 SARSCOV2 & INF A&B AMP PRB: CPT | Performed by: INTERNAL MEDICINE

## 2022-01-01 PROCEDURE — 93325 DOPPLER ECHO COLOR FLOW MAPG: CPT | Performed by: SPECIALIST

## 2022-01-01 PROCEDURE — 93325 DOPPLER ECHO COLOR FLOW MAPG: CPT

## 2022-01-01 PROCEDURE — 74177 CT ABD & PELVIS W/CONTRAST: CPT

## 2022-01-01 PROCEDURE — 3E0G76Z INTRODUCTION OF NUTRITIONAL SUBSTANCE INTO UPPER GI, VIA NATURAL OR ARTIFICIAL OPENING: ICD-10-PCS | Performed by: INTERNAL MEDICINE

## 2022-01-01 PROCEDURE — 86668 FRANCISELLA TULARENSIS: CPT | Performed by: EMERGENCY MEDICINE

## 2022-01-01 PROCEDURE — 89051 BODY FLUID CELL COUNT: CPT | Performed by: PSYCHIATRY & NEUROLOGY

## 2022-01-01 PROCEDURE — 94002 VENT MGMT INPAT INIT DAY: CPT

## 2022-01-01 PROCEDURE — 93010 ELECTROCARDIOGRAM REPORT: CPT | Performed by: SPECIALIST

## 2022-01-01 PROCEDURE — 87483 CNS DNA AMP PROBE TYPE 12-25: CPT | Performed by: EMERGENCY MEDICINE

## 2022-01-01 PROCEDURE — 83036 HEMOGLOBIN GLYCOSYLATED A1C: CPT | Performed by: PSYCHIATRY & NEUROLOGY

## 2022-01-01 PROCEDURE — 97597 DBRDMT OPN WND 1ST 20 CM/<: CPT | Performed by: SURGERY

## 2022-01-01 PROCEDURE — 009U3ZX DRAINAGE OF SPINAL CANAL, PERCUTANEOUS APPROACH, DIAGNOSTIC: ICD-10-PCS | Performed by: EMERGENCY MEDICINE

## 2022-01-01 PROCEDURE — 009U3ZX DRAINAGE OF SPINAL CANAL, PERCUTANEOUS APPROACH, DIAGNOSTIC: ICD-10-PCS | Performed by: RADIOLOGY

## 2022-01-01 PROCEDURE — 83916 OLIGOCLONAL BANDS: CPT | Performed by: INTERNAL MEDICINE

## 2022-01-01 PROCEDURE — 85025 COMPLETE CBC W/AUTO DIFF WBC: CPT | Performed by: HOSPITALIST

## 2022-01-01 PROCEDURE — 85007 BL SMEAR W/DIFF WBC COUNT: CPT | Performed by: HOSPITALIST

## 2022-01-01 PROCEDURE — 93005 ELECTROCARDIOGRAM TRACING: CPT | Performed by: HOSPITALIST

## 2022-01-01 PROCEDURE — B01BZZZ FLUOROSCOPY OF SPINAL CORD: ICD-10-PCS | Performed by: RADIOLOGY

## 2022-01-01 PROCEDURE — 82945 GLUCOSE OTHER FLUID: CPT | Performed by: INTERNAL MEDICINE

## 2022-01-01 PROCEDURE — 87116 MYCOBACTERIA CULTURE: CPT | Performed by: PSYCHIATRY & NEUROLOGY

## 2022-01-01 PROCEDURE — 83880 ASSAY OF NATRIURETIC PEPTIDE: CPT | Performed by: HOSPITALIST

## 2022-01-01 PROCEDURE — 99285 EMERGENCY DEPT VISIT HI MDM: CPT

## 2022-01-01 PROCEDURE — 86789 WEST NILE VIRUS ANTIBODY: CPT | Performed by: INTERNAL MEDICINE

## 2022-01-01 PROCEDURE — 86727 LYMPH CHORIOMENINGITIS AB: CPT | Performed by: INTERNAL MEDICINE

## 2022-01-01 PROCEDURE — 95714 VEEG EA 12-26 HR UNMNTR: CPT

## 2022-01-01 PROCEDURE — 80143 DRUG ASSAY ACETAMINOPHEN: CPT | Performed by: STUDENT IN AN ORGANIZED HEALTH CARE EDUCATION/TRAINING PROGRAM

## 2022-01-01 PROCEDURE — 80202 ASSAY OF VANCOMYCIN: CPT

## 2022-01-01 PROCEDURE — 71260 CT THORAX DX C+: CPT

## 2022-01-01 PROCEDURE — 85007 BL SMEAR W/DIFF WBC COUNT: CPT | Performed by: EMERGENCY MEDICINE

## 2022-01-01 PROCEDURE — 84436 ASSAY OF TOTAL THYROXINE: CPT | Performed by: STUDENT IN AN ORGANIZED HEALTH CARE EDUCATION/TRAINING PROGRAM

## 2022-01-01 PROCEDURE — 84484 ASSAY OF TROPONIN QUANT: CPT | Performed by: STUDENT IN AN ORGANIZED HEALTH CARE EDUCATION/TRAINING PROGRAM

## 2022-01-01 PROCEDURE — 87205 SMEAR GRAM STAIN: CPT | Performed by: EMERGENCY MEDICINE

## 2022-01-01 PROCEDURE — 86666 EHRLICHIA ANTIBODY: CPT | Performed by: EMERGENCY MEDICINE

## 2022-01-01 PROCEDURE — 0 IOVERSOL 74 % SOLUTION: Performed by: INTERNAL MEDICINE

## 2022-01-01 PROCEDURE — 87641 MR-STAPH DNA AMP PROBE: CPT | Performed by: INTERNAL MEDICINE

## 2022-01-01 PROCEDURE — 70498 CT ANGIOGRAPHY NECK: CPT

## 2022-01-01 PROCEDURE — 87493 C DIFF AMPLIFIED PROBE: CPT | Performed by: INTERNAL MEDICINE

## 2022-01-01 PROCEDURE — 80171 DRUG SCREEN QUANT GABAPENTIN: CPT | Performed by: EMERGENCY MEDICINE

## 2022-01-01 PROCEDURE — 25010000002 FENTANYL CITRATE (PF) 50 MCG/ML SOLUTION: Performed by: RADIOLOGY

## 2022-01-01 RX ORDER — ONDANSETRON 4 MG/1
4 TABLET, FILM COATED ORAL EVERY 6 HOURS PRN
Status: DISCONTINUED | OUTPATIENT
Start: 2022-01-01 | End: 2022-01-01

## 2022-01-01 RX ORDER — PHENYLEPHRINE HCL IN 0.9% NACL 0.5 MG/5ML
.5-3 SYRINGE (ML) INTRAVENOUS
Status: DISCONTINUED | OUTPATIENT
Start: 2022-01-01 | End: 2022-01-01

## 2022-01-01 RX ORDER — IBUPROFEN 400 MG/1
400 TABLET ORAL EVERY 6 HOURS PRN
Status: DISCONTINUED | OUTPATIENT
Start: 2022-01-01 | End: 2022-01-01 | Stop reason: HOSPADM

## 2022-01-01 RX ORDER — METOPROLOL TARTRATE 100 MG/1
100 TABLET ORAL 2 TIMES DAILY
Status: DISCONTINUED | OUTPATIENT
Start: 2022-01-01 | End: 2022-01-01 | Stop reason: HOSPADM

## 2022-01-01 RX ORDER — PANTOPRAZOLE SODIUM 40 MG/1
40 TABLET, DELAYED RELEASE ORAL EVERY MORNING
Status: DISCONTINUED | OUTPATIENT
Start: 2022-01-01 | End: 2022-01-01

## 2022-01-01 RX ORDER — POTASSIUM CHLORIDE 1.5 G/1.77G
40 POWDER, FOR SOLUTION ORAL EVERY 4 HOURS
Status: DISPENSED | OUTPATIENT
Start: 2022-01-01 | End: 2022-01-01

## 2022-01-01 RX ORDER — BUDESONIDE 3 MG/1
3 CAPSULE, COATED PELLETS ORAL EVERY MORNING
Status: CANCELLED | OUTPATIENT
Start: 2022-01-01

## 2022-01-01 RX ORDER — DIPHENOXYLATE HYDROCHLORIDE AND ATROPINE SULFATE 2.5; .025 MG/1; MG/1
1 TABLET ORAL DAILY
Status: DISCONTINUED | OUTPATIENT
Start: 2022-01-01 | End: 2022-01-01 | Stop reason: HOSPADM

## 2022-01-01 RX ORDER — POLYETHYLENE GLYCOL 3350 17 G/17G
17 POWDER, FOR SOLUTION ORAL DAILY PRN
Status: DISCONTINUED | OUTPATIENT
Start: 2022-01-01 | End: 2022-01-01 | Stop reason: HOSPADM

## 2022-01-01 RX ORDER — HYDROMORPHONE HCL 110MG/55ML
1.5 PATIENT CONTROLLED ANALGESIA SYRINGE INTRAVENOUS
Status: DISCONTINUED | OUTPATIENT
Start: 2022-01-01 | End: 2022-01-01

## 2022-01-01 RX ORDER — MORPHINE SULFATE 20 MG/ML
20 SOLUTION ORAL
Status: DISCONTINUED | OUTPATIENT
Start: 2022-01-01 | End: 2022-01-01

## 2022-01-01 RX ORDER — METHYLPREDNISOLONE SODIUM SUCCINATE 40 MG/ML
40 INJECTION, POWDER, LYOPHILIZED, FOR SOLUTION INTRAMUSCULAR; INTRAVENOUS EVERY 12 HOURS
Status: DISCONTINUED | OUTPATIENT
Start: 2022-01-01 | End: 2022-01-01

## 2022-01-01 RX ORDER — GLYCOPYRROLATE 0.2 MG/ML
0.2 INJECTION INTRAMUSCULAR; INTRAVENOUS
Status: DISCONTINUED | OUTPATIENT
Start: 2022-01-01 | End: 2022-01-01

## 2022-01-01 RX ORDER — QUETIAPINE FUMARATE 25 MG/1
50 TABLET, FILM COATED ORAL NIGHTLY
Status: DISCONTINUED | OUTPATIENT
Start: 2022-01-01 | End: 2022-01-01 | Stop reason: HOSPADM

## 2022-01-01 RX ORDER — SODIUM CHLORIDE 0.9 % (FLUSH) 0.9 %
30 SYRINGE (ML) INJECTION ONCE AS NEEDED
Status: DISCONTINUED | OUTPATIENT
Start: 2022-01-01 | End: 2022-01-01

## 2022-01-01 RX ORDER — PROCHLORPERAZINE EDISYLATE 5 MG/ML
5 INJECTION INTRAMUSCULAR; INTRAVENOUS EVERY 6 HOURS PRN
Status: DISCONTINUED | OUTPATIENT
Start: 2022-01-01 | End: 2022-01-01

## 2022-01-01 RX ORDER — GLYCOPYRROLATE 0.2 MG/ML
0.4 INJECTION INTRAMUSCULAR; INTRAVENOUS
Status: CANCELLED | OUTPATIENT
Start: 2022-01-01

## 2022-01-01 RX ORDER — FENTANYL CITRATE 50 UG/ML
50 INJECTION, SOLUTION INTRAMUSCULAR; INTRAVENOUS
Status: DISCONTINUED | OUTPATIENT
Start: 2022-01-01 | End: 2022-01-01

## 2022-01-01 RX ORDER — PREDNISONE 20 MG/1
40 TABLET ORAL 2 TIMES DAILY
Status: DISCONTINUED | OUTPATIENT
Start: 2022-01-01 | End: 2022-01-01

## 2022-01-01 RX ORDER — ALUMINA, MAGNESIA, AND SIMETHICONE 2400; 2400; 240 MG/30ML; MG/30ML; MG/30ML
15 SUSPENSION ORAL EVERY 6 HOURS PRN
Status: DISCONTINUED | OUTPATIENT
Start: 2022-01-01 | End: 2022-01-01 | Stop reason: HOSPADM

## 2022-01-01 RX ORDER — LORAZEPAM 2 MG/ML
0.5 INJECTION INTRAMUSCULAR
Status: DISCONTINUED | OUTPATIENT
Start: 2022-01-01 | End: 2022-01-01 | Stop reason: HOSPADM

## 2022-01-01 RX ORDER — DEXTROSE MONOHYDRATE 25 G/50ML
25 INJECTION, SOLUTION INTRAVENOUS
Start: 2022-01-01

## 2022-01-01 RX ORDER — NICOTINE 21 MG/24HR
1 PATCH, TRANSDERMAL 24 HOURS TRANSDERMAL
Status: DISCONTINUED | OUTPATIENT
Start: 2022-01-01 | End: 2022-01-01 | Stop reason: HOSPADM

## 2022-01-01 RX ORDER — HEPARIN SODIUM 5000 [USP'U]/ML
30-60 INJECTION, SOLUTION INTRAVENOUS; SUBCUTANEOUS EVERY 6 HOURS PRN
Status: DISCONTINUED | OUTPATIENT
Start: 2022-01-01 | End: 2022-01-01

## 2022-01-01 RX ORDER — LEVOFLOXACIN 5 MG/ML
750 INJECTION, SOLUTION INTRAVENOUS EVERY 24 HOURS
Status: DISCONTINUED | OUTPATIENT
Start: 2022-01-01 | End: 2022-01-01

## 2022-01-01 RX ORDER — ESTRADIOL 1 MG/1
2 TABLET ORAL DAILY
Status: DISCONTINUED | OUTPATIENT
Start: 2022-01-01 | End: 2022-01-01 | Stop reason: HOSPADM

## 2022-01-01 RX ORDER — HYDRALAZINE HYDROCHLORIDE 10 MG/1
10 TABLET, FILM COATED ORAL EVERY 8 HOURS SCHEDULED
Start: 2022-01-01

## 2022-01-01 RX ORDER — MORPHINE SULFATE 20 MG/ML
5 SOLUTION ORAL
Status: DISCONTINUED | OUTPATIENT
Start: 2022-01-01 | End: 2022-01-01

## 2022-01-01 RX ORDER — METRONIDAZOLE 250 MG/1
500 TABLET ORAL EVERY 8 HOURS SCHEDULED
Status: DISCONTINUED | OUTPATIENT
Start: 2022-01-01 | End: 2022-01-01

## 2022-01-01 RX ORDER — HEPARIN SODIUM 5000 [USP'U]/ML
5000 INJECTION, SOLUTION INTRAVENOUS; SUBCUTANEOUS AS NEEDED
Status: DISCONTINUED | OUTPATIENT
Start: 2022-01-01 | End: 2022-01-01 | Stop reason: HOSPADM

## 2022-01-01 RX ORDER — PRAVASTATIN SODIUM 40 MG
40 TABLET ORAL DAILY
Status: DISCONTINUED | OUTPATIENT
Start: 2022-01-01 | End: 2022-01-01 | Stop reason: HOSPADM

## 2022-01-01 RX ORDER — ALBUTEROL SULFATE 90 UG/1
2 AEROSOL, METERED RESPIRATORY (INHALATION) EVERY 6 HOURS PRN
COMMUNITY
End: 2022-01-01 | Stop reason: HOSPADM

## 2022-01-01 RX ORDER — MAGNESIUM SULFATE HEPTAHYDRATE 40 MG/ML
2 INJECTION, SOLUTION INTRAVENOUS ONCE
Status: COMPLETED | OUTPATIENT
Start: 2022-01-01 | End: 2022-01-01

## 2022-01-01 RX ORDER — FENTANYL CITRATE/PF 100MCG/2ML
SYRINGE (ML) INTRAVENOUS
Status: DISPENSED | OUTPATIENT
Start: 2022-01-01 | End: 2022-01-01

## 2022-01-01 RX ORDER — IODIXANOL 320 MG/ML
200 INJECTION, SOLUTION INTRAVASCULAR
Status: COMPLETED | OUTPATIENT
Start: 2022-01-01 | End: 2022-01-01

## 2022-01-01 RX ORDER — ECHINACEA PURPUREA EXTRACT 125 MG
1 TABLET ORAL AS NEEDED
Status: DISCONTINUED | OUTPATIENT
Start: 2022-01-01 | End: 2022-01-01 | Stop reason: HOSPADM

## 2022-01-01 RX ORDER — POTASSIUM CHLORIDE 7.45 MG/ML
10 INJECTION INTRAVENOUS
Status: COMPLETED | OUTPATIENT
Start: 2022-01-01 | End: 2022-01-01

## 2022-01-01 RX ORDER — LORAZEPAM 1 MG/1
1 TABLET ORAL NIGHTLY
Status: DISCONTINUED | OUTPATIENT
Start: 2022-01-01 | End: 2022-01-01

## 2022-01-01 RX ORDER — ACETAMINOPHEN 160 MG/5ML
650 SOLUTION ORAL EVERY 4 HOURS PRN
Status: CANCELLED | OUTPATIENT
Start: 2022-01-01

## 2022-01-01 RX ORDER — HEPARIN SODIUM 5000 [USP'U]/ML
2500 INJECTION, SOLUTION INTRAVENOUS; SUBCUTANEOUS AS NEEDED
Start: 2022-01-01

## 2022-01-01 RX ORDER — ACETAMINOPHEN 325 MG/1
650 TABLET ORAL EVERY 6 HOURS PRN
Status: DISCONTINUED | OUTPATIENT
Start: 2022-01-01 | End: 2022-01-01 | Stop reason: HOSPADM

## 2022-01-01 RX ORDER — HYDRALAZINE HYDROCHLORIDE 20 MG/ML
10 INJECTION INTRAMUSCULAR; INTRAVENOUS EVERY 4 HOURS PRN
Status: DISCONTINUED | OUTPATIENT
Start: 2022-01-01 | End: 2022-01-01 | Stop reason: HOSPADM

## 2022-01-01 RX ORDER — DEXTROSE MONOHYDRATE 50 MG/ML
75 INJECTION, SOLUTION INTRAVENOUS CONTINUOUS
Status: DISCONTINUED | OUTPATIENT
Start: 2022-01-01 | End: 2022-01-01

## 2022-01-01 RX ORDER — LORAZEPAM 2 MG/ML
2 CONCENTRATE ORAL
Status: DISCONTINUED | OUTPATIENT
Start: 2022-01-01 | End: 2022-01-01 | Stop reason: HOSPADM

## 2022-01-01 RX ORDER — MORPHINE SULFATE 10 MG/ML
6 INJECTION INTRAMUSCULAR; INTRAVENOUS; SUBCUTANEOUS
Status: DISCONTINUED | OUTPATIENT
Start: 2022-01-01 | End: 2022-01-01

## 2022-01-01 RX ORDER — HALOPERIDOL 5 MG/ML
1 INJECTION INTRAMUSCULAR ONCE
Status: COMPLETED | OUTPATIENT
Start: 2022-01-01 | End: 2022-01-01

## 2022-01-01 RX ORDER — SODIUM CHLORIDE 9 MG/ML
125 INJECTION, SOLUTION INTRAVENOUS CONTINUOUS
Status: DISCONTINUED | OUTPATIENT
Start: 2022-01-01 | End: 2022-01-01 | Stop reason: HOSPADM

## 2022-01-01 RX ORDER — OXYBUTYNIN CHLORIDE 5 MG/1
10 TABLET, EXTENDED RELEASE ORAL DAILY
Status: CANCELLED | OUTPATIENT
Start: 2022-01-01

## 2022-01-01 RX ORDER — METOPROLOL TARTRATE 50 MG/1
50 TABLET, FILM COATED ORAL EVERY 12 HOURS SCHEDULED
Status: DISCONTINUED | OUTPATIENT
Start: 2022-01-01 | End: 2022-01-01

## 2022-01-01 RX ORDER — LORAZEPAM 2 MG/ML
2 INJECTION INTRAMUSCULAR ONCE
Status: COMPLETED | OUTPATIENT
Start: 2022-01-01 | End: 2022-01-01

## 2022-01-01 RX ORDER — CHLORHEXIDINE GLUCONATE 0.12 MG/ML
15 RINSE ORAL EVERY 12 HOURS SCHEDULED
Status: DISCONTINUED | OUTPATIENT
Start: 2022-01-01 | End: 2022-01-01 | Stop reason: HOSPADM

## 2022-01-01 RX ORDER — POTASSIUM CHLORIDE 29.8 MG/ML
20 INJECTION INTRAVENOUS
Status: COMPLETED | OUTPATIENT
Start: 2022-01-01 | End: 2022-01-01

## 2022-01-01 RX ORDER — FUROSEMIDE 10 MG/ML
60 INJECTION INTRAMUSCULAR; INTRAVENOUS EVERY 12 HOURS
Status: DISPENSED | OUTPATIENT
Start: 2022-01-01 | End: 2022-01-01

## 2022-01-01 RX ORDER — LORAZEPAM 2 MG/ML
1 INJECTION INTRAMUSCULAR
Status: CANCELLED | OUTPATIENT
Start: 2022-01-01 | End: 2022-03-05

## 2022-01-01 RX ORDER — ACETAMINOPHEN 325 MG/1
650 TABLET ORAL EVERY 4 HOURS PRN
Status: CANCELLED | OUTPATIENT
Start: 2022-01-01

## 2022-01-01 RX ORDER — AMOXICILLIN 250 MG
2 CAPSULE ORAL 2 TIMES DAILY
Status: DISCONTINUED | OUTPATIENT
Start: 2022-01-01 | End: 2022-01-01 | Stop reason: HOSPADM

## 2022-01-01 RX ORDER — QUETIAPINE FUMARATE 100 MG/1
200 TABLET, FILM COATED ORAL 2 TIMES DAILY
Status: CANCELLED | OUTPATIENT
Start: 2022-01-01

## 2022-01-01 RX ORDER — PROCHLORPERAZINE 25 MG
25 SUPPOSITORY, RECTAL RECTAL EVERY 12 HOURS PRN
Status: DISCONTINUED | OUTPATIENT
Start: 2022-01-01 | End: 2022-01-01 | Stop reason: HOSPADM

## 2022-01-01 RX ORDER — PROCHLORPERAZINE 25 MG
25 SUPPOSITORY, RECTAL RECTAL EVERY 12 HOURS PRN
Status: CANCELLED | OUTPATIENT
Start: 2022-01-01

## 2022-01-01 RX ORDER — TRAMADOL HYDROCHLORIDE 50 MG/1
50 TABLET ORAL 4 TIMES DAILY PRN
Status: CANCELLED | OUTPATIENT
Start: 2022-01-01

## 2022-01-01 RX ORDER — PHENYLEPHRINE HCL IN 0.9% NACL 1 MG/10 ML
SYRINGE (ML) INTRAVENOUS
Status: DISPENSED
Start: 2022-01-01 | End: 2022-01-01

## 2022-01-01 RX ORDER — ZIPRASIDONE MESYLATE 20 MG/ML
INJECTION, POWDER, LYOPHILIZED, FOR SOLUTION INTRAMUSCULAR
Status: DISPENSED
Start: 2022-01-01 | End: 2022-01-01

## 2022-01-01 RX ORDER — LORAZEPAM 2 MG/ML
1 CONCENTRATE ORAL
Status: CANCELLED | OUTPATIENT
Start: 2022-01-01 | End: 2022-03-05

## 2022-01-01 RX ORDER — DEXTROSE MONOHYDRATE 25 G/50ML
25-50 INJECTION, SOLUTION INTRAVENOUS
Status: DISCONTINUED | OUTPATIENT
Start: 2022-01-01 | End: 2022-01-01

## 2022-01-01 RX ORDER — LORAZEPAM 2 MG/ML
0.5 INJECTION INTRAMUSCULAR EVERY 4 HOURS PRN
Start: 2022-01-01

## 2022-01-01 RX ORDER — LORAZEPAM 2 MG/ML
2 INJECTION INTRAMUSCULAR
Status: CANCELLED | OUTPATIENT
Start: 2022-01-01 | End: 2022-03-05

## 2022-01-01 RX ORDER — BUDESONIDE 3 MG/1
3 CAPSULE, COATED PELLETS ORAL EVERY MORNING
Status: DISCONTINUED | OUTPATIENT
Start: 2022-01-01 | End: 2022-01-01 | Stop reason: HOSPADM

## 2022-01-01 RX ORDER — FENTANYL CITRATE 50 UG/ML
INJECTION, SOLUTION INTRAMUSCULAR; INTRAVENOUS
Status: COMPLETED | OUTPATIENT
Start: 2022-01-01 | End: 2022-01-01

## 2022-01-01 RX ORDER — ACETYLCYSTEINE 200 MG/ML
4 SOLUTION ORAL; RESPIRATORY (INHALATION)
Status: DISCONTINUED | OUTPATIENT
Start: 2022-01-01 | End: 2022-01-01

## 2022-01-01 RX ORDER — HALOPERIDOL 5 MG/ML
INJECTION INTRAMUSCULAR
Status: COMPLETED
Start: 2022-01-01 | End: 2022-01-01

## 2022-01-01 RX ORDER — LORAZEPAM 2 MG/ML
0.5 CONCENTRATE ORAL
Status: DISCONTINUED | OUTPATIENT
Start: 2022-01-01 | End: 2022-01-01 | Stop reason: HOSPADM

## 2022-01-01 RX ORDER — LORAZEPAM 2 MG/ML
0.5 INJECTION INTRAMUSCULAR
Status: CANCELLED | OUTPATIENT
Start: 2022-01-01 | End: 2022-03-05

## 2022-01-01 RX ORDER — MORPHINE SULFATE 20 MG/ML
10 SOLUTION ORAL
Status: DISCONTINUED | OUTPATIENT
Start: 2022-01-01 | End: 2022-01-01 | Stop reason: HOSPADM

## 2022-01-01 RX ORDER — POTASSIUM CHLORIDE 1.5 G/1.77G
40 POWDER, FOR SOLUTION ORAL AS NEEDED
Status: DISCONTINUED | OUTPATIENT
Start: 2022-01-01 | End: 2022-01-01 | Stop reason: HOSPADM

## 2022-01-01 RX ORDER — SPIRONOLACTONE 25 MG/1
25 TABLET ORAL DAILY
Status: DISCONTINUED | OUTPATIENT
Start: 2022-01-01 | End: 2022-01-01 | Stop reason: HOSPADM

## 2022-01-01 RX ORDER — MAGNESIUM SULFATE HEPTAHYDRATE 40 MG/ML
2 INJECTION, SOLUTION INTRAVENOUS AS NEEDED
Status: DISCONTINUED | OUTPATIENT
Start: 2022-01-01 | End: 2022-01-01

## 2022-01-01 RX ORDER — CETIRIZINE HYDROCHLORIDE, PSEUDOEPHEDRINE HYDROCHLORIDE 5; 120 MG/1; MG/1
1 TABLET, FILM COATED, EXTENDED RELEASE ORAL 2 TIMES DAILY
Status: CANCELLED | OUTPATIENT
Start: 2022-01-01

## 2022-01-01 RX ORDER — FENTANYL CITRATE/PF 100MCG/2ML
SYRINGE (ML) INTRAVENOUS
Status: ACTIVE | OUTPATIENT
Start: 2022-01-01 | End: 2022-01-01

## 2022-01-01 RX ORDER — MORPHINE SULFATE 2 MG/ML
2 INJECTION, SOLUTION INTRAMUSCULAR; INTRAVENOUS
Status: DISCONTINUED | OUTPATIENT
Start: 2022-01-01 | End: 2022-01-01

## 2022-01-01 RX ORDER — POTASSIUM CHLORIDE 1.5 G/1.77G
40 POWDER, FOR SOLUTION ORAL EVERY 4 HOURS
Status: COMPLETED | OUTPATIENT
Start: 2022-01-01 | End: 2022-01-01

## 2022-01-01 RX ORDER — POTASSIUM CHLORIDE 750 MG/1
40 TABLET, FILM COATED, EXTENDED RELEASE ORAL AS NEEDED
Status: DISCONTINUED | OUTPATIENT
Start: 2022-01-01 | End: 2022-01-01 | Stop reason: HOSPADM

## 2022-01-01 RX ORDER — MORPHINE SULFATE 20 MG/ML
10 SOLUTION ORAL
Status: CANCELLED | OUTPATIENT
Start: 2022-01-01 | End: 2022-02-28

## 2022-01-01 RX ORDER — NICOTINE POLACRILEX 4 MG
15 LOZENGE BUCCAL
Start: 2022-01-01

## 2022-01-01 RX ORDER — HYDROMORPHONE HYDROCHLORIDE 1 MG/ML
0.5 INJECTION, SOLUTION INTRAMUSCULAR; INTRAVENOUS; SUBCUTANEOUS
Status: DISPENSED | OUTPATIENT
Start: 2022-01-01 | End: 2022-01-01

## 2022-01-01 RX ORDER — POLYETHYLENE GLYCOL 3350 17 G/17G
17 POWDER, FOR SOLUTION ORAL DAILY PRN
Status: DISCONTINUED | OUTPATIENT
Start: 2022-01-01 | End: 2022-01-01

## 2022-01-01 RX ORDER — LORAZEPAM 1 MG/1
1 TABLET ORAL 2 TIMES DAILY PRN
Status: CANCELLED | OUTPATIENT
Start: 2022-01-01

## 2022-01-01 RX ORDER — PROCHLORPERAZINE MALEATE 10 MG
5 TABLET ORAL EVERY 6 HOURS PRN
Status: DISCONTINUED | OUTPATIENT
Start: 2022-01-01 | End: 2022-01-01

## 2022-01-01 RX ORDER — LORAZEPAM 1 MG/1
1 TABLET ORAL 2 TIMES DAILY PRN
Status: DISCONTINUED | OUTPATIENT
Start: 2022-01-01 | End: 2022-01-01 | Stop reason: HOSPADM

## 2022-01-01 RX ORDER — PROCHLORPERAZINE MALEATE 10 MG
5 TABLET ORAL EVERY 6 HOURS PRN
Status: CANCELLED | OUTPATIENT
Start: 2022-01-01

## 2022-01-01 RX ORDER — LORAZEPAM 2 MG/ML
INJECTION INTRAMUSCULAR
Status: COMPLETED
Start: 2022-01-01 | End: 2022-01-01

## 2022-01-01 RX ORDER — HEPARIN SODIUM 5000 [USP'U]/ML
5000 INJECTION, SOLUTION INTRAVENOUS; SUBCUTANEOUS ONCE
Status: COMPLETED | OUTPATIENT
Start: 2022-01-01 | End: 2022-01-01

## 2022-01-01 RX ORDER — ROCURONIUM BROMIDE 10 MG/ML
INJECTION, SOLUTION INTRAVENOUS
Status: DISCONTINUED
Start: 2022-01-01 | End: 2022-01-01 | Stop reason: WASHOUT

## 2022-01-01 RX ORDER — HYDROMORPHONE HYDROCHLORIDE 1 MG/ML
0.5 INJECTION, SOLUTION INTRAMUSCULAR; INTRAVENOUS; SUBCUTANEOUS
Status: DISCONTINUED | OUTPATIENT
Start: 2022-01-01 | End: 2022-01-01 | Stop reason: HOSPADM

## 2022-01-01 RX ORDER — PANTOPRAZOLE SODIUM 40 MG/1
40 TABLET, DELAYED RELEASE ORAL EVERY MORNING
Status: CANCELLED | OUTPATIENT
Start: 2022-01-01

## 2022-01-01 RX ORDER — PREGABALIN 75 MG/1
75 CAPSULE ORAL EVERY 12 HOURS SCHEDULED
Start: 2022-01-01

## 2022-01-01 RX ORDER — PANTOPRAZOLE SODIUM 40 MG/10ML
40 INJECTION, POWDER, LYOPHILIZED, FOR SOLUTION INTRAVENOUS
Status: DISCONTINUED | OUTPATIENT
Start: 2022-01-01 | End: 2022-01-01 | Stop reason: HOSPADM

## 2022-01-01 RX ORDER — ATORVASTATIN CALCIUM 20 MG/1
40 TABLET, FILM COATED ORAL NIGHTLY
Status: DISCONTINUED | OUTPATIENT
Start: 2022-01-01 | End: 2022-01-01

## 2022-01-01 RX ORDER — MIDAZOLAM HYDROCHLORIDE 1 MG/ML
INJECTION INTRAMUSCULAR; INTRAVENOUS
Status: COMPLETED
Start: 2022-01-01 | End: 2022-01-01

## 2022-01-01 RX ORDER — CHLORHEXIDINE GLUCONATE 0.12 MG/ML
15 RINSE ORAL EVERY 12 HOURS SCHEDULED
Status: DISCONTINUED | OUTPATIENT
Start: 2022-01-01 | End: 2022-01-01

## 2022-01-01 RX ORDER — LORAZEPAM 2 MG/ML
2 INJECTION INTRAMUSCULAR
Status: DISCONTINUED | OUTPATIENT
Start: 2022-01-01 | End: 2022-01-01 | Stop reason: HOSPADM

## 2022-01-01 RX ORDER — LOSARTAN POTASSIUM 100 MG/1
100 TABLET ORAL DAILY
Start: 2022-01-01

## 2022-01-01 RX ORDER — PREGABALIN 75 MG/1
75 CAPSULE ORAL EVERY 12 HOURS SCHEDULED
Status: DISCONTINUED | OUTPATIENT
Start: 2022-01-01 | End: 2022-01-01 | Stop reason: HOSPADM

## 2022-01-01 RX ORDER — PROPOFOL 10 MG/ML
VIAL (ML) INTRAVENOUS
Status: COMPLETED
Start: 2022-01-01 | End: 2022-01-01

## 2022-01-01 RX ORDER — BISACODYL 5 MG/1
5 TABLET, DELAYED RELEASE ORAL DAILY PRN
Status: DISCONTINUED | OUTPATIENT
Start: 2022-01-01 | End: 2022-01-01

## 2022-01-01 RX ORDER — ONDANSETRON 4 MG/1
4 TABLET, FILM COATED ORAL EVERY 6 HOURS PRN
Status: DISCONTINUED | OUTPATIENT
Start: 2022-01-01 | End: 2022-01-01 | Stop reason: HOSPADM

## 2022-01-01 RX ORDER — METOPROLOL TARTRATE 5 MG/5ML
5 INJECTION INTRAVENOUS ONCE
Status: COMPLETED | OUTPATIENT
Start: 2022-01-01 | End: 2022-01-01

## 2022-01-01 RX ORDER — WATER 1000 ML/1000ML
INJECTION, SOLUTION INTRAVENOUS
Status: DISPENSED
Start: 2022-01-01 | End: 2022-01-01

## 2022-01-01 RX ORDER — SCOLOPAMINE TRANSDERMAL SYSTEM 1 MG/1
1 PATCH, EXTENDED RELEASE TRANSDERMAL
Status: DISCONTINUED | OUTPATIENT
Start: 2022-01-01 | End: 2022-01-01 | Stop reason: HOSPADM

## 2022-01-01 RX ORDER — ALBUTEROL SULFATE 90 UG/1
2 AEROSOL, METERED RESPIRATORY (INHALATION) EVERY 6 HOURS PRN
Status: DISCONTINUED | OUTPATIENT
Start: 2022-01-01 | End: 2022-01-01 | Stop reason: HOSPADM

## 2022-01-01 RX ORDER — ACETAMINOPHEN 325 MG/1
650 TABLET ORAL EVERY 6 HOURS PRN
Status: DISCONTINUED | OUTPATIENT
Start: 2022-01-01 | End: 2022-01-01

## 2022-01-01 RX ORDER — MORPHINE SULFATE 2 MG/ML
4 INJECTION, SOLUTION INTRAMUSCULAR; INTRAVENOUS EVERY 4 HOURS PRN
Status: DISPENSED | OUTPATIENT
Start: 2022-01-01 | End: 2022-01-01

## 2022-01-01 RX ORDER — TRAMADOL HYDROCHLORIDE 50 MG/1
50 TABLET ORAL 4 TIMES DAILY PRN
Status: DISCONTINUED | OUTPATIENT
Start: 2022-01-01 | End: 2022-01-01 | Stop reason: HOSPADM

## 2022-01-01 RX ORDER — MORPHINE SULFATE 2 MG/ML
2 INJECTION, SOLUTION INTRAMUSCULAR; INTRAVENOUS
Status: CANCELLED | OUTPATIENT
Start: 2022-01-01 | End: 2022-02-28

## 2022-01-01 RX ORDER — HEPARIN SODIUM 5000 [USP'U]/ML
5000 INJECTION, SOLUTION INTRAVENOUS; SUBCUTANEOUS EVERY 12 HOURS SCHEDULED
Status: DISCONTINUED | OUTPATIENT
Start: 2022-01-01 | End: 2022-01-01

## 2022-01-01 RX ORDER — PROCHLORPERAZINE MALEATE 10 MG
5 TABLET ORAL EVERY 6 HOURS PRN
Status: DISCONTINUED | OUTPATIENT
Start: 2022-01-01 | End: 2022-01-01 | Stop reason: HOSPADM

## 2022-01-01 RX ORDER — BISACODYL 5 MG/1
5 TABLET, DELAYED RELEASE ORAL DAILY PRN
Status: DISCONTINUED | OUTPATIENT
Start: 2022-01-01 | End: 2022-01-01 | Stop reason: HOSPADM

## 2022-01-01 RX ORDER — LORAZEPAM 1 MG/1
1 TABLET ORAL 2 TIMES DAILY PRN
COMMUNITY
End: 2022-01-01 | Stop reason: HOSPADM

## 2022-01-01 RX ORDER — BISACODYL 5 MG/1
5 TABLET, DELAYED RELEASE ORAL DAILY PRN
Status: CANCELLED | OUTPATIENT
Start: 2022-01-01

## 2022-01-01 RX ORDER — SODIUM CHLORIDE 0.9 % (FLUSH) 0.9 %
10 SYRINGE (ML) INJECTION AS NEEDED
Status: DISCONTINUED | OUTPATIENT
Start: 2022-01-01 | End: 2022-01-01

## 2022-01-01 RX ORDER — PANTOPRAZOLE SODIUM 40 MG/10ML
40 INJECTION, POWDER, LYOPHILIZED, FOR SOLUTION INTRAVENOUS
Start: 2022-01-01

## 2022-01-01 RX ORDER — DEXTROSE MONOHYDRATE 25 G/50ML
25 INJECTION, SOLUTION INTRAVENOUS
Status: DISCONTINUED | OUTPATIENT
Start: 2022-01-01 | End: 2022-01-01

## 2022-01-01 RX ORDER — SODIUM CHLORIDE 9 MG/ML
50 INJECTION, SOLUTION INTRAVENOUS CONTINUOUS
Status: DISCONTINUED | OUTPATIENT
Start: 2022-01-01 | End: 2022-01-01

## 2022-01-01 RX ORDER — VERAPAMIL HYDROCHLORIDE 80 MG/1
80 TABLET ORAL EVERY 8 HOURS SCHEDULED
Status: DISCONTINUED | OUTPATIENT
Start: 2022-01-01 | End: 2022-01-01

## 2022-01-01 RX ORDER — ACETAMINOPHEN 325 MG/1
650 TABLET ORAL EVERY 6 HOURS PRN
Start: 2022-01-01

## 2022-01-01 RX ORDER — OXYBUTYNIN CHLORIDE 5 MG/1
10 TABLET, EXTENDED RELEASE ORAL DAILY
Status: DISCONTINUED | OUTPATIENT
Start: 2022-01-01 | End: 2022-01-01 | Stop reason: HOSPADM

## 2022-01-01 RX ORDER — POTASSIUM CHLORIDE 7.45 MG/ML
10 INJECTION INTRAVENOUS
Status: DISCONTINUED | OUTPATIENT
Start: 2022-01-01 | End: 2022-01-01

## 2022-01-01 RX ORDER — MORPHINE SULFATE 2 MG/ML
2 INJECTION, SOLUTION INTRAMUSCULAR; INTRAVENOUS
Status: DISCONTINUED | OUTPATIENT
Start: 2022-01-01 | End: 2022-01-01 | Stop reason: HOSPADM

## 2022-01-01 RX ORDER — FENTANYL CITRATE 50 UG/ML
200 INJECTION, SOLUTION INTRAMUSCULAR; INTRAVENOUS ONCE
Status: DISCONTINUED | OUTPATIENT
Start: 2022-01-01 | End: 2022-01-01

## 2022-01-01 RX ORDER — SODIUM CHLORIDE 0.9 % (FLUSH) 0.9 %
20 SYRINGE (ML) INJECTION AS NEEDED
Status: DISCONTINUED | OUTPATIENT
Start: 2022-01-01 | End: 2022-01-01 | Stop reason: HOSPADM

## 2022-01-01 RX ORDER — ACETAMINOPHEN 650 MG/1
650 SUPPOSITORY RECTAL EVERY 4 HOURS PRN
Status: DISCONTINUED | OUTPATIENT
Start: 2022-01-01 | End: 2022-01-01 | Stop reason: HOSPADM

## 2022-01-01 RX ORDER — BISACODYL 10 MG
10 SUPPOSITORY, RECTAL RECTAL DAILY PRN
Status: DISCONTINUED | OUTPATIENT
Start: 2022-01-01 | End: 2022-01-01

## 2022-01-01 RX ORDER — LORAZEPAM 2 MG/ML
1 INJECTION INTRAMUSCULAR EVERY 4 HOURS PRN
Status: DISCONTINUED | OUTPATIENT
Start: 2022-01-01 | End: 2022-01-01

## 2022-01-01 RX ORDER — HEPARIN SODIUM 10000 [USP'U]/100ML
12 INJECTION, SOLUTION INTRAVENOUS
Status: DISCONTINUED | OUTPATIENT
Start: 2022-01-01 | End: 2022-01-01

## 2022-01-01 RX ORDER — VERAPAMIL HYDROCHLORIDE 120 MG/1
60 TABLET, FILM COATED ORAL EVERY 6 HOURS SCHEDULED
Status: DISCONTINUED | OUTPATIENT
Start: 2022-01-01 | End: 2022-01-01

## 2022-01-01 RX ORDER — MORPHINE SULFATE 4 MG/ML
4 INJECTION, SOLUTION INTRAMUSCULAR; INTRAVENOUS
Status: CANCELLED | OUTPATIENT
Start: 2022-01-01 | End: 2022-02-28

## 2022-01-01 RX ORDER — LIDOCAINE HYDROCHLORIDE 10 MG/ML
INJECTION, SOLUTION INFILTRATION; PERINEURAL
Status: COMPLETED | OUTPATIENT
Start: 2022-01-01 | End: 2022-01-01

## 2022-01-01 RX ORDER — POTASSIUM CHLORIDE 29.8 MG/ML
20 INJECTION INTRAVENOUS
Status: DISCONTINUED | OUTPATIENT
Start: 2022-01-01 | End: 2022-01-01 | Stop reason: ALTCHOICE

## 2022-01-01 RX ORDER — INSULIN LISPRO 100 [IU]/ML
0-24 INJECTION, SOLUTION INTRAVENOUS; SUBCUTANEOUS EVERY 6 HOURS
Status: DISCONTINUED | OUTPATIENT
Start: 2022-01-01 | End: 2022-01-01

## 2022-01-01 RX ORDER — LORAZEPAM 2 MG/ML
1 INJECTION INTRAMUSCULAR
Status: DISCONTINUED | OUTPATIENT
Start: 2022-01-01 | End: 2022-01-01 | Stop reason: HOSPADM

## 2022-01-01 RX ORDER — LANOLIN ALCOHOL/MO/W.PET/CERES
3 CREAM (GRAM) TOPICAL NIGHTLY PRN
Status: DISCONTINUED | OUTPATIENT
Start: 2022-01-01 | End: 2022-01-01 | Stop reason: HOSPADM

## 2022-01-01 RX ORDER — POLYETHYLENE GLYCOL 3350 17 G/17G
17 POWDER, FOR SOLUTION ORAL DAILY PRN
Status: CANCELLED | OUTPATIENT
Start: 2022-01-01

## 2022-01-01 RX ORDER — GLYCOPYRROLATE 0.2 MG/ML
0.2 INJECTION INTRAMUSCULAR; INTRAVENOUS
Status: CANCELLED | OUTPATIENT
Start: 2022-01-01

## 2022-01-01 RX ORDER — VERAPAMIL HYDROCHLORIDE 80 MG/1
80 TABLET ORAL EVERY 8 HOURS SCHEDULED
Start: 2022-01-01

## 2022-01-01 RX ORDER — METOPROLOL TARTRATE 5 MG/5ML
5 INJECTION INTRAVENOUS EVERY 6 HOURS PRN
Status: DISCONTINUED | OUTPATIENT
Start: 2022-01-01 | End: 2022-01-01 | Stop reason: HOSPADM

## 2022-01-01 RX ORDER — MORPHINE SULFATE 20 MG/ML
10 SOLUTION ORAL
Status: DISCONTINUED | OUTPATIENT
Start: 2022-01-01 | End: 2022-01-01

## 2022-01-01 RX ORDER — HYDROMORPHONE HCL 110MG/55ML
1.5 PATIENT CONTROLLED ANALGESIA SYRINGE INTRAVENOUS EVERY 4 HOURS PRN
Status: DISCONTINUED | OUTPATIENT
Start: 2022-01-01 | End: 2022-01-01

## 2022-01-01 RX ORDER — FUROSEMIDE 10 MG/ML
40 INJECTION INTRAMUSCULAR; INTRAVENOUS ONCE
Status: COMPLETED | OUTPATIENT
Start: 2022-01-01 | End: 2022-01-01

## 2022-01-01 RX ORDER — GLYCOPYRROLATE 0.2 MG/ML
0.4 INJECTION INTRAMUSCULAR; INTRAVENOUS
Status: DISCONTINUED | OUTPATIENT
Start: 2022-01-01 | End: 2022-01-01

## 2022-01-01 RX ORDER — MAGNESIUM SULFATE 1 G/100ML
1 INJECTION INTRAVENOUS AS NEEDED
Status: DISCONTINUED | OUTPATIENT
Start: 2022-01-01 | End: 2022-01-01

## 2022-01-01 RX ORDER — LISINOPRIL 5 MG/1
5 TABLET ORAL
Status: DISCONTINUED | OUTPATIENT
Start: 2022-01-01 | End: 2022-01-01

## 2022-01-01 RX ORDER — MORPHINE SULFATE 2 MG/ML
4 INJECTION, SOLUTION INTRAMUSCULAR; INTRAVENOUS
Status: DISCONTINUED | OUTPATIENT
Start: 2022-01-01 | End: 2022-01-01 | Stop reason: HOSPADM

## 2022-01-01 RX ORDER — HYDROMORPHONE HCL 110MG/55ML
1.5 PATIENT CONTROLLED ANALGESIA SYRINGE INTRAVENOUS
Status: DISCONTINUED | OUTPATIENT
Start: 2022-01-01 | End: 2022-01-01 | Stop reason: HOSPADM

## 2022-01-01 RX ORDER — HYDROMORPHONE HCL 110MG/55ML
1.5 PATIENT CONTROLLED ANALGESIA SYRINGE INTRAVENOUS ONCE
Status: COMPLETED | OUTPATIENT
Start: 2022-01-01 | End: 2022-01-01

## 2022-01-01 RX ORDER — ESTRADIOL 1 MG/1
2 TABLET ORAL DAILY
Status: CANCELLED | OUTPATIENT
Start: 2022-01-01

## 2022-01-01 RX ORDER — SODIUM CHLORIDE 0.9 % (FLUSH) 0.9 %
10 SYRINGE (ML) INJECTION EVERY 12 HOURS SCHEDULED
Status: DISCONTINUED | OUTPATIENT
Start: 2022-01-01 | End: 2022-01-01 | Stop reason: HOSPADM

## 2022-01-01 RX ORDER — DEXTROSE MONOHYDRATE 25 G/50ML
25 INJECTION, SOLUTION INTRAVENOUS
Status: DISCONTINUED | OUTPATIENT
Start: 2022-01-01 | End: 2022-01-01 | Stop reason: HOSPADM

## 2022-01-01 RX ORDER — ONDANSETRON 2 MG/ML
4 INJECTION INTRAMUSCULAR; INTRAVENOUS EVERY 6 HOURS PRN
Status: DISCONTINUED | OUTPATIENT
Start: 2022-01-01 | End: 2022-01-01

## 2022-01-01 RX ORDER — SODIUM CHLORIDE 0.9 % (FLUSH) 0.9 %
10 SYRINGE (ML) INJECTION EVERY 12 HOURS SCHEDULED
Status: DISCONTINUED | OUTPATIENT
Start: 2022-01-01 | End: 2022-01-01

## 2022-01-01 RX ORDER — QUETIAPINE FUMARATE 200 MG/1
200 TABLET, FILM COATED ORAL 2 TIMES DAILY
COMMUNITY
End: 2022-01-01 | Stop reason: HOSPADM

## 2022-01-01 RX ORDER — LORAZEPAM 2 MG/ML
1 INJECTION INTRAMUSCULAR
Status: DISCONTINUED | OUTPATIENT
Start: 2022-01-01 | End: 2022-01-01

## 2022-01-01 RX ORDER — LOPERAMIDE HYDROCHLORIDE 2 MG/1
2 CAPSULE ORAL
Status: DISCONTINUED | OUTPATIENT
Start: 2022-01-01 | End: 2022-01-01 | Stop reason: HOSPADM

## 2022-01-01 RX ORDER — MORPHINE SULFATE 20 MG/ML
20 SOLUTION ORAL
Status: CANCELLED | OUTPATIENT
Start: 2022-01-01 | End: 2022-02-28

## 2022-01-01 RX ORDER — LORAZEPAM 2 MG/ML
1 INJECTION INTRAMUSCULAR ONCE
Status: COMPLETED | OUTPATIENT
Start: 2022-01-01 | End: 2022-01-01

## 2022-01-01 RX ORDER — MAGNESIUM SULFATE HEPTAHYDRATE 40 MG/ML
2 INJECTION, SOLUTION INTRAVENOUS AS NEEDED
Status: DISCONTINUED | OUTPATIENT
Start: 2022-01-01 | End: 2022-01-01 | Stop reason: HOSPADM

## 2022-01-01 RX ORDER — POTASSIUM CHLORIDE 29.8 MG/ML
20 INJECTION INTRAVENOUS
Status: DISCONTINUED | OUTPATIENT
Start: 2022-01-01 | End: 2022-01-01 | Stop reason: DRUGHIGH

## 2022-01-01 RX ORDER — GLYCOPYRROLATE 0.2 MG/ML
0.4 INJECTION INTRAMUSCULAR; INTRAVENOUS
Status: DISCONTINUED | OUTPATIENT
Start: 2022-01-01 | End: 2022-01-01 | Stop reason: HOSPADM

## 2022-01-01 RX ORDER — FUROSEMIDE 10 MG/ML
INJECTION INTRAMUSCULAR; INTRAVENOUS
Status: COMPLETED
Start: 2022-01-01 | End: 2022-01-01

## 2022-01-01 RX ORDER — HYDRALAZINE HYDROCHLORIDE 20 MG/ML
10 INJECTION INTRAMUSCULAR; INTRAVENOUS EVERY 8 HOURS PRN
Status: DISCONTINUED | OUTPATIENT
Start: 2022-01-01 | End: 2022-01-01

## 2022-01-01 RX ORDER — METOPROLOL TARTRATE 100 MG/1
100 TABLET ORAL EVERY 12 HOURS SCHEDULED
Status: DISCONTINUED | OUTPATIENT
Start: 2022-01-01 | End: 2022-01-01 | Stop reason: HOSPADM

## 2022-01-01 RX ORDER — DEXAMETHASONE SODIUM PHOSPHATE 10 MG/ML
6 INJECTION INTRAMUSCULAR; INTRAVENOUS DAILY
Status: DISCONTINUED | OUTPATIENT
Start: 2022-01-01 | End: 2022-01-01

## 2022-01-01 RX ORDER — MORPHINE SULFATE 20 MG/ML
20 SOLUTION ORAL
Status: DISCONTINUED | OUTPATIENT
Start: 2022-01-01 | End: 2022-01-01 | Stop reason: HOSPADM

## 2022-01-01 RX ORDER — METOPROLOL TARTRATE 100 MG/1
100 TABLET ORAL 2 TIMES DAILY
Status: CANCELLED | OUTPATIENT
Start: 2022-01-01

## 2022-01-01 RX ORDER — LOSARTAN POTASSIUM 50 MG/1
100 TABLET ORAL DAILY
Status: DISCONTINUED | OUTPATIENT
Start: 2022-01-01 | End: 2022-01-01 | Stop reason: HOSPADM

## 2022-01-01 RX ORDER — ACETAMINOPHEN 160 MG/5ML
650 SOLUTION ORAL EVERY 4 HOURS PRN
Status: DISCONTINUED | OUTPATIENT
Start: 2022-01-01 | End: 2022-01-01 | Stop reason: HOSPADM

## 2022-01-01 RX ORDER — BISACODYL 10 MG
10 SUPPOSITORY, RECTAL RECTAL DAILY PRN
Status: CANCELLED | OUTPATIENT
Start: 2022-01-01

## 2022-01-01 RX ORDER — SUCCINYLCHOLINE CHLORIDE 20 MG/ML
200 INJECTION INTRAMUSCULAR; INTRAVENOUS ONCE
Status: DISCONTINUED | OUTPATIENT
Start: 2022-01-01 | End: 2022-01-01

## 2022-01-01 RX ORDER — TRAZODONE HYDROCHLORIDE 50 MG/1
12.5 TABLET ORAL NIGHTLY PRN
Status: DISCONTINUED | OUTPATIENT
Start: 2022-01-01 | End: 2022-01-01 | Stop reason: HOSPADM

## 2022-01-01 RX ORDER — KETAMINE HYDROCHLORIDE 100 MG/ML
INJECTION INTRAMUSCULAR; INTRAVENOUS
Status: COMPLETED
Start: 2022-01-01 | End: 2022-01-01

## 2022-01-01 RX ORDER — BUDESONIDE 3 MG/1
3 CAPSULE, COATED PELLETS ORAL EVERY MORNING
COMMUNITY
End: 2022-01-01 | Stop reason: HOSPADM

## 2022-01-01 RX ORDER — NOREPINEPHRINE BIT/0.9 % NACL 8 MG/250ML
.02-.3 INFUSION BOTTLE (ML) INTRAVENOUS
Status: DISCONTINUED | OUTPATIENT
Start: 2022-01-01 | End: 2022-01-01

## 2022-01-01 RX ORDER — PREGABALIN 75 MG/1
150 CAPSULE ORAL 2 TIMES DAILY
Status: DISCONTINUED | OUTPATIENT
Start: 2022-01-01 | End: 2022-01-01 | Stop reason: HOSPADM

## 2022-01-01 RX ORDER — PREGABALIN 75 MG/1
75 CAPSULE ORAL EVERY 12 HOURS SCHEDULED
Status: DISCONTINUED | OUTPATIENT
Start: 2022-01-01 | End: 2022-01-01

## 2022-01-01 RX ORDER — LOSARTAN POTASSIUM 50 MG/1
50 TABLET ORAL DAILY
Status: DISCONTINUED | OUTPATIENT
Start: 2022-01-01 | End: 2022-01-01

## 2022-01-01 RX ORDER — SODIUM CHLORIDE 0.9 % (FLUSH) 0.9 %
10 SYRINGE (ML) INJECTION AS NEEDED
Status: DISCONTINUED | OUTPATIENT
Start: 2022-01-01 | End: 2022-01-01 | Stop reason: HOSPADM

## 2022-01-01 RX ORDER — ACETAMINOPHEN 325 MG/1
650 TABLET ORAL EVERY 4 HOURS PRN
Status: DISCONTINUED | OUTPATIENT
Start: 2022-01-01 | End: 2022-01-01 | Stop reason: HOSPADM

## 2022-01-01 RX ORDER — CEFAZOLIN SODIUM 2 G/100ML
2 INJECTION, SOLUTION INTRAVENOUS EVERY 8 HOURS
Status: DISCONTINUED | OUTPATIENT
Start: 2022-01-01 | End: 2022-01-01

## 2022-01-01 RX ORDER — FUROSEMIDE 10 MG/ML
40 INJECTION INTRAMUSCULAR; INTRAVENOUS EVERY 12 HOURS
Status: COMPLETED | OUTPATIENT
Start: 2022-01-01 | End: 2022-01-01

## 2022-01-01 RX ORDER — LORAZEPAM 2 MG/ML
2 INJECTION INTRAMUSCULAR
Status: DISCONTINUED | OUTPATIENT
Start: 2022-01-01 | End: 2022-01-01

## 2022-01-01 RX ORDER — FAMOTIDINE 20 MG/1
20 TABLET, FILM COATED ORAL
Status: DISCONTINUED | OUTPATIENT
Start: 2022-01-01 | End: 2022-01-01

## 2022-01-01 RX ORDER — LORAZEPAM 2 MG/ML
2 CONCENTRATE ORAL
Status: CANCELLED | OUTPATIENT
Start: 2022-01-01 | End: 2022-03-05

## 2022-01-01 RX ORDER — MAGNESIUM SULFATE HEPTAHYDRATE 40 MG/ML
2 INJECTION, SOLUTION INTRAVENOUS AS NEEDED
Start: 2022-01-01

## 2022-01-01 RX ORDER — ERGOCALCIFEROL 1.25 MG/1
50000 CAPSULE ORAL WEEKLY
COMMUNITY
End: 2022-01-01 | Stop reason: HOSPADM

## 2022-01-01 RX ORDER — BISACODYL 10 MG
10 SUPPOSITORY, RECTAL RECTAL DAILY PRN
Status: DISCONTINUED | OUTPATIENT
Start: 2022-01-01 | End: 2022-01-01 | Stop reason: HOSPADM

## 2022-01-01 RX ORDER — CEFAZOLIN SODIUM 2 G/50ML
2 SOLUTION INTRAVENOUS EVERY 8 HOURS
Status: DISCONTINUED | OUTPATIENT
Start: 2022-01-01 | End: 2022-01-01

## 2022-01-01 RX ORDER — IPRATROPIUM BROMIDE AND ALBUTEROL SULFATE 2.5; .5 MG/3ML; MG/3ML
3 SOLUTION RESPIRATORY (INHALATION)
Status: DISCONTINUED | OUTPATIENT
Start: 2022-01-01 | End: 2022-01-01

## 2022-01-01 RX ORDER — DIPHENOXYLATE HYDROCHLORIDE AND ATROPINE SULFATE 2.5; .025 MG/1; MG/1
1 TABLET ORAL
Status: DISCONTINUED | OUTPATIENT
Start: 2022-01-01 | End: 2022-01-01 | Stop reason: HOSPADM

## 2022-01-01 RX ORDER — HYDRALAZINE HYDROCHLORIDE 10 MG/1
10 TABLET, FILM COATED ORAL EVERY 8 HOURS SCHEDULED
Status: DISCONTINUED | OUTPATIENT
Start: 2022-01-01 | End: 2022-01-01 | Stop reason: HOSPADM

## 2022-01-01 RX ORDER — CLONIDINE 0.1 MG/24H
1 PATCH, EXTENDED RELEASE TRANSDERMAL WEEKLY
Status: DISCONTINUED | OUTPATIENT
Start: 2022-01-01 | End: 2022-01-01 | Stop reason: HOSPADM

## 2022-01-01 RX ORDER — FENTANYL CITRATE/PF 100MCG/2ML
SYRINGE (ML) INTRAVENOUS
Status: DISCONTINUED | OUTPATIENT
Start: 2022-01-01 | End: 2022-01-01

## 2022-01-01 RX ORDER — MAGNESIUM SULFATE HEPTAHYDRATE 40 MG/ML
4 INJECTION, SOLUTION INTRAVENOUS AS NEEDED
Status: DISCONTINUED | OUTPATIENT
Start: 2022-01-01 | End: 2022-01-01

## 2022-01-01 RX ORDER — MIDAZOLAM HYDROCHLORIDE 1 MG/ML
2.5 INJECTION INTRAMUSCULAR; INTRAVENOUS ONCE
Status: COMPLETED | OUTPATIENT
Start: 2022-01-01 | End: 2022-01-01

## 2022-01-01 RX ORDER — LEVETIRACETAM 5 MG/ML
500 INJECTION INTRAVASCULAR EVERY 12 HOURS SCHEDULED
Status: DISCONTINUED | OUTPATIENT
Start: 2022-01-01 | End: 2022-01-01

## 2022-01-01 RX ORDER — PREGABALIN 75 MG/1
150 CAPSULE ORAL 2 TIMES DAILY
Status: CANCELLED | OUTPATIENT
Start: 2022-01-01

## 2022-01-01 RX ORDER — ALBUTEROL SULFATE 90 UG/1
2 AEROSOL, METERED RESPIRATORY (INHALATION) EVERY 6 HOURS PRN
Status: CANCELLED | OUTPATIENT
Start: 2022-01-01

## 2022-01-01 RX ORDER — HALOPERIDOL 5 MG/ML
5 INJECTION INTRAMUSCULAR ONCE
Status: COMPLETED | OUTPATIENT
Start: 2022-01-01 | End: 2022-01-01

## 2022-01-01 RX ORDER — LEVETIRACETAM 5 MG/ML
500 INJECTION INTRAVASCULAR EVERY 12 HOURS SCHEDULED
Status: DISCONTINUED | OUTPATIENT
Start: 2022-01-01 | End: 2022-01-01 | Stop reason: HOSPADM

## 2022-01-01 RX ORDER — POTASSIUM CHLORIDE 750 MG/1
40 TABLET, FILM COATED, EXTENDED RELEASE ORAL AS NEEDED
Status: DISCONTINUED | OUTPATIENT
Start: 2022-01-01 | End: 2022-01-01

## 2022-01-01 RX ORDER — NICOTINE POLACRILEX 4 MG
15 LOZENGE BUCCAL
Status: DISCONTINUED | OUTPATIENT
Start: 2022-01-01 | End: 2022-01-01 | Stop reason: SDUPTHER

## 2022-01-01 RX ORDER — LORAZEPAM 2 MG/ML
0.5 CONCENTRATE ORAL
Status: CANCELLED | OUTPATIENT
Start: 2022-01-01 | End: 2022-03-05

## 2022-01-01 RX ORDER — LEVETIRACETAM 100 MG/ML
750 SOLUTION ORAL EVERY 12 HOURS SCHEDULED
Status: DISCONTINUED | OUTPATIENT
Start: 2022-01-01 | End: 2022-01-01

## 2022-01-01 RX ORDER — AMOXICILLIN 250 MG
2 CAPSULE ORAL 2 TIMES DAILY
Status: DISCONTINUED | OUTPATIENT
Start: 2022-01-01 | End: 2022-01-01

## 2022-01-01 RX ORDER — SODIUM CHLORIDE 9 MG/ML
30 INJECTION, SOLUTION INTRAVENOUS CONTINUOUS PRN
Status: DISCONTINUED | OUTPATIENT
Start: 2022-01-01 | End: 2022-01-01

## 2022-01-01 RX ORDER — CLONIDINE 0.1 MG/24H
1 PATCH, EXTENDED RELEASE TRANSDERMAL WEEKLY
Start: 2022-01-01

## 2022-01-01 RX ORDER — SCOLOPAMINE TRANSDERMAL SYSTEM 1 MG/1
1 PATCH, EXTENDED RELEASE TRANSDERMAL
Status: CANCELLED | OUTPATIENT
Start: 2022-01-01

## 2022-01-01 RX ORDER — HYDROMORPHONE HCL 110MG/55ML
2 PATIENT CONTROLLED ANALGESIA SYRINGE INTRAVENOUS
Status: DISCONTINUED | OUTPATIENT
Start: 2022-01-01 | End: 2022-01-01 | Stop reason: HOSPADM

## 2022-01-01 RX ORDER — LORAZEPAM 2 MG/ML
0.5 INJECTION INTRAMUSCULAR 2 TIMES DAILY PRN
Status: DISCONTINUED | OUTPATIENT
Start: 2022-01-01 | End: 2022-01-01

## 2022-01-01 RX ORDER — LORAZEPAM 2 MG/ML
1 CONCENTRATE ORAL
Status: DISCONTINUED | OUTPATIENT
Start: 2022-01-01 | End: 2022-01-01 | Stop reason: HOSPADM

## 2022-01-01 RX ORDER — MAGNESIUM SULFATE HEPTAHYDRATE 40 MG/ML
4 INJECTION, SOLUTION INTRAVENOUS AS NEEDED
Status: DISCONTINUED | OUTPATIENT
Start: 2022-01-01 | End: 2022-01-01 | Stop reason: HOSPADM

## 2022-01-01 RX ORDER — HEPARIN SODIUM 10000 [USP'U]/100ML
10.2 INJECTION, SOLUTION INTRAVENOUS
Start: 2022-01-01

## 2022-01-01 RX ORDER — POTASSIUM CHLORIDE 1.5 G/1.77G
40 POWDER, FOR SOLUTION ORAL AS NEEDED
Status: DISCONTINUED | OUTPATIENT
Start: 2022-01-01 | End: 2022-01-01

## 2022-01-01 RX ORDER — KETOROLAC TROMETHAMINE 15 MG/ML
15 INJECTION, SOLUTION INTRAMUSCULAR; INTRAVENOUS EVERY 6 HOURS PRN
Status: DISCONTINUED | OUTPATIENT
Start: 2022-01-01 | End: 2022-01-01 | Stop reason: HOSPADM

## 2022-01-01 RX ORDER — MORPHINE SULFATE 4 MG/ML
4 INJECTION, SOLUTION INTRAMUSCULAR; INTRAVENOUS
Status: DISCONTINUED | OUTPATIENT
Start: 2022-01-01 | End: 2022-01-01

## 2022-01-01 RX ORDER — NICOTINE POLACRILEX 4 MG
15 LOZENGE BUCCAL
Status: DISCONTINUED | OUTPATIENT
Start: 2022-01-01 | End: 2022-01-01 | Stop reason: HOSPADM

## 2022-01-01 RX ORDER — GLYCOPYRROLATE 0.2 MG/ML
0.2 INJECTION INTRAMUSCULAR; INTRAVENOUS
Status: DISCONTINUED | OUTPATIENT
Start: 2022-01-01 | End: 2022-01-01 | Stop reason: HOSPADM

## 2022-01-01 RX ORDER — HEPARIN SODIUM 10000 [USP'U]/100ML
10.2 INJECTION, SOLUTION INTRAVENOUS
Status: DISCONTINUED | OUTPATIENT
Start: 2022-01-01 | End: 2022-01-01 | Stop reason: HOSPADM

## 2022-01-01 RX ORDER — VERAPAMIL HYDROCHLORIDE 80 MG/1
80 TABLET ORAL EVERY 6 HOURS SCHEDULED
Status: DISCONTINUED | OUTPATIENT
Start: 2022-01-01 | End: 2022-01-01

## 2022-01-01 RX ORDER — ECHINACEA PURPUREA EXTRACT 125 MG
2 TABLET ORAL AS NEEDED
Status: DISCONTINUED | OUTPATIENT
Start: 2022-01-01 | End: 2022-01-01 | Stop reason: HOSPADM

## 2022-01-01 RX ORDER — ACETAMINOPHEN 650 MG/1
650 SUPPOSITORY RECTAL EVERY 4 HOURS PRN
Status: CANCELLED | OUTPATIENT
Start: 2022-01-01

## 2022-01-01 RX ORDER — MIDAZOLAM IN NACL,ISO-OSMOT/PF 50 MG/50ML
1-10 INFUSION BOTTLE (ML) INTRAVENOUS
Status: DISCONTINUED | OUTPATIENT
Start: 2022-01-01 | End: 2022-01-01

## 2022-01-01 RX ORDER — BENZONATATE 100 MG/1
100 CAPSULE ORAL 3 TIMES DAILY PRN
Status: DISCONTINUED | OUTPATIENT
Start: 2022-01-01 | End: 2022-01-01 | Stop reason: HOSPADM

## 2022-01-01 RX ORDER — KETOROLAC TROMETHAMINE 15 MG/ML
15 INJECTION, SOLUTION INTRAMUSCULAR; INTRAVENOUS EVERY 6 HOURS PRN
Status: CANCELLED | OUTPATIENT
Start: 2022-01-01 | End: 2022-02-28

## 2022-01-01 RX ORDER — INSULIN LISPRO 100 [IU]/ML
0-24 INJECTION, SOLUTION INTRAVENOUS; SUBCUTANEOUS EVERY 4 HOURS
Status: DISCONTINUED | OUTPATIENT
Start: 2022-01-01 | End: 2022-01-01

## 2022-01-01 RX ORDER — PROPOFOL 10 MG/ML
200 VIAL (ML) INTRAVENOUS ONCE
Status: DISCONTINUED | OUTPATIENT
Start: 2022-01-01 | End: 2022-01-01 | Stop reason: HOSPADM

## 2022-01-01 RX ORDER — ECHINACEA PURPUREA EXTRACT 125 MG
1 TABLET ORAL AS NEEDED
Status: CANCELLED | OUTPATIENT
Start: 2022-01-01

## 2022-01-01 RX ORDER — DIPHENHYDRAMINE HYDROCHLORIDE 50 MG/ML
INJECTION INTRAMUSCULAR; INTRAVENOUS
Status: COMPLETED
Start: 2022-01-01 | End: 2022-01-01

## 2022-01-01 RX ORDER — METRONIDAZOLE 250 MG/1
500 TABLET ORAL EVERY 8 HOURS SCHEDULED
Status: DISCONTINUED | OUTPATIENT
Start: 2022-01-01 | End: 2022-01-01 | Stop reason: HOSPADM

## 2022-01-01 RX ORDER — PROCHLORPERAZINE EDISYLATE 5 MG/ML
5 INJECTION INTRAMUSCULAR; INTRAVENOUS EVERY 6 HOURS PRN
Status: DISCONTINUED | OUTPATIENT
Start: 2022-01-01 | End: 2022-01-01 | Stop reason: HOSPADM

## 2022-01-01 RX ORDER — ENALAPRIL MALEATE 5 MG/1
5 TABLET ORAL ONCE
Status: COMPLETED | OUTPATIENT
Start: 2022-01-01 | End: 2022-01-01

## 2022-01-01 RX ORDER — VERAPAMIL HYDROCHLORIDE 80 MG/1
80 TABLET ORAL EVERY 8 HOURS SCHEDULED
Status: DISCONTINUED | OUTPATIENT
Start: 2022-01-01 | End: 2022-01-01 | Stop reason: HOSPADM

## 2022-01-01 RX ORDER — MORPHINE SULFATE 20 MG/ML
5 SOLUTION ORAL
Status: CANCELLED | OUTPATIENT
Start: 2022-01-01 | End: 2022-02-28

## 2022-01-01 RX ORDER — HYDROMORPHONE HYDROCHLORIDE 1 MG/ML
0.5 INJECTION, SOLUTION INTRAMUSCULAR; INTRAVENOUS; SUBCUTANEOUS
Status: DISCONTINUED | OUTPATIENT
Start: 2022-01-01 | End: 2022-01-01

## 2022-01-01 RX ORDER — KETAMINE HYDROCHLORIDE 100 MG/ML
200 INJECTION INTRAMUSCULAR; INTRAVENOUS ONCE
Status: COMPLETED | OUTPATIENT
Start: 2022-01-01 | End: 2022-01-01

## 2022-01-01 RX ORDER — PROCHLORPERAZINE EDISYLATE 5 MG/ML
5 INJECTION INTRAMUSCULAR; INTRAVENOUS EVERY 6 HOURS PRN
Status: CANCELLED | OUTPATIENT
Start: 2022-01-01

## 2022-01-01 RX ORDER — OLANZAPINE 5 MG/1
5 TABLET ORAL DAILY
Status: DISCONTINUED | OUTPATIENT
Start: 2022-01-01 | End: 2022-01-01

## 2022-01-01 RX ORDER — LEVETIRACETAM 15 MG/ML
1500 INJECTION INTRAVASCULAR EVERY 12 HOURS SCHEDULED
Status: DISCONTINUED | OUTPATIENT
Start: 2022-01-01 | End: 2022-01-01

## 2022-01-01 RX ORDER — ACETAMINOPHEN 160 MG/5ML
650 SOLUTION ORAL EVERY 4 HOURS PRN
Status: DISCONTINUED | OUTPATIENT
Start: 2022-01-01 | End: 2022-01-01

## 2022-01-01 RX ORDER — QUETIAPINE FUMARATE 100 MG/1
200 TABLET, FILM COATED ORAL 2 TIMES DAILY
Status: DISCONTINUED | OUTPATIENT
Start: 2022-01-01 | End: 2022-01-01 | Stop reason: HOSPADM

## 2022-01-01 RX ORDER — PROPOFOL 10 MG/ML
VIAL (ML) INTRAVENOUS
Status: DISPENSED
Start: 2022-01-01 | End: 2022-01-01

## 2022-01-01 RX ORDER — HYDROMORPHONE HYDROCHLORIDE 1 MG/ML
0.5 INJECTION, SOLUTION INTRAMUSCULAR; INTRAVENOUS; SUBCUTANEOUS
Status: CANCELLED | OUTPATIENT
Start: 2022-01-01 | End: 2022-02-28

## 2022-01-01 RX ORDER — VERAPAMIL HYDROCHLORIDE 120 MG/1
120 TABLET, FILM COATED ORAL EVERY 6 HOURS SCHEDULED
Status: DISCONTINUED | OUTPATIENT
Start: 2022-01-01 | End: 2022-01-01

## 2022-01-01 RX ORDER — HYDRALAZINE HYDROCHLORIDE 20 MG/ML
10 INJECTION INTRAMUSCULAR; INTRAVENOUS EVERY 4 HOURS PRN
Start: 2022-01-01

## 2022-01-01 RX ORDER — ACETAMINOPHEN 325 MG/1
650 TABLET ORAL EVERY 4 HOURS PRN
Status: DISCONTINUED | OUTPATIENT
Start: 2022-01-01 | End: 2022-01-01

## 2022-01-01 RX ORDER — HYDRALAZINE HYDROCHLORIDE 20 MG/ML
10 INJECTION INTRAMUSCULAR; INTRAVENOUS EVERY 6 HOURS PRN
Status: DISCONTINUED | OUTPATIENT
Start: 2022-01-01 | End: 2022-01-01

## 2022-01-01 RX ORDER — NICOTINE POLACRILEX 4 MG
15 LOZENGE BUCCAL
Status: DISCONTINUED | OUTPATIENT
Start: 2022-01-01 | End: 2022-01-01

## 2022-01-01 RX ORDER — AMOXICILLIN 250 MG
2 CAPSULE ORAL 2 TIMES DAILY
Status: CANCELLED | OUTPATIENT
Start: 2022-01-01

## 2022-01-01 RX ORDER — MORPHINE SULFATE 20 MG/ML
5 SOLUTION ORAL
Status: DISCONTINUED | OUTPATIENT
Start: 2022-01-01 | End: 2022-01-01 | Stop reason: HOSPADM

## 2022-01-01 RX ORDER — LORAZEPAM 2 MG/ML
0.5 INJECTION INTRAMUSCULAR
Status: DISCONTINUED | OUTPATIENT
Start: 2022-01-01 | End: 2022-01-01

## 2022-01-01 RX ORDER — MORPHINE SULFATE 2 MG/ML
6 INJECTION, SOLUTION INTRAMUSCULAR; INTRAVENOUS
Status: DISCONTINUED | OUTPATIENT
Start: 2022-01-01 | End: 2022-01-01 | Stop reason: HOSPADM

## 2022-01-01 RX ORDER — HEPARIN SODIUM 5000 [USP'U]/ML
2500 INJECTION, SOLUTION INTRAVENOUS; SUBCUTANEOUS AS NEEDED
Status: DISCONTINUED | OUTPATIENT
Start: 2022-01-01 | End: 2022-01-01 | Stop reason: HOSPADM

## 2022-01-01 RX ORDER — SODIUM CHLORIDE 9 MG/ML
75 INJECTION, SOLUTION INTRAVENOUS CONTINUOUS
Status: DISCONTINUED | OUTPATIENT
Start: 2022-01-01 | End: 2022-01-01

## 2022-01-01 RX ORDER — MORPHINE SULFATE 10 MG/ML
6 INJECTION INTRAMUSCULAR; INTRAVENOUS; SUBCUTANEOUS
Status: CANCELLED | OUTPATIENT
Start: 2022-01-01 | End: 2022-02-28

## 2022-01-01 RX ORDER — LORAZEPAM 2 MG/ML
0.5 INJECTION INTRAMUSCULAR EVERY 4 HOURS PRN
Status: DISCONTINUED | OUTPATIENT
Start: 2022-01-01 | End: 2022-01-01

## 2022-01-01 RX ORDER — FENTANYL CITRATE 50 UG/ML
INJECTION, SOLUTION INTRAMUSCULAR; INTRAVENOUS
Status: DISCONTINUED
Start: 2022-01-01 | End: 2022-01-01 | Stop reason: WASHOUT

## 2022-01-01 RX ORDER — HEPARIN SODIUM 5000 [USP'U]/ML
5000 INJECTION, SOLUTION INTRAVENOUS; SUBCUTANEOUS AS NEEDED
Start: 2022-01-01

## 2022-01-01 RX ORDER — DIPHENOXYLATE HYDROCHLORIDE AND ATROPINE SULFATE 2.5; .025 MG/1; MG/1
1 TABLET ORAL
Status: DISCONTINUED | OUTPATIENT
Start: 2022-01-01 | End: 2022-01-01

## 2022-01-01 RX ORDER — LIDOCAINE HYDROCHLORIDE 10 MG/ML
10 INJECTION, SOLUTION INFILTRATION; PERINEURAL ONCE
Status: COMPLETED | OUTPATIENT
Start: 2022-01-01 | End: 2022-01-01

## 2022-01-01 RX ORDER — PREDNISONE 20 MG/1
40 TABLET ORAL DAILY
Status: DISCONTINUED | OUTPATIENT
Start: 2022-01-01 | End: 2022-01-01

## 2022-01-01 RX ORDER — MIDAZOLAM HYDROCHLORIDE 1 MG/ML
4 INJECTION INTRAMUSCULAR; INTRAVENOUS ONCE
Status: COMPLETED | OUTPATIENT
Start: 2022-01-01 | End: 2022-01-01

## 2022-01-01 RX ORDER — LEVETIRACETAM 5 MG/ML
500 INJECTION INTRAVASCULAR EVERY 12 HOURS SCHEDULED
Qty: 4000 ML
Start: 2022-01-01

## 2022-01-01 RX ORDER — LORAZEPAM 0.5 MG/1
0.5 TABLET ORAL NIGHTLY
Status: DISPENSED | OUTPATIENT
Start: 2022-01-01 | End: 2022-01-01

## 2022-01-01 RX ORDER — LEVETIRACETAM 10 MG/ML
1000 INJECTION INTRAVASCULAR ONCE
Status: COMPLETED | OUTPATIENT
Start: 2022-01-01 | End: 2022-01-01

## 2022-01-01 RX ORDER — GLYCOPYRROLATE 0.2 MG/ML
0.8 INJECTION INTRAMUSCULAR; INTRAVENOUS
Status: DISCONTINUED | OUTPATIENT
Start: 2022-01-01 | End: 2022-01-01 | Stop reason: HOSPADM

## 2022-01-01 RX ORDER — POTASSIUM CHLORIDE 7.45 MG/ML
10 INJECTION INTRAVENOUS
Status: DISCONTINUED | OUTPATIENT
Start: 2022-01-01 | End: 2022-01-01 | Stop reason: HOSPADM

## 2022-01-01 RX ORDER — LORAZEPAM 2 MG/ML
0.5 INJECTION INTRAMUSCULAR EVERY 4 HOURS PRN
Status: DISCONTINUED | OUTPATIENT
Start: 2022-01-01 | End: 2022-01-01 | Stop reason: HOSPADM

## 2022-01-01 RX ORDER — DIPHENHYDRAMINE HYDROCHLORIDE 50 MG/ML
50 INJECTION INTRAMUSCULAR; INTRAVENOUS ONCE
Status: COMPLETED | OUTPATIENT
Start: 2022-01-01 | End: 2022-01-01

## 2022-01-01 RX ORDER — KETOROLAC TROMETHAMINE 15 MG/ML
15 INJECTION, SOLUTION INTRAMUSCULAR; INTRAVENOUS EVERY 6 HOURS PRN
Status: DISCONTINUED | OUTPATIENT
Start: 2022-01-01 | End: 2022-01-01

## 2022-01-01 RX ORDER — PROCHLORPERAZINE 25 MG
25 SUPPOSITORY, RECTAL RECTAL EVERY 12 HOURS PRN
Status: DISCONTINUED | OUTPATIENT
Start: 2022-01-01 | End: 2022-01-01

## 2022-01-01 RX ORDER — HYDROMORPHONE HCL 110MG/55ML
1.5 PATIENT CONTROLLED ANALGESIA SYRINGE INTRAVENOUS
Status: CANCELLED | OUTPATIENT
Start: 2022-01-01 | End: 2022-02-28

## 2022-01-01 RX ORDER — DIPHENOXYLATE HYDROCHLORIDE AND ATROPINE SULFATE 2.5; .025 MG/1; MG/1
1 TABLET ORAL
Status: CANCELLED | OUTPATIENT
Start: 2022-01-01

## 2022-01-01 RX ORDER — SPIRONOLACTONE 25 MG/1
25 TABLET ORAL DAILY
Start: 2022-01-01

## 2022-01-01 RX ORDER — SUCCINYLCHOLINE CHLORIDE 20 MG/ML
INJECTION INTRAMUSCULAR; INTRAVENOUS
Status: ACTIVE
Start: 2022-01-01 | End: 2022-01-01

## 2022-01-01 RX ORDER — MIDAZOLAM HYDROCHLORIDE 1 MG/ML
INJECTION INTRAMUSCULAR; INTRAVENOUS
Status: COMPLETED | OUTPATIENT
Start: 2022-01-01 | End: 2022-01-01

## 2022-01-01 RX ORDER — PREGABALIN 150 MG/1
150 CAPSULE ORAL 2 TIMES DAILY
COMMUNITY
End: 2022-01-01 | Stop reason: HOSPADM

## 2022-01-01 RX ADMIN — SODIUM CHLORIDE, PRESERVATIVE FREE 10 ML: 5 INJECTION INTRAVENOUS at 11:45

## 2022-01-01 RX ADMIN — LORAZEPAM 2 MG: 2 INJECTION INTRAMUSCULAR; INTRAVENOUS at 16:27

## 2022-01-01 RX ADMIN — INSULIN HUMAN 9 UNITS/HR: 1 INJECTION, SOLUTION INTRAVENOUS at 03:24

## 2022-01-01 RX ADMIN — GLYCERIN 1 DROP: .002; .002; .01 SOLUTION/ DROPS OPHTHALMIC at 21:08

## 2022-01-01 RX ADMIN — SODIUM CHLORIDE 5 MG/HR: 900 INJECTION, SOLUTION INTRAVENOUS at 11:51

## 2022-01-01 RX ADMIN — INSULIN ASPART 5 UNITS: 100 INJECTION, SOLUTION INTRAVENOUS; SUBCUTANEOUS at 05:41

## 2022-01-01 RX ADMIN — HEPARIN SODIUM 21.6 UNITS/KG/HR: 10000 INJECTION, SOLUTION INTRAVENOUS at 21:32

## 2022-01-01 RX ADMIN — CHLORHEXIDINE GLUCONATE 15 ML: 1.2 RINSE ORAL at 08:23

## 2022-01-01 RX ADMIN — METHYLPREDNISOLONE SODIUM SUCCINATE 1 G: 1 INJECTION, POWDER, LYOPHILIZED, FOR SOLUTION INTRAMUSCULAR; INTRAVENOUS at 09:00

## 2022-01-01 RX ADMIN — LORAZEPAM 0.5 MG: 2 INJECTION INTRAMUSCULAR; INTRAVENOUS at 18:03

## 2022-01-01 RX ADMIN — DEXMEDETOMIDINE HYDROCHLORIDE 1.3 MCG/KG/HR: 100 INJECTION, SOLUTION INTRAVENOUS at 17:07

## 2022-01-01 RX ADMIN — PROPOFOL 30 MCG/KG/MIN: 10 INJECTION, EMULSION INTRAVENOUS at 12:18

## 2022-01-01 RX ADMIN — PANTOPRAZOLE SODIUM 40 MG: 40 INJECTION, POWDER, FOR SOLUTION INTRAVENOUS at 06:19

## 2022-01-01 RX ADMIN — LISINOPRIL 5 MG: 5 TABLET ORAL at 09:00

## 2022-01-01 RX ADMIN — POTASSIUM CHLORIDE 40 MEQ: 1.5 POWDER, FOR SOLUTION ORAL at 13:48

## 2022-01-01 RX ADMIN — SODIUM CHLORIDE 5 MG/HR: 9 INJECTION, SOLUTION INTRAVENOUS at 10:16

## 2022-01-01 RX ADMIN — HEPARIN SODIUM 30.2 UNITS/KG/HR: 10000 INJECTION, SOLUTION INTRAVENOUS at 06:39

## 2022-01-01 RX ADMIN — CEFAZOLIN SODIUM 2 G: 2 INJECTION, SOLUTION INTRAVENOUS at 11:04

## 2022-01-01 RX ADMIN — THIAMINE HYDROCHLORIDE 500 MG: 100 INJECTION, SOLUTION INTRAMUSCULAR; INTRAVENOUS at 08:04

## 2022-01-01 RX ADMIN — PROPOFOL 50 MCG/KG/MIN: 10 INJECTION, EMULSION INTRAVENOUS at 22:16

## 2022-01-01 RX ADMIN — CEFEPIME HYDROCHLORIDE 2 G: 2 INJECTION, POWDER, FOR SOLUTION INTRAVENOUS at 22:08

## 2022-01-01 RX ADMIN — SODIUM CHLORIDE, PRESERVATIVE FREE 10 ML: 5 INJECTION INTRAVENOUS at 20:14

## 2022-01-01 RX ADMIN — Medication: at 01:10

## 2022-01-01 RX ADMIN — SODIUM CHLORIDE 1000 ML: 9 INJECTION, SOLUTION INTRAVENOUS at 04:45

## 2022-01-01 RX ADMIN — LORAZEPAM 0.5 MG: 0.5 TABLET ORAL at 20:51

## 2022-01-01 RX ADMIN — NICARDIPINE HYDROCHLORIDE 5 MG/HR: 25 INJECTION, SOLUTION INTRAVENOUS at 18:02

## 2022-01-01 RX ADMIN — NICARDIPINE HYDROCHLORIDE 5 MG/HR: 25 INJECTION, SOLUTION INTRAVENOUS at 21:38

## 2022-01-01 RX ADMIN — PROPOFOL 45 MCG/KG/MIN: 10 INJECTION, EMULSION INTRAVENOUS at 04:25

## 2022-01-01 RX ADMIN — PROPOFOL 50 MCG/KG/MIN: 10 INJECTION, EMULSION INTRAVENOUS at 10:02

## 2022-01-01 RX ADMIN — LEVETIRACETAM 500 MG: 5 INJECTION, SOLUTION INTRAVENOUS at 08:07

## 2022-01-01 RX ADMIN — REMDESIVIR 100 MG: 100 INJECTION, POWDER, LYOPHILIZED, FOR SOLUTION INTRAVENOUS at 16:46

## 2022-01-01 RX ADMIN — CEFTRIAXONE 2 G: 2 INJECTION, POWDER, FOR SOLUTION INTRAMUSCULAR; INTRAVENOUS at 10:38

## 2022-01-01 RX ADMIN — FUROSEMIDE 40 MG: 10 INJECTION, SOLUTION INTRAVENOUS at 00:18

## 2022-01-01 RX ADMIN — PROPOFOL 25 MCG/KG/MIN: 10 INJECTION, EMULSION INTRAVENOUS at 02:30

## 2022-01-01 RX ADMIN — LEVETIRACETAM 500 MG: 5 INJECTION, SOLUTION INTRAVENOUS at 20:43

## 2022-01-01 RX ADMIN — SODIUM CHLORIDE, PRESERVATIVE FREE 10 ML: 5 INJECTION INTRAVENOUS at 21:24

## 2022-01-01 RX ADMIN — ACETAMINOPHEN 650 MG: 325 TABLET ORAL at 11:39

## 2022-01-01 RX ADMIN — METOPROLOL TARTRATE 25 MG: 25 TABLET ORAL at 08:22

## 2022-01-01 RX ADMIN — PROPOFOL 50 MCG/KG/MIN: 10 INJECTION, EMULSION INTRAVENOUS at 14:54

## 2022-01-01 RX ADMIN — PROPOFOL 50 MCG/KG/MIN: 10 INJECTION, EMULSION INTRAVENOUS at 22:51

## 2022-01-01 RX ADMIN — LEVETIRACETAM 750 MG: 100 SOLUTION ORAL at 09:01

## 2022-01-01 RX ADMIN — Medication 0.02 MCG/KG/MIN: at 14:33

## 2022-01-01 RX ADMIN — ACETYLCYSTEINE 4 ML: 200 SOLUTION ORAL; RESPIRATORY (INHALATION) at 15:04

## 2022-01-01 RX ADMIN — HEPARIN SODIUM 13 UNITS/KG/HR: 10000 INJECTION, SOLUTION INTRAVENOUS at 11:05

## 2022-01-01 RX ADMIN — HYDRALAZINE HYDROCHLORIDE 10 MG: 20 INJECTION INTRAMUSCULAR; INTRAVENOUS at 02:45

## 2022-01-01 RX ADMIN — PREGABALIN 75 MG: 75 CAPSULE ORAL at 20:41

## 2022-01-01 RX ADMIN — HYDROMORPHONE HYDROCHLORIDE 1 MG: 1 INJECTION, SOLUTION INTRAMUSCULAR; INTRAVENOUS; SUBCUTANEOUS at 21:20

## 2022-01-01 RX ADMIN — SODIUM CHLORIDE, PRESERVATIVE FREE 10 ML: 5 INJECTION INTRAVENOUS at 08:08

## 2022-01-01 RX ADMIN — PROPOFOL 50 MCG/KG/MIN: 10 INJECTION, EMULSION INTRAVENOUS at 07:14

## 2022-01-01 RX ADMIN — DOCUSATE SODIUM 50MG AND SENNOSIDES 8.6MG 2 TABLET: 8.6; 5 TABLET, FILM COATED ORAL at 10:11

## 2022-01-01 RX ADMIN — GLYCOPYRROLATE 0.2 MG: 0.2 INJECTION INTRAMUSCULAR; INTRAVENOUS at 12:55

## 2022-01-01 RX ADMIN — DOCUSATE SODIUM 50MG AND SENNOSIDES 8.6MG 2 TABLET: 8.6; 5 TABLET, FILM COATED ORAL at 08:59

## 2022-01-01 RX ADMIN — LISINOPRIL 5 MG: 5 TABLET ORAL at 08:45

## 2022-01-01 RX ADMIN — PROPOFOL 30 MCG/KG/MIN: 10 INJECTION, EMULSION INTRAVENOUS at 20:28

## 2022-01-01 RX ADMIN — VANCOMYCIN HYDROCHLORIDE 1500 MG: 5 INJECTION, POWDER, LYOPHILIZED, FOR SOLUTION INTRAVENOUS at 13:24

## 2022-01-01 RX ADMIN — METRONIDAZOLE 500 MG: 500 INJECTION, SOLUTION INTRAVENOUS at 01:29

## 2022-01-01 RX ADMIN — HEPARIN SODIUM 24.6 UNITS/KG/HR: 10000 INJECTION, SOLUTION INTRAVENOUS at 01:06

## 2022-01-01 RX ADMIN — GLYCERIN 1 DROP: .002; .002; .01 SOLUTION/ DROPS OPHTHALMIC at 16:29

## 2022-01-01 RX ADMIN — CEFEPIME HYDROCHLORIDE 2 G: 2 INJECTION, POWDER, FOR SOLUTION INTRAVENOUS at 06:01

## 2022-01-01 RX ADMIN — AZTREONAM 2 G: 2 INJECTION, POWDER, LYOPHILIZED, FOR SOLUTION INTRAMUSCULAR; INTRAVENOUS at 16:41

## 2022-01-01 RX ADMIN — PROPOFOL 50 MCG/KG/MIN: 10 INJECTION, EMULSION INTRAVENOUS at 01:27

## 2022-01-01 RX ADMIN — DEXMEDETOMIDINE HYDROCHLORIDE 1.5 MCG/KG/HR: 100 INJECTION, SOLUTION, CONCENTRATE INTRAVENOUS at 01:27

## 2022-01-01 RX ADMIN — HYDRALAZINE HYDROCHLORIDE 10 MG: 10 TABLET, FILM COATED ORAL at 21:28

## 2022-01-01 RX ADMIN — POTASSIUM CHLORIDE 20 MEQ: 400 INJECTION, SOLUTION INTRAVENOUS at 16:00

## 2022-01-01 RX ADMIN — IPRATROPIUM BROMIDE AND ALBUTEROL SULFATE 3 ML: 2.5; .5 SOLUTION RESPIRATORY (INHALATION) at 15:17

## 2022-01-01 RX ADMIN — VERAPAMIL HYDROCHLORIDE 80 MG: 80 TABLET ORAL at 12:51

## 2022-01-01 RX ADMIN — LORAZEPAM 2 MG: 2 INJECTION, SOLUTION INTRAMUSCULAR; INTRAVENOUS at 12:04

## 2022-01-01 RX ADMIN — POTASSIUM CHLORIDE 40 MEQ: 1.5 POWDER, FOR SOLUTION ORAL at 17:29

## 2022-01-01 RX ADMIN — LORAZEPAM 1 MG: 1 TABLET ORAL at 20:16

## 2022-01-01 RX ADMIN — LORAZEPAM 1 MG: 2 INJECTION INTRAMUSCULAR; INTRAVENOUS at 15:42

## 2022-01-01 RX ADMIN — LOSARTAN POTASSIUM 50 MG: 50 TABLET, FILM COATED ORAL at 08:49

## 2022-01-01 RX ADMIN — SODIUM CHLORIDE, PRESERVATIVE FREE 10 ML: 5 INJECTION INTRAVENOUS at 10:21

## 2022-01-01 RX ADMIN — HEPARIN SODIUM 32.2 UNITS/KG/HR: 10000 INJECTION, SOLUTION INTRAVENOUS at 11:00

## 2022-01-01 RX ADMIN — ACETAMINOPHEN 650 MG: 325 TABLET ORAL at 12:50

## 2022-01-01 RX ADMIN — HEPARIN SODIUM 24.2 UNITS/KG/HR: 10000 INJECTION, SOLUTION INTRAVENOUS at 22:50

## 2022-01-01 RX ADMIN — PRAVASTATIN SODIUM 40 MG: 40 TABLET ORAL at 08:36

## 2022-01-01 RX ADMIN — LEVOFLOXACIN 750 MG: 5 INJECTION, SOLUTION INTRAVENOUS at 19:09

## 2022-01-01 RX ADMIN — SODIUM CHLORIDE, PRESERVATIVE FREE 10 ML: 5 INJECTION INTRAVENOUS at 20:16

## 2022-01-01 RX ADMIN — SODIUM CHLORIDE, PRESERVATIVE FREE 10 ML: 5 INJECTION INTRAVENOUS at 20:45

## 2022-01-01 RX ADMIN — PREDNISONE 40 MG: 20 TABLET ORAL at 21:08

## 2022-01-01 RX ADMIN — PRAVASTATIN SODIUM 40 MG: 40 TABLET ORAL at 08:19

## 2022-01-01 RX ADMIN — SODIUM CHLORIDE, PRESERVATIVE FREE 10 ML: 5 INJECTION INTRAVENOUS at 08:22

## 2022-01-01 RX ADMIN — IPRATROPIUM BROMIDE AND ALBUTEROL SULFATE 3 ML: 2.5; .5 SOLUTION RESPIRATORY (INHALATION) at 10:43

## 2022-01-01 RX ADMIN — PREGABALIN 75 MG: 75 CAPSULE ORAL at 20:15

## 2022-01-01 RX ADMIN — HEPARIN SODIUM 24.6 UNITS/KG/HR: 10000 INJECTION, SOLUTION INTRAVENOUS at 07:10

## 2022-01-01 RX ADMIN — GLYCERIN 1 DROP: .002; .002; .01 SOLUTION/ DROPS OPHTHALMIC at 18:17

## 2022-01-01 RX ADMIN — INSULIN ASPART 4 UNITS: 100 INJECTION, SOLUTION INTRAVENOUS; SUBCUTANEOUS at 00:18

## 2022-01-01 RX ADMIN — WATER 10 MG: 1 INJECTION INTRAMUSCULAR; INTRAVENOUS; SUBCUTANEOUS at 18:36

## 2022-01-01 RX ADMIN — CHLORHEXIDINE GLUCONATE 15 ML: 1.2 RINSE ORAL at 08:11

## 2022-01-01 RX ADMIN — MIDAZOLAM 4 MG: 1 INJECTION INTRAMUSCULAR; INTRAVENOUS at 23:56

## 2022-01-01 RX ADMIN — PREGABALIN 75 MG: 75 CAPSULE ORAL at 20:13

## 2022-01-01 RX ADMIN — LOSARTAN POTASSIUM 100 MG: 50 TABLET, FILM COATED ORAL at 08:59

## 2022-01-01 RX ADMIN — HEPARIN SODIUM 5000 UNITS: 5000 INJECTION, SOLUTION INTRAVENOUS; SUBCUTANEOUS at 02:05

## 2022-01-01 RX ADMIN — SODIUM CHLORIDE, PRESERVATIVE FREE 10 ML: 5 INJECTION INTRAVENOUS at 09:01

## 2022-01-01 RX ADMIN — IPRATROPIUM BROMIDE AND ALBUTEROL SULFATE 3 ML: 2.5; .5 SOLUTION RESPIRATORY (INHALATION) at 07:03

## 2022-01-01 RX ADMIN — HYDROMORPHONE HYDROCHLORIDE 1 MG: 1 INJECTION, SOLUTION INTRAMUSCULAR; INTRAVENOUS; SUBCUTANEOUS at 04:11

## 2022-01-01 RX ADMIN — DOXYCYCLINE 100 MG: 100 INJECTION, POWDER, LYOPHILIZED, FOR SOLUTION INTRAVENOUS at 13:20

## 2022-01-01 RX ADMIN — INSULIN ASPART 5 UNITS: 100 INJECTION, SOLUTION INTRAVENOUS; SUBCUTANEOUS at 08:34

## 2022-01-01 RX ADMIN — SODIUM CHLORIDE 15 MG/HR: 9 INJECTION, SOLUTION INTRAVENOUS at 20:47

## 2022-01-01 RX ADMIN — PROPOFOL 45 MCG/KG/MIN: 10 INJECTION, EMULSION INTRAVENOUS at 23:00

## 2022-01-01 RX ADMIN — HEPARIN SODIUM 15 UNITS/KG/HR: 10000 INJECTION, SOLUTION INTRAVENOUS at 12:36

## 2022-01-01 RX ADMIN — VANCOMYCIN HYDROCHLORIDE 1750 MG: 5 INJECTION, POWDER, LYOPHILIZED, FOR SOLUTION INTRAVENOUS at 20:15

## 2022-01-01 RX ADMIN — HEPARIN SODIUM 2500 UNITS: 5000 INJECTION, SOLUTION INTRAVENOUS; SUBCUTANEOUS at 14:34

## 2022-01-01 RX ADMIN — GLYCERIN 1 DROP: .002; .002; .01 SOLUTION/ DROPS OPHTHALMIC at 12:30

## 2022-01-01 RX ADMIN — FUROSEMIDE 40 MG: 10 INJECTION, SOLUTION INTRAMUSCULAR; INTRAVENOUS at 23:57

## 2022-01-01 RX ADMIN — GLYCERIN 1 DROP: .002; .002; .01 SOLUTION/ DROPS OPHTHALMIC at 08:43

## 2022-01-01 RX ADMIN — CEFEPIME HYDROCHLORIDE: 2 INJECTION, POWDER, FOR SOLUTION INTRAVENOUS at 14:42

## 2022-01-01 RX ADMIN — NICARDIPINE HYDROCHLORIDE 5 MG/HR: 25 INJECTION, SOLUTION INTRAVENOUS at 18:45

## 2022-01-01 RX ADMIN — VERAPAMIL HYDROCHLORIDE 120 MG: 120 TABLET ORAL at 06:08

## 2022-01-01 RX ADMIN — PREGABALIN 75 MG: 75 CAPSULE ORAL at 20:37

## 2022-01-01 RX ADMIN — SODIUM CHLORIDE, PRESERVATIVE FREE 10 ML: 5 INJECTION INTRAVENOUS at 21:10

## 2022-01-01 RX ADMIN — PREGABALIN 75 MG: 75 CAPSULE ORAL at 08:28

## 2022-01-01 RX ADMIN — CEFAZOLIN SODIUM 2 G: 2 INJECTION, SOLUTION INTRAVENOUS at 18:06

## 2022-01-01 RX ADMIN — PROPOFOL 30 MCG/KG/MIN: 10 INJECTION, EMULSION INTRAVENOUS at 11:33

## 2022-01-01 RX ADMIN — CEFAZOLIN SODIUM 2 G: 2 SOLUTION INTRAVENOUS at 10:02

## 2022-01-01 RX ADMIN — FENTANYL CITRATE 50 MCG: 50 INJECTION INTRAMUSCULAR; INTRAVENOUS at 11:26

## 2022-01-01 RX ADMIN — PRAVASTATIN SODIUM 40 MG: 40 TABLET ORAL at 08:54

## 2022-01-01 RX ADMIN — CHLORHEXIDINE GLUCONATE 15 ML: 1.2 RINSE ORAL at 08:46

## 2022-01-01 RX ADMIN — METRONIDAZOLE 500 MG: 500 INJECTION, SOLUTION INTRAVENOUS at 22:51

## 2022-01-01 RX ADMIN — SODIUM CHLORIDE, PRESERVATIVE FREE 10 ML: 5 INJECTION INTRAVENOUS at 20:42

## 2022-01-01 RX ADMIN — METRONIDAZOLE 500 MG: 500 INJECTION, SOLUTION INTRAVENOUS at 06:01

## 2022-01-01 RX ADMIN — INSULIN ASPART 4 UNITS: 100 INJECTION, SOLUTION INTRAVENOUS; SUBCUTANEOUS at 15:07

## 2022-01-01 RX ADMIN — HYDROMORPHONE HYDROCHLORIDE 1.5 MG: 2 INJECTION, SOLUTION INTRAMUSCULAR; INTRAVENOUS; SUBCUTANEOUS at 03:32

## 2022-01-01 RX ADMIN — ENOXAPARIN SODIUM 90 MG: 100 INJECTION SUBCUTANEOUS at 20:46

## 2022-01-01 RX ADMIN — POTASSIUM CHLORIDE 20 MEQ: 400 INJECTION, SOLUTION INTRAVENOUS at 13:41

## 2022-01-01 RX ADMIN — DEXTROSE MONOHYDRATE 125 ML/HR: 50 INJECTION, SOLUTION INTRAVENOUS at 13:14

## 2022-01-01 RX ADMIN — INSULIN GLARGINE-YFGN 30 UNITS: 100 INJECTION, SOLUTION SUBCUTANEOUS at 08:34

## 2022-01-01 RX ADMIN — HYDROMORPHONE HYDROCHLORIDE 1.5 MG: 2 INJECTION, SOLUTION INTRAMUSCULAR; INTRAVENOUS; SUBCUTANEOUS at 03:37

## 2022-01-01 RX ADMIN — POTASSIUM CHLORIDE 10 MEQ: 7.46 INJECTION, SOLUTION INTRAVENOUS at 07:40

## 2022-01-01 RX ADMIN — ATORVASTATIN CALCIUM 40 MG: 20 TABLET, FILM COATED ORAL at 21:09

## 2022-01-01 RX ADMIN — CHLORHEXIDINE GLUCONATE 15 ML: 1.2 RINSE ORAL at 20:44

## 2022-01-01 RX ADMIN — LORAZEPAM 1 MG: 2 INJECTION INTRAMUSCULAR; INTRAVENOUS at 08:16

## 2022-01-01 RX ADMIN — LISINOPRIL 5 MG: 5 TABLET ORAL at 08:19

## 2022-01-01 RX ADMIN — SODIUM CHLORIDE, PRESERVATIVE FREE 10 ML: 5 INJECTION INTRAVENOUS at 20:32

## 2022-01-01 RX ADMIN — VANCOMYCIN HYDROCHLORIDE 1750 MG: 5 INJECTION, POWDER, LYOPHILIZED, FOR SOLUTION INTRAVENOUS at 21:30

## 2022-01-01 RX ADMIN — LORAZEPAM 1 MG: 2 INJECTION INTRAMUSCULAR; INTRAVENOUS at 23:42

## 2022-01-01 RX ADMIN — QUETIAPINE FUMARATE 50 MG: 25 TABLET, FILM COATED ORAL at 20:32

## 2022-01-01 RX ADMIN — DEXMEDETOMIDINE HYDROCHLORIDE 1.5 MCG/KG/HR: 100 INJECTION, SOLUTION INTRAVENOUS at 02:20

## 2022-01-01 RX ADMIN — DEXMEDETOMIDINE HYDROCHLORIDE 1.2 MCG/KG/HR: 100 INJECTION, SOLUTION INTRAVENOUS at 19:41

## 2022-01-01 RX ADMIN — HYDRALAZINE HYDROCHLORIDE 10 MG: 20 INJECTION INTRAMUSCULAR; INTRAVENOUS at 06:19

## 2022-01-01 RX ADMIN — POTASSIUM CHLORIDE 10 MEQ: 7.46 INJECTION, SOLUTION INTRAVENOUS at 05:15

## 2022-01-01 RX ADMIN — DEXMEDETOMIDINE HYDROCHLORIDE 1.5 MCG/KG/HR: 100 INJECTION, SOLUTION, CONCENTRATE INTRAVENOUS at 15:22

## 2022-01-01 RX ADMIN — SPIRONOLACTONE 25 MG: 25 TABLET ORAL at 08:13

## 2022-01-01 RX ADMIN — SODIUM CHLORIDE, PRESERVATIVE FREE 10 ML: 5 INJECTION INTRAVENOUS at 08:20

## 2022-01-01 RX ADMIN — METOPROLOL TARTRATE 25 MG: 25 TABLET ORAL at 20:32

## 2022-01-01 RX ADMIN — POTASSIUM CHLORIDE 10 MEQ: 7.46 INJECTION, SOLUTION INTRAVENOUS at 19:34

## 2022-01-01 RX ADMIN — HYDROMORPHONE HYDROCHLORIDE 1 MG: 1 INJECTION, SOLUTION INTRAMUSCULAR; INTRAVENOUS; SUBCUTANEOUS at 20:16

## 2022-01-01 RX ADMIN — PREGABALIN 75 MG: 75 CAPSULE ORAL at 08:41

## 2022-01-01 RX ADMIN — CEFAZOLIN SODIUM 2 G: 2 INJECTION, SOLUTION INTRAVENOUS at 18:27

## 2022-01-01 RX ADMIN — QUETIAPINE FUMARATE 50 MG: 25 TABLET, FILM COATED ORAL at 20:03

## 2022-01-01 RX ADMIN — GLYCOPYRROLATE 0.4 MG: 0.2 INJECTION INTRAMUSCULAR; INTRAVENOUS at 09:35

## 2022-01-01 RX ADMIN — POTASSIUM CHLORIDE 10 MEQ: 7.46 INJECTION, SOLUTION INTRAVENOUS at 09:10

## 2022-01-01 RX ADMIN — MIDAZOLAM 2 MG: 1 INJECTION INTRAMUSCULAR; INTRAVENOUS at 22:33

## 2022-01-01 RX ADMIN — SODIUM CHLORIDE, PRESERVATIVE FREE 10 ML: 5 INJECTION INTRAVENOUS at 23:57

## 2022-01-01 RX ADMIN — ATORVASTATIN CALCIUM 40 MG: 20 TABLET, FILM COATED ORAL at 20:32

## 2022-01-01 RX ADMIN — ATORVASTATIN CALCIUM 40 MG: 20 TABLET, FILM COATED ORAL at 20:46

## 2022-01-01 RX ADMIN — FAMOTIDINE 20 MG: 20 TABLET, FILM COATED ORAL at 18:24

## 2022-01-01 RX ADMIN — CEFAZOLIN SODIUM 2 G: 2 INJECTION, SOLUTION INTRAVENOUS at 03:11

## 2022-01-01 RX ADMIN — VERAPAMIL HYDROCHLORIDE 120 MG: 120 TABLET ORAL at 11:56

## 2022-01-01 RX ADMIN — VERAPAMIL HYDROCHLORIDE 80 MG: 80 TABLET ORAL at 17:56

## 2022-01-01 RX ADMIN — CEFEPIME HYDROCHLORIDE 2 G: 2 INJECTION, POWDER, FOR SOLUTION INTRAVENOUS at 21:12

## 2022-01-01 RX ADMIN — PROPOFOL 5 MCG/KG/MIN: 10 INJECTION, EMULSION INTRAVENOUS at 01:11

## 2022-01-01 RX ADMIN — HEPARIN SODIUM 4000 UNITS: 5000 INJECTION INTRAVENOUS; SUBCUTANEOUS at 12:37

## 2022-01-01 RX ADMIN — LORAZEPAM 1 MG: 2 INJECTION INTRAMUSCULAR; INTRAVENOUS at 08:40

## 2022-01-01 RX ADMIN — HYDRALAZINE HYDROCHLORIDE 10 MG: 10 TABLET, FILM COATED ORAL at 21:24

## 2022-01-01 RX ADMIN — SODIUM CHLORIDE, PRESERVATIVE FREE 10 ML: 5 INJECTION INTRAVENOUS at 08:58

## 2022-01-01 RX ADMIN — SODIUM CHLORIDE, PRESERVATIVE FREE 10 ML: 5 INJECTION INTRAVENOUS at 08:36

## 2022-01-01 RX ADMIN — NICOTINE 1 PATCH: 14 PATCH, EXTENDED RELEASE TRANSDERMAL at 10:44

## 2022-01-01 RX ADMIN — SODIUM CHLORIDE 75 ML/HR: 9 INJECTION, SOLUTION INTRAVENOUS at 14:29

## 2022-01-01 RX ADMIN — INSULIN DETEMIR 15 UNITS: 100 INJECTION, SOLUTION SUBCUTANEOUS at 09:35

## 2022-01-01 RX ADMIN — SODIUM CHLORIDE, PRESERVATIVE FREE 10 ML: 5 INJECTION INTRAVENOUS at 20:15

## 2022-01-01 RX ADMIN — DEXMEDETOMIDINE HYDROCHLORIDE 1.5 MCG/KG/HR: 100 INJECTION, SOLUTION INTRAVENOUS at 00:19

## 2022-01-01 RX ADMIN — AZTREONAM 2 G: 2 INJECTION, POWDER, LYOPHILIZED, FOR SOLUTION INTRAMUSCULAR; INTRAVENOUS at 23:45

## 2022-01-01 RX ADMIN — VERAPAMIL HYDROCHLORIDE 120 MG: 120 TABLET ORAL at 11:31

## 2022-01-01 RX ADMIN — LORAZEPAM 1 MG: 1 TABLET ORAL at 17:09

## 2022-01-01 RX ADMIN — ENOXAPARIN SODIUM 90 MG: 100 INJECTION SUBCUTANEOUS at 08:19

## 2022-01-01 RX ADMIN — POTASSIUM CHLORIDE 40 MEQ: 1.5 POWDER, FOR SOLUTION ORAL at 13:51

## 2022-01-01 RX ADMIN — HYDRALAZINE HYDROCHLORIDE 10 MG: 20 INJECTION INTRAMUSCULAR; INTRAVENOUS at 11:38

## 2022-01-01 RX ADMIN — INSULIN ASPART 8 UNITS: 100 INJECTION, SOLUTION INTRAVENOUS; SUBCUTANEOUS at 08:29

## 2022-01-01 RX ADMIN — HYDROMORPHONE HYDROCHLORIDE 0.5 MG: 1 INJECTION, SOLUTION INTRAMUSCULAR; INTRAVENOUS; SUBCUTANEOUS at 00:46

## 2022-01-01 RX ADMIN — QUETIAPINE FUMARATE 50 MG: 25 TABLET, FILM COATED ORAL at 21:28

## 2022-01-01 RX ADMIN — GLYCERIN 1 DROP: .002; .002; .01 SOLUTION/ DROPS OPHTHALMIC at 16:46

## 2022-01-01 RX ADMIN — LORAZEPAM 2 MG: 2 INJECTION INTRAMUSCULAR; INTRAVENOUS at 09:35

## 2022-01-01 RX ADMIN — IPRATROPIUM BROMIDE AND ALBUTEROL SULFATE 3 ML: 2.5; .5 SOLUTION RESPIRATORY (INHALATION) at 19:40

## 2022-01-01 RX ADMIN — GLYCERIN 1 DROP: .002; .002; .01 SOLUTION/ DROPS OPHTHALMIC at 08:20

## 2022-01-01 RX ADMIN — CHLORHEXIDINE GLUCONATE 15 ML: 1.2 RINSE ORAL at 21:16

## 2022-01-01 RX ADMIN — VANCOMYCIN HYDROCHLORIDE 1750 MG: 5 INJECTION, POWDER, LYOPHILIZED, FOR SOLUTION INTRAVENOUS at 09:21

## 2022-01-01 RX ADMIN — PREGABALIN 75 MG: 75 CAPSULE ORAL at 20:04

## 2022-01-01 RX ADMIN — INSULIN GLARGINE-YFGN 30 UNITS: 100 INJECTION, SOLUTION SUBCUTANEOUS at 20:01

## 2022-01-01 RX ADMIN — SODIUM CHLORIDE 100 ML/HR: 9 INJECTION, SOLUTION INTRAVENOUS at 06:13

## 2022-01-01 RX ADMIN — METRONIDAZOLE 500 MG: 500 INJECTION, SOLUTION INTRAVENOUS at 23:41

## 2022-01-01 RX ADMIN — MORPHINE SULFATE 4 MG: 2 INJECTION, SOLUTION INTRAMUSCULAR; INTRAVENOUS at 00:27

## 2022-01-01 RX ADMIN — SILVER SULFADIAZINE 1 APPLICATION: 10 CREAM TOPICAL at 08:31

## 2022-01-01 RX ADMIN — PROPOFOL 40 MCG/KG/MIN: 10 INJECTION, EMULSION INTRAVENOUS at 11:32

## 2022-01-01 RX ADMIN — INSULIN ASPART 6 UNITS: 100 INJECTION, SOLUTION INTRAVENOUS; SUBCUTANEOUS at 19:01

## 2022-01-01 RX ADMIN — VERAPAMIL HYDROCHLORIDE 80 MG: 80 TABLET ORAL at 13:17

## 2022-01-01 RX ADMIN — VERAPAMIL HYDROCHLORIDE 120 MG: 120 TABLET ORAL at 17:14

## 2022-01-01 RX ADMIN — FAMOTIDINE 20 MG: 20 TABLET, FILM COATED ORAL at 08:19

## 2022-01-01 RX ADMIN — VANCOMYCIN HYDROCHLORIDE 2000 MG: 5 INJECTION, POWDER, LYOPHILIZED, FOR SOLUTION INTRAVENOUS at 23:09

## 2022-01-01 RX ADMIN — FENTANYL CITRATE 50 MCG: 50 INJECTION INTRAMUSCULAR; INTRAVENOUS at 21:25

## 2022-01-01 RX ADMIN — PREGABALIN 75 MG: 75 CAPSULE ORAL at 09:12

## 2022-01-01 RX ADMIN — CEFAZOLIN SODIUM 2 G: 2 INJECTION, SOLUTION INTRAVENOUS at 18:25

## 2022-01-01 RX ADMIN — SODIUM CHLORIDE, PRESERVATIVE FREE 10 ML: 5 INJECTION INTRAVENOUS at 22:33

## 2022-01-01 RX ADMIN — HEPARIN SODIUM 5000 UNITS: 5000 INJECTION INTRAVENOUS; SUBCUTANEOUS at 08:44

## 2022-01-01 RX ADMIN — SODIUM CHLORIDE 50 ML/HR: 9 INJECTION, SOLUTION INTRAVENOUS at 17:41

## 2022-01-01 RX ADMIN — METOPROLOL TARTRATE 5 MG: 5 INJECTION INTRAVENOUS at 06:00

## 2022-01-01 RX ADMIN — IPRATROPIUM BROMIDE AND ALBUTEROL SULFATE 3 ML: 2.5; .5 SOLUTION RESPIRATORY (INHALATION) at 10:48

## 2022-01-01 RX ADMIN — CEFAZOLIN SODIUM 2 G: 2 INJECTION, SOLUTION INTRAVENOUS at 18:24

## 2022-01-01 RX ADMIN — ENOXAPARIN SODIUM 90 MG: 100 INJECTION SUBCUTANEOUS at 08:34

## 2022-01-01 RX ADMIN — PREGABALIN 75 MG: 75 CAPSULE ORAL at 08:29

## 2022-01-01 RX ADMIN — PROPOFOL 50 MCG/KG/MIN: 10 INJECTION, EMULSION INTRAVENOUS at 03:44

## 2022-01-01 RX ADMIN — PREGABALIN 75 MG: 75 CAPSULE ORAL at 20:46

## 2022-01-01 RX ADMIN — PROPOFOL 25 MCG/KG/MIN: 10 INJECTION, EMULSION INTRAVENOUS at 00:27

## 2022-01-01 RX ADMIN — CEFAZOLIN SODIUM 2 G: 2 SOLUTION INTRAVENOUS at 03:34

## 2022-01-01 RX ADMIN — VERAPAMIL HYDROCHLORIDE 80 MG: 80 TABLET ORAL at 05:31

## 2022-01-01 RX ADMIN — MEROPENEM 1 G: 1 INJECTION, POWDER, FOR SOLUTION INTRAVENOUS at 02:19

## 2022-01-01 RX ADMIN — DEXMEDETOMIDINE HYDROCHLORIDE 1.5 MCG/KG/HR: 100 INJECTION, SOLUTION, CONCENTRATE INTRAVENOUS at 01:38

## 2022-01-01 RX ADMIN — CHLORHEXIDINE GLUCONATE 15 ML: 1.2 RINSE ORAL at 20:15

## 2022-01-01 RX ADMIN — REMDESIVIR 100 MG: 100 INJECTION, POWDER, LYOPHILIZED, FOR SOLUTION INTRAVENOUS at 14:53

## 2022-01-01 RX ADMIN — LOSARTAN POTASSIUM 50 MG: 50 TABLET, FILM COATED ORAL at 08:18

## 2022-01-01 RX ADMIN — LORAZEPAM 2 MG: 2 INJECTION INTRAMUSCULAR at 23:32

## 2022-01-01 RX ADMIN — OLANZAPINE 5 MG: 5 TABLET ORAL at 08:13

## 2022-01-01 RX ADMIN — LEVETIRACETAM 750 MG: 100 SOLUTION ORAL at 20:32

## 2022-01-01 RX ADMIN — FAMOTIDINE 20 MG: 20 TABLET, FILM COATED ORAL at 16:34

## 2022-01-01 RX ADMIN — PROPOFOL 20 MCG/KG/MIN: 10 INJECTION, EMULSION INTRAVENOUS at 04:26

## 2022-01-01 RX ADMIN — GLYCOPYRROLATE 0.4 MG: 0.2 INJECTION INTRAMUSCULAR; INTRAVENOUS at 11:41

## 2022-01-01 RX ADMIN — LEVETIRACETAM 500 MG: 5 INJECTION, SOLUTION INTRAVENOUS at 22:35

## 2022-01-01 RX ADMIN — CHLORHEXIDINE GLUCONATE 15 ML: 1.2 RINSE ORAL at 20:32

## 2022-01-01 RX ADMIN — CEFEPIME HYDROCHLORIDE 2 G: 2 INJECTION, POWDER, FOR SOLUTION INTRAVENOUS at 12:51

## 2022-01-01 RX ADMIN — ACETAMINOPHEN 650 MG: 325 TABLET ORAL at 05:16

## 2022-01-01 RX ADMIN — IPRATROPIUM BROMIDE AND ALBUTEROL SULFATE 3 ML: 2.5; .5 SOLUTION RESPIRATORY (INHALATION) at 10:39

## 2022-01-01 RX ADMIN — ACETYLCYSTEINE 4 ML: 200 SOLUTION ORAL; RESPIRATORY (INHALATION) at 15:13

## 2022-01-01 RX ADMIN — DEXMEDETOMIDINE HYDROCHLORIDE 0.8 MCG/KG/HR: 100 INJECTION, SOLUTION INTRAVENOUS at 09:36

## 2022-01-01 RX ADMIN — PREGABALIN 75 MG: 75 CAPSULE ORAL at 08:35

## 2022-01-01 RX ADMIN — HYDROMORPHONE HYDROCHLORIDE 1 MG: 1 INJECTION, SOLUTION INTRAMUSCULAR; INTRAVENOUS; SUBCUTANEOUS at 11:06

## 2022-01-01 RX ADMIN — INSULIN DETEMIR 30 UNITS: 100 INJECTION, SOLUTION SUBCUTANEOUS at 08:40

## 2022-01-01 RX ADMIN — VERAPAMIL HYDROCHLORIDE 120 MG: 120 TABLET ORAL at 06:50

## 2022-01-01 RX ADMIN — LEVETIRACETAM 500 MG: 5 INJECTION, SOLUTION INTRAVENOUS at 08:03

## 2022-01-01 RX ADMIN — LORAZEPAM 2 MG: 2 INJECTION, SOLUTION INTRAMUSCULAR; INTRAVENOUS at 23:32

## 2022-01-01 RX ADMIN — NICARDIPINE HYDROCHLORIDE 10 MG/HR: 25 INJECTION, SOLUTION INTRAVENOUS at 14:45

## 2022-01-01 RX ADMIN — IPRATROPIUM BROMIDE AND ALBUTEROL SULFATE 3 ML: 2.5; .5 SOLUTION RESPIRATORY (INHALATION) at 14:50

## 2022-01-01 RX ADMIN — CEFAZOLIN SODIUM 2 G: 2 SOLUTION INTRAVENOUS at 11:00

## 2022-01-01 RX ADMIN — PROPOFOL 50 MCG/KG/MIN: 10 INJECTION, EMULSION INTRAVENOUS at 12:53

## 2022-01-01 RX ADMIN — LORAZEPAM 1 MG: 2 INJECTION INTRAMUSCULAR; INTRAVENOUS at 10:43

## 2022-01-01 RX ADMIN — CEFAZOLIN SODIUM 2 G: 2 SOLUTION INTRAVENOUS at 15:07

## 2022-01-01 RX ADMIN — IPRATROPIUM BROMIDE AND ALBUTEROL SULFATE 3 ML: 2.5; .5 SOLUTION RESPIRATORY (INHALATION) at 19:37

## 2022-01-01 RX ADMIN — QUETIAPINE FUMARATE 50 MG: 25 TABLET, FILM COATED ORAL at 20:44

## 2022-01-01 RX ADMIN — DEXMEDETOMIDINE HYDROCHLORIDE 1.5 MCG/KG/HR: 100 INJECTION, SOLUTION, CONCENTRATE INTRAVENOUS at 05:11

## 2022-01-01 RX ADMIN — SPIRONOLACTONE 25 MG: 25 TABLET ORAL at 08:59

## 2022-01-01 RX ADMIN — METOPROLOL TARTRATE 5 MG: 5 INJECTION INTRAVENOUS at 14:03

## 2022-01-01 RX ADMIN — PROPOFOL 50 MCG/KG/MIN: 10 INJECTION, EMULSION INTRAVENOUS at 11:30

## 2022-01-01 RX ADMIN — SODIUM CHLORIDE 15 MG/HR: 9 INJECTION, SOLUTION INTRAVENOUS at 08:07

## 2022-01-01 RX ADMIN — INSULIN GLARGINE-YFGN 15 UNITS: 100 INJECTION, SOLUTION SUBCUTANEOUS at 09:29

## 2022-01-01 RX ADMIN — INSULIN DETEMIR 30 UNITS: 100 INJECTION, SOLUTION SUBCUTANEOUS at 08:59

## 2022-01-01 RX ADMIN — SODIUM CHLORIDE, PRESERVATIVE FREE 10 ML: 5 INJECTION INTRAVENOUS at 21:08

## 2022-01-01 RX ADMIN — WATER 10 MG: 1 INJECTION INTRAMUSCULAR; INTRAVENOUS; SUBCUTANEOUS at 12:10

## 2022-01-01 RX ADMIN — SODIUM CHLORIDE, PRESERVATIVE FREE 10 ML: 5 INJECTION INTRAVENOUS at 08:40

## 2022-01-01 RX ADMIN — SILVER SULFADIAZINE 1 APPLICATION: 10 CREAM TOPICAL at 21:08

## 2022-01-01 RX ADMIN — PROPOFOL 45 MCG/KG/MIN: 10 INJECTION, EMULSION INTRAVENOUS at 05:41

## 2022-01-01 RX ADMIN — HEPARIN SODIUM 2500 UNITS: 5000 INJECTION, SOLUTION INTRAVENOUS; SUBCUTANEOUS at 22:47

## 2022-01-01 RX ADMIN — PROPOFOL 50 MCG/KG/MIN: 10 INJECTION, EMULSION INTRAVENOUS at 09:17

## 2022-01-01 RX ADMIN — VERAPAMIL HYDROCHLORIDE 120 MG: 120 TABLET ORAL at 12:15

## 2022-01-01 RX ADMIN — POTASSIUM CHLORIDE 40 MEQ: 1.5 POWDER, FOR SOLUTION ORAL at 15:34

## 2022-01-01 RX ADMIN — PREGABALIN 75 MG: 75 CAPSULE ORAL at 01:52

## 2022-01-01 RX ADMIN — CEFAZOLIN SODIUM 2 G: 2 INJECTION, SOLUTION INTRAVENOUS at 17:46

## 2022-01-01 RX ADMIN — QUETIAPINE FUMARATE 50 MG: 25 TABLET, FILM COATED ORAL at 20:15

## 2022-01-01 RX ADMIN — HYDROMORPHONE HYDROCHLORIDE 0.5 MG: 1 INJECTION, SOLUTION INTRAMUSCULAR; INTRAVENOUS; SUBCUTANEOUS at 16:27

## 2022-01-01 RX ADMIN — VANCOMYCIN HYDROCHLORIDE 1500 MG: 5 INJECTION, POWDER, LYOPHILIZED, FOR SOLUTION INTRAVENOUS at 02:42

## 2022-01-01 RX ADMIN — HEPARIN SODIUM 26.2 UNITS/KG/HR: 10000 INJECTION, SOLUTION INTRAVENOUS at 12:45

## 2022-01-01 RX ADMIN — INSULIN HUMAN 20 UNITS: 100 INJECTION, SOLUTION PARENTERAL at 12:23

## 2022-01-01 RX ADMIN — POTASSIUM CHLORIDE 10 MEQ: 7.46 INJECTION, SOLUTION INTRAVENOUS at 08:39

## 2022-01-01 RX ADMIN — HYDROMORPHONE HYDROCHLORIDE 1.5 MG: 2 INJECTION, SOLUTION INTRAMUSCULAR; INTRAVENOUS; SUBCUTANEOUS at 05:34

## 2022-01-01 RX ADMIN — METHYLPREDNISOLONE SODIUM SUCCINATE 1 G: 1 INJECTION, POWDER, LYOPHILIZED, FOR SOLUTION INTRAMUSCULAR; INTRAVENOUS at 14:42

## 2022-01-01 RX ADMIN — MIDAZOLAM 1 MG: 1 INJECTION INTRAMUSCULAR; INTRAVENOUS at 10:19

## 2022-01-01 RX ADMIN — PROPOFOL 45 MCG/KG/MIN: 10 INJECTION, EMULSION INTRAVENOUS at 22:39

## 2022-01-01 RX ADMIN — ENOXAPARIN SODIUM 90 MG: 100 INJECTION SUBCUTANEOUS at 09:00

## 2022-01-01 RX ADMIN — PROPOFOL 50 MCG/KG/MIN: 10 INJECTION, EMULSION INTRAVENOUS at 05:12

## 2022-01-01 RX ADMIN — ACETYLCYSTEINE 4 ML: 200 SOLUTION ORAL; RESPIRATORY (INHALATION) at 07:13

## 2022-01-01 RX ADMIN — ACETYLCYSTEINE 4 ML: 200 SOLUTION ORAL; RESPIRATORY (INHALATION) at 07:15

## 2022-01-01 RX ADMIN — POTASSIUM CHLORIDE 10 MEQ: 7.46 INJECTION, SOLUTION INTRAVENOUS at 12:56

## 2022-01-01 RX ADMIN — METRONIDAZOLE 500 MG: 500 INJECTION, SOLUTION INTRAVENOUS at 06:24

## 2022-01-01 RX ADMIN — PREGABALIN 75 MG: 75 CAPSULE ORAL at 11:45

## 2022-01-01 RX ADMIN — INSULIN GLARGINE-YFGN 15 UNITS: 100 INJECTION, SOLUTION SUBCUTANEOUS at 14:30

## 2022-01-01 RX ADMIN — PREGABALIN 75 MG: 75 CAPSULE ORAL at 08:19

## 2022-01-01 RX ADMIN — INSULIN HUMAN 9.8 UNITS/HR: 1 INJECTION, SOLUTION INTRAVENOUS at 19:48

## 2022-01-01 RX ADMIN — HEPARIN SODIUM 20.2 UNITS/KG/HR: 10000 INJECTION, SOLUTION INTRAVENOUS at 20:54

## 2022-01-01 RX ADMIN — SODIUM CHLORIDE, PRESERVATIVE FREE 10 ML: 5 INJECTION INTRAVENOUS at 08:46

## 2022-01-01 RX ADMIN — VERAPAMIL HYDROCHLORIDE 120 MG: 120 TABLET ORAL at 06:11

## 2022-01-01 RX ADMIN — SODIUM CHLORIDE, PRESERVATIVE FREE 10 ML: 5 INJECTION INTRAVENOUS at 07:52

## 2022-01-01 RX ADMIN — DEXMEDETOMIDINE HYDROCHLORIDE 1.4 MCG/KG/HR: 100 INJECTION, SOLUTION INTRAVENOUS at 17:36

## 2022-01-01 RX ADMIN — VERAPAMIL HYDROCHLORIDE 120 MG: 120 TABLET ORAL at 05:41

## 2022-01-01 RX ADMIN — VERAPAMIL HYDROCHLORIDE 120 MG: 120 TABLET ORAL at 00:16

## 2022-01-01 RX ADMIN — FAMOTIDINE 20 MG: 20 TABLET, FILM COATED ORAL at 07:50

## 2022-01-01 RX ADMIN — VERAPAMIL HYDROCHLORIDE 80 MG: 80 TABLET ORAL at 06:29

## 2022-01-01 RX ADMIN — HYDROMORPHONE HYDROCHLORIDE 1.5 MG: 2 INJECTION, SOLUTION INTRAMUSCULAR; INTRAVENOUS; SUBCUTANEOUS at 23:17

## 2022-01-01 RX ADMIN — HYDROMORPHONE HYDROCHLORIDE 1.5 MG: 2 INJECTION, SOLUTION INTRAMUSCULAR; INTRAVENOUS; SUBCUTANEOUS at 04:06

## 2022-01-01 RX ADMIN — DEXMEDETOMIDINE HYDROCHLORIDE 1.3 MCG/KG/HR: 100 INJECTION, SOLUTION INTRAVENOUS at 04:12

## 2022-01-01 RX ADMIN — NICARDIPINE HYDROCHLORIDE 5 MG/HR: 25 INJECTION, SOLUTION INTRAVENOUS at 09:13

## 2022-01-01 RX ADMIN — METRONIDAZOLE 500 MG: 500 INJECTION, SOLUTION INTRAVENOUS at 05:07

## 2022-01-01 RX ADMIN — VANCOMYCIN HYDROCHLORIDE 1750 MG: 5 INJECTION, POWDER, LYOPHILIZED, FOR SOLUTION INTRAVENOUS at 13:13

## 2022-01-01 RX ADMIN — PROPOFOL 35 MCG/KG/MIN: 10 INJECTION, EMULSION INTRAVENOUS at 17:07

## 2022-01-01 RX ADMIN — SODIUM CHLORIDE, PRESERVATIVE FREE 10 ML: 5 INJECTION INTRAVENOUS at 21:02

## 2022-01-01 RX ADMIN — HYDRALAZINE HYDROCHLORIDE 10 MG: 20 INJECTION INTRAMUSCULAR; INTRAVENOUS at 07:26

## 2022-01-01 RX ADMIN — LORAZEPAM 0.5 MG: 2 INJECTION INTRAMUSCULAR; INTRAVENOUS at 06:19

## 2022-01-01 RX ADMIN — ZIPRASIDONE MESYLATE 10 MG: 20 INJECTION, POWDER, LYOPHILIZED, FOR SOLUTION INTRAMUSCULAR at 13:19

## 2022-01-01 RX ADMIN — HYDRALAZINE HYDROCHLORIDE 10 MG: 20 INJECTION INTRAMUSCULAR; INTRAVENOUS at 01:35

## 2022-01-01 RX ADMIN — ACETYLCYSTEINE 4 ML: 200 SOLUTION ORAL; RESPIRATORY (INHALATION) at 11:05

## 2022-01-01 RX ADMIN — GLYCOPYRROLATE 0.4 MG: 0.2 INJECTION INTRAMUSCULAR; INTRAVENOUS at 00:24

## 2022-01-01 RX ADMIN — CEFAZOLIN SODIUM 2 G: 2 INJECTION, SOLUTION INTRAVENOUS at 18:17

## 2022-01-01 RX ADMIN — VERAPAMIL HYDROCHLORIDE 80 MG: 80 TABLET ORAL at 13:14

## 2022-01-01 RX ADMIN — CEFAZOLIN SODIUM 2 G: 2 INJECTION, SOLUTION INTRAVENOUS at 11:39

## 2022-01-01 RX ADMIN — SODIUM CHLORIDE 75 ML/HR: 9 INJECTION, SOLUTION INTRAVENOUS at 21:23

## 2022-01-01 RX ADMIN — METOPROLOL TARTRATE 100 MG: 100 TABLET, FILM COATED ORAL at 20:56

## 2022-01-01 RX ADMIN — CEFEPIME HYDROCHLORIDE 2 G: 2 INJECTION, POWDER, FOR SOLUTION INTRAVENOUS at 05:00

## 2022-01-01 RX ADMIN — INSULIN HUMAN 10.4 UNITS/HR: 1 INJECTION, SOLUTION INTRAVENOUS at 19:34

## 2022-01-01 RX ADMIN — SODIUM CHLORIDE 15 MG/HR: 9 INJECTION, SOLUTION INTRAVENOUS at 10:20

## 2022-01-01 RX ADMIN — PROPOFOL 50 MCG/KG/MIN: 10 INJECTION, EMULSION INTRAVENOUS at 05:29

## 2022-01-01 RX ADMIN — LORAZEPAM 1 MG: 1 TABLET ORAL at 20:32

## 2022-01-01 RX ADMIN — PANTOPRAZOLE SODIUM 40 MG: 40 INJECTION, POWDER, FOR SOLUTION INTRAVENOUS at 05:16

## 2022-01-01 RX ADMIN — LORAZEPAM 0.5 MG: 2 INJECTION INTRAMUSCULAR; INTRAVENOUS at 21:19

## 2022-01-01 RX ADMIN — CEFAZOLIN SODIUM 2 G: 2 SOLUTION INTRAVENOUS at 11:32

## 2022-01-01 RX ADMIN — IPRATROPIUM BROMIDE AND ALBUTEROL SULFATE 3 ML: 2.5; .5 SOLUTION RESPIRATORY (INHALATION) at 07:45

## 2022-01-01 RX ADMIN — VANCOMYCIN HYDROCHLORIDE 1500 MG: 5 INJECTION, POWDER, LYOPHILIZED, FOR SOLUTION INTRAVENOUS at 02:40

## 2022-01-01 RX ADMIN — POTASSIUM CHLORIDE 40 MEQ: 1.5 POWDER, FOR SOLUTION ORAL at 20:11

## 2022-01-01 RX ADMIN — POTASSIUM CHLORIDE 10 MEQ: 7.46 INJECTION, SOLUTION INTRAVENOUS at 11:51

## 2022-01-01 RX ADMIN — HEPARIN SODIUM 2500 UNITS: 5000 INJECTION, SOLUTION INTRAVENOUS; SUBCUTANEOUS at 02:41

## 2022-01-01 RX ADMIN — METOPROLOL TARTRATE 5 MG: 5 INJECTION INTRAVENOUS at 03:07

## 2022-01-01 RX ADMIN — INSULIN DETEMIR 20 UNITS: 100 INJECTION, SOLUTION SUBCUTANEOUS at 09:08

## 2022-01-01 RX ADMIN — CEFTRIAXONE 2 G: 2 INJECTION, POWDER, FOR SOLUTION INTRAMUSCULAR; INTRAVENOUS at 11:29

## 2022-01-01 RX ADMIN — HYDROMORPHONE HYDROCHLORIDE 1 MG: 1 INJECTION, SOLUTION INTRAMUSCULAR; INTRAVENOUS; SUBCUTANEOUS at 16:28

## 2022-01-01 RX ADMIN — POTASSIUM CHLORIDE 10 MEQ: 7.46 INJECTION, SOLUTION INTRAVENOUS at 19:27

## 2022-01-01 RX ADMIN — CEFAZOLIN SODIUM 2 G: 2 INJECTION, SOLUTION INTRAVENOUS at 17:56

## 2022-01-01 RX ADMIN — TECHNETIUM TC 99M OXIDRONATE 1 DOSE: 3.15 INJECTION, POWDER, LYOPHILIZED, FOR SOLUTION INTRAVENOUS at 10:55

## 2022-01-01 RX ADMIN — ACETYLCYSTEINE 4 ML: 200 SOLUTION ORAL; RESPIRATORY (INHALATION) at 14:26

## 2022-01-01 RX ADMIN — QUETIAPINE FUMARATE 50 MG: 25 TABLET, FILM COATED ORAL at 20:16

## 2022-01-01 RX ADMIN — VANCOMYCIN HYDROCHLORIDE 1750 MG: 5 INJECTION, POWDER, LYOPHILIZED, FOR SOLUTION INTRAVENOUS at 08:38

## 2022-01-01 RX ADMIN — SODIUM CHLORIDE, PRESERVATIVE FREE 10 ML: 5 INJECTION INTRAVENOUS at 09:00

## 2022-01-01 RX ADMIN — IPRATROPIUM BROMIDE AND ALBUTEROL SULFATE 3 ML: 2.5; .5 SOLUTION RESPIRATORY (INHALATION) at 07:09

## 2022-01-01 RX ADMIN — CEFAZOLIN SODIUM 2 G: 2 INJECTION, SOLUTION INTRAVENOUS at 17:53

## 2022-01-01 RX ADMIN — IPRATROPIUM BROMIDE AND ALBUTEROL SULFATE 3 ML: 2.5; .5 SOLUTION RESPIRATORY (INHALATION) at 07:10

## 2022-01-01 RX ADMIN — SODIUM CHLORIDE, PRESERVATIVE FREE 10 ML: 5 INJECTION INTRAVENOUS at 08:39

## 2022-01-01 RX ADMIN — LORAZEPAM 2 MG: 2 INJECTION INTRAMUSCULAR; INTRAVENOUS at 17:43

## 2022-01-01 RX ADMIN — LORAZEPAM 1 MG: 1 TABLET ORAL at 20:15

## 2022-01-01 RX ADMIN — POTASSIUM CHLORIDE 40 MEQ: 1.5 POWDER, FOR SOLUTION ORAL at 13:20

## 2022-01-01 RX ADMIN — HEPARIN SODIUM 24.2 UNITS/KG/HR: 10000 INJECTION, SOLUTION INTRAVENOUS at 22:54

## 2022-01-01 RX ADMIN — METHYLPREDNISOLONE SODIUM SUCCINATE 40 MG: 40 INJECTION, POWDER, FOR SOLUTION INTRAMUSCULAR; INTRAVENOUS at 10:36

## 2022-01-01 RX ADMIN — PREGABALIN 75 MG: 75 CAPSULE ORAL at 20:16

## 2022-01-01 RX ADMIN — POTASSIUM CHLORIDE 40 MEQ: 1.5 POWDER, FOR SOLUTION ORAL at 08:19

## 2022-01-01 RX ADMIN — SODIUM CHLORIDE, PRESERVATIVE FREE 10 ML: 5 INJECTION INTRAVENOUS at 20:04

## 2022-01-01 RX ADMIN — IPRATROPIUM BROMIDE AND ALBUTEROL SULFATE 3 ML: 2.5; .5 SOLUTION RESPIRATORY (INHALATION) at 19:50

## 2022-01-01 RX ADMIN — LORAZEPAM 2 MG: 2 INJECTION INTRAMUSCULAR; INTRAVENOUS at 21:34

## 2022-01-01 RX ADMIN — PROPOFOL 20 MCG/KG/MIN: 10 INJECTION, EMULSION INTRAVENOUS at 16:42

## 2022-01-01 RX ADMIN — METOPROLOL TARTRATE 100 MG: 100 TABLET, FILM COATED ORAL at 20:41

## 2022-01-01 RX ADMIN — PANTOPRAZOLE SODIUM 40 MG: 40 INJECTION, POWDER, FOR SOLUTION INTRAVENOUS at 06:47

## 2022-01-01 RX ADMIN — FENTANYL CITRATE 50 MCG: 50 INJECTION INTRAMUSCULAR; INTRAVENOUS at 08:00

## 2022-01-01 RX ADMIN — SODIUM CHLORIDE, PRESERVATIVE FREE 10 ML: 5 INJECTION INTRAVENOUS at 08:17

## 2022-01-01 RX ADMIN — HYDROMORPHONE HYDROCHLORIDE 1 MG: 1 INJECTION, SOLUTION INTRAMUSCULAR; INTRAVENOUS; SUBCUTANEOUS at 17:36

## 2022-01-01 RX ADMIN — CHLORHEXIDINE GLUCONATE 15 ML: 1.2 RINSE ORAL at 08:30

## 2022-01-01 RX ADMIN — DEXMEDETOMIDINE HYDROCHLORIDE 1.5 MCG/KG/HR: 100 INJECTION, SOLUTION INTRAVENOUS at 14:49

## 2022-01-01 RX ADMIN — CHLORHEXIDINE GLUCONATE 15 ML: 1.2 RINSE ORAL at 20:57

## 2022-01-01 RX ADMIN — DEXMEDETOMIDINE HYDROCHLORIDE 1.5 MCG/KG/HR: 100 INJECTION, SOLUTION INTRAVENOUS at 14:43

## 2022-01-01 RX ADMIN — HYDROMORPHONE HYDROCHLORIDE 1.5 MG: 2 INJECTION, SOLUTION INTRAMUSCULAR; INTRAVENOUS; SUBCUTANEOUS at 20:57

## 2022-01-01 RX ADMIN — LORAZEPAM 2 MG: 2 INJECTION INTRAMUSCULAR; INTRAVENOUS at 16:17

## 2022-01-01 RX ADMIN — PROPOFOL 30 MCG/KG/MIN: 10 INJECTION, EMULSION INTRAVENOUS at 01:17

## 2022-01-01 RX ADMIN — DEXMEDETOMIDINE HYDROCHLORIDE 1.5 MCG/KG/HR: 100 INJECTION, SOLUTION, CONCENTRATE INTRAVENOUS at 19:15

## 2022-01-01 RX ADMIN — FAMOTIDINE 20 MG: 20 TABLET, FILM COATED ORAL at 17:14

## 2022-01-01 RX ADMIN — INSULIN LISPRO 2 UNITS: 100 INJECTION, SOLUTION INTRAVENOUS; SUBCUTANEOUS at 09:06

## 2022-01-01 RX ADMIN — INSULIN LISPRO 12 UNITS: 100 INJECTION, SOLUTION INTRAVENOUS; SUBCUTANEOUS at 12:06

## 2022-01-01 RX ADMIN — CEFAZOLIN SODIUM 2 G: 2 SOLUTION INTRAVENOUS at 12:15

## 2022-01-01 RX ADMIN — SODIUM CHLORIDE, PRESERVATIVE FREE 10 ML: 5 INJECTION INTRAVENOUS at 08:04

## 2022-01-01 RX ADMIN — PHENYLEPHRINE HYDROCHLORIDE 0.5 MCG/KG/MIN: 10 INJECTION INTRAVENOUS at 05:37

## 2022-01-01 RX ADMIN — SODIUM CHLORIDE, PRESERVATIVE FREE 10 ML: 5 INJECTION INTRAVENOUS at 20:34

## 2022-01-01 RX ADMIN — ACETYLCYSTEINE 4 ML: 200 SOLUTION ORAL; RESPIRATORY (INHALATION) at 19:53

## 2022-01-01 RX ADMIN — HEPARIN SODIUM 26.2 UNITS/KG/HR: 10000 INJECTION, SOLUTION INTRAVENOUS at 21:02

## 2022-01-01 RX ADMIN — MORPHINE SULFATE 6 MG: 2 INJECTION, SOLUTION INTRAMUSCULAR; INTRAVENOUS at 15:42

## 2022-01-01 RX ADMIN — ACETYLCYSTEINE 4 ML: 200 SOLUTION ORAL; RESPIRATORY (INHALATION) at 10:43

## 2022-01-01 RX ADMIN — SODIUM CHLORIDE 1000 ML: 9 INJECTION, SOLUTION INTRAVENOUS at 05:09

## 2022-01-01 RX ADMIN — SODIUM CHLORIDE, PRESERVATIVE FREE 10 ML: 5 INJECTION INTRAVENOUS at 21:23

## 2022-01-01 RX ADMIN — VERAPAMIL HYDROCHLORIDE 80 MG: 80 TABLET ORAL at 11:44

## 2022-01-01 RX ADMIN — VERAPAMIL HYDROCHLORIDE 80 MG: 80 TABLET ORAL at 00:56

## 2022-01-01 RX ADMIN — LORAZEPAM 1 MG: 2 INJECTION INTRAMUSCULAR; INTRAVENOUS at 02:06

## 2022-01-01 RX ADMIN — VANCOMYCIN HYDROCHLORIDE 1250 MG: 5 INJECTION, POWDER, LYOPHILIZED, FOR SOLUTION INTRAVENOUS at 16:16

## 2022-01-01 RX ADMIN — HALOPERIDOL LACTATE 5 MG: 5 INJECTION, SOLUTION INTRAMUSCULAR at 12:02

## 2022-01-01 RX ADMIN — INSULIN ASPART 3 UNITS: 100 INJECTION, SOLUTION INTRAVENOUS; SUBCUTANEOUS at 12:08

## 2022-01-01 RX ADMIN — HYDRALAZINE HYDROCHLORIDE 10 MG: 20 INJECTION INTRAMUSCULAR; INTRAVENOUS at 18:06

## 2022-01-01 RX ADMIN — PROPOFOL 50 MCG/KG/MIN: 10 INJECTION, EMULSION INTRAVENOUS at 23:58

## 2022-01-01 RX ADMIN — DIPHENHYDRAMINE HYDROCHLORIDE 50 MG: 50 INJECTION, SOLUTION INTRAMUSCULAR; INTRAVENOUS at 12:05

## 2022-01-01 RX ADMIN — HEPARIN SODIUM 24.6 UNITS/KG/HR: 10000 INJECTION, SOLUTION INTRAVENOUS at 19:51

## 2022-01-01 RX ADMIN — METOPROLOL TARTRATE 25 MG: 25 TABLET ORAL at 08:08

## 2022-01-01 RX ADMIN — HEPARIN SODIUM 11 UNITS/KG/HR: 10000 INJECTION, SOLUTION INTRAVENOUS at 09:03

## 2022-01-01 RX ADMIN — HYDROMORPHONE HYDROCHLORIDE 1.5 MG: 2 INJECTION, SOLUTION INTRAMUSCULAR; INTRAVENOUS; SUBCUTANEOUS at 08:31

## 2022-01-01 RX ADMIN — INSULIN ASPART 5 UNITS: 100 INJECTION, SOLUTION INTRAVENOUS; SUBCUTANEOUS at 16:48

## 2022-01-01 RX ADMIN — MORPHINE SULFATE 4 MG: 2 INJECTION, SOLUTION INTRAMUSCULAR; INTRAVENOUS at 10:18

## 2022-01-01 RX ADMIN — GLYCERIN 1 DROP: .002; .002; .01 SOLUTION/ DROPS OPHTHALMIC at 08:18

## 2022-01-01 RX ADMIN — ENOXAPARIN SODIUM 90 MG: 100 INJECTION SUBCUTANEOUS at 20:54

## 2022-01-01 RX ADMIN — AZTREONAM 2 G: 2 INJECTION, POWDER, LYOPHILIZED, FOR SOLUTION INTRAMUSCULAR; INTRAVENOUS at 08:19

## 2022-01-01 RX ADMIN — HEPARIN SODIUM 5000 UNITS: 5000 INJECTION, SOLUTION INTRAVENOUS; SUBCUTANEOUS at 23:38

## 2022-01-01 RX ADMIN — CEFAZOLIN SODIUM 2 G: 2 INJECTION, SOLUTION INTRAVENOUS at 03:06

## 2022-01-01 RX ADMIN — LORAZEPAM 1 MG: 2 INJECTION INTRAMUSCULAR; INTRAVENOUS at 12:05

## 2022-01-01 RX ADMIN — INSULIN ASPART 5 UNITS: 100 INJECTION, SOLUTION INTRAVENOUS; SUBCUTANEOUS at 12:35

## 2022-01-01 RX ADMIN — FENTANYL CITRATE 50 MCG: 50 INJECTION INTRAMUSCULAR; INTRAVENOUS at 14:57

## 2022-01-01 RX ADMIN — CEFAZOLIN SODIUM 2 G: 2 SOLUTION INTRAVENOUS at 18:21

## 2022-01-01 RX ADMIN — LEVETIRACETAM 1000 MG: 10 INJECTION INTRAVENOUS at 12:10

## 2022-01-01 RX ADMIN — INSULIN LISPRO 8 UNITS: 100 INJECTION, SOLUTION INTRAVENOUS; SUBCUTANEOUS at 17:45

## 2022-01-01 RX ADMIN — LEVETIRACETAM 750 MG: 100 INJECTION, SOLUTION, CONCENTRATE INTRAVENOUS at 20:42

## 2022-01-01 RX ADMIN — METHYLPREDNISOLONE SODIUM SUCCINATE 40 MG: 40 INJECTION, POWDER, FOR SOLUTION INTRAMUSCULAR; INTRAVENOUS at 11:04

## 2022-01-01 RX ADMIN — SODIUM CHLORIDE 12.5 MG/HR: 9 INJECTION, SOLUTION INTRAVENOUS at 18:39

## 2022-01-01 RX ADMIN — ACETYLCYSTEINE 4 ML: 200 SOLUTION ORAL; RESPIRATORY (INHALATION) at 07:12

## 2022-01-01 RX ADMIN — SILVER SULFADIAZINE 1 APPLICATION: 10 CREAM TOPICAL at 08:23

## 2022-01-01 RX ADMIN — HEPARIN SODIUM 24.2 UNITS/KG/HR: 10000 INJECTION, SOLUTION INTRAVENOUS at 10:47

## 2022-01-01 RX ADMIN — PRAVASTATIN SODIUM 40 MG: 40 TABLET ORAL at 08:34

## 2022-01-01 RX ADMIN — INSULIN GLARGINE-YFGN 30 UNITS: 100 INJECTION, SOLUTION SUBCUTANEOUS at 09:11

## 2022-01-01 RX ADMIN — PROPOFOL 50 MCG/KG/MIN: 10 INJECTION, EMULSION INTRAVENOUS at 19:54

## 2022-01-01 RX ADMIN — INSULIN LISPRO 17 UNITS: 100 INJECTION, SOLUTION INTRAVENOUS; SUBCUTANEOUS at 17:56

## 2022-01-01 RX ADMIN — PROPOFOL 30 MCG/KG/MIN: 10 INJECTION, EMULSION INTRAVENOUS at 06:33

## 2022-01-01 RX ADMIN — LORAZEPAM 1 MG: 2 INJECTION INTRAMUSCULAR; INTRAVENOUS at 12:55

## 2022-01-01 RX ADMIN — INSULIN HUMAN 16 UNITS: 100 INJECTION, SOLUTION PARENTERAL at 13:13

## 2022-01-01 RX ADMIN — DOXYCYCLINE 100 MG: 100 INJECTION, POWDER, LYOPHILIZED, FOR SOLUTION INTRAVENOUS at 11:17

## 2022-01-01 RX ADMIN — SODIUM CHLORIDE, PRESERVATIVE FREE 10 ML: 5 INJECTION INTRAVENOUS at 08:59

## 2022-01-01 RX ADMIN — METRONIDAZOLE 500 MG: 500 INJECTION, SOLUTION INTRAVENOUS at 06:13

## 2022-01-01 RX ADMIN — LIDOCAINE HYDROCHLORIDE 8 ML: 10 INJECTION, SOLUTION INFILTRATION; PERINEURAL at 10:24

## 2022-01-01 RX ADMIN — ATORVASTATIN CALCIUM 40 MG: 20 TABLET, FILM COATED ORAL at 20:17

## 2022-01-01 RX ADMIN — FAMOTIDINE 20 MG: 20 TABLET, FILM COATED ORAL at 08:22

## 2022-01-01 RX ADMIN — HYDRALAZINE HYDROCHLORIDE 10 MG: 10 TABLET, FILM COATED ORAL at 06:25

## 2022-01-01 RX ADMIN — PREDNISONE 40 MG: 20 TABLET ORAL at 08:41

## 2022-01-01 RX ADMIN — LEVETIRACETAM 750 MG: 100 INJECTION, SOLUTION, CONCENTRATE INTRAVENOUS at 21:08

## 2022-01-01 RX ADMIN — CEFAZOLIN SODIUM 2 G: 2 INJECTION, SOLUTION INTRAVENOUS at 11:18

## 2022-01-01 RX ADMIN — SODIUM CHLORIDE, PRESERVATIVE FREE 10 ML: 5 INJECTION INTRAVENOUS at 08:50

## 2022-01-01 RX ADMIN — INSULIN LISPRO 5 UNITS: 100 INJECTION, SOLUTION INTRAVENOUS; SUBCUTANEOUS at 00:39

## 2022-01-01 RX ADMIN — ACETAMINOPHEN 650 MG: 325 TABLET ORAL at 23:41

## 2022-01-01 RX ADMIN — DEXMEDETOMIDINE HYDROCHLORIDE 1.5 MCG/KG/HR: 100 INJECTION, SOLUTION INTRAVENOUS at 22:53

## 2022-01-01 RX ADMIN — ACETYLCYSTEINE 4 ML: 200 SOLUTION ORAL; RESPIRATORY (INHALATION) at 15:17

## 2022-01-01 RX ADMIN — METOPROLOL TARTRATE 5 MG: 5 INJECTION INTRAVENOUS at 05:56

## 2022-01-01 RX ADMIN — ENOXAPARIN SODIUM 90 MG: 100 INJECTION SUBCUTANEOUS at 20:04

## 2022-01-01 RX ADMIN — DEXMEDETOMIDINE HYDROCHLORIDE 1.5 MCG/KG/HR: 100 INJECTION, SOLUTION INTRAVENOUS at 07:57

## 2022-01-01 RX ADMIN — CEFEPIME HYDROCHLORIDE 2 G: 2 INJECTION, POWDER, FOR SOLUTION INTRAVENOUS at 04:47

## 2022-01-01 RX ADMIN — METRONIDAZOLE 500 MG: 500 INJECTION, SOLUTION INTRAVENOUS at 05:17

## 2022-01-01 RX ADMIN — PROPOFOL 50 MCG/KG/MIN: 10 INJECTION, EMULSION INTRAVENOUS at 06:27

## 2022-01-01 RX ADMIN — LORAZEPAM 1 MG: 1 TABLET ORAL at 20:56

## 2022-01-01 RX ADMIN — Medication: at 10:16

## 2022-01-01 RX ADMIN — ACETAMINOPHEN 650 MG: 325 TABLET ORAL at 06:26

## 2022-01-01 RX ADMIN — HEPARIN SODIUM 12 UNITS/KG/HR: 10000 INJECTION, SOLUTION INTRAVENOUS at 12:55

## 2022-01-01 RX ADMIN — INSULIN LISPRO 24 UNITS: 100 INJECTION, SOLUTION INTRAVENOUS; SUBCUTANEOUS at 00:55

## 2022-01-01 RX ADMIN — HEPARIN SODIUM 10.2 UNITS/KG/HR: 10000 INJECTION, SOLUTION INTRAVENOUS at 17:13

## 2022-01-01 RX ADMIN — POTASSIUM CHLORIDE 10 MEQ: 7.46 INJECTION, SOLUTION INTRAVENOUS at 20:34

## 2022-01-01 RX ADMIN — IPRATROPIUM BROMIDE AND ALBUTEROL SULFATE 3 ML: 2.5; .5 SOLUTION RESPIRATORY (INHALATION) at 19:26

## 2022-01-01 RX ADMIN — CEFAZOLIN SODIUM 2 G: 2 INJECTION, SOLUTION INTRAVENOUS at 02:27

## 2022-01-01 RX ADMIN — POTASSIUM PHOSPHATE, MONOBASIC AND POTASSIUM PHOSPHATE, DIBASIC 15 MMOL: 224; 236 INJECTION, SOLUTION, CONCENTRATE INTRAVENOUS at 11:23

## 2022-01-01 RX ADMIN — DEXMEDETOMIDINE HYDROCHLORIDE 0.5 MCG/KG/HR: 100 INJECTION, SOLUTION INTRAVENOUS at 05:16

## 2022-01-01 RX ADMIN — ATORVASTATIN CALCIUM 40 MG: 20 TABLET, FILM COATED ORAL at 22:11

## 2022-01-01 RX ADMIN — DEXMEDETOMIDINE HYDROCHLORIDE 1.5 MCG/KG/HR: 100 INJECTION, SOLUTION, CONCENTRATE INTRAVENOUS at 08:20

## 2022-01-01 RX ADMIN — OLANZAPINE 5 MG: 5 TABLET ORAL at 08:34

## 2022-01-01 RX ADMIN — DEXMEDETOMIDINE HYDROCHLORIDE 0.5 MCG/KG/HR: 100 INJECTION, SOLUTION INTRAVENOUS at 08:37

## 2022-01-01 RX ADMIN — INSULIN LISPRO 8 UNITS: 100 INJECTION, SOLUTION INTRAVENOUS; SUBCUTANEOUS at 23:58

## 2022-01-01 RX ADMIN — LORAZEPAM 2 MG: 2 INJECTION INTRAMUSCULAR; INTRAVENOUS at 00:25

## 2022-01-01 RX ADMIN — HEPARIN SODIUM 12 UNITS/KG/HR: 10000 INJECTION, SOLUTION INTRAVENOUS at 15:03

## 2022-01-01 RX ADMIN — PRAVASTATIN SODIUM 40 MG: 40 TABLET ORAL at 08:22

## 2022-01-01 RX ADMIN — ENOXAPARIN SODIUM 90 MG: 100 INJECTION SUBCUTANEOUS at 18:27

## 2022-01-01 RX ADMIN — SPIRONOLACTONE 25 MG: 25 TABLET ORAL at 08:28

## 2022-01-01 RX ADMIN — MEROPENEM 1 G: 1 INJECTION, POWDER, FOR SOLUTION INTRAVENOUS at 17:50

## 2022-01-01 RX ADMIN — HALOPERIDOL LACTATE 1 MG: 5 INJECTION, SOLUTION INTRAMUSCULAR at 18:44

## 2022-01-01 RX ADMIN — HEPARIN SODIUM 30.2 UNITS/KG/HR: 10000 INJECTION, SOLUTION INTRAVENOUS at 17:23

## 2022-01-01 RX ADMIN — INSULIN LISPRO 9 UNITS: 100 INJECTION, SOLUTION INTRAVENOUS; SUBCUTANEOUS at 03:42

## 2022-01-01 RX ADMIN — IPRATROPIUM BROMIDE AND ALBUTEROL SULFATE 3 ML: 2.5; .5 SOLUTION RESPIRATORY (INHALATION) at 15:13

## 2022-01-01 RX ADMIN — HYDROMORPHONE HYDROCHLORIDE 1.5 MG: 2 INJECTION, SOLUTION INTRAMUSCULAR; INTRAVENOUS; SUBCUTANEOUS at 08:11

## 2022-01-01 RX ADMIN — LEVETIRACETAM 750 MG: 100 SOLUTION ORAL at 10:20

## 2022-01-01 RX ADMIN — DOXYCYCLINE 100 MG: 100 INJECTION, POWDER, LYOPHILIZED, FOR SOLUTION INTRAVENOUS at 23:57

## 2022-01-01 RX ADMIN — SODIUM CHLORIDE, PRESERVATIVE FREE 10 ML: 5 INJECTION INTRAVENOUS at 20:57

## 2022-01-01 RX ADMIN — POTASSIUM CHLORIDE 10 MEQ: 7.46 INJECTION, SOLUTION INTRAVENOUS at 21:19

## 2022-01-01 RX ADMIN — SODIUM CHLORIDE, PRESERVATIVE FREE 10 ML: 5 INJECTION INTRAVENOUS at 20:25

## 2022-01-01 RX ADMIN — HEPARIN SODIUM 2800 UNITS: 5000 INJECTION INTRAVENOUS; SUBCUTANEOUS at 01:00

## 2022-01-01 RX ADMIN — LORAZEPAM 2 MG: 2 INJECTION INTRAMUSCULAR; INTRAVENOUS at 17:05

## 2022-01-01 RX ADMIN — DEXMEDETOMIDINE HYDROCHLORIDE 1.4 MCG/KG/HR: 100 INJECTION, SOLUTION, CONCENTRATE INTRAVENOUS at 12:17

## 2022-01-01 RX ADMIN — METOPROLOL TARTRATE 50 MG: 25 TABLET ORAL at 08:19

## 2022-01-01 RX ADMIN — PREGABALIN 75 MG: 75 CAPSULE ORAL at 09:11

## 2022-01-01 RX ADMIN — VERAPAMIL HYDROCHLORIDE 120 MG: 120 TABLET ORAL at 18:41

## 2022-01-01 RX ADMIN — SILVER SULFADIAZINE 1 APPLICATION: 10 CREAM TOPICAL at 20:12

## 2022-01-01 RX ADMIN — SODIUM CHLORIDE, PRESERVATIVE FREE 10 ML: 5 INJECTION INTRAVENOUS at 19:50

## 2022-01-01 RX ADMIN — DEXMEDETOMIDINE HYDROCHLORIDE 1.5 MCG/KG/HR: 100 INJECTION, SOLUTION, CONCENTRATE INTRAVENOUS at 12:24

## 2022-01-01 RX ADMIN — LEVETIRACETAM 750 MG: 100 SOLUTION ORAL at 20:24

## 2022-01-01 RX ADMIN — HYDROMORPHONE HYDROCHLORIDE 1 MG: 1 INJECTION, SOLUTION INTRAMUSCULAR; INTRAVENOUS; SUBCUTANEOUS at 03:21

## 2022-01-01 RX ADMIN — NICARDIPINE HYDROCHLORIDE 5 MG/HR: 25 INJECTION, SOLUTION INTRAVENOUS at 22:12

## 2022-01-01 RX ADMIN — PROPOFOL 35 MCG/KG/MIN: 10 INJECTION, EMULSION INTRAVENOUS at 04:45

## 2022-01-01 RX ADMIN — HYDRALAZINE HYDROCHLORIDE 10 MG: 10 TABLET, FILM COATED ORAL at 13:50

## 2022-01-01 RX ADMIN — PROPOFOL 50 MCG/KG/MIN: 10 INJECTION, EMULSION INTRAVENOUS at 01:28

## 2022-01-01 RX ADMIN — SODIUM CHLORIDE, PRESERVATIVE FREE 10 ML: 5 INJECTION INTRAVENOUS at 20:44

## 2022-01-01 RX ADMIN — PROPOFOL 30 MCG/KG/MIN: 10 INJECTION, EMULSION INTRAVENOUS at 17:59

## 2022-01-01 RX ADMIN — HYDROMORPHONE HYDROCHLORIDE 1 MG: 1 INJECTION, SOLUTION INTRAMUSCULAR; INTRAVENOUS; SUBCUTANEOUS at 12:40

## 2022-01-01 RX ADMIN — HYDRALAZINE HYDROCHLORIDE 10 MG: 10 TABLET, FILM COATED ORAL at 21:00

## 2022-01-01 RX ADMIN — CEFEPIME HYDROCHLORIDE 2 G: 2 INJECTION, POWDER, FOR SOLUTION INTRAVENOUS at 13:23

## 2022-01-01 RX ADMIN — ETHYL ALCOHOL 1 EACH: 62 SWAB TOPICAL at 08:21

## 2022-01-01 RX ADMIN — METRONIDAZOLE 500 MG: 250 TABLET ORAL at 21:08

## 2022-01-01 RX ADMIN — VERAPAMIL HYDROCHLORIDE 120 MG: 120 TABLET ORAL at 17:12

## 2022-01-01 RX ADMIN — ACETYLCYSTEINE 4 ML: 200 SOLUTION ORAL; RESPIRATORY (INHALATION) at 15:26

## 2022-01-01 RX ADMIN — DILTIAZEM HYDROCHLORIDE 5 MG/HR: 100 INJECTION, POWDER, LYOPHILIZED, FOR SOLUTION INTRAVENOUS at 11:51

## 2022-01-01 RX ADMIN — HYDROMORPHONE HYDROCHLORIDE 1 MG: 1 INJECTION, SOLUTION INTRAMUSCULAR; INTRAVENOUS; SUBCUTANEOUS at 00:14

## 2022-01-01 RX ADMIN — HYDROMORPHONE HYDROCHLORIDE 1.5 MG: 2 INJECTION, SOLUTION INTRAMUSCULAR; INTRAVENOUS; SUBCUTANEOUS at 06:03

## 2022-01-01 RX ADMIN — PRAVASTATIN SODIUM 40 MG: 40 TABLET ORAL at 08:59

## 2022-01-01 RX ADMIN — VANCOMYCIN HYDROCHLORIDE 1250 MG: 5 INJECTION, POWDER, LYOPHILIZED, FOR SOLUTION INTRAVENOUS at 06:50

## 2022-01-01 RX ADMIN — PRAVASTATIN SODIUM 40 MG: 40 TABLET ORAL at 08:30

## 2022-01-01 RX ADMIN — PROPOFOL 50 MCG/KG/MIN: 10 INJECTION, EMULSION INTRAVENOUS at 02:41

## 2022-01-01 RX ADMIN — LORAZEPAM 2 MG: 2 INJECTION INTRAMUSCULAR; INTRAVENOUS at 20:57

## 2022-01-01 RX ADMIN — METRONIDAZOLE 500 MG: 500 INJECTION, SOLUTION INTRAVENOUS at 22:01

## 2022-01-01 RX ADMIN — HYDRALAZINE HYDROCHLORIDE 10 MG: 20 INJECTION INTRAMUSCULAR; INTRAVENOUS at 20:31

## 2022-01-01 RX ADMIN — LOSARTAN POTASSIUM 100 MG: 50 TABLET, FILM COATED ORAL at 08:34

## 2022-01-01 RX ADMIN — METOPROLOL TARTRATE 25 MG: 25 TABLET ORAL at 20:56

## 2022-01-01 RX ADMIN — LOSARTAN POTASSIUM 50 MG: 50 TABLET, FILM COATED ORAL at 08:13

## 2022-01-01 RX ADMIN — CEFTRIAXONE 2 G: 2 INJECTION, POWDER, FOR SOLUTION INTRAMUSCULAR; INTRAVENOUS at 12:05

## 2022-01-01 RX ADMIN — POTASSIUM CHLORIDE 40 MEQ: 1.5 POWDER, FOR SOLUTION ORAL at 00:31

## 2022-01-01 RX ADMIN — CEFEPIME HYDROCHLORIDE 2 G: 2 INJECTION, POWDER, FOR SOLUTION INTRAVENOUS at 06:20

## 2022-01-01 RX ADMIN — IPRATROPIUM BROMIDE AND ALBUTEROL SULFATE 3 ML: 2.5; .5 SOLUTION RESPIRATORY (INHALATION) at 12:25

## 2022-01-01 RX ADMIN — METRONIDAZOLE 500 MG: 250 TABLET ORAL at 13:17

## 2022-01-01 RX ADMIN — ETHYL ALCOHOL 1 EACH: 62 SWAB TOPICAL at 08:50

## 2022-01-01 RX ADMIN — DEXMEDETOMIDINE HYDROCHLORIDE 1.5 MCG/KG/HR: 100 INJECTION, SOLUTION, CONCENTRATE INTRAVENOUS at 09:05

## 2022-01-01 RX ADMIN — ENOXAPARIN SODIUM 90 MG: 100 INJECTION SUBCUTANEOUS at 21:09

## 2022-01-01 RX ADMIN — Medication: at 10:01

## 2022-01-01 RX ADMIN — HYDROMORPHONE HYDROCHLORIDE 1.5 MG: 2 INJECTION, SOLUTION INTRAMUSCULAR; INTRAVENOUS; SUBCUTANEOUS at 10:46

## 2022-01-01 RX ADMIN — IPRATROPIUM BROMIDE AND ALBUTEROL SULFATE 3 ML: 2.5; .5 SOLUTION RESPIRATORY (INHALATION) at 10:50

## 2022-01-01 RX ADMIN — METOPROLOL TARTRATE 25 MG: 25 TABLET ORAL at 08:45

## 2022-01-01 RX ADMIN — SODIUM CHLORIDE, PRESERVATIVE FREE 10 ML: 5 INJECTION INTRAVENOUS at 22:34

## 2022-01-01 RX ADMIN — HYDRALAZINE HYDROCHLORIDE 10 MG: 20 INJECTION INTRAMUSCULAR; INTRAVENOUS at 02:41

## 2022-01-01 RX ADMIN — AZTREONAM 2 G: 2 INJECTION, POWDER, LYOPHILIZED, FOR SOLUTION INTRAMUSCULAR; INTRAVENOUS at 07:24

## 2022-01-01 RX ADMIN — PROPOFOL 50 MCG/KG/MIN: 10 INJECTION, EMULSION INTRAVENOUS at 22:19

## 2022-01-01 RX ADMIN — PRAVASTATIN SODIUM 40 MG: 40 TABLET ORAL at 11:38

## 2022-01-01 RX ADMIN — VANCOMYCIN HYDROCHLORIDE 1250 MG: 5 INJECTION, POWDER, LYOPHILIZED, FOR SOLUTION INTRAVENOUS at 02:40

## 2022-01-01 RX ADMIN — VERAPAMIL HYDROCHLORIDE 80 MG: 80 TABLET ORAL at 21:09

## 2022-01-01 RX ADMIN — CEFAZOLIN SODIUM 2 G: 2 INJECTION, SOLUTION INTRAVENOUS at 02:03

## 2022-01-01 RX ADMIN — HYDROMORPHONE HYDROCHLORIDE 1.5 MG: 2 INJECTION, SOLUTION INTRAMUSCULAR; INTRAVENOUS; SUBCUTANEOUS at 22:06

## 2022-01-01 RX ADMIN — HYDROMORPHONE HYDROCHLORIDE 1.5 MG: 2 INJECTION, SOLUTION INTRAMUSCULAR; INTRAVENOUS; SUBCUTANEOUS at 00:25

## 2022-01-01 RX ADMIN — VANCOMYCIN HYDROCHLORIDE 1750 MG: 5 INJECTION, POWDER, LYOPHILIZED, FOR SOLUTION INTRAVENOUS at 20:05

## 2022-01-01 RX ADMIN — CLONIDINE 1 PATCH: 0.1 PATCH TRANSDERMAL at 20:36

## 2022-01-01 RX ADMIN — INSULIN LISPRO 4 UNITS: 100 INJECTION, SOLUTION INTRAVENOUS; SUBCUTANEOUS at 20:54

## 2022-01-01 RX ADMIN — CHLORHEXIDINE GLUCONATE 15 ML: 1.2 RINSE ORAL at 08:29

## 2022-01-01 RX ADMIN — DEXMEDETOMIDINE HYDROCHLORIDE 1.5 MCG/KG/HR: 100 INJECTION, SOLUTION INTRAVENOUS at 22:42

## 2022-01-01 RX ADMIN — LEVETIRACETAM 750 MG: 100 INJECTION, SOLUTION, CONCENTRATE INTRAVENOUS at 09:03

## 2022-01-01 RX ADMIN — CEFEPIME HYDROCHLORIDE 2 G: 2 INJECTION, POWDER, FOR SOLUTION INTRAVENOUS at 20:30

## 2022-01-01 RX ADMIN — LORAZEPAM 1 MG: 2 INJECTION INTRAMUSCULAR; INTRAVENOUS at 20:54

## 2022-01-01 RX ADMIN — HEPARIN SODIUM 2500 UNITS: 5000 INJECTION, SOLUTION INTRAVENOUS; SUBCUTANEOUS at 06:43

## 2022-01-01 RX ADMIN — LORAZEPAM 0.5 MG: 2 INJECTION INTRAMUSCULAR; INTRAVENOUS at 02:31

## 2022-01-01 RX ADMIN — ACETAMINOPHEN 650 MG: 325 TABLET, FILM COATED ORAL at 12:28

## 2022-01-01 RX ADMIN — HEPARIN SODIUM 18.2 UNITS/KG/HR: 10000 INJECTION, SOLUTION INTRAVENOUS at 04:14

## 2022-01-01 RX ADMIN — PROPOFOL 50 MCG/KG/MIN: 10 INJECTION, EMULSION INTRAVENOUS at 09:05

## 2022-01-01 RX ADMIN — NICARDIPINE HYDROCHLORIDE 15 MG/HR: 25 INJECTION, SOLUTION INTRAVENOUS at 12:41

## 2022-01-01 RX ADMIN — PROPOFOL 30 MCG/KG/MIN: 10 INJECTION, EMULSION INTRAVENOUS at 19:51

## 2022-01-01 RX ADMIN — CEFAZOLIN SODIUM 2 G: 2 SOLUTION INTRAVENOUS at 03:40

## 2022-01-01 RX ADMIN — VERAPAMIL HYDROCHLORIDE 120 MG: 120 TABLET ORAL at 07:02

## 2022-01-01 RX ADMIN — THIAMINE HYDROCHLORIDE 500 MG: 100 INJECTION, SOLUTION INTRAMUSCULAR; INTRAVENOUS at 08:34

## 2022-01-01 RX ADMIN — DEXMEDETOMIDINE HYDROCHLORIDE 1.5 MCG/KG/HR: 100 INJECTION, SOLUTION, CONCENTRATE INTRAVENOUS at 22:45

## 2022-01-01 RX ADMIN — DEXMEDETOMIDINE HYDROCHLORIDE 1.4 MCG/KG/HR: 100 INJECTION, SOLUTION INTRAVENOUS at 10:03

## 2022-01-01 RX ADMIN — VERAPAMIL HYDROCHLORIDE 120 MG: 120 TABLET ORAL at 17:45

## 2022-01-01 RX ADMIN — DEXMEDETOMIDINE HYDROCHLORIDE 1.5 MCG/KG/HR: 100 INJECTION, SOLUTION INTRAVENOUS at 06:01

## 2022-01-01 RX ADMIN — NICARDIPINE HYDROCHLORIDE 7.5 MG/HR: 25 INJECTION, SOLUTION INTRAVENOUS at 23:46

## 2022-01-01 RX ADMIN — METOPROLOL TARTRATE 5 MG: 5 INJECTION INTRAVENOUS at 12:53

## 2022-01-01 RX ADMIN — CEFAZOLIN SODIUM 2 G: 2 INJECTION, SOLUTION INTRAVENOUS at 12:52

## 2022-01-01 RX ADMIN — VANCOMYCIN HYDROCHLORIDE 1750 MG: 5 INJECTION, POWDER, LYOPHILIZED, FOR SOLUTION INTRAVENOUS at 14:12

## 2022-01-01 RX ADMIN — DEXMEDETOMIDINE HYDROCHLORIDE 1.5 MCG/KG/HR: 100 INJECTION, SOLUTION INTRAVENOUS at 06:43

## 2022-01-01 RX ADMIN — HEPARIN SODIUM 21.6 UNITS/KG/HR: 10000 INJECTION, SOLUTION INTRAVENOUS at 05:35

## 2022-01-01 RX ADMIN — METRONIDAZOLE 500 MG: 500 INJECTION, SOLUTION INTRAVENOUS at 23:31

## 2022-01-01 RX ADMIN — LORAZEPAM 0.5 MG: 2 INJECTION INTRAMUSCULAR; INTRAVENOUS at 01:36

## 2022-01-01 RX ADMIN — DEXMEDETOMIDINE HYDROCHLORIDE 1.5 MCG/KG/HR: 100 INJECTION, SOLUTION INTRAVENOUS at 15:56

## 2022-01-01 RX ADMIN — CEFAZOLIN SODIUM 2 G: 2 SOLUTION INTRAVENOUS at 02:45

## 2022-01-01 RX ADMIN — IPRATROPIUM BROMIDE AND ALBUTEROL SULFATE 3 ML: 2.5; .5 SOLUTION RESPIRATORY (INHALATION) at 15:06

## 2022-01-01 RX ADMIN — CHLORHEXIDINE GLUCONATE 15 ML: 1.2 RINSE ORAL at 20:13

## 2022-01-01 RX ADMIN — SODIUM CHLORIDE 75 ML/HR: 9 INJECTION, SOLUTION INTRAVENOUS at 04:26

## 2022-01-01 RX ADMIN — METRONIDAZOLE 500 MG: 500 INJECTION, SOLUTION INTRAVENOUS at 17:23

## 2022-01-01 RX ADMIN — PREDNISONE 40 MG: 20 TABLET ORAL at 08:19

## 2022-01-01 RX ADMIN — CEFEPIME HYDROCHLORIDE 2 G: 2 INJECTION, POWDER, FOR SOLUTION INTRAVENOUS at 05:39

## 2022-01-01 RX ADMIN — FAMOTIDINE 20 MG: 20 TABLET, FILM COATED ORAL at 18:17

## 2022-01-01 RX ADMIN — INSULIN HUMAN 20 UNITS: 100 INJECTION, SOLUTION PARENTERAL at 17:27

## 2022-01-01 RX ADMIN — NICARDIPINE HYDROCHLORIDE 2.5 MG/HR: 25 INJECTION, SOLUTION INTRAVENOUS at 03:02

## 2022-01-01 RX ADMIN — VERAPAMIL HYDROCHLORIDE 120 MG: 120 TABLET ORAL at 19:09

## 2022-01-01 RX ADMIN — DEXMEDETOMIDINE HYDROCHLORIDE 0.2 MCG/KG/HR: 100 INJECTION, SOLUTION INTRAVENOUS at 02:58

## 2022-01-01 RX ADMIN — HEPARIN SODIUM 20.2 UNITS/KG/HR: 10000 INJECTION, SOLUTION INTRAVENOUS at 07:20

## 2022-01-01 RX ADMIN — POTASSIUM CHLORIDE 20 MEQ: 400 INJECTION, SOLUTION INTRAVENOUS at 14:59

## 2022-01-01 RX ADMIN — VANCOMYCIN HYDROCHLORIDE 1750 MG: 5 INJECTION, POWDER, LYOPHILIZED, FOR SOLUTION INTRAVENOUS at 01:58

## 2022-01-01 RX ADMIN — PANTOPRAZOLE SODIUM 40 MG: 40 INJECTION, POWDER, FOR SOLUTION INTRAVENOUS at 06:02

## 2022-01-01 RX ADMIN — HEPARIN SODIUM 24.6 UNITS/KG/HR: 10000 INJECTION, SOLUTION INTRAVENOUS at 20:15

## 2022-01-01 RX ADMIN — PROPOFOL 40 MCG/KG/MIN: 10 INJECTION, EMULSION INTRAVENOUS at 02:03

## 2022-01-01 RX ADMIN — METRONIDAZOLE 500 MG: 500 INJECTION, SOLUTION INTRAVENOUS at 07:49

## 2022-01-01 RX ADMIN — POTASSIUM CHLORIDE 10 MEQ: 7.46 INJECTION, SOLUTION INTRAVENOUS at 22:27

## 2022-01-01 RX ADMIN — PROPOFOL 20 MCG/KG/MIN: 10 INJECTION, EMULSION INTRAVENOUS at 18:26

## 2022-01-01 RX ADMIN — LORAZEPAM 0.5 MG: 2 INJECTION INTRAMUSCULAR; INTRAVENOUS at 11:08

## 2022-01-01 RX ADMIN — HALOPERIDOL 5 MG: 5 INJECTION INTRAMUSCULAR at 12:02

## 2022-01-01 RX ADMIN — FUROSEMIDE 40 MG: 10 INJECTION, SOLUTION INTRAMUSCULAR; INTRAVENOUS at 14:48

## 2022-01-01 RX ADMIN — POTASSIUM CHLORIDE 40 MEQ: 1.5 POWDER, FOR SOLUTION ORAL at 14:49

## 2022-01-01 RX ADMIN — PREGABALIN 75 MG: 75 CAPSULE ORAL at 08:14

## 2022-01-01 RX ADMIN — FENTANYL CITRATE 50 MCG: 50 INJECTION INTRAMUSCULAR; INTRAVENOUS at 02:06

## 2022-01-01 RX ADMIN — CEFEPIME HYDROCHLORIDE 2 G: 2 INJECTION, POWDER, FOR SOLUTION INTRAVENOUS at 14:10

## 2022-01-01 RX ADMIN — HEPARIN SODIUM 24.6 UNITS/KG/HR: 10000 INJECTION, SOLUTION INTRAVENOUS at 13:01

## 2022-01-01 RX ADMIN — FAMOTIDINE 20 MG: 20 TABLET, FILM COATED ORAL at 17:44

## 2022-01-01 RX ADMIN — VANCOMYCIN HYDROCHLORIDE 1750 MG: 5 INJECTION, POWDER, LYOPHILIZED, FOR SOLUTION INTRAVENOUS at 21:48

## 2022-01-01 RX ADMIN — VERAPAMIL HYDROCHLORIDE 120 MG: 120 TABLET ORAL at 11:49

## 2022-01-01 RX ADMIN — SODIUM CHLORIDE, PRESERVATIVE FREE 10 ML: 5 INJECTION INTRAVENOUS at 08:37

## 2022-01-01 RX ADMIN — LIDOCAINE HYDROCHLORIDE 5 ML: 10 INJECTION, SOLUTION INFILTRATION; PERINEURAL at 16:00

## 2022-01-01 RX ADMIN — HEPARIN SODIUM 24.6 UNITS/KG/HR: 10000 INJECTION, SOLUTION INTRAVENOUS at 09:04

## 2022-01-01 RX ADMIN — LEVETIRACETAM 750 MG: 100 INJECTION, SOLUTION, CONCENTRATE INTRAVENOUS at 20:37

## 2022-01-01 RX ADMIN — FENTANYL CITRATE 50 MCG: 50 INJECTION INTRAMUSCULAR; INTRAVENOUS at 04:38

## 2022-01-01 RX ADMIN — INSULIN LISPRO 12 UNITS: 100 INJECTION, SOLUTION INTRAVENOUS; SUBCUTANEOUS at 17:00

## 2022-01-01 RX ADMIN — DEXMEDETOMIDINE HYDROCHLORIDE 0.2 MCG/KG/HR: 100 INJECTION, SOLUTION, CONCENTRATE INTRAVENOUS at 11:55

## 2022-01-01 RX ADMIN — GLYCOPYRROLATE 0.4 MG: 0.2 INJECTION INTRAMUSCULAR; INTRAVENOUS at 16:16

## 2022-01-01 RX ADMIN — VERAPAMIL HYDROCHLORIDE 80 MG: 80 TABLET ORAL at 13:51

## 2022-01-01 RX ADMIN — INSULIN HUMAN 10.4 UNITS/HR: 1 INJECTION, SOLUTION INTRAVENOUS at 09:19

## 2022-01-01 RX ADMIN — REMDESIVIR 200 MG: 100 INJECTION, POWDER, LYOPHILIZED, FOR SOLUTION INTRAVENOUS at 11:39

## 2022-01-01 RX ADMIN — AZTREONAM 2 G: 2 INJECTION, POWDER, LYOPHILIZED, FOR SOLUTION INTRAMUSCULAR; INTRAVENOUS at 16:04

## 2022-01-01 RX ADMIN — AZTREONAM 2 G: 2 INJECTION, POWDER, LYOPHILIZED, FOR SOLUTION INTRAMUSCULAR; INTRAVENOUS at 00:28

## 2022-01-01 RX ADMIN — POTASSIUM CHLORIDE 10 MEQ: 7.46 INJECTION, SOLUTION INTRAVENOUS at 08:38

## 2022-01-01 RX ADMIN — METHYLPREDNISOLONE SODIUM SUCCINATE 1 G: 1 INJECTION, POWDER, LYOPHILIZED, FOR SOLUTION INTRAMUSCULAR; INTRAVENOUS at 09:12

## 2022-01-01 RX ADMIN — PREGABALIN 75 MG: 75 CAPSULE ORAL at 08:59

## 2022-01-01 RX ADMIN — SODIUM CHLORIDE, PRESERVATIVE FREE 10 ML: 5 INJECTION INTRAVENOUS at 20:46

## 2022-01-01 RX ADMIN — PANTOPRAZOLE SODIUM 40 MG: 40 INJECTION, POWDER, FOR SOLUTION INTRAVENOUS at 05:07

## 2022-01-01 RX ADMIN — PROPOFOL 30 MCG/KG/MIN: 10 INJECTION, EMULSION INTRAVENOUS at 13:29

## 2022-01-01 RX ADMIN — PROPOFOL 40 MCG/KG/MIN: 10 INJECTION, EMULSION INTRAVENOUS at 15:32

## 2022-01-01 RX ADMIN — ATORVASTATIN CALCIUM 40 MG: 20 TABLET, FILM COATED ORAL at 20:41

## 2022-01-01 RX ADMIN — ETHYL ALCOHOL 1 EACH: 62 SWAB TOPICAL at 21:23

## 2022-01-01 RX ADMIN — ACETYLCYSTEINE 4 ML: 200 SOLUTION ORAL; RESPIRATORY (INHALATION) at 19:27

## 2022-01-01 RX ADMIN — CEFAZOLIN SODIUM 2 G: 2 SOLUTION INTRAVENOUS at 20:44

## 2022-01-01 RX ADMIN — LEVETIRACETAM 750 MG: 100 INJECTION, SOLUTION, CONCENTRATE INTRAVENOUS at 09:53

## 2022-01-01 RX ADMIN — QUETIAPINE FUMARATE 50 MG: 25 TABLET, FILM COATED ORAL at 20:13

## 2022-01-01 RX ADMIN — Medication 1 MG/HR: at 01:27

## 2022-01-01 RX ADMIN — CEFEPIME HYDROCHLORIDE: 2 INJECTION, POWDER, FOR SOLUTION INTRAVENOUS at 15:35

## 2022-01-01 RX ADMIN — PREGABALIN 75 MG: 75 CAPSULE ORAL at 20:56

## 2022-01-01 RX ADMIN — LORAZEPAM 0.5 MG: 2 INJECTION INTRAMUSCULAR; INTRAVENOUS at 02:40

## 2022-01-01 RX ADMIN — LORAZEPAM 1 MG: 2 INJECTION INTRAMUSCULAR; INTRAVENOUS at 22:24

## 2022-01-01 RX ADMIN — DEXMEDETOMIDINE HYDROCHLORIDE 0.4 MCG/KG/HR: 100 INJECTION, SOLUTION INTRAVENOUS at 12:27

## 2022-01-01 RX ADMIN — METRONIDAZOLE 500 MG: 500 INJECTION, SOLUTION INTRAVENOUS at 16:11

## 2022-01-01 RX ADMIN — ACETAMINOPHEN 650 MG: 325 TABLET, FILM COATED ORAL at 03:44

## 2022-01-01 RX ADMIN — METRONIDAZOLE 500 MG: 500 INJECTION, SOLUTION INTRAVENOUS at 15:01

## 2022-01-01 RX ADMIN — VANCOMYCIN HYDROCHLORIDE 1500 MG: 5 INJECTION, POWDER, LYOPHILIZED, FOR SOLUTION INTRAVENOUS at 14:22

## 2022-01-01 RX ADMIN — THIAMINE HYDROCHLORIDE 500 MG: 100 INJECTION, SOLUTION INTRAMUSCULAR; INTRAVENOUS at 21:30

## 2022-01-01 RX ADMIN — MIDAZOLAM HYDROCHLORIDE 2 MG: 1 INJECTION INTRAMUSCULAR; INTRAVENOUS at 22:33

## 2022-01-01 RX ADMIN — GADOBENATE DIMEGLUMINE 18 ML: 529 INJECTION, SOLUTION INTRAVENOUS at 17:10

## 2022-01-01 RX ADMIN — DEXMEDETOMIDINE HYDROCHLORIDE 0.5 MCG/KG/HR: 100 INJECTION, SOLUTION, CONCENTRATE INTRAVENOUS at 21:26

## 2022-01-01 RX ADMIN — PROPOFOL 50 MCG/KG/MIN: 10 INJECTION, EMULSION INTRAVENOUS at 15:21

## 2022-01-01 RX ADMIN — LORAZEPAM 1 MG: 2 INJECTION INTRAMUSCULAR; INTRAVENOUS at 14:10

## 2022-01-01 RX ADMIN — LORAZEPAM 2 MG: 2 INJECTION INTRAMUSCULAR at 12:04

## 2022-01-01 RX ADMIN — PREGABALIN 75 MG: 75 CAPSULE ORAL at 20:51

## 2022-01-01 RX ADMIN — CEFEPIME HYDROCHLORIDE 2 G: 2 INJECTION, POWDER, FOR SOLUTION INTRAVENOUS at 12:01

## 2022-01-01 RX ADMIN — POTASSIUM CHLORIDE 10 MEQ: 7.46 INJECTION, SOLUTION INTRAVENOUS at 20:36

## 2022-01-01 RX ADMIN — METRONIDAZOLE 500 MG: 250 TABLET ORAL at 23:21

## 2022-01-01 RX ADMIN — HEPARIN SODIUM 2500 UNITS: 5000 INJECTION, SOLUTION INTRAVENOUS; SUBCUTANEOUS at 06:07

## 2022-01-01 RX ADMIN — ZIPRASIDONE MESYLATE 10 MG: 20 INJECTION, POWDER, LYOPHILIZED, FOR SOLUTION INTRAMUSCULAR at 22:30

## 2022-01-01 RX ADMIN — AZTREONAM 2 G: 2 INJECTION, POWDER, LYOPHILIZED, FOR SOLUTION INTRAMUSCULAR; INTRAVENOUS at 15:42

## 2022-01-01 RX ADMIN — OLANZAPINE 5 MG: 5 TABLET ORAL at 11:31

## 2022-01-01 RX ADMIN — SODIUM CHLORIDE, PRESERVATIVE FREE 10 ML: 5 INJECTION INTRAVENOUS at 21:14

## 2022-01-01 RX ADMIN — METOPROLOL TARTRATE 5 MG: 5 INJECTION INTRAVENOUS at 18:02

## 2022-01-01 RX ADMIN — FUROSEMIDE 40 MG: 10 INJECTION, SOLUTION INTRAVENOUS at 13:20

## 2022-01-01 RX ADMIN — INSULIN ASPART 8 UNITS: 100 INJECTION, SOLUTION INTRAVENOUS; SUBCUTANEOUS at 08:47

## 2022-01-01 RX ADMIN — HEPARIN SODIUM 5000 UNITS: 5000 INJECTION, SOLUTION INTRAVENOUS; SUBCUTANEOUS at 14:22

## 2022-01-01 RX ADMIN — INSULIN DETEMIR 30 UNITS: 100 INJECTION, SOLUTION SUBCUTANEOUS at 08:50

## 2022-01-01 RX ADMIN — INSULIN ASPART 5 UNITS: 100 INJECTION, SOLUTION INTRAVENOUS; SUBCUTANEOUS at 10:50

## 2022-01-01 RX ADMIN — METOPROLOL TARTRATE 75 MG: 25 TABLET ORAL at 08:49

## 2022-01-01 RX ADMIN — CEFEPIME HYDROCHLORIDE 2 G: 2 INJECTION, POWDER, FOR SOLUTION INTRAVENOUS at 20:13

## 2022-01-01 RX ADMIN — SODIUM CHLORIDE, PRESERVATIVE FREE 10 ML: 5 INJECTION INTRAVENOUS at 21:29

## 2022-01-01 RX ADMIN — AZTREONAM 2 G: 2 INJECTION, POWDER, LYOPHILIZED, FOR SOLUTION INTRAMUSCULAR; INTRAVENOUS at 08:44

## 2022-01-01 RX ADMIN — PANTOPRAZOLE SODIUM 40 MG: 40 INJECTION, POWDER, FOR SOLUTION INTRAVENOUS at 06:29

## 2022-01-01 RX ADMIN — CEFEPIME HYDROCHLORIDE 2 G: 2 INJECTION, POWDER, FOR SOLUTION INTRAVENOUS at 20:36

## 2022-01-01 RX ADMIN — HYDRALAZINE HYDROCHLORIDE 10 MG: 10 TABLET, FILM COATED ORAL at 13:17

## 2022-01-01 RX ADMIN — NICARDIPINE HYDROCHLORIDE 12.5 MG/HR: 25 INJECTION, SOLUTION INTRAVENOUS at 00:55

## 2022-01-01 RX ADMIN — POTASSIUM CHLORIDE 40 MEQ: 1.5 POWDER, FOR SOLUTION ORAL at 13:17

## 2022-01-01 RX ADMIN — Medication: at 08:46

## 2022-01-01 RX ADMIN — PANTOPRAZOLE SODIUM 40 MG: 40 INJECTION, POWDER, FOR SOLUTION INTRAVENOUS at 06:11

## 2022-01-01 RX ADMIN — HYDROMORPHONE HYDROCHLORIDE 1 MG: 1 INJECTION, SOLUTION INTRAMUSCULAR; INTRAVENOUS; SUBCUTANEOUS at 11:47

## 2022-01-01 RX ADMIN — INSULIN LISPRO 2 UNITS: 100 INJECTION, SOLUTION INTRAVENOUS; SUBCUTANEOUS at 16:41

## 2022-01-01 RX ADMIN — HEPARIN SODIUM 24.6 UNITS/KG/HR: 10000 INJECTION, SOLUTION INTRAVENOUS at 21:27

## 2022-01-01 RX ADMIN — NICARDIPINE HYDROCHLORIDE 10 MG/HR: 25 INJECTION, SOLUTION INTRAVENOUS at 19:05

## 2022-01-01 RX ADMIN — INSULIN DETEMIR 30 UNITS: 100 INJECTION, SOLUTION SUBCUTANEOUS at 08:35

## 2022-01-01 RX ADMIN — MEROPENEM 1 G: 1 INJECTION, POWDER, FOR SOLUTION INTRAVENOUS at 14:36

## 2022-01-01 RX ADMIN — FENTANYL CITRATE 50 MCG: 0.05 INJECTION, SOLUTION INTRAMUSCULAR; INTRAVENOUS at 11:08

## 2022-01-01 RX ADMIN — ACETYLCYSTEINE 4 ML: 200 SOLUTION ORAL; RESPIRATORY (INHALATION) at 19:40

## 2022-01-01 RX ADMIN — HEPARIN SODIUM 24.6 UNITS/KG/HR: 10000 INJECTION, SOLUTION INTRAVENOUS at 00:13

## 2022-01-01 RX ADMIN — SODIUM CHLORIDE, PRESERVATIVE FREE 10 ML: 5 INJECTION INTRAVENOUS at 01:12

## 2022-01-01 RX ADMIN — IPRATROPIUM BROMIDE AND ALBUTEROL SULFATE 3 ML: 2.5; .5 SOLUTION RESPIRATORY (INHALATION) at 07:20

## 2022-01-01 RX ADMIN — AZTREONAM 2 G: 2 INJECTION, POWDER, LYOPHILIZED, FOR SOLUTION INTRAMUSCULAR; INTRAVENOUS at 23:54

## 2022-01-01 RX ADMIN — ACETAMINOPHEN 650 MG: 325 TABLET, FILM COATED ORAL at 08:27

## 2022-01-01 RX ADMIN — DEXMEDETOMIDINE HYDROCHLORIDE 1.5 MCG/KG/HR: 100 INJECTION, SOLUTION, CONCENTRATE INTRAVENOUS at 09:17

## 2022-01-01 RX ADMIN — POTASSIUM CHLORIDE 10 MEQ: 7.46 INJECTION, SOLUTION INTRAVENOUS at 10:43

## 2022-01-01 RX ADMIN — POTASSIUM CHLORIDE 10 MEQ: 7.46 INJECTION, SOLUTION INTRAVENOUS at 10:17

## 2022-01-01 RX ADMIN — HYDROMORPHONE HYDROCHLORIDE 1.5 MG: 2 INJECTION, SOLUTION INTRAMUSCULAR; INTRAVENOUS; SUBCUTANEOUS at 13:05

## 2022-01-01 RX ADMIN — CHLORHEXIDINE GLUCONATE 15 ML: 1.2 RINSE ORAL at 08:17

## 2022-01-01 RX ADMIN — ETHYL ALCOHOL 1 EACH: 62 SWAB TOPICAL at 20:20

## 2022-01-01 RX ADMIN — PRAVASTATIN SODIUM 40 MG: 40 TABLET ORAL at 08:28

## 2022-01-01 RX ADMIN — NICARDIPINE HYDROCHLORIDE 10 MG/HR: 25 INJECTION, SOLUTION INTRAVENOUS at 16:21

## 2022-01-01 RX ADMIN — DOXYCYCLINE 100 MG: 100 INJECTION, POWDER, LYOPHILIZED, FOR SOLUTION INTRAVENOUS at 22:54

## 2022-01-01 RX ADMIN — HYDRALAZINE HYDROCHLORIDE 10 MG: 10 TABLET, FILM COATED ORAL at 23:12

## 2022-01-01 RX ADMIN — ACETYLCYSTEINE 4 ML: 200 SOLUTION ORAL; RESPIRATORY (INHALATION) at 10:51

## 2022-01-01 RX ADMIN — ACETYLCYSTEINE 4 ML: 200 SOLUTION ORAL; RESPIRATORY (INHALATION) at 07:45

## 2022-01-01 RX ADMIN — CEFAZOLIN SODIUM 2 G: 2 INJECTION, SOLUTION INTRAVENOUS at 03:07

## 2022-01-01 RX ADMIN — IPRATROPIUM BROMIDE AND ALBUTEROL SULFATE 3 ML: 2.5; .5 SOLUTION RESPIRATORY (INHALATION) at 08:31

## 2022-01-01 RX ADMIN — AZTREONAM 2 G: 2 INJECTION, POWDER, LYOPHILIZED, FOR SOLUTION INTRAMUSCULAR; INTRAVENOUS at 23:46

## 2022-01-01 RX ADMIN — ETHYL ALCOHOL 1 EACH: 62 SWAB TOPICAL at 21:28

## 2022-01-01 RX ADMIN — PROPOFOL 30 MCG/KG/MIN: 10 INJECTION, EMULSION INTRAVENOUS at 06:11

## 2022-01-01 RX ADMIN — HEPARIN SODIUM 10.2 UNITS/KG/HR: 10000 INJECTION, SOLUTION INTRAVENOUS at 18:32

## 2022-01-01 RX ADMIN — PROPOFOL 50 MCG/KG/MIN: 10 INJECTION, EMULSION INTRAVENOUS at 03:32

## 2022-01-01 RX ADMIN — HEPARIN SODIUM 5000 UNITS: 5000 INJECTION INTRAVENOUS; SUBCUTANEOUS at 01:19

## 2022-01-01 RX ADMIN — IOVERSOL 100 ML: 741 INJECTION INTRA-ARTERIAL; INTRAVENOUS at 16:28

## 2022-01-01 RX ADMIN — INSULIN LISPRO 17 UNITS: 100 INJECTION, SOLUTION INTRAVENOUS; SUBCUTANEOUS at 12:27

## 2022-01-01 RX ADMIN — INSULIN LISPRO 5 UNITS: 100 INJECTION, SOLUTION INTRAVENOUS; SUBCUTANEOUS at 13:33

## 2022-01-01 RX ADMIN — POTASSIUM CHLORIDE 40 MEQ: 1.5 POWDER, FOR SOLUTION ORAL at 08:34

## 2022-01-01 RX ADMIN — HYDROMORPHONE HYDROCHLORIDE 1.5 MG: 2 INJECTION, SOLUTION INTRAMUSCULAR; INTRAVENOUS; SUBCUTANEOUS at 17:43

## 2022-01-01 RX ADMIN — PROPOFOL 50 MCG/KG/MIN: 10 INJECTION, EMULSION INTRAVENOUS at 12:27

## 2022-01-01 RX ADMIN — FAMOTIDINE 20 MG: 20 TABLET, FILM COATED ORAL at 08:14

## 2022-01-01 RX ADMIN — METRONIDAZOLE 500 MG: 500 INJECTION, SOLUTION INTRAVENOUS at 21:00

## 2022-01-01 RX ADMIN — SODIUM CHLORIDE, PRESERVATIVE FREE 10 ML: 5 INJECTION INTRAVENOUS at 08:30

## 2022-01-01 RX ADMIN — PANTOPRAZOLE SODIUM 40 MG: 40 INJECTION, POWDER, FOR SOLUTION INTRAVENOUS at 06:03

## 2022-01-01 RX ADMIN — DEXTROSE MONOHYDRATE 125 ML/HR: 50 INJECTION, SOLUTION INTRAVENOUS at 19:39

## 2022-01-01 RX ADMIN — AZTREONAM 2 G: 2 INJECTION, POWDER, LYOPHILIZED, FOR SOLUTION INTRAMUSCULAR; INTRAVENOUS at 15:21

## 2022-01-01 RX ADMIN — POTASSIUM CHLORIDE 10 MEQ: 7.46 INJECTION, SOLUTION INTRAVENOUS at 22:59

## 2022-01-01 RX ADMIN — DOCUSATE SODIUM 50MG AND SENNOSIDES 8.6MG 2 TABLET: 8.6; 5 TABLET, FILM COATED ORAL at 13:39

## 2022-01-01 RX ADMIN — POTASSIUM CHLORIDE 40 MEQ: 1.5 POWDER, FOR SOLUTION ORAL at 18:06

## 2022-01-01 RX ADMIN — ETHYL ALCOHOL 1 EACH: 62 SWAB TOPICAL at 20:17

## 2022-01-01 RX ADMIN — ENOXAPARIN SODIUM 90 MG: 100 INJECTION SUBCUTANEOUS at 14:54

## 2022-01-01 RX ADMIN — FUROSEMIDE 60 MG: 10 INJECTION, SOLUTION INTRAMUSCULAR; INTRAVENOUS at 06:03

## 2022-01-01 RX ADMIN — HYDRALAZINE HYDROCHLORIDE 10 MG: 20 INJECTION INTRAMUSCULAR; INTRAVENOUS at 15:35

## 2022-01-01 RX ADMIN — MEROPENEM 1 G: 1 INJECTION, POWDER, FOR SOLUTION INTRAVENOUS at 05:09

## 2022-01-01 RX ADMIN — ETHYL ALCOHOL 1 EACH: 62 SWAB TOPICAL at 08:38

## 2022-01-01 RX ADMIN — INSULIN ASPART 3 UNITS: 100 INJECTION, SOLUTION INTRAVENOUS; SUBCUTANEOUS at 08:06

## 2022-01-01 RX ADMIN — PREGABALIN 75 MG: 75 CAPSULE ORAL at 20:31

## 2022-01-01 RX ADMIN — PANTOPRAZOLE SODIUM 40 MG: 40 INJECTION, POWDER, FOR SOLUTION INTRAVENOUS at 05:19

## 2022-01-01 RX ADMIN — INSULIN HUMAN 20 UNITS: 100 INJECTION, SOLUTION PARENTERAL at 06:28

## 2022-01-01 RX ADMIN — HYDROMORPHONE HYDROCHLORIDE 1 MG: 1 INJECTION, SOLUTION INTRAMUSCULAR; INTRAVENOUS; SUBCUTANEOUS at 08:14

## 2022-01-01 RX ADMIN — IPRATROPIUM BROMIDE AND ALBUTEROL SULFATE 3 ML: 2.5; .5 SOLUTION RESPIRATORY (INHALATION) at 11:15

## 2022-01-01 RX ADMIN — METRONIDAZOLE 500 MG: 500 INJECTION, SOLUTION INTRAVENOUS at 00:29

## 2022-01-01 RX ADMIN — HYDRALAZINE HYDROCHLORIDE 10 MG: 20 INJECTION INTRAMUSCULAR; INTRAVENOUS at 05:46

## 2022-01-01 RX ADMIN — CHLORHEXIDINE GLUCONATE 15 ML: 1.2 RINSE ORAL at 08:39

## 2022-01-01 RX ADMIN — HYDROMORPHONE HYDROCHLORIDE 1.5 MG: 2 INJECTION, SOLUTION INTRAMUSCULAR; INTRAVENOUS; SUBCUTANEOUS at 19:15

## 2022-01-01 RX ADMIN — PREDNISONE 40 MG: 20 TABLET ORAL at 08:13

## 2022-01-01 RX ADMIN — PREGABALIN 75 MG: 75 CAPSULE ORAL at 21:09

## 2022-01-01 RX ADMIN — POTASSIUM CHLORIDE 40 MEQ: 1.5 POWDER, FOR SOLUTION ORAL at 09:12

## 2022-01-01 RX ADMIN — ZIPRASIDONE MESYLATE 10 MG: 20 INJECTION, POWDER, LYOPHILIZED, FOR SOLUTION INTRAMUSCULAR at 04:45

## 2022-01-01 RX ADMIN — VERAPAMIL HYDROCHLORIDE 80 MG: 80 TABLET ORAL at 05:11

## 2022-01-01 RX ADMIN — METRONIDAZOLE 500 MG: 500 INJECTION, SOLUTION INTRAVENOUS at 00:06

## 2022-01-01 RX ADMIN — SODIUM CHLORIDE, PRESERVATIVE FREE 10 ML: 5 INJECTION INTRAVENOUS at 21:03

## 2022-01-01 RX ADMIN — ACETAMINOPHEN 650 MG: 325 TABLET ORAL at 20:15

## 2022-01-01 RX ADMIN — IPRATROPIUM BROMIDE AND ALBUTEROL SULFATE 3 ML: 2.5; .5 SOLUTION RESPIRATORY (INHALATION) at 19:27

## 2022-01-01 RX ADMIN — PREGABALIN 75 MG: 75 CAPSULE ORAL at 08:13

## 2022-01-01 RX ADMIN — CEFAZOLIN SODIUM 2 G: 2 SOLUTION INTRAVENOUS at 20:22

## 2022-01-01 RX ADMIN — CEFAZOLIN SODIUM 2 G: 2 INJECTION, SOLUTION INTRAVENOUS at 10:36

## 2022-01-01 RX ADMIN — LORAZEPAM 1 MG: 2 INJECTION INTRAMUSCULAR; INTRAVENOUS at 15:52

## 2022-01-01 RX ADMIN — MEROPENEM 1 G: 1 INJECTION, POWDER, FOR SOLUTION INTRAVENOUS at 09:59

## 2022-01-01 RX ADMIN — LORAZEPAM 2 MG: 2 INJECTION INTRAMUSCULAR; INTRAVENOUS at 11:41

## 2022-01-01 RX ADMIN — NICARDIPINE HYDROCHLORIDE 5 MG/HR: 25 INJECTION, SOLUTION INTRAVENOUS at 15:35

## 2022-01-01 RX ADMIN — DEXMEDETOMIDINE HYDROCHLORIDE 1.5 MCG/KG/HR: 100 INJECTION, SOLUTION INTRAVENOUS at 00:27

## 2022-01-01 RX ADMIN — OLANZAPINE 5 MG: 5 TABLET ORAL at 08:29

## 2022-01-01 RX ADMIN — QUETIAPINE FUMARATE 50 MG: 25 TABLET, FILM COATED ORAL at 20:57

## 2022-01-01 RX ADMIN — VANCOMYCIN HYDROCHLORIDE 1750 MG: 5 INJECTION, POWDER, LYOPHILIZED, FOR SOLUTION INTRAVENOUS at 20:51

## 2022-01-01 RX ADMIN — DEXMEDETOMIDINE HYDROCHLORIDE 1.5 MCG/KG/HR: 100 INJECTION, SOLUTION, CONCENTRATE INTRAVENOUS at 23:09

## 2022-01-01 RX ADMIN — PANTOPRAZOLE SODIUM 40 MG: 40 INJECTION, POWDER, FOR SOLUTION INTRAVENOUS at 06:00

## 2022-01-01 RX ADMIN — HYDROMORPHONE HYDROCHLORIDE 1 MG: 1 INJECTION, SOLUTION INTRAMUSCULAR; INTRAVENOUS; SUBCUTANEOUS at 23:42

## 2022-01-01 RX ADMIN — METOPROLOL TARTRATE 5 MG: 5 INJECTION INTRAVENOUS at 13:03

## 2022-01-01 RX ADMIN — DEXMEDETOMIDINE HYDROCHLORIDE 1.5 MCG/KG/HR: 100 INJECTION, SOLUTION INTRAVENOUS at 09:14

## 2022-01-01 RX ADMIN — FAMOTIDINE 20 MG: 20 TABLET, FILM COATED ORAL at 06:44

## 2022-01-01 RX ADMIN — SPIRONOLACTONE 25 MG: 25 TABLET ORAL at 08:49

## 2022-01-01 RX ADMIN — LORAZEPAM 1 MG: 2 INJECTION INTRAMUSCULAR; INTRAVENOUS at 21:45

## 2022-01-01 RX ADMIN — DEXMEDETOMIDINE HYDROCHLORIDE 1.5 MCG/KG/HR: 100 INJECTION, SOLUTION, CONCENTRATE INTRAVENOUS at 12:27

## 2022-01-01 RX ADMIN — LORAZEPAM 0.5 MG: 2 INJECTION INTRAMUSCULAR; INTRAVENOUS at 17:37

## 2022-01-01 RX ADMIN — ENOXAPARIN SODIUM 90 MG: 100 INJECTION SUBCUTANEOUS at 20:18

## 2022-01-01 RX ADMIN — LEVETIRACETAM 750 MG: 100 INJECTION, SOLUTION, CONCENTRATE INTRAVENOUS at 20:07

## 2022-01-01 RX ADMIN — LORAZEPAM 1 MG: 2 INJECTION INTRAMUSCULAR; INTRAVENOUS at 08:10

## 2022-01-01 RX ADMIN — METOPROLOL TARTRATE 5 MG: 5 INJECTION INTRAVENOUS at 22:34

## 2022-01-01 RX ADMIN — INSULIN ASPART 2 UNITS: 100 INJECTION, SOLUTION INTRAVENOUS; SUBCUTANEOUS at 01:19

## 2022-01-01 RX ADMIN — ACETYLCYSTEINE 4 ML: 200 SOLUTION ORAL; RESPIRATORY (INHALATION) at 11:03

## 2022-01-01 RX ADMIN — INSULIN ASPART 5 UNITS: 100 INJECTION, SOLUTION INTRAVENOUS; SUBCUTANEOUS at 06:01

## 2022-01-01 RX ADMIN — METOPROLOL TARTRATE 25 MG: 25 TABLET ORAL at 20:03

## 2022-01-01 RX ADMIN — METOPROLOL TARTRATE 100 MG: 100 TABLET, FILM COATED ORAL at 21:28

## 2022-01-01 RX ADMIN — FAMOTIDINE 20 MG: 20 TABLET, FILM COATED ORAL at 18:41

## 2022-01-01 RX ADMIN — QUETIAPINE FUMARATE 50 MG: 25 TABLET, FILM COATED ORAL at 20:41

## 2022-01-01 RX ADMIN — METOPROLOL TARTRATE 100 MG: 100 TABLET, FILM COATED ORAL at 08:35

## 2022-01-01 RX ADMIN — REMDESIVIR 100 MG: 100 INJECTION, POWDER, LYOPHILIZED, FOR SOLUTION INTRAVENOUS at 13:20

## 2022-01-01 RX ADMIN — CEFAZOLIN SODIUM 2 G: 2 INJECTION, SOLUTION INTRAVENOUS at 11:10

## 2022-01-01 RX ADMIN — FAMOTIDINE 20 MG: 20 TABLET, FILM COATED ORAL at 17:12

## 2022-01-01 RX ADMIN — PROPOFOL 50 MCG/KG/MIN: 10 INJECTION, EMULSION INTRAVENOUS at 17:35

## 2022-01-01 RX ADMIN — IPRATROPIUM BROMIDE AND ALBUTEROL SULFATE 3 ML: 2.5; .5 SOLUTION RESPIRATORY (INHALATION) at 11:03

## 2022-01-01 RX ADMIN — PROPOFOL 50 MCG/KG/MIN: 10 INJECTION, EMULSION INTRAVENOUS at 18:27

## 2022-01-01 RX ADMIN — LOSARTAN POTASSIUM 50 MG: 50 TABLET, FILM COATED ORAL at 12:34

## 2022-01-01 RX ADMIN — SODIUM CHLORIDE 15 MG/HR: 9 INJECTION, SOLUTION INTRAVENOUS at 22:32

## 2022-01-01 RX ADMIN — SPIRONOLACTONE 25 MG: 25 TABLET ORAL at 13:20

## 2022-01-01 RX ADMIN — DEXMEDETOMIDINE HYDROCHLORIDE 1.5 MCG/KG/HR: 100 INJECTION, SOLUTION, CONCENTRATE INTRAVENOUS at 22:51

## 2022-01-01 RX ADMIN — LORAZEPAM 2 MG: 2 INJECTION INTRAMUSCULAR; INTRAVENOUS at 18:58

## 2022-01-01 RX ADMIN — SODIUM CHLORIDE 15 MG/HR: 900 INJECTION, SOLUTION INTRAVENOUS at 17:15

## 2022-01-01 RX ADMIN — INSULIN GLARGINE-YFGN 30 UNITS: 100 INJECTION, SOLUTION SUBCUTANEOUS at 21:08

## 2022-01-01 RX ADMIN — DEXMEDETOMIDINE HYDROCHLORIDE 1 MCG/KG/HR: 100 INJECTION, SOLUTION INTRAVENOUS at 08:45

## 2022-01-01 RX ADMIN — POTASSIUM CHLORIDE 40 MEQ: 1.5 POWDER, FOR SOLUTION ORAL at 06:25

## 2022-01-01 RX ADMIN — ACETYLCYSTEINE 4 ML: 200 SOLUTION ORAL; RESPIRATORY (INHALATION) at 19:43

## 2022-01-01 RX ADMIN — PREDNISONE 40 MG: 20 TABLET ORAL at 08:29

## 2022-01-01 RX ADMIN — NICARDIPINE HYDROCHLORIDE 7.5 MG/HR: 25 INJECTION, SOLUTION INTRAVENOUS at 05:00

## 2022-01-01 RX ADMIN — CEFAZOLIN SODIUM 2 G: 2 SOLUTION INTRAVENOUS at 02:33

## 2022-01-01 RX ADMIN — LORAZEPAM 0.5 MG: 0.5 TABLET ORAL at 21:29

## 2022-01-01 RX ADMIN — FAMOTIDINE 20 MG: 20 TABLET, FILM COATED ORAL at 17:45

## 2022-01-01 RX ADMIN — DEXMEDETOMIDINE HYDROCHLORIDE 1.5 MCG/KG/HR: 100 INJECTION, SOLUTION INTRAVENOUS at 09:13

## 2022-01-01 RX ADMIN — METOPROLOL TARTRATE 5 MG: 5 INJECTION INTRAVENOUS at 00:15

## 2022-01-01 RX ADMIN — IPRATROPIUM BROMIDE AND ALBUTEROL SULFATE 3 ML: 2.5; .5 SOLUTION RESPIRATORY (INHALATION) at 15:03

## 2022-01-01 RX ADMIN — VANCOMYCIN HYDROCHLORIDE 1500 MG: 5 INJECTION, POWDER, LYOPHILIZED, FOR SOLUTION INTRAVENOUS at 01:36

## 2022-01-01 RX ADMIN — PRAVASTATIN SODIUM 40 MG: 40 TABLET ORAL at 08:45

## 2022-01-01 RX ADMIN — CHLORHEXIDINE GLUCONATE 15 ML: 1.2 RINSE ORAL at 20:16

## 2022-01-01 RX ADMIN — METHYLPREDNISOLONE SODIUM SUCCINATE 40 MG: 40 INJECTION, POWDER, FOR SOLUTION INTRAMUSCULAR; INTRAVENOUS at 23:09

## 2022-01-01 RX ADMIN — CHLORHEXIDINE GLUCONATE 15 ML: 1.2 RINSE ORAL at 08:19

## 2022-01-01 RX ADMIN — INSULIN GLARGINE-YFGN 30 UNITS: 100 INJECTION, SOLUTION SUBCUTANEOUS at 08:36

## 2022-01-01 RX ADMIN — METHYLPREDNISOLONE SODIUM SUCCINATE 1 G: 1 INJECTION, POWDER, LYOPHILIZED, FOR SOLUTION INTRAMUSCULAR; INTRAVENOUS at 12:17

## 2022-01-01 RX ADMIN — HEPARIN SODIUM 18.2 UNITS/KG/HR: 10000 INJECTION, SOLUTION INTRAVENOUS at 15:16

## 2022-01-01 RX ADMIN — POTASSIUM CHLORIDE 10 MEQ: 7.46 INJECTION, SOLUTION INTRAVENOUS at 05:44

## 2022-01-01 RX ADMIN — ACETYLCYSTEINE 4 ML: 200 SOLUTION ORAL; RESPIRATORY (INHALATION) at 10:40

## 2022-01-01 RX ADMIN — NICARDIPINE HYDROCHLORIDE 5 MG/HR: 25 INJECTION, SOLUTION INTRAVENOUS at 09:00

## 2022-01-01 RX ADMIN — PROPOFOL 50 MCG/KG/MIN: 10 INJECTION, EMULSION INTRAVENOUS at 19:15

## 2022-01-01 RX ADMIN — PREGABALIN 75 MG: 75 CAPSULE ORAL at 08:49

## 2022-01-01 RX ADMIN — VERAPAMIL HYDROCHLORIDE 120 MG: 120 TABLET ORAL at 00:05

## 2022-01-01 RX ADMIN — METOPROLOL TARTRATE 5 MG: 5 INJECTION INTRAVENOUS at 18:08

## 2022-01-01 RX ADMIN — METOPROLOL TARTRATE 25 MG: 25 TABLET ORAL at 08:19

## 2022-01-01 RX ADMIN — GLYCERIN 1 DROP: .002; .002; .01 SOLUTION/ DROPS OPHTHALMIC at 11:49

## 2022-01-01 RX ADMIN — SODIUM CHLORIDE 75 ML/HR: 9 INJECTION, SOLUTION INTRAVENOUS at 22:39

## 2022-01-01 RX ADMIN — MINERAL OIL AND PETROLATUM: 150; 830 OINTMENT OPHTHALMIC at 08:07

## 2022-01-01 RX ADMIN — CHLORHEXIDINE GLUCONATE 15 ML: 1.2 RINSE ORAL at 20:01

## 2022-01-01 RX ADMIN — VERAPAMIL HYDROCHLORIDE 80 MG: 80 TABLET ORAL at 05:35

## 2022-01-01 RX ADMIN — HEPARIN SODIUM 24.6 UNITS/KG/HR: 10000 INJECTION, SOLUTION INTRAVENOUS at 09:40

## 2022-01-01 RX ADMIN — FAMOTIDINE 20 MG: 20 TABLET, FILM COATED ORAL at 16:30

## 2022-01-01 RX ADMIN — HYDROMORPHONE HYDROCHLORIDE 1.5 MG: 2 INJECTION, SOLUTION INTRAMUSCULAR; INTRAVENOUS; SUBCUTANEOUS at 11:41

## 2022-01-01 RX ADMIN — METRONIDAZOLE 500 MG: 250 TABLET ORAL at 05:16

## 2022-01-01 RX ADMIN — VANCOMYCIN HYDROCHLORIDE 1750 MG: 5 INJECTION, POWDER, LYOPHILIZED, FOR SOLUTION INTRAVENOUS at 20:13

## 2022-01-01 RX ADMIN — SILVER SULFADIAZINE 1 APPLICATION: 10 CREAM TOPICAL at 12:53

## 2022-01-01 RX ADMIN — METRONIDAZOLE 500 MG: 500 INJECTION, SOLUTION INTRAVENOUS at 15:42

## 2022-01-01 RX ADMIN — Medication: at 02:53

## 2022-01-01 RX ADMIN — LORAZEPAM 0.5 MG: 2 INJECTION INTRAMUSCULAR; INTRAVENOUS at 03:11

## 2022-01-01 RX ADMIN — ACETYLCYSTEINE 4 ML: 200 SOLUTION ORAL; RESPIRATORY (INHALATION) at 12:25

## 2022-01-01 RX ADMIN — METRONIDAZOLE 500 MG: 250 TABLET ORAL at 06:11

## 2022-01-01 RX ADMIN — HEPARIN SODIUM 32.2 UNITS/KG/HR: 10000 INJECTION, SOLUTION INTRAVENOUS at 21:26

## 2022-01-01 RX ADMIN — VERAPAMIL HYDROCHLORIDE 80 MG: 80 TABLET ORAL at 23:12

## 2022-01-01 RX ADMIN — METRONIDAZOLE 500 MG: 500 INJECTION, SOLUTION INTRAVENOUS at 14:30

## 2022-01-01 RX ADMIN — DEXMEDETOMIDINE HYDROCHLORIDE 0.2 MCG/KG/HR: 100 INJECTION, SOLUTION INTRAVENOUS at 16:02

## 2022-01-01 RX ADMIN — LISINOPRIL 5 MG: 5 TABLET ORAL at 16:45

## 2022-01-01 RX ADMIN — PROPOFOL 25 MCG/KG/MIN: 10 INJECTION, EMULSION INTRAVENOUS at 11:41

## 2022-01-01 RX ADMIN — PROPOFOL 5 MCG/KG/MIN: 10 INJECTION, EMULSION INTRAVENOUS at 12:55

## 2022-01-01 RX ADMIN — CEFEPIME HYDROCHLORIDE 2 G: 2 INJECTION, POWDER, FOR SOLUTION INTRAVENOUS at 21:32

## 2022-01-01 RX ADMIN — VERAPAMIL HYDROCHLORIDE 120 MG: 120 TABLET ORAL at 05:44

## 2022-01-01 RX ADMIN — INSULIN ASPART 4 UNITS: 100 INJECTION, SOLUTION INTRAVENOUS; SUBCUTANEOUS at 14:32

## 2022-01-01 RX ADMIN — INSULIN ASPART 8 UNITS: 100 INJECTION, SOLUTION INTRAVENOUS; SUBCUTANEOUS at 18:00

## 2022-01-01 RX ADMIN — VANCOMYCIN HYDROCHLORIDE 1750 MG: 5 INJECTION, POWDER, LYOPHILIZED, FOR SOLUTION INTRAVENOUS at 01:05

## 2022-01-01 RX ADMIN — IPRATROPIUM BROMIDE AND ALBUTEROL SULFATE 3 ML: 2.5; .5 SOLUTION RESPIRATORY (INHALATION) at 07:15

## 2022-01-01 RX ADMIN — ATORVASTATIN CALCIUM 40 MG: 20 TABLET, FILM COATED ORAL at 20:54

## 2022-01-01 RX ADMIN — PANTOPRAZOLE SODIUM 40 MG: 40 INJECTION, POWDER, FOR SOLUTION INTRAVENOUS at 05:45

## 2022-01-01 RX ADMIN — ACETYLCYSTEINE 4 ML: 200 SOLUTION ORAL; RESPIRATORY (INHALATION) at 19:50

## 2022-01-01 RX ADMIN — SPIRONOLACTONE 25 MG: 25 TABLET ORAL at 08:19

## 2022-01-01 RX ADMIN — VANCOMYCIN HYDROCHLORIDE 1750 MG: 5 INJECTION, POWDER, LYOPHILIZED, FOR SOLUTION INTRAVENOUS at 02:02

## 2022-01-01 RX ADMIN — METOPROLOL TARTRATE 75 MG: 25 TABLET ORAL at 20:53

## 2022-01-01 RX ADMIN — SODIUM CHLORIDE 15 MG/HR: 9 INJECTION, SOLUTION INTRAVENOUS at 04:06

## 2022-01-01 RX ADMIN — HYDROMORPHONE HYDROCHLORIDE 1 MG: 1 INJECTION, SOLUTION INTRAMUSCULAR; INTRAVENOUS; SUBCUTANEOUS at 05:55

## 2022-01-01 RX ADMIN — METRONIDAZOLE 500 MG: 500 INJECTION, SOLUTION INTRAVENOUS at 05:27

## 2022-01-01 RX ADMIN — PROPOFOL 40 MCG/KG/MIN: 10 INJECTION, EMULSION INTRAVENOUS at 21:27

## 2022-01-01 RX ADMIN — CHLORHEXIDINE GLUCONATE 15 ML: 1.2 RINSE ORAL at 08:21

## 2022-01-01 RX ADMIN — DOCUSATE SODIUM 50MG AND SENNOSIDES 8.6MG 2 TABLET: 8.6; 5 TABLET, FILM COATED ORAL at 00:27

## 2022-01-01 RX ADMIN — FAMOTIDINE 20 MG: 20 TABLET, FILM COATED ORAL at 08:35

## 2022-01-01 RX ADMIN — POTASSIUM CHLORIDE 40 MEQ: 1.5 POWDER, FOR SOLUTION ORAL at 08:44

## 2022-01-01 RX ADMIN — LORAZEPAM 0.5 MG: 2 INJECTION INTRAMUSCULAR; INTRAVENOUS at 21:33

## 2022-01-01 RX ADMIN — IPRATROPIUM BROMIDE AND ALBUTEROL SULFATE 3 ML: 2.5; .5 SOLUTION RESPIRATORY (INHALATION) at 19:58

## 2022-01-01 RX ADMIN — NICOTINE 1 PATCH: 14 PATCH, EXTENDED RELEASE TRANSDERMAL at 23:14

## 2022-01-01 RX ADMIN — METOPROLOL TARTRATE 5 MG: 5 INJECTION INTRAVENOUS at 20:11

## 2022-01-01 RX ADMIN — NICARDIPINE HYDROCHLORIDE 7.5 MG/HR: 25 INJECTION, SOLUTION INTRAVENOUS at 22:43

## 2022-01-01 RX ADMIN — PREGABALIN 75 MG: 75 CAPSULE ORAL at 20:24

## 2022-01-01 RX ADMIN — ACETAMINOPHEN 650 MG: 325 TABLET ORAL at 21:08

## 2022-01-01 RX ADMIN — POTASSIUM CHLORIDE 10 MEQ: 7.46 INJECTION, SOLUTION INTRAVENOUS at 08:07

## 2022-01-01 RX ADMIN — SODIUM CHLORIDE, PRESERVATIVE FREE 10 ML: 5 INJECTION INTRAVENOUS at 08:47

## 2022-01-01 RX ADMIN — PREDNISONE 40 MG: 20 TABLET ORAL at 20:04

## 2022-01-01 RX ADMIN — ACETYLCYSTEINE 4 ML: 200 SOLUTION ORAL; RESPIRATORY (INHALATION) at 07:17

## 2022-01-01 RX ADMIN — GLYCOPYRROLATE 0.4 MG: 0.2 INJECTION INTRAMUSCULAR; INTRAVENOUS at 15:42

## 2022-01-01 RX ADMIN — MIDAZOLAM 1 MG: 1 INJECTION INTRAMUSCULAR; INTRAVENOUS at 11:08

## 2022-01-01 RX ADMIN — SPIRONOLACTONE 25 MG: 25 TABLET ORAL at 08:33

## 2022-01-01 RX ADMIN — GLYCOPYRROLATE 0.4 MG: 0.2 INJECTION INTRAMUSCULAR; INTRAVENOUS at 03:36

## 2022-01-01 RX ADMIN — METRONIDAZOLE 500 MG: 500 INJECTION, SOLUTION INTRAVENOUS at 06:20

## 2022-01-01 RX ADMIN — PROPOFOL 40 MCG/KG/MIN: 10 INJECTION, EMULSION INTRAVENOUS at 09:11

## 2022-01-01 RX ADMIN — HYDROMORPHONE HYDROCHLORIDE 1.5 MG: 2 INJECTION, SOLUTION INTRAMUSCULAR; INTRAVENOUS; SUBCUTANEOUS at 13:28

## 2022-01-01 RX ADMIN — PROPOFOL 50 MCG/KG/MIN: 10 INJECTION, EMULSION INTRAVENOUS at 14:34

## 2022-01-01 RX ADMIN — VERAPAMIL HYDROCHLORIDE 80 MG: 80 TABLET ORAL at 21:00

## 2022-01-01 RX ADMIN — CEFEPIME HYDROCHLORIDE: 2 INJECTION, POWDER, FOR SOLUTION INTRAVENOUS at 13:30

## 2022-01-01 RX ADMIN — LORAZEPAM 1 MG: 2 INJECTION INTRAMUSCULAR; INTRAVENOUS at 18:48

## 2022-01-01 RX ADMIN — METOPROLOL TARTRATE 5 MG: 5 INJECTION INTRAVENOUS at 00:34

## 2022-01-01 RX ADMIN — ACETAMINOPHEN 650 MG: 325 TABLET, FILM COATED ORAL at 08:35

## 2022-01-01 RX ADMIN — INSULIN HUMAN 20 UNITS: 100 INJECTION, SOLUTION PARENTERAL at 23:45

## 2022-01-01 RX ADMIN — METOPROLOL TARTRATE 50 MG: 25 TABLET ORAL at 20:16

## 2022-01-01 RX ADMIN — PREGABALIN 75 MG: 75 CAPSULE ORAL at 10:11

## 2022-01-01 RX ADMIN — PROPOFOL 50 MCG/KG/MIN: 10 INJECTION, EMULSION INTRAVENOUS at 05:24

## 2022-01-01 RX ADMIN — SODIUM CHLORIDE, PRESERVATIVE FREE 10 ML: 5 INJECTION INTRAVENOUS at 09:15

## 2022-01-01 RX ADMIN — LORAZEPAM 1 MG: 2 INJECTION INTRAMUSCULAR; INTRAVENOUS at 19:50

## 2022-01-01 RX ADMIN — DEXMEDETOMIDINE HYDROCHLORIDE 1.5 MCG/KG/HR: 100 INJECTION, SOLUTION, CONCENTRATE INTRAVENOUS at 02:08

## 2022-01-01 RX ADMIN — SODIUM CHLORIDE, PRESERVATIVE FREE 10 ML: 5 INJECTION INTRAVENOUS at 20:47

## 2022-01-01 RX ADMIN — KETAMINE HYDROCHLORIDE 250 MG: 100 INJECTION, SOLUTION, CONCENTRATE INTRAMUSCULAR; INTRAVENOUS at 22:33

## 2022-01-01 RX ADMIN — IPRATROPIUM BROMIDE AND ALBUTEROL SULFATE 3 ML: 2.5; .5 SOLUTION RESPIRATORY (INHALATION) at 19:52

## 2022-01-01 RX ADMIN — SODIUM CHLORIDE 15 MG/HR: 9 INJECTION, SOLUTION INTRAVENOUS at 02:18

## 2022-01-01 RX ADMIN — VERAPAMIL HYDROCHLORIDE 120 MG: 120 TABLET ORAL at 00:14

## 2022-01-01 RX ADMIN — PANTOPRAZOLE SODIUM 40 MG: 40 INJECTION, POWDER, FOR SOLUTION INTRAVENOUS at 05:35

## 2022-01-01 RX ADMIN — INSULIN GLARGINE-YFGN 20 UNITS: 100 INJECTION, SOLUTION SUBCUTANEOUS at 21:10

## 2022-01-01 RX ADMIN — POTASSIUM CHLORIDE 10 MEQ: 7.46 INJECTION, SOLUTION INTRAVENOUS at 08:47

## 2022-01-01 RX ADMIN — PROPOFOL 35 MCG/KG/MIN: 10 INJECTION, EMULSION INTRAVENOUS at 00:51

## 2022-01-01 RX ADMIN — METRONIDAZOLE 500 MG: 500 INJECTION, SOLUTION INTRAVENOUS at 23:08

## 2022-01-01 RX ADMIN — ACETYLCYSTEINE 4 ML: 200 SOLUTION ORAL; RESPIRATORY (INHALATION) at 19:28

## 2022-01-01 RX ADMIN — PANTOPRAZOLE SODIUM 40 MG: 40 INJECTION, POWDER, FOR SOLUTION INTRAVENOUS at 05:40

## 2022-01-01 RX ADMIN — LORAZEPAM 0.5 MG: 2 INJECTION INTRAMUSCULAR; INTRAVENOUS at 20:32

## 2022-01-01 RX ADMIN — SODIUM CHLORIDE 50 ML/HR: 9 INJECTION, SOLUTION INTRAVENOUS at 13:14

## 2022-01-01 RX ADMIN — FAMOTIDINE 20 MG: 20 TABLET, FILM COATED ORAL at 08:29

## 2022-01-01 RX ADMIN — INSULIN ASPART 8 UNITS: 100 INJECTION, SOLUTION INTRAVENOUS; SUBCUTANEOUS at 17:25

## 2022-01-01 RX ADMIN — INSULIN DETEMIR 30 UNITS: 100 INJECTION, SOLUTION SUBCUTANEOUS at 08:32

## 2022-01-01 RX ADMIN — PREGABALIN 75 MG: 75 CAPSULE ORAL at 08:36

## 2022-01-01 RX ADMIN — VANCOMYCIN HYDROCHLORIDE 1750 MG: 5 INJECTION, POWDER, LYOPHILIZED, FOR SOLUTION INTRAVENOUS at 09:00

## 2022-01-01 RX ADMIN — SODIUM CHLORIDE, PRESERVATIVE FREE 10 ML: 5 INJECTION INTRAVENOUS at 20:05

## 2022-01-01 RX ADMIN — MORPHINE SULFATE 4 MG: 2 INJECTION, SOLUTION INTRAMUSCULAR; INTRAVENOUS at 14:09

## 2022-01-01 RX ADMIN — LEVETIRACETAM 750 MG: 100 SOLUTION ORAL at 08:45

## 2022-01-01 RX ADMIN — INSULIN LISPRO 5 UNITS: 100 INJECTION, SOLUTION INTRAVENOUS; SUBCUTANEOUS at 04:30

## 2022-01-01 RX ADMIN — ACETYLCYSTEINE 4 ML: 200 SOLUTION ORAL; RESPIRATORY (INHALATION) at 07:03

## 2022-01-01 RX ADMIN — IPRATROPIUM BROMIDE AND ALBUTEROL SULFATE 3 ML: 2.5; .5 SOLUTION RESPIRATORY (INHALATION) at 15:24

## 2022-01-01 RX ADMIN — POTASSIUM CHLORIDE 10 MEQ: 7.46 INJECTION, SOLUTION INTRAVENOUS at 02:03

## 2022-01-01 RX ADMIN — POTASSIUM CHLORIDE 40 MEQ: 1.5 POWDER, FOR SOLUTION ORAL at 05:11

## 2022-01-01 RX ADMIN — FAMOTIDINE 20 MG: 20 TABLET, FILM COATED ORAL at 18:06

## 2022-01-01 RX ADMIN — POTASSIUM CHLORIDE 40 MEQ: 1.5 POWDER, FOR SOLUTION ORAL at 04:00

## 2022-01-01 RX ADMIN — SODIUM CHLORIDE, PRESERVATIVE FREE 10 ML: 5 INJECTION INTRAVENOUS at 20:12

## 2022-01-01 RX ADMIN — HYDRALAZINE HYDROCHLORIDE 10 MG: 20 INJECTION INTRAMUSCULAR; INTRAVENOUS at 16:11

## 2022-01-01 RX ADMIN — HEPARIN SODIUM 24.2 UNITS/KG/HR: 10000 INJECTION, SOLUTION INTRAVENOUS at 10:18

## 2022-01-01 RX ADMIN — IPRATROPIUM BROMIDE AND ALBUTEROL SULFATE 3 ML: 2.5; .5 SOLUTION RESPIRATORY (INHALATION) at 19:32

## 2022-01-01 RX ADMIN — IPRATROPIUM BROMIDE AND ALBUTEROL SULFATE 3 ML: 2.5; .5 SOLUTION RESPIRATORY (INHALATION) at 07:13

## 2022-01-01 RX ADMIN — VANCOMYCIN HYDROCHLORIDE 1500 MG: 5 INJECTION, POWDER, LYOPHILIZED, FOR SOLUTION INTRAVENOUS at 13:00

## 2022-01-01 RX ADMIN — DOCUSATE SODIUM 50MG AND SENNOSIDES 8.6MG 2 TABLET: 8.6; 5 TABLET, FILM COATED ORAL at 12:28

## 2022-01-01 RX ADMIN — DEXMEDETOMIDINE HYDROCHLORIDE 1.5 MCG/KG/HR: 100 INJECTION, SOLUTION, CONCENTRATE INTRAVENOUS at 05:23

## 2022-01-01 RX ADMIN — HEPARIN SODIUM 5000 UNITS: 5000 INJECTION, SOLUTION INTRAVENOUS; SUBCUTANEOUS at 17:13

## 2022-01-01 RX ADMIN — PREGABALIN 75 MG: 75 CAPSULE ORAL at 08:22

## 2022-01-01 RX ADMIN — SODIUM CHLORIDE, PRESERVATIVE FREE 10 ML: 5 INJECTION INTRAVENOUS at 08:14

## 2022-01-01 RX ADMIN — PROPOFOL 15 MCG/KG/MIN: 10 INJECTION, EMULSION INTRAVENOUS at 08:09

## 2022-01-01 RX ADMIN — PROPOFOL 50 MCG/KG/MIN: 10 INJECTION, EMULSION INTRAVENOUS at 08:42

## 2022-01-01 RX ADMIN — LEVETIRACETAM 750 MG: 100 INJECTION, SOLUTION, CONCENTRATE INTRAVENOUS at 08:21

## 2022-01-01 RX ADMIN — VANCOMYCIN HYDROCHLORIDE 1250 MG: 5 INJECTION, POWDER, LYOPHILIZED, FOR SOLUTION INTRAVENOUS at 01:12

## 2022-01-01 RX ADMIN — LORAZEPAM 1 MG: 2 INJECTION INTRAMUSCULAR; INTRAVENOUS at 19:13

## 2022-01-01 RX ADMIN — PRAVASTATIN SODIUM 40 MG: 40 TABLET ORAL at 08:13

## 2022-01-01 RX ADMIN — INSULIN ASPART 2 UNITS: 100 INJECTION, SOLUTION INTRAVENOUS; SUBCUTANEOUS at 11:34

## 2022-01-01 RX ADMIN — LEVETIRACETAM 750 MG: 100 SOLUTION ORAL at 20:58

## 2022-01-01 RX ADMIN — FENTANYL CITRATE 50 MCG: 50 INJECTION INTRAMUSCULAR; INTRAVENOUS at 21:19

## 2022-01-01 RX ADMIN — METOPROLOL TARTRATE 5 MG: 5 INJECTION INTRAVENOUS at 10:38

## 2022-01-01 RX ADMIN — DEXMEDETOMIDINE HYDROCHLORIDE 1.5 MCG/KG/HR: 100 INJECTION, SOLUTION, CONCENTRATE INTRAVENOUS at 05:24

## 2022-01-01 RX ADMIN — CEFEPIME HYDROCHLORIDE: 2 INJECTION, POWDER, FOR SOLUTION INTRAVENOUS at 12:46

## 2022-01-01 RX ADMIN — ENOXAPARIN SODIUM 90 MG: 100 INJECTION SUBCUTANEOUS at 20:56

## 2022-01-01 RX ADMIN — PREGABALIN 75 MG: 75 CAPSULE ORAL at 20:55

## 2022-01-01 RX ADMIN — MIDAZOLAM 1 MG: 1 INJECTION INTRAMUSCULAR; INTRAVENOUS at 10:46

## 2022-01-01 RX ADMIN — INSULIN ASPART 6 UNITS: 100 INJECTION, SOLUTION INTRAVENOUS; SUBCUTANEOUS at 06:08

## 2022-01-01 RX ADMIN — DOCUSATE SODIUM 50MG AND SENNOSIDES 8.6MG 2 TABLET: 8.6; 5 TABLET, FILM COATED ORAL at 08:34

## 2022-01-01 RX ADMIN — POTASSIUM CHLORIDE 40 MEQ: 1.5 POWDER, FOR SOLUTION ORAL at 06:29

## 2022-01-01 RX ADMIN — OLANZAPINE 5 MG: 5 TABLET ORAL at 08:19

## 2022-01-01 RX ADMIN — IPRATROPIUM BROMIDE AND ALBUTEROL SULFATE 3 ML: 2.5; .5 SOLUTION RESPIRATORY (INHALATION) at 07:14

## 2022-01-01 RX ADMIN — PREGABALIN 75 MG: 75 CAPSULE ORAL at 08:46

## 2022-01-01 RX ADMIN — ATORVASTATIN CALCIUM 40 MG: 20 TABLET, FILM COATED ORAL at 20:24

## 2022-01-01 RX ADMIN — LORAZEPAM 2 MG: 2 INJECTION, SOLUTION INTRAMUSCULAR; INTRAVENOUS at 20:16

## 2022-01-01 RX ADMIN — DEXMEDETOMIDINE HYDROCHLORIDE 1.5 MCG/KG/HR: 100 INJECTION, SOLUTION INTRAVENOUS at 16:22

## 2022-01-01 RX ADMIN — METRONIDAZOLE 500 MG: 500 INJECTION, SOLUTION INTRAVENOUS at 15:31

## 2022-01-01 RX ADMIN — IPRATROPIUM BROMIDE AND ALBUTEROL SULFATE 3 ML: 2.5; .5 SOLUTION RESPIRATORY (INHALATION) at 19:10

## 2022-01-01 RX ADMIN — METRONIDAZOLE 500 MG: 500 INJECTION, SOLUTION INTRAVENOUS at 06:55

## 2022-01-01 RX ADMIN — HEPARIN SODIUM 14.2 UNITS/KG/HR: 10000 INJECTION, SOLUTION INTRAVENOUS at 13:20

## 2022-01-01 RX ADMIN — PREGABALIN 75 MG: 75 CAPSULE ORAL at 20:32

## 2022-01-01 RX ADMIN — MORPHINE SULFATE 4 MG: 2 INJECTION, SOLUTION INTRAMUSCULAR; INTRAVENOUS at 12:10

## 2022-01-01 RX ADMIN — FENTANYL CITRATE 50 MCG: 50 INJECTION INTRAMUSCULAR; INTRAVENOUS at 04:23

## 2022-01-01 RX ADMIN — METOPROLOL TARTRATE 5 MG: 5 INJECTION INTRAVENOUS at 14:08

## 2022-01-01 RX ADMIN — ACETYLCYSTEINE 4 ML: 200 SOLUTION ORAL; RESPIRATORY (INHALATION) at 08:31

## 2022-01-01 RX ADMIN — HYDROMORPHONE HYDROCHLORIDE 1.5 MG: 2 INJECTION, SOLUTION INTRAMUSCULAR; INTRAVENOUS; SUBCUTANEOUS at 09:35

## 2022-01-01 RX ADMIN — ATORVASTATIN CALCIUM 40 MG: 20 TABLET, FILM COATED ORAL at 21:08

## 2022-01-01 RX ADMIN — ACETYLCYSTEINE 4 ML: 200 SOLUTION ORAL; RESPIRATORY (INHALATION) at 14:49

## 2022-01-01 RX ADMIN — LEVETIRACETAM 750 MG: 100 SOLUTION ORAL at 20:33

## 2022-01-01 RX ADMIN — METRONIDAZOLE 500 MG: 500 INJECTION, SOLUTION INTRAVENOUS at 06:00

## 2022-01-01 RX ADMIN — LEVETIRACETAM 750 MG: 100 SOLUTION ORAL at 08:41

## 2022-01-01 RX ADMIN — MORPHINE SULFATE 4 MG: 2 INJECTION, SOLUTION INTRAMUSCULAR; INTRAVENOUS at 17:55

## 2022-01-01 RX ADMIN — INSULIN LISPRO 9 UNITS: 100 INJECTION, SOLUTION INTRAVENOUS; SUBCUTANEOUS at 17:39

## 2022-01-01 RX ADMIN — HEPARIN SODIUM 4000 UNITS: 5000 INJECTION INTRAVENOUS; SUBCUTANEOUS at 16:20

## 2022-01-01 RX ADMIN — LORAZEPAM 2 MG: 2 INJECTION INTRAMUSCULAR; INTRAVENOUS at 15:41

## 2022-01-01 RX ADMIN — SODIUM CHLORIDE, PRESERVATIVE FREE 10 ML: 5 INJECTION INTRAVENOUS at 20:30

## 2022-01-01 RX ADMIN — METRONIDAZOLE 500 MG: 500 INJECTION, SOLUTION INTRAVENOUS at 23:13

## 2022-01-01 RX ADMIN — INSULIN ASPART 8 UNITS: 100 INJECTION, SOLUTION INTRAVENOUS; SUBCUTANEOUS at 06:38

## 2022-01-01 RX ADMIN — PROPOFOL 35 MCG/KG/MIN: 10 INJECTION, EMULSION INTRAVENOUS at 19:13

## 2022-01-01 RX ADMIN — SODIUM CHLORIDE 10 MG/HR: 9 INJECTION, SOLUTION INTRAVENOUS at 16:00

## 2022-01-01 RX ADMIN — METRONIDAZOLE 500 MG: 500 INJECTION, SOLUTION INTRAVENOUS at 14:10

## 2022-01-01 RX ADMIN — METOPROLOL TARTRATE 25 MG: 25 TABLET ORAL at 08:36

## 2022-01-01 RX ADMIN — CHLORHEXIDINE GLUCONATE 15 ML: 1.2 RINSE ORAL at 08:45

## 2022-01-01 RX ADMIN — LORAZEPAM 0.5 MG: 2 INJECTION INTRAMUSCULAR; INTRAVENOUS at 13:52

## 2022-01-01 RX ADMIN — INSULIN HUMAN 6.4 UNITS/HR: 1 INJECTION, SOLUTION INTRAVENOUS at 00:12

## 2022-01-01 RX ADMIN — PROPOFOL 50 MCG/KG/MIN: 10 INJECTION, EMULSION INTRAVENOUS at 16:03

## 2022-01-01 RX ADMIN — GLYCERIN 1 DROP: .002; .002; .01 SOLUTION/ DROPS OPHTHALMIC at 12:33

## 2022-01-01 RX ADMIN — POTASSIUM CHLORIDE 10 MEQ: 7.46 INJECTION, SOLUTION INTRAVENOUS at 09:48

## 2022-01-01 RX ADMIN — HYDRALAZINE HYDROCHLORIDE 10 MG: 20 INJECTION INTRAMUSCULAR; INTRAVENOUS at 13:24

## 2022-01-01 RX ADMIN — LORAZEPAM 1 MG: 2 INJECTION, SOLUTION INTRAMUSCULAR; INTRAVENOUS at 17:09

## 2022-01-01 RX ADMIN — CEFAZOLIN SODIUM 2 G: 2 INJECTION, SOLUTION INTRAVENOUS at 03:28

## 2022-01-01 RX ADMIN — VERAPAMIL HYDROCHLORIDE 80 MG: 80 TABLET ORAL at 22:11

## 2022-01-01 RX ADMIN — ACETYLCYSTEINE 4 ML: 200 SOLUTION ORAL; RESPIRATORY (INHALATION) at 19:37

## 2022-01-01 RX ADMIN — DEXMEDETOMIDINE HYDROCHLORIDE 0.6 MCG/KG/HR: 100 INJECTION, SOLUTION, CONCENTRATE INTRAVENOUS at 16:41

## 2022-01-01 RX ADMIN — SODIUM CHLORIDE 2 G: 900 INJECTION, SOLUTION INTRAVENOUS at 01:14

## 2022-01-01 RX ADMIN — SODIUM CHLORIDE, PRESERVATIVE FREE 10 ML: 5 INJECTION INTRAVENOUS at 21:13

## 2022-01-01 RX ADMIN — VERAPAMIL HYDROCHLORIDE 80 MG: 80 TABLET ORAL at 01:52

## 2022-01-01 RX ADMIN — FENTANYL CITRATE 50 MCG: 0.05 INJECTION, SOLUTION INTRAMUSCULAR; INTRAVENOUS at 10:18

## 2022-01-01 RX ADMIN — Medication: at 02:39

## 2022-01-01 RX ADMIN — FAMOTIDINE 20 MG: 20 TABLET, FILM COATED ORAL at 17:56

## 2022-01-01 RX ADMIN — GADOBENATE DIMEGLUMINE 20 ML: 529 INJECTION, SOLUTION INTRAVENOUS at 11:45

## 2022-01-01 RX ADMIN — ACETYLCYSTEINE 4 ML: 200 SOLUTION ORAL; RESPIRATORY (INHALATION) at 14:42

## 2022-01-01 RX ADMIN — METOPROLOL TARTRATE 50 MG: 50 TABLET, FILM COATED ORAL at 08:45

## 2022-01-01 RX ADMIN — IPRATROPIUM BROMIDE AND ALBUTEROL SULFATE 3 ML: 2.5; .5 SOLUTION RESPIRATORY (INHALATION) at 11:45

## 2022-01-01 RX ADMIN — INSULIN ASPART 3 UNITS: 100 INJECTION, SOLUTION INTRAVENOUS; SUBCUTANEOUS at 17:42

## 2022-01-01 RX ADMIN — INSULIN GLARGINE-YFGN 30 UNITS: 100 INJECTION, SOLUTION SUBCUTANEOUS at 20:56

## 2022-01-01 RX ADMIN — INSULIN GLARGINE-YFGN 15 UNITS: 100 INJECTION, SOLUTION SUBCUTANEOUS at 09:07

## 2022-01-01 RX ADMIN — METOPROLOL TARTRATE 100 MG: 100 TABLET, FILM COATED ORAL at 08:59

## 2022-01-01 RX ADMIN — ETHYL ALCOHOL 1 EACH: 62 SWAB TOPICAL at 20:01

## 2022-01-01 RX ADMIN — IPRATROPIUM BROMIDE AND ALBUTEROL SULFATE 3 ML: 2.5; .5 SOLUTION RESPIRATORY (INHALATION) at 10:20

## 2022-01-01 RX ADMIN — LEVETIRACETAM 750 MG: 100 INJECTION, SOLUTION, CONCENTRATE INTRAVENOUS at 09:12

## 2022-01-01 RX ADMIN — CEFAZOLIN SODIUM 2 G: 2 SOLUTION INTRAVENOUS at 03:54

## 2022-01-01 RX ADMIN — ENALAPRIL MALEATE 5 MG: 5 TABLET ORAL at 00:16

## 2022-01-01 RX ADMIN — PREGABALIN 75 MG: 75 CAPSULE ORAL at 21:28

## 2022-01-01 RX ADMIN — AZTREONAM 2 G: 2 INJECTION, POWDER, LYOPHILIZED, FOR SOLUTION INTRAMUSCULAR; INTRAVENOUS at 08:22

## 2022-01-01 RX ADMIN — LORAZEPAM 1 MG: 1 TABLET ORAL at 20:13

## 2022-01-01 RX ADMIN — INSULIN ASPART 8 UNITS: 100 INJECTION, SOLUTION INTRAVENOUS; SUBCUTANEOUS at 12:11

## 2022-01-01 RX ADMIN — INSULIN ASPART 4 UNITS: 100 INJECTION, SOLUTION INTRAVENOUS; SUBCUTANEOUS at 17:30

## 2022-01-01 RX ADMIN — NICARDIPINE HYDROCHLORIDE 15 MG/HR: 25 INJECTION, SOLUTION INTRAVENOUS at 04:37

## 2022-01-01 RX ADMIN — PANTOPRAZOLE SODIUM 40 MG: 40 INJECTION, POWDER, FOR SOLUTION INTRAVENOUS at 05:27

## 2022-01-01 RX ADMIN — SODIUM CHLORIDE, PRESERVATIVE FREE 10 ML: 5 INJECTION INTRAVENOUS at 08:35

## 2022-01-01 RX ADMIN — CEFEPIME HYDROCHLORIDE 2 G: 2 INJECTION, POWDER, FOR SOLUTION INTRAVENOUS at 12:53

## 2022-01-01 RX ADMIN — INSULIN DETEMIR 20 UNITS: 100 INJECTION, SOLUTION SUBCUTANEOUS at 08:12

## 2022-01-01 RX ADMIN — CHLORHEXIDINE GLUCONATE 15 ML: 1.2 RINSE ORAL at 08:08

## 2022-01-01 RX ADMIN — INSULIN ASPART 2 UNITS: 100 INJECTION, SOLUTION INTRAVENOUS; SUBCUTANEOUS at 06:00

## 2022-01-01 RX ADMIN — METRONIDAZOLE 500 MG: 500 INJECTION, SOLUTION INTRAVENOUS at 15:14

## 2022-01-01 RX ADMIN — PREGABALIN 75 MG: 75 CAPSULE ORAL at 08:18

## 2022-01-01 RX ADMIN — ACETAMINOPHEN 650 MG: 325 TABLET, FILM COATED ORAL at 04:11

## 2022-01-01 RX ADMIN — FUROSEMIDE 40 MG: 10 INJECTION, SOLUTION INTRAMUSCULAR; INTRAVENOUS at 12:34

## 2022-01-01 RX ADMIN — METRONIDAZOLE 500 MG: 500 INJECTION, SOLUTION INTRAVENOUS at 22:35

## 2022-01-01 RX ADMIN — INSULIN HUMAN 24 UNITS: 100 INJECTION, SOLUTION PARENTERAL at 16:56

## 2022-01-01 RX ADMIN — SODIUM CHLORIDE 15 MG/HR: 9 INJECTION, SOLUTION INTRAVENOUS at 05:59

## 2022-01-01 RX ADMIN — DOXYCYCLINE 100 MG: 100 INJECTION, POWDER, LYOPHILIZED, FOR SOLUTION INTRAVENOUS at 10:59

## 2022-01-01 RX ADMIN — PRAVASTATIN SODIUM 40 MG: 40 TABLET ORAL at 08:10

## 2022-01-01 RX ADMIN — PROPOFOL 50 MCG/KG/MIN: 10 INJECTION, EMULSION INTRAVENOUS at 04:56

## 2022-01-01 RX ADMIN — PREGABALIN 75 MG: 75 CAPSULE ORAL at 08:45

## 2022-01-01 RX ADMIN — HYDRALAZINE HYDROCHLORIDE 10 MG: 20 INJECTION INTRAMUSCULAR; INTRAVENOUS at 21:19

## 2022-01-01 RX ADMIN — DEXMEDETOMIDINE HYDROCHLORIDE 1.5 MCG/KG/HR: 100 INJECTION, SOLUTION, CONCENTRATE INTRAVENOUS at 16:33

## 2022-01-01 RX ADMIN — INSULIN HUMAN 20 UNITS: 100 INJECTION, SOLUTION PARENTERAL at 05:11

## 2022-01-01 RX ADMIN — IPRATROPIUM BROMIDE AND ALBUTEROL SULFATE 3 ML: 2.5; .5 SOLUTION RESPIRATORY (INHALATION) at 14:42

## 2022-01-01 RX ADMIN — HYDROMORPHONE HYDROCHLORIDE 1 MG: 1 INJECTION, SOLUTION INTRAMUSCULAR; INTRAVENOUS; SUBCUTANEOUS at 01:39

## 2022-01-01 RX ADMIN — DOXYCYCLINE 100 MG: 100 INJECTION, POWDER, LYOPHILIZED, FOR SOLUTION INTRAVENOUS at 10:41

## 2022-01-01 RX ADMIN — SCOLOPAMINE TRANSDERMAL SYSTEM 1 PATCH: 1 PATCH, EXTENDED RELEASE TRANSDERMAL at 10:45

## 2022-01-01 RX ADMIN — DEXMEDETOMIDINE HYDROCHLORIDE 0.8 MCG/KG/HR: 100 INJECTION, SOLUTION, CONCENTRATE INTRAVENOUS at 04:24

## 2022-01-01 RX ADMIN — METOPROLOL TARTRATE 50 MG: 25 TABLET ORAL at 08:13

## 2022-01-01 RX ADMIN — NICARDIPINE HYDROCHLORIDE 5 MG/HR: 25 INJECTION, SOLUTION INTRAVENOUS at 11:30

## 2022-01-01 RX ADMIN — QUETIAPINE FUMARATE 50 MG: 25 TABLET, FILM COATED ORAL at 20:51

## 2022-01-01 RX ADMIN — INSULIN ASPART 4 UNITS: 100 INJECTION, SOLUTION INTRAVENOUS; SUBCUTANEOUS at 00:19

## 2022-01-01 RX ADMIN — ENOXAPARIN SODIUM 90 MG: 100 INJECTION SUBCUTANEOUS at 08:14

## 2022-01-01 RX ADMIN — SODIUM CHLORIDE 15 MG/HR: 9 INJECTION, SOLUTION INTRAVENOUS at 00:17

## 2022-01-01 RX ADMIN — SODIUM CHLORIDE 125 ML/HR: 9 INJECTION, SOLUTION INTRAVENOUS at 05:09

## 2022-01-01 RX ADMIN — ACETYLCYSTEINE 4 ML: 200 SOLUTION ORAL; RESPIRATORY (INHALATION) at 19:58

## 2022-01-01 RX ADMIN — LEVETIRACETAM 750 MG: 100 INJECTION, SOLUTION, CONCENTRATE INTRAVENOUS at 23:52

## 2022-01-01 RX ADMIN — POTASSIUM CHLORIDE 40 MEQ: 1.5 POWDER, FOR SOLUTION ORAL at 11:01

## 2022-01-01 RX ADMIN — HEPARIN SODIUM 5000 UNITS: 5000 INJECTION, SOLUTION INTRAVENOUS; SUBCUTANEOUS at 11:18

## 2022-01-01 RX ADMIN — INSULIN LISPRO 7 UNITS: 100 INJECTION, SOLUTION INTRAVENOUS; SUBCUTANEOUS at 12:28

## 2022-01-01 RX ADMIN — LORAZEPAM 2 MG: 2 INJECTION INTRAMUSCULAR; INTRAVENOUS at 03:37

## 2022-01-01 RX ADMIN — VERAPAMIL HYDROCHLORIDE 120 MG: 120 TABLET ORAL at 00:27

## 2022-01-01 RX ADMIN — LEVETIRACETAM 500 MG: 5 INJECTION, SOLUTION INTRAVENOUS at 08:35

## 2022-01-01 RX ADMIN — ATORVASTATIN CALCIUM 40 MG: 20 TABLET, FILM COATED ORAL at 20:36

## 2022-01-01 RX ADMIN — LISINOPRIL 5 MG: 5 TABLET ORAL at 10:11

## 2022-01-01 RX ADMIN — PREGABALIN 75 MG: 75 CAPSULE ORAL at 09:53

## 2022-01-01 RX ADMIN — DEXMEDETOMIDINE HYDROCHLORIDE 1 MCG/KG/HR: 100 INJECTION, SOLUTION INTRAVENOUS at 23:42

## 2022-01-01 RX ADMIN — POTASSIUM CHLORIDE 40 MEQ: 1.5 POWDER, FOR SOLUTION ORAL at 03:56

## 2022-01-01 RX ADMIN — POTASSIUM CHLORIDE 40 MEQ: 1.5 POWDER, FOR SOLUTION ORAL at 08:28

## 2022-01-01 RX ADMIN — DOCUSATE SODIUM 50MG AND SENNOSIDES 8.6MG 2 TABLET: 8.6; 5 TABLET, FILM COATED ORAL at 20:31

## 2022-01-01 RX ADMIN — DEXMEDETOMIDINE HYDROCHLORIDE 0.5 MCG/KG/HR: 100 INJECTION, SOLUTION INTRAVENOUS at 02:54

## 2022-01-01 RX ADMIN — METRONIDAZOLE 500 MG: 500 INJECTION, SOLUTION INTRAVENOUS at 13:17

## 2022-01-01 RX ADMIN — CEFAZOLIN SODIUM 2 G: 2 INJECTION, SOLUTION INTRAVENOUS at 10:11

## 2022-01-01 RX ADMIN — ACETYLCYSTEINE 4 ML: 200 SOLUTION ORAL; RESPIRATORY (INHALATION) at 10:46

## 2022-01-01 RX ADMIN — INSULIN DETEMIR 30 UNITS: 100 INJECTION, SOLUTION SUBCUTANEOUS at 08:08

## 2022-01-01 RX ADMIN — PANTOPRAZOLE SODIUM 40 MG: 40 INJECTION, POWDER, FOR SOLUTION INTRAVENOUS at 06:01

## 2022-01-01 RX ADMIN — HYDROMORPHONE HYDROCHLORIDE 1.5 MG: 2 INJECTION, SOLUTION INTRAMUSCULAR; INTRAVENOUS; SUBCUTANEOUS at 13:01

## 2022-01-01 RX ADMIN — LORAZEPAM 1 MG: 1 TABLET ORAL at 20:03

## 2022-01-01 RX ADMIN — METRONIDAZOLE 500 MG: 250 TABLET ORAL at 06:00

## 2022-01-01 RX ADMIN — METRONIDAZOLE 500 MG: 500 INJECTION, SOLUTION INTRAVENOUS at 14:42

## 2022-01-01 RX ADMIN — HYDROMORPHONE HYDROCHLORIDE 2 MG: 2 INJECTION, SOLUTION INTRAMUSCULAR; INTRAVENOUS; SUBCUTANEOUS at 16:17

## 2022-01-01 RX ADMIN — METRONIDAZOLE 500 MG: 500 INJECTION, SOLUTION INTRAVENOUS at 06:39

## 2022-01-01 RX ADMIN — METRONIDAZOLE 500 MG: 500 INJECTION, SOLUTION INTRAVENOUS at 16:14

## 2022-01-01 RX ADMIN — VANCOMYCIN HYDROCHLORIDE 1750 MG: 5 INJECTION, POWDER, LYOPHILIZED, FOR SOLUTION INTRAVENOUS at 19:01

## 2022-01-01 RX ADMIN — PROPOFOL 50 MCG/KG/MIN: 10 INJECTION, EMULSION INTRAVENOUS at 19:34

## 2022-01-01 RX ADMIN — HYDROMORPHONE HYDROCHLORIDE 1 MG: 1 INJECTION, SOLUTION INTRAMUSCULAR; INTRAVENOUS; SUBCUTANEOUS at 07:14

## 2022-01-01 RX ADMIN — FAMOTIDINE 20 MG: 20 TABLET, FILM COATED ORAL at 06:30

## 2022-01-01 RX ADMIN — INSULIN LISPRO 2 UNITS: 100 INJECTION, SOLUTION INTRAVENOUS; SUBCUTANEOUS at 03:45

## 2022-01-01 RX ADMIN — ETHYL ALCOHOL 1 EACH: 62 SWAB TOPICAL at 09:08

## 2022-01-01 RX ADMIN — CEFAZOLIN SODIUM 2 G: 2 INJECTION, SOLUTION INTRAVENOUS at 02:07

## 2022-01-01 RX ADMIN — IPRATROPIUM BROMIDE AND ALBUTEROL SULFATE 3 ML: 2.5; .5 SOLUTION RESPIRATORY (INHALATION) at 19:43

## 2022-01-01 RX ADMIN — Medication: at 17:49

## 2022-01-01 RX ADMIN — HYDRALAZINE HYDROCHLORIDE 10 MG: 20 INJECTION INTRAMUSCULAR; INTRAVENOUS at 20:13

## 2022-01-01 RX ADMIN — HEPARIN SODIUM 13 UNITS/KG/HR: 10000 INJECTION, SOLUTION INTRAVENOUS at 10:23

## 2022-01-01 RX ADMIN — POTASSIUM CHLORIDE 40 MEQ: 1.5 POWDER, FOR SOLUTION ORAL at 17:32

## 2022-01-01 RX ADMIN — DEXMEDETOMIDINE HYDROCHLORIDE 0.5 MCG/KG/HR: 100 INJECTION, SOLUTION INTRAVENOUS at 22:01

## 2022-01-01 RX ADMIN — HYDRALAZINE HYDROCHLORIDE 10 MG: 20 INJECTION INTRAMUSCULAR; INTRAVENOUS at 11:51

## 2022-01-01 RX ADMIN — FAMOTIDINE 20 MG: 20 TABLET, FILM COATED ORAL at 08:59

## 2022-01-01 RX ADMIN — HYDROMORPHONE HYDROCHLORIDE 1.5 MG: 2 INJECTION, SOLUTION INTRAMUSCULAR; INTRAVENOUS; SUBCUTANEOUS at 18:58

## 2022-01-01 RX ADMIN — PROPOFOL 30 MCG/KG/MIN: 10 INJECTION, EMULSION INTRAVENOUS at 16:21

## 2022-01-01 RX ADMIN — INSULIN DETEMIR 30 UNITS: 100 INJECTION, SOLUTION SUBCUTANEOUS at 07:49

## 2022-01-01 RX ADMIN — DEXMEDETOMIDINE HYDROCHLORIDE 1.5 MCG/KG/HR: 100 INJECTION, SOLUTION, CONCENTRATE INTRAVENOUS at 19:54

## 2022-01-01 RX ADMIN — INSULIN ASPART 5 UNITS: 100 INJECTION, SOLUTION INTRAVENOUS; SUBCUTANEOUS at 17:30

## 2022-01-01 RX ADMIN — LORAZEPAM 2 MG: 2 INJECTION INTRAMUSCULAR; INTRAVENOUS at 10:46

## 2022-01-01 RX ADMIN — METOPROLOL TARTRATE 25 MG: 25 TABLET ORAL at 20:15

## 2022-01-01 RX ADMIN — LORAZEPAM 0.5 MG: 2 INJECTION INTRAMUSCULAR; INTRAVENOUS at 22:01

## 2022-01-01 RX ADMIN — AZTREONAM 2 G: 2 INJECTION, POWDER, LYOPHILIZED, FOR SOLUTION INTRAMUSCULAR; INTRAVENOUS at 08:30

## 2022-01-01 RX ADMIN — INSULIN DETEMIR 20 UNITS: 100 INJECTION, SOLUTION SUBCUTANEOUS at 08:20

## 2022-01-01 RX ADMIN — INSULIN ASPART 4 UNITS: 100 INJECTION, SOLUTION INTRAVENOUS; SUBCUTANEOUS at 18:34

## 2022-01-01 RX ADMIN — IPRATROPIUM BROMIDE AND ALBUTEROL SULFATE 3 ML: 2.5; .5 SOLUTION RESPIRATORY (INHALATION) at 15:34

## 2022-01-01 RX ADMIN — INSULIN ASPART 5 UNITS: 100 INJECTION, SOLUTION INTRAVENOUS; SUBCUTANEOUS at 13:12

## 2022-01-01 RX ADMIN — HEPARIN SODIUM 30.2 UNITS/KG/HR: 10000 INJECTION, SOLUTION INTRAVENOUS at 00:34

## 2022-01-01 RX ADMIN — METOPROLOL TARTRATE 25 MG: 25 TABLET ORAL at 08:28

## 2022-01-01 RX ADMIN — CEFAZOLIN SODIUM 2 G: 2 SOLUTION INTRAVENOUS at 10:25

## 2022-01-01 RX ADMIN — VANCOMYCIN HYDROCHLORIDE 1250 MG: 5 INJECTION, POWDER, LYOPHILIZED, FOR SOLUTION INTRAVENOUS at 13:18

## 2022-01-01 RX ADMIN — HYDRALAZINE HYDROCHLORIDE 10 MG: 20 INJECTION INTRAMUSCULAR; INTRAVENOUS at 16:36

## 2022-01-01 RX ADMIN — METOPROLOL TARTRATE 5 MG: 5 INJECTION INTRAVENOUS at 03:10

## 2022-01-01 RX ADMIN — NICARDIPINE HYDROCHLORIDE 10 MG/HR: 25 INJECTION, SOLUTION INTRAVENOUS at 12:30

## 2022-01-01 RX ADMIN — VANCOMYCIN HYDROCHLORIDE 1250 MG: 5 INJECTION, POWDER, LYOPHILIZED, FOR SOLUTION INTRAVENOUS at 15:14

## 2022-01-01 RX ADMIN — PROPOFOL 45 MCG/KG/MIN: 10 INJECTION, EMULSION INTRAVENOUS at 23:09

## 2022-01-01 RX ADMIN — PROPOFOL 20 MCG/KG/MIN: 10 INJECTION, EMULSION INTRAVENOUS at 12:22

## 2022-01-01 RX ADMIN — VERAPAMIL HYDROCHLORIDE 120 MG: 120 TABLET ORAL at 00:39

## 2022-01-01 RX ADMIN — PREGABALIN 75 MG: 75 CAPSULE ORAL at 20:18

## 2022-01-01 RX ADMIN — HYDRALAZINE HYDROCHLORIDE 10 MG: 10 TABLET, FILM COATED ORAL at 05:07

## 2022-01-01 RX ADMIN — CEFEPIME HYDROCHLORIDE 2 G: 2 INJECTION, POWDER, FOR SOLUTION INTRAVENOUS at 04:18

## 2022-01-01 RX ADMIN — INSULIN ASPART 5 UNITS: 100 INJECTION, SOLUTION INTRAVENOUS; SUBCUTANEOUS at 11:02

## 2022-01-01 RX ADMIN — PROPOFOL 50 MCG/KG/MIN: 10 INJECTION, EMULSION INTRAVENOUS at 08:40

## 2022-01-01 RX ADMIN — PREGABALIN 75 MG: 75 CAPSULE ORAL at 08:10

## 2022-01-01 RX ADMIN — INSULIN LISPRO 6 UNITS: 100 INJECTION, SOLUTION INTRAVENOUS; SUBCUTANEOUS at 19:55

## 2022-01-01 RX ADMIN — CHLORHEXIDINE GLUCONATE 15 ML: 1.2 RINSE ORAL at 20:12

## 2022-01-01 RX ADMIN — METOPROLOL TARTRATE 5 MG: 5 INJECTION INTRAVENOUS at 11:32

## 2022-01-01 RX ADMIN — HEPARIN SODIUM 24.6 UNITS/KG/HR: 10000 INJECTION, SOLUTION INTRAVENOUS at 14:32

## 2022-01-01 RX ADMIN — ACETAMINOPHEN 650 MG: 325 TABLET ORAL at 12:34

## 2022-01-01 RX ADMIN — POTASSIUM CHLORIDE 10 MEQ: 7.46 INJECTION, SOLUTION INTRAVENOUS at 06:07

## 2022-01-01 RX ADMIN — HYDROMORPHONE HYDROCHLORIDE 1.5 MG: 2 INJECTION, SOLUTION INTRAMUSCULAR; INTRAVENOUS; SUBCUTANEOUS at 10:42

## 2022-01-01 RX ADMIN — LORAZEPAM 2 MG: 2 INJECTION INTRAMUSCULAR; INTRAVENOUS at 13:01

## 2022-01-01 RX ADMIN — FAMOTIDINE 20 MG: 20 TABLET, FILM COATED ORAL at 17:05

## 2022-01-01 RX ADMIN — ATORVASTATIN CALCIUM 40 MG: 20 TABLET, FILM COATED ORAL at 20:04

## 2022-01-01 RX ADMIN — DEXMEDETOMIDINE HYDROCHLORIDE 1.5 MCG/KG/HR: 100 INJECTION, SOLUTION, CONCENTRATE INTRAVENOUS at 08:35

## 2022-01-01 RX ADMIN — CEFAZOLIN SODIUM 2 G: 2 INJECTION, SOLUTION INTRAVENOUS at 01:52

## 2022-01-01 RX ADMIN — METRONIDAZOLE 500 MG: 250 TABLET ORAL at 15:40

## 2022-01-01 RX ADMIN — IPRATROPIUM BROMIDE AND ALBUTEROL SULFATE 3 ML: 2.5; .5 SOLUTION RESPIRATORY (INHALATION) at 11:06

## 2022-01-01 RX ADMIN — INSULIN DETEMIR 30 UNITS: 100 INJECTION, SOLUTION SUBCUTANEOUS at 07:54

## 2022-01-01 RX ADMIN — INSULIN ASPART 6 UNITS: 100 INJECTION, SOLUTION INTRAVENOUS; SUBCUTANEOUS at 06:48

## 2022-01-01 RX ADMIN — PROPOFOL 45 MCG/KG/MIN: 10 INJECTION, EMULSION INTRAVENOUS at 17:15

## 2022-01-01 RX ADMIN — HYDROMORPHONE HYDROCHLORIDE 1 MG: 1 INJECTION, SOLUTION INTRAMUSCULAR; INTRAVENOUS; SUBCUTANEOUS at 08:49

## 2022-01-01 RX ADMIN — LEVETIRACETAM 750 MG: 100 INJECTION, SOLUTION, CONCENTRATE INTRAVENOUS at 08:59

## 2022-01-01 RX ADMIN — PREGABALIN 75 MG: 75 CAPSULE ORAL at 22:10

## 2022-01-01 RX ADMIN — ACETAMINOPHEN 650 MG: 325 TABLET ORAL at 03:50

## 2022-01-01 RX ADMIN — PROPOFOL 40 MCG/KG/MIN: 10 INJECTION, EMULSION INTRAVENOUS at 07:50

## 2022-01-01 RX ADMIN — INSULIN ASPART 4 UNITS: 100 INJECTION, SOLUTION INTRAVENOUS; SUBCUTANEOUS at 01:58

## 2022-01-01 RX ADMIN — PROPOFOL 40 MCG/KG/MIN: 10 INJECTION, EMULSION INTRAVENOUS at 14:30

## 2022-01-01 RX ADMIN — IOPAMIDOL 85 ML: 612 INJECTION, SOLUTION INTRAVENOUS at 16:20

## 2022-01-01 RX ADMIN — SODIUM CHLORIDE, PRESERVATIVE FREE 10 ML: 5 INJECTION INTRAVENOUS at 08:07

## 2022-01-01 RX ADMIN — NICARDIPINE HYDROCHLORIDE 2.5 MG/HR: 25 INJECTION, SOLUTION INTRAVENOUS at 00:27

## 2022-01-01 RX ADMIN — GLYCERIN 1 DROP: .002; .002; .01 SOLUTION/ DROPS OPHTHALMIC at 23:58

## 2022-01-01 RX ADMIN — DIPHENHYDRAMINE HYDROCHLORIDE 50 MG: 50 INJECTION INTRAMUSCULAR; INTRAVENOUS at 12:05

## 2022-01-01 RX ADMIN — LORAZEPAM 1 MG: 2 INJECTION INTRAMUSCULAR; INTRAVENOUS at 01:41

## 2022-01-01 RX ADMIN — INSULIN ASPART 2 UNITS: 100 INJECTION, SOLUTION INTRAVENOUS; SUBCUTANEOUS at 06:40

## 2022-01-01 RX ADMIN — POTASSIUM CHLORIDE 10 MEQ: 7.46 INJECTION, SOLUTION INTRAVENOUS at 04:03

## 2022-01-01 RX ADMIN — PROPOFOL 45 MCG/KG/MIN: 10 INJECTION, EMULSION INTRAVENOUS at 14:12

## 2022-01-01 RX ADMIN — SODIUM CHLORIDE, PRESERVATIVE FREE 10 ML: 5 INJECTION INTRAVENOUS at 20:07

## 2022-01-01 RX ADMIN — DEXMEDETOMIDINE HYDROCHLORIDE 1.5 MCG/KG/HR: 100 INJECTION, SOLUTION INTRAVENOUS at 01:56

## 2022-01-01 RX ADMIN — METOPROLOL TARTRATE 5 MG: 5 INJECTION INTRAVENOUS at 00:16

## 2022-01-01 RX ADMIN — HEPARIN SODIUM 2500 UNITS: 5000 INJECTION, SOLUTION INTRAVENOUS; SUBCUTANEOUS at 10:34

## 2022-01-01 RX ADMIN — PREDNISONE 40 MG: 20 TABLET ORAL at 08:34

## 2022-01-01 RX ADMIN — HYDROMORPHONE HYDROCHLORIDE 1 MG: 1 INJECTION, SOLUTION INTRAMUSCULAR; INTRAVENOUS; SUBCUTANEOUS at 16:22

## 2022-01-01 RX ADMIN — PREDNISONE 40 MG: 20 TABLET ORAL at 20:46

## 2022-01-01 RX ADMIN — ACETYLCYSTEINE 4 ML: 200 SOLUTION ORAL; RESPIRATORY (INHALATION) at 07:08

## 2022-01-01 RX ADMIN — HYDROMORPHONE HYDROCHLORIDE 1.5 MG: 2 INJECTION, SOLUTION INTRAMUSCULAR; INTRAVENOUS; SUBCUTANEOUS at 17:55

## 2022-01-01 RX ADMIN — HYDRALAZINE HYDROCHLORIDE 10 MG: 20 INJECTION INTRAMUSCULAR; INTRAVENOUS at 07:36

## 2022-01-01 RX ADMIN — CEFAZOLIN SODIUM 2 G: 2 INJECTION, SOLUTION INTRAVENOUS at 10:40

## 2022-01-01 RX ADMIN — FUROSEMIDE 60 MG: 10 INJECTION, SOLUTION INTRAMUSCULAR; INTRAVENOUS at 16:09

## 2022-01-01 RX ADMIN — FENTANYL CITRATE 50 MCG: 0.05 INJECTION, SOLUTION INTRAMUSCULAR; INTRAVENOUS at 10:46

## 2022-01-01 RX ADMIN — ACETYLCYSTEINE 4 ML: 200 SOLUTION ORAL; RESPIRATORY (INHALATION) at 16:46

## 2022-01-01 RX ADMIN — VERAPAMIL HYDROCHLORIDE 120 MG: 120 TABLET ORAL at 17:04

## 2022-01-01 RX ADMIN — POTASSIUM CHLORIDE 40 MEQ: 1.5 POWDER, FOR SOLUTION ORAL at 22:08

## 2022-01-01 RX ADMIN — VANCOMYCIN HYDROCHLORIDE 1750 MG: 5 INJECTION, POWDER, LYOPHILIZED, FOR SOLUTION INTRAVENOUS at 08:21

## 2022-01-01 RX ADMIN — SODIUM CHLORIDE 100 ML/HR: 9 INJECTION, SOLUTION INTRAVENOUS at 01:18

## 2022-01-01 RX ADMIN — LEVETIRACETAM 500 MG: 5 INJECTION, SOLUTION INTRAVENOUS at 21:02

## 2022-01-01 RX ADMIN — PROPOFOL 10 MCG/KG/MIN: 10 INJECTION, EMULSION INTRAVENOUS at 19:00

## 2022-01-01 RX ADMIN — POTASSIUM CHLORIDE 10 MEQ: 7.46 INJECTION, SOLUTION INTRAVENOUS at 11:02

## 2022-01-01 RX ADMIN — CEFAZOLIN SODIUM 2 G: 2 SOLUTION INTRAVENOUS at 18:13

## 2022-01-01 RX ADMIN — REMDESIVIR 100 MG: 100 INJECTION, POWDER, LYOPHILIZED, FOR SOLUTION INTRAVENOUS at 14:22

## 2022-01-01 RX ADMIN — INSULIN HUMAN 6 UNITS/HR: 1 INJECTION, SOLUTION INTRAVENOUS at 04:18

## 2022-01-01 RX ADMIN — QUETIAPINE FUMARATE 50 MG: 25 TABLET, FILM COATED ORAL at 20:56

## 2022-01-01 RX ADMIN — ACETYLCYSTEINE 4 ML: 200 SOLUTION ORAL; RESPIRATORY (INHALATION) at 11:21

## 2022-01-01 RX ADMIN — IODIXANOL 152 ML: 320 INJECTION, SOLUTION INTRAVASCULAR at 11:37

## 2022-01-01 RX ADMIN — INSULIN LISPRO 1 UNITS: 100 INJECTION, SOLUTION INTRAVENOUS; SUBCUTANEOUS at 04:11

## 2022-01-01 RX ADMIN — IPRATROPIUM BROMIDE AND ALBUTEROL SULFATE 3 ML: 2.5; .5 SOLUTION RESPIRATORY (INHALATION) at 16:45

## 2022-01-01 RX ADMIN — HEPARIN SODIUM 20.2 UNITS/KG/HR: 10000 INJECTION, SOLUTION INTRAVENOUS at 18:58

## 2022-01-01 RX ADMIN — CEFAZOLIN SODIUM 2 G: 2 SOLUTION INTRAVENOUS at 13:22

## 2022-01-01 RX ADMIN — METOPROLOL TARTRATE 25 MG: 25 TABLET ORAL at 08:10

## 2022-01-01 RX ADMIN — SODIUM CHLORIDE 5 MG/HR: 9 INJECTION, SOLUTION INTRAVENOUS at 13:17

## 2022-01-01 RX ADMIN — POTASSIUM CHLORIDE 10 MEQ: 7.46 INJECTION, SOLUTION INTRAVENOUS at 15:15

## 2022-01-01 RX ADMIN — LISINOPRIL 5 MG: 5 TABLET ORAL at 08:41

## 2022-01-01 RX ADMIN — POTASSIUM CHLORIDE 40 MEQ: 1.5 POWDER, FOR SOLUTION ORAL at 05:06

## 2022-01-01 RX ADMIN — PRAVASTATIN SODIUM 40 MG: 40 TABLET ORAL at 08:49

## 2022-01-01 RX ADMIN — VANCOMYCIN HYDROCHLORIDE 1750 MG: 5 INJECTION, POWDER, LYOPHILIZED, FOR SOLUTION INTRAVENOUS at 08:12

## 2022-01-01 RX ADMIN — CEFAZOLIN SODIUM 2 G: 2 SOLUTION INTRAVENOUS at 18:05

## 2022-01-01 RX ADMIN — PROPOFOL 30 MCG/KG/MIN: 10 INJECTION, EMULSION INTRAVENOUS at 01:18

## 2022-01-01 RX ADMIN — METOPROLOL TARTRATE 50 MG: 25 TABLET ORAL at 20:18

## 2022-01-01 RX ADMIN — SODIUM CHLORIDE, PRESERVATIVE FREE 10 ML: 5 INJECTION INTRAVENOUS at 07:50

## 2022-01-01 RX ADMIN — PANTOPRAZOLE SODIUM 40 MG: 40 INJECTION, POWDER, FOR SOLUTION INTRAVENOUS at 05:36

## 2022-01-01 RX ADMIN — KETAMINE HYDROCHLORIDE 250 MG: 100 INJECTION INTRAMUSCULAR; INTRAVENOUS at 22:33

## 2022-01-01 RX ADMIN — FENTANYL CITRATE 50 MCG: 50 INJECTION INTRAMUSCULAR; INTRAVENOUS at 21:45

## 2022-01-01 RX ADMIN — PREDNISONE 40 MG: 20 TABLET ORAL at 11:49

## 2022-01-01 RX ADMIN — INSULIN GLARGINE-YFGN 20 UNITS: 100 INJECTION, SOLUTION SUBCUTANEOUS at 08:14

## 2022-01-01 RX ADMIN — INSULIN ASPART 4 UNITS: 100 INJECTION, SOLUTION INTRAVENOUS; SUBCUTANEOUS at 06:01

## 2022-01-01 RX ADMIN — VANCOMYCIN HYDROCHLORIDE 1750 MG: 5 INJECTION, POWDER, LYOPHILIZED, FOR SOLUTION INTRAVENOUS at 08:49

## 2022-01-01 RX ADMIN — SODIUM CHLORIDE 5 MG/HR: 9 INJECTION, SOLUTION INTRAVENOUS at 12:10

## 2022-01-01 RX ADMIN — FAMOTIDINE 20 MG: 20 TABLET, FILM COATED ORAL at 06:48

## 2022-01-01 RX ADMIN — VERAPAMIL HYDROCHLORIDE 80 MG: 80 TABLET ORAL at 05:13

## 2022-01-01 RX ADMIN — IPRATROPIUM BROMIDE AND ALBUTEROL SULFATE 3 ML: 2.5; .5 SOLUTION RESPIRATORY (INHALATION) at 14:27

## 2022-01-01 RX ADMIN — DOXYCYCLINE 100 MG: 100 INJECTION, POWDER, LYOPHILIZED, FOR SOLUTION INTRAVENOUS at 22:42

## 2022-01-01 RX ADMIN — ENOXAPARIN SODIUM 90 MG: 100 INJECTION SUBCUTANEOUS at 08:41

## 2022-01-01 RX ADMIN — MAGNESIUM SULFATE HEPTAHYDRATE 2 G: 40 INJECTION, SOLUTION INTRAVENOUS at 08:36

## 2022-01-01 RX ADMIN — PRAVASTATIN SODIUM 40 MG: 40 TABLET ORAL at 08:18

## 2022-01-01 RX ADMIN — PREGABALIN 75 MG: 75 CAPSULE ORAL at 20:57

## 2022-01-01 RX ADMIN — ENOXAPARIN SODIUM 90 MG: 100 INJECTION SUBCUTANEOUS at 20:34

## 2022-01-01 RX ADMIN — VERAPAMIL HYDROCHLORIDE 120 MG: 120 TABLET ORAL at 23:58

## 2022-01-01 RX ADMIN — IPRATROPIUM BROMIDE AND ALBUTEROL SULFATE 3 ML: 2.5; .5 SOLUTION RESPIRATORY (INHALATION) at 10:46

## 2022-01-01 RX ADMIN — IPRATROPIUM BROMIDE AND ALBUTEROL SULFATE 3 ML: 2.5; .5 SOLUTION RESPIRATORY (INHALATION) at 07:19

## 2022-01-01 RX ADMIN — ENOXAPARIN SODIUM 90 MG: 100 INJECTION SUBCUTANEOUS at 08:29

## 2022-01-01 RX ADMIN — INSULIN HUMAN 7.2 UNITS/HR: 1 INJECTION, SOLUTION INTRAVENOUS at 20:23
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
## 2022-01-21 PROBLEM — F29 PSYCHOSIS (HCC): Status: ACTIVE | Noted: 2022-01-01

## 2022-01-22 PROBLEM — G93.40 ENCEPHALOPATHY ACUTE: Status: ACTIVE | Noted: 2022-01-01

## 2022-02-01 NOTE — PROGRESS NOTES
LOS: 10 days     Name: Lynette Stein  Age/Sex: 56 y.o. female  :  1965        PCP: Orville Wu PA  REF: No Known Provider    Active Problems:    Encephalopathy acute      Reason for follow-up: Atrial fibrillation and congestive heart failure    Subjective       Subjective     Lynette Stein is a 56 y.o. female with a past medical history significant for hypertension, dyslipidemia, COPD, type 2 diabetes, anxiety/depression, leukemia, obstructive sleep apnea, and bilateral leg ulcerations.  She initially presented to the [facility] ED on 2022 with altered mental status.  Patient developed atrial fibrillation with RVR are in the ED    Interval History: Patient remains intubated but is off sedation and currently alert.  Noted to have a 12 beat run of ventricular tachycardia overnight.  Remains in normal sinus rhythm.  Kidney function normal.  Scheduled for lumbar puncture today.  IV heparin currently on hold.          Vital Signs  Temp:  [99.4 °F (37.4 °C)-100 °F (37.8 °C)] 99.4 °F (37.4 °C)  Heart Rate:  [] 111  Resp:  [-33] 25  BP: ()/() 192/105  FiO2 (%):  [35 %] 35 %     Vital Signs (last 72 hrs)        0700   0659  07 0659  0700   0659  0700   0953   Most Recent      Temp (°F) 98.6 -  99    98.8 -  100.6    99.4 -  100       99.4 (37.4)  0405    Heart Rate 91 -  124    85 -  122    89 -  141    89 -  111     111  0812    Resp  -   -  29    22 -  33      25     25 / 0702    /38 -  189/80    73/46 -  189/93    81/50 -  197/87    113/57 -  192/105     192/105  0812    SpO2 (%) 95 -  100    94 -  100    92 -  100      97     97  0702        Documented weights    22 0029 22 0602 22 0602 22 0533   Weight: 96.6 kg (212 lb 15.4 oz) 97.2 kg (214 lb 4.6 oz) 97 kg (213 lb 13.5 oz) 97.8 kg (215 lb 9.8 oz)    22 0502 22 0700 22 0600 22 0600    Weight: 99.8 kg (220 lb 1.6 oz) 98.9 kg (218 lb) 101 kg (223 lb 12.3 oz) 98.6 kg (217 lb 6 oz)    01/30/22 0535   Weight: 102 kg (224 lb 3.3 oz)      Body mass index is 30.4 kg/m².    Intake/Output Summary (Last 24 hours) at 2/1/2022 0953  Last data filed at 2/1/2022 0613  Gross per 24 hour   Intake 1917.98 ml   Output 1825 ml   Net 92.98 ml     Objective    Objective     I have seen and examined mistreated today.  Physical Exam:     General Appearance:    Alert, in no acute distress.  Patient is orally intubated and mechanically ventilated.   Head:    Normocephalic, without obvious abnormality, atraumatic   Eyes:            Conjunctivae and sclerae normal, no   icterus, no pallor, corneas clear.   Neck:   No adenopathy, supple, trachea midline, no thyromegaly, no   carotid bruit, no JVD   Lungs:    Intubated, clear to auscultation,respirations regular, even and    unlabored    Heart:    Regular rhythm and normal rate, normal S1 and S2, no            murmur, no gallop, no rub, no click   Chest Wall:    No abnormalities observed   Abdomen:     Normal bowel sounds, no masses, no organomegaly, soft        non-tender, non-distended, no guarding, no rebound                tenderness   Extremities:   no edema, no cyanosis, no edness   Pulses:   Pulses palpable and equal bilaterally   Skin:   No bleeding, bruising or rash         Results review       Results Review:   Results from last 7 days   Lab Units 01/31/22  2326 01/31/22  0103 01/30/22  0403 01/29/22  0521 01/28/22  0048 01/27/22  0045 01/26/22  0117   WBC 10*3/mm3 12.23* 11.33* 10.94* 12.64* 10.84* 13.33* 18.98*   HEMOGLOBIN g/dL 11.1* 11.0* 10.9* 11.1* 10.5* 9.6* 10.2*   PLATELETS 10*3/mm3 509* 496* 496* 554* 372 370 425     Results from last 7 days   Lab Units 01/31/22  2331 01/31/22  2326 01/31/22  1325 01/31/22  0103 01/30/22  0403 01/29/22  1430 01/29/22  0521 01/28/22  0048 01/28/22  0048 01/27/22  0045 01/27/22  0045 01/26/22  0117 01/26/22  0117   SODIUM  mmol/L 144 143  --  139 139  --  136  --  139  --  141   < > 141   POTASSIUM mmol/L 4.0 3.9 4.3 3.4* 3.8 4.2 3.4*   < > 3.6   < > 3.6  3.6   < > 3.1*   CHLORIDE mmol/L 107 106  --  100 100  --  92*  --  100  --  106   < > 107   CO2 mmol/L 25.8 26.3  --  29.0 27.8  --  30.3*  --  26.3  --  26.2   < > 20.5*   BUN mg/dL 15 15  --  16 13  --  16  --  13  --  10   < > 6   CREATININE mg/dL 0.58 0.55*  --  0.52* 0.47*  --  0.54*  --  0.50*  --  0.58   < > 0.45*   CALCIUM mg/dL 10.3 10.4  --  10.1 10.0  --  10.2  --  9.7  --  9.3   < > 8.6   GLUCOSE mg/dL 259* 255*  --  244* 271*  --  295*  --  273*  --  186*   < > 163*   ALT (SGPT) U/L  --  37*  --  35* 26  --  19  --  13  --  12  --  16   AST (SGOT) U/L  --  23  --  34* 36*  --  28  --  13  --  9  --  18    < > = values in this interval not displayed.         Lab Results   Component Value Date    INR 1.18 (H) 01/22/2022    INR 1.07 01/19/2022    INR 0.89 12/01/2016     Lab Results   Component Value Date    MG 2.1 01/31/2022    MG 2.1 01/31/2022    MG 2.3 01/31/2022     Lab Results   Component Value Date    TSH 2.290 01/19/2022    TRIG 335 (H) 01/22/2022    HDL 32 (L) 01/22/2022    LDL 96 01/22/2022      Imaging Results (Last 48 Hours)     Procedure Component Value Units Date/Time    CT Angiogram Carotids [[id number]] Collected: 01/31/22 1823     Updated: 01/31/22 1827    Narrative:      EXAMINATION: CT ANGIOGRAM CAROTIDS-      CLINICAL INDICATION: Stroke, follow up        COMPARISON: None available     Radiation dose reduction techniques were utilized per ALARA protocol.  Automated exposure control was initiated through either or [employer] or  [employer] software packages by  protocol.           PROCEDURE:  Thin cut axial images through the carotid arteries were acquired during  the rapid infusion of IV contrast.     Additional 3-D reformatted images obtained via post-processing for  aerated diagnostic accuracy and procedural planning.     FINDINGS:  No evidence  of large vessel occlusion.  There is plaque at the origin of the right internal carotid artery  causing mild-to-moderate stenosis.     No extrinsic deviation of the carotid vessels.       Impression:      Mild-to-moderate stenosis of the right internal carotid artery at its  origin.       This report was finalized on 1/31/2022 6:24 PM by Dr. Deonte Reece MD.       CT Angiogram Head [[id number]] Collected: 01/31/22 1742     Updated: 01/31/22 1826    Narrative:      EXAMINATION: CT ANGIOGRAM HEAD-      CLINICAL INDICATION: Stroke, follow up        COMPARISON: None available.     Radiation dose reduction techniques were utilized per ALARA protocol.  Automated exposure control was initiated through either or [employer] or  [employer] software packages by  protocol.           PROCEDURE:  Thin cut axial images were acquired through the brain during the rapid  infusion of IV contrast     Additional 3-D reformatted images obtained via post-processing for  aerated diagnostic accuracy and procedural planning..     FINDINGS:  No evidence of large vessel occlusion.     Basilar artery is somewhat diminutive.     No aneurysm is seen     No evidence of vascular malformation.     There are no contrast-enhancing masses.       Impression:      1. No large vessel occlusion.  2. Basilar tip is somewhat diminutive  3. Other findings as above.       This report was finalized on 1/31/2022 6:24 PM by Dr. Deonte Reece MD.       CT Head Without Contrast [[id number]] Collected: 01/31/22 1619     Updated: 01/31/22 1632    Narrative:      CT HEAD WO CONTRAST-     CLINICAL INDICATION: Stroke, follow up        COMPARISON: 01/28/2022      TECHNIQUE: Axial images of the brain were obtained with out intravenous  contrast.  Reformatted images were created in the sagittal and coronal  planes.     DOSE:     Radiation dose reduction techniques were utilized per ALARA protocol.  Automated exposure control was initiated through either or [employer]  or  "[employer] software packages by  protocol.           FINDINGS:   Today's study shows no mass, hemorrhage, or midline shift.   The ventricles, cisterns, and sulci are unremarkable. There is no  hydrocephalus.   There is no evidence of acute ischemia.  I do not see epidural or subdural hematoma.  The gray-white differentiation is appropriate.   The bone window setting images show no destructive calvarial lesion or  acute calvarial fracture.   The posterior fossa is unremarkable.          Impression:         1. Study is degraded from patient motion  2. No parenchymal mass, hemorrhage, or midline shift  3. Mild chronic microangiopathic change stable from the previous study     This report was finalized on 1/31/2022 4:19 PM by Dr. Deonte Reece MD.       XR Chest AP [[id number]] Collected: 01/31/22 0805     Updated: 01/31/22 0808    Narrative:      XR CHEST AP-     CLINICAL INDICATION: Resp failure on vent        COMPARISON: 01/28/2022      TECHNIQUE: Single frontal view of the chest.     FINDINGS:     Slight interval improvement in aeration of the lungs. Trace right  effusion persists  The cardiac silhouette is normal. The pulmonary vasculature is  unremarkable.  There is no evidence of an acute osseous abnormality.   There are no suspicious-appearing parenchymal soft tissue nodules.          Impression:      Slight interval improvement     This report was finalized on 1/31/2022 8:06 AM by Dr. Deonte Reece MD.           Echo   Results for orders placed during the hospital encounter of 01/22/22    Adult Transthoracic Echo Limited W/ Cont if Necessary Per Protocol    Interpretation Summary  · Left ventricular wall thickness is consistent with mild concentric hypertrophy.  · Left ventricular ejection fraction appears to be 56 - 60%. Left ventricular systolic function is normal.  · Left ventricular diastolic function is consistent with (grade II w/high LAP) pseudonormalization.  · This Was a technically  limited study.     I reviewed the patient's new clinical results.    Telemetry: Normal sinus rhythm 80 to 90 bpm    Medication Review:   chlorhexidine, 15 mL, Mouth/Throat, Q12H  ethyl alcohol, 1 application, Nasal, BID  insulin aspart, 0-14 Units, Subcutaneous, TID AC  insulin detemir, 20 Units, Subcutaneous, Daily  LORazepam, 1 mg, Oral, Nightly  losartan, 50 mg, Oral, Daily  metoprolol tartrate, 50 mg, Nasogastric, Q12H  metroNIDAZOLE, 500 mg, Intravenous, Q8H  pantoprazole, 40 mg, Intravenous, Q AM  pravastatin, 40 mg, Oral, Daily  pregabalin, 75 mg, Nasogastric, Q12H  QUEtiapine, 50 mg, Oral, Nightly  sodium chloride, 10 mL, Intravenous, Q12H  sodium chloride, 10 mL, Intravenous, Q12H  spironolactone, 25 mg, Oral, Daily  vancomycin, 1,750 mg, Intravenous, Q12H        dexmedetomidine, 0.2-1.5 mcg/kg/hr, Last Rate: 1.1 mcg/kg/hr (02/01/22 0538)  fentaNYL Citrate,   heparin (porcine), 10.2 Units/kg/hr, Last Rate: Stopped (01/31/22 0000)  hold, 1 each  Pharmacy Consult - Pharmacy to dose,   Pharmacy Consult,   Pharmacy to dose vancomycin,         Assessment      Assessment:  New onset atrial fibrillation, patient has been maintaining sinus rhythm over the last few days.  12 beat run of wide-complex tachycardia last night (V. tach versus SVT with aberrant conduction.)  Acute HFpEF, improved.  Acute respiratory failure with hypoxia requiring mechanical ventilation, improving.    Plan     Recommendations:  For her 1 episode of 12 beat run of wide-complex tachycardia, would continue to treat her with metoprolol and increase the dose to 75 mg p.o. twice daily for now and see how she does.  Try to keep the potassium levels between 4 and 5 and magnesium levels between 2 and 2.2.  If she has recurrent runs of wide-complex tachycardia then will consider specific antiarrhythmic therapy.  Continue with anticoagulation for history of atrial fibrillation.    I discussed the patients findings and my recommendations with  patient's  at bedside.      Electronically signed by ALDEN Do, 02/01/22, 9:54 AM EST.    Electronically signed by Elkin Collins MD, 02/01/22, 1:27 PM EST.      Please note that portions of this note were completed with a voice recognition program.

## 2022-02-01 NOTE — PROGRESS NOTES
Surgery follow up for multiple leg lesions.    Patient sedated on vent and unable to provide history.    Pathology reviewed and not diagnostic for etiology.    Wounds examined and healing well.    Continue current wound care.  Will sign off, please reconsult as needed

## 2022-02-01 NOTE — PROGRESS NOTES
Extubated to NC today, stable, blood pressure trending up and giving PRNs, treating pain as well, likely wouldn't tolerate lumbar puncture at this time, hold on order and resume Hep gtt now, communicated with Nurse, have ordered MRI and Echocardiogram as per [employer] recommendations, pending.

## 2022-02-01 NOTE — PROGRESS NOTES
Miquel Mora MD                          [phone]      Patient ID:    Name:  Lynette Stein    MRN:  [id number]    1965   56 y.o.  female            Patient Care Team:  Orville Wu PA as PCP - General (Family Medicine)  Apple Menezes MD as Consulting Physician (Hematology and Oncology)  Mercedes Calix APRN as Nurse Practitioner (Nurse Practitioner)    CC/ Reason for visit: Acute respiratory failure, substance abuse, encephalopathy, psychiatric issues    Subjective: Pt seen and examined this AM.  Remains on mechanical ventilator and settings have reviewed.  ABG and chest x-ray reviewed as well.  Patient remains with right hemiplegia and left gaze preference.  CT of the head and CT angiogram done by neurology reviewed.  Awaiting further plan.  Much more awake and interactive this morning at least in the left upper extremity.   at bedside.  Noted plan for LP.  On anticoagulation per cardiology    ROS: Unable to obtain    Objective     Vital Signs past 24hrs    BP range: BP: ()/() 113/68  Pulse range: Heart Rate:  [] 85  Resp rate range: Resp:  [22-33] 24  Temp range: Temp (24hrs), Av.7 °F (37.6 °C), Min:99.4 °F (37.4 °C), Max:100 °F (37.8 °C)      Ventilator/Non-Invasive Ventilation Settings (From admission, onward)             Start     Ordered    22 1703  Ventilator - AC/VC; 20; 40; 5; 450  Continuous        Comments: Decrease rate 16 and abg at 2200   Question Answer Comment   Vent Mode AC/VC    Breath rate 20    FiO2 40    PEEP 5    Tidal Volume 450        22 1703    22 1529  Ventilator - AC/VC; (16); 30; 5; 450  Continuous,   Status:  Canceled        Comments: Decrease rate 16 and abg at 2200   Question Answer Comment   Vent Mode AC/VC    Breath rate  16   FiO2 30    PEEP 5    Tidal Volume 450        22 1529    22 0043  Ventilator - AC/VC; 20; 30; 5; 450  Continuous,   Status:  Canceled         Question Answer Comment   Vent Mode AC/VC    Breath rate 20    FiO2 30    PEEP 5    Tidal Volume 450        01/22/22 0042    01/22/22 0034  Ventilator - AC/VC; 20; 30; 5; 400  Continuous,   Status:  Canceled        Question Answer Comment   Vent Mode AC/VC    Breath rate 20    FiO2 30    PEEP 5    Tidal Volume 400        01/22/22 0039                Device (Oxygen Therapy): ventilator FiO2 (%): 35 %     102 kg (224 lb 3.3 oz); Body mass index is 30.4 kg/m².      Intake/Output Summary (Last 24 hours) at 2/1/2022 1119  Last data filed at 2/1/2022 0613  Gross per 24 hour   Intake 1917.98 ml   Output 1825 ml   Net 92.98 ml       PHYSICAL EXAM   Constitutional: Middle aged pt in bed, awake and following commands while on the mechanical ventilator  Head: - NCAT  Eyes: No pallor.  Anicteric sclerae, EOMI.  ENMT:   Endotracheal tube in place, no oral thrush. Moist MM.   NECK: Trachea midline, No thyromegaly, no palpable cervical lymphadenopathy  Heart: RRR, no murmur. No pedal edema   Lungs: SUSHIL +, occasional rhonchi.  No wheezes/ crackles heard    Abdomen: Soft. No tenderness, guarding or rigidity. No palpable masses  Extremities: Extremities warm and well perfused. No cyanosis/ clubbing  Neuro: Conscious, following commands now with left gaze preference and ? right hemiplegia  Psych: Mood and affect -unable to obtain    PPE recommended per [facility] infectious disease Isolation protocol for the current clinical scenario(as mentioned below) was followed.     Scheduled meds:  chlorhexidine, 15 mL, Mouth/Throat, Q12H  ethyl alcohol, 1 application, Nasal, BID  insulin aspart, 0-14 Units, Subcutaneous, TID AC  [START ON 2/2/2022] insulin detemir, 30 Units, Subcutaneous, Daily  LORazepam, 0.5 mg, Oral, Nightly  losartan, 50 mg, Oral, Daily  metoprolol tartrate, 50 mg, Nasogastric, Q12H  metroNIDAZOLE, 500 mg, Intravenous, Q8H  pantoprazole, 40 mg, Intravenous, Q AM  pravastatin, 40 mg, Oral, Daily  pregabalin, 75  mg, Nasogastric, Q12H  QUEtiapine, 50 mg, Oral, Nightly  sodium chloride, 10 mL, Intravenous, Q12H  sodium chloride, 10 mL, Intravenous, Q12H  spironolactone, 25 mg, Oral, Daily  vancomycin, 1,750 mg, Intravenous, Q12H        IV meds:                      dexmedetomidine, 0.2-1.5 mcg/kg/hr, Last Rate: 0.5 mcg/kg/hr (02/01/22 1039)  heparin (porcine), 10.2 Units/kg/hr, Last Rate: Stopped (01/31/22 0000)  hold, 1 each  Pharmacy Consult - Pharmacy to dose,   Pharmacy Consult,   Pharmacy to dose vancomycin,         Data Review:      Results from last 7 days   Lab Units 01/31/22  2331 01/31/22  2326 01/31/22  1325 01/31/22  0103 01/31/22  0103 01/30/22  0403 01/30/22  0403 01/28/22  0048 01/27/22  0045 01/26/22  0117 01/26/22  0117   SODIUM mmol/L 144 143  --   --  139   < > 139   < > 141   < > 141   POTASSIUM mmol/L 4.0 3.9 4.3   < > 3.4*   < > 3.8   < > 3.6  3.6   < > 3.1*   CHLORIDE mmol/L 107 106  --   --  100   < > 100   < > 106   < > 107   CO2 mmol/L 25.8 26.3  --   --  29.0   < > 27.8   < > 26.2   < > 20.5*   BUN mg/dL 15 15  --   --  16   < > 13   < > 10   < > 6   CREATININE mg/dL 0.58 0.55*  --   --  0.52*   < > 0.47*   < > 0.58   < > 0.45*   CALCIUM mg/dL 10.3 10.4  --   --  10.1   < > 10.0   < > 9.3   < > 8.6   BILIRUBIN mg/dL  --  0.2  --   --  0.2  --  0.2   < > 0.2   < > 0.3   ALK PHOS U/L  --  123*  --   --  133*  --  135*   < > 99   < > 110   ALT (SGPT) U/L  --  37*  --   --  35*  --  26   < > 12   < > 16   AST (SGOT) U/L  --  23  --   --  34*  --  36*   < > 9   < > 18   GLUCOSE mg/dL 259* 255*  --   --  244*   < > 271*   < > 186*   < > 163*   WBC 10*3/mm3  --  12.23*  --   --  11.33*  --  10.94*   < > 13.33*   < > 18.98*   HEMOGLOBIN g/dL  --  11.1*  --   --  11.0*  --  10.9*   < > 9.6*   < > 10.2*   PLATELETS 10*3/mm3  --  509*  --   --  496*  --  496*   < > 370   < > 425   PROBNP pg/mL  --   --   --   --   --   --   --   --  915.8*  --   --    PROCALCITONIN ng/mL  --   --   --   --   --   --   --   --    --   --  0.15    < > = values in this interval not displayed.       Lab Results   Component Value Date    CALCIUM 10.3 01/31/2022    PHOS 4.6 (H) 01/31/2022       Results from last 7 days   Lab Units 01/29/22  0640   RESPCX  No growth       Results from last 7 days   Lab Units 02/01/22  1049 02/01/22  0425 01/31/22  0423 01/30/22  0424 01/29/22  0431 01/28/22  0422 01/27/22  0502   PH, ARTERIAL pH units 7.429 7.423 7.479* 7.425 7.472* 7.438 7.412   PO2 ART mm Hg 95.4 95.5 75.9* 87.8 78.6* 82.2* 66.8*   PCO2, ARTERIAL mm Hg 45.7* 46.2* 44.3 49.2* 48.9* 49.6* 45.3*   HCO3 ART mmol/L 30.2* 30.1* 32.9* 32.3* 35.7* 33.5* 28.8*        Results Review:    I have reviewed the relevant laboratory results and independently reviewed the chest imaging from this hospitalization including the available echocardiogram reports personally and summarized it if/ when appropriate below    Assessment    Acute respiratory failure on mechanical ventilator-1/22  Acute toxic/metabolic encephalopathy  Substance abuse and withdrawal syndrome  Bilateral foot ulcers/osteomyelitis  COPD without exacerbation  A. fib with RVR  NSTEMI type II  Monoclonal lymphocytosis  PAD  Chronic pain syndrome  Anxiety/depression  DARON    PLAN:  Patient remains on mechanical ventilator and will continue to work on ventilator weaning as encephalopathy is improving and she is able to follow commands in spite of right hemiplegia and left gaze preference.  Neurology evaluation noted CT head and CT angiogram reviewed.  Noted need for MRI and unable to be done while on the ventilator in this facility   Would continue to working on weaning down sedation.  Use antipsychotic  On anticoagulation per cardiology for NSTEMI.  Infectious disease managing antibiotics.  Surgery evaluation noted  All other medical problems per primary  Guarded prognosis  Full code    CC-32 minutes    D/w team in MDR.  Discussed with  at bedside    Miquel Mora MD  2/1/2022

## 2022-02-01 NOTE — PLAN OF CARE
Goal Outcome Evaluation:  Patient extubated today via respiratory per pulmonologist orders. Patient tolerating well on 3L nasal cannula. NG placed and confirmed via xray - tube feeding restarted and patient tolerating well. Patient relaxed and sitting up in bed now.

## 2022-02-01 NOTE — PROGRESS NOTES
[facility] HOSPITALIST PROGRESS NOTE     Patient Identification:  Name:  Lynette Stein  Age:  56 y.o.  Sex:  female  :  1965  MRN:  [id number]  Visit Number:  [id number]  ROOM: [address]     Primary Care Provider:  Orville Wu PA    Length of stay in inpatient status:  10    Subjective     Chief Compliant:  altered mental status     History of Presenting Illness:    Patient remains critically ill but stable today, no acute events overnight, remains intubated for airway protection, more awake today, tracking some, could move left upper extremity upon request, did not follow thumbs up command, did not follow commands right upper extremity or bilateral lower extremities though, CTA's and repeat EEG reviewed without definitive etiology, awaiting updated TeleNeuro recommendations, awaiting updated Pulmonary recommendations on plan for spontaneous breathing trial today or not, if not planned may consider adding back sedation and repeat lumbar puncture in radiology, continued on Antibiotics, Infectious Disease following, remains afebrile, General Surgery now signed off, Cardiology following peripherally as well for tentative ischemic workup when clinically improved. Called  to update him on current status and plan of care today, did not reach him, attempted sister listed and reached her for updates, no further questions at that time.  Objective     Current Hospital Meds:chlorhexidine, 15 mL, Mouth/Throat, Q12H  ethyl alcohol, 1 application, Nasal, BID  insulin aspart, 0-14 Units, Subcutaneous, TID AC  [START ON 2022] insulin detemir, 30 Units, Subcutaneous, Daily  LORazepam, 0.5 mg, Oral, Nightly  losartan, 50 mg, Oral, Daily  metoprolol tartrate, 50 mg, Nasogastric, Q12H  metroNIDAZOLE, 500 mg, Intravenous, Q8H  pantoprazole, 40 mg, Intravenous, Q AM  pravastatin, 40 mg, Oral, Daily  pregabalin, 75 mg, Nasogastric, Q12H  QUEtiapine, 50 mg, Oral, Nightly  sodium chloride, 10 mL,  Intravenous, Q12H  sodium chloride, 10 mL, Intravenous, Q12H  spironolactone, 25 mg, Oral, Daily  vancomycin, 1,750 mg, Intravenous, Q12H    dexmedetomidine, 0.2-1.5 mcg/kg/hr, Last Rate: 0.5 mcg/kg/hr (02/01/22 1039)  heparin (porcine), 10.2 Units/kg/hr, Last Rate: Stopped (01/31/22 0000)  hold, 1 each  Pharmacy Consult - Pharmacy to dose,   Pharmacy Consult,   Pharmacy to dose vancomycin,       ----------------------------------------------------------------------------------------------------------------------  Vital Signs:  Temp:  [99.4 °F (37.4 °C)-100 °F (37.8 °C)] 99.4 °F (37.4 °C)  Heart Rate:  [] 85  Resp:  [22-33] 24  BP: ()/() 113/68  FiO2 (%):  [35 %] 35 %  SpO2:  [92 %-100 %] 97 %  on  Flow (L/min):  [2] 2;   Device (Oxygen Therapy): ventilator  Body mass index is 30.4 kg/m².    Intake & Output (last 3 days)       01/29 0701 01/30 0700 01/30 0701 01/31 0700 01/31 0701 02/01 0700 02/01 0701 02/02 0700    I.V. (mL/kg) 1268.5 (12.4) 1171.3 (11.5) 463 (4.5)     Other 621 826 300     NG/GT 1049 1215 472     IV Piggyback 1300 1300 1300     Total Intake(mL/kg) 4238.5 (41.6) 4512.3 (44.2) 2535 (24.9)     Urine (mL/kg/hr) 1550 (0.6) 3550 (1.5) 1825 (0.7)     Stool 0 0 0     Total Output 1550 3550 1825     Net +2688.5 +962.3 +710             Stool Unmeasured Occurrence 1 x 0 x 2 x         ----------------------------------------------------------------------------------------------------------------------  Physical exam:  Constitutional:  Well-developed and well-nourished. Awake, looking around room    HENT:  Head:  Normocephalic and atraumatic.  Mouth:  Moist mucous membranes.    Eyes:  Conjunctivae normal. No scleral icterus.    Neck:  Neck supple.  No JVD present.    Cardiovascular:  Normal rate, regular rhythm and normal heart sounds with no murmur.  Pulmonary/Chest:  Intubated/ventilated, bilateral course breath sounds  Abdominal:  Soft. No distension and no tenderness.    Musculoskeletal:  No tenderness, and no deformity.  No red or swollen joints anywhere.    Neurological: awake, tracking some now, left upper extremity more movement and possibly following some commands  Skin:  Skin is warm and dry. No rash noted. No pallor.   Peripheral vascular: Bilateral lower extremity ulcers wrapped, clean and dry  : Lees in place  ----------------------------------------------------------------------------------------------------------------------  Results from last 7 days   Lab Units 01/31/22 2326 01/31/22 0103 01/30/22  0403 01/29/22  0521 01/29/22  0521   CRP mg/dL  --   --   --   --  5.37*   WBC 10*3/mm3 12.23* 11.33* 10.94*   < > 12.64*   HEMOGLOBIN g/dL 11.1* 11.0* 10.9*   < > 11.1*   HEMATOCRIT % 38.8 37.3 37.0   < > 37.8   MCV fL 84.2 80.9 81.9   < > 81.5   MCHC g/dL 28.6* 29.5* 29.5*   < > 29.4*   PLATELETS 10*3/mm3 509* 496* 496*   < > 554*    < > = values in this interval not displayed.     Results from last 7 days   Lab Units 02/01/22  0425   PH, ARTERIAL pH units 7.423   PO2 ART mm Hg 95.5   PCO2, ARTERIAL mm Hg 46.2*   HCO3 ART mmol/L 30.1*     Results from last 7 days   Lab Units 01/31/22  2331 01/31/22 2326 01/31/22  1325 01/31/22  0103 01/31/22  0103 01/30/22  0403 01/30/22  0403   SODIUM mmol/L 144 143  --   --  139   < > 139   POTASSIUM mmol/L 4.0 3.9 4.3   < > 3.4*   < > 3.8   MAGNESIUM mg/dL 2.1 2.1  --   --  2.3   < > 1.9   CHLORIDE mmol/L 107 106  --   --  100   < > 100   CO2 mmol/L 25.8 26.3  --   --  29.0   < > 27.8   BUN mg/dL 15 15  --   --  16   < > 13   CREATININE mg/dL 0.58 0.55*  --   --  0.52*   < > 0.47*   EGFR IF NONAFRICN AM mL/min/1.73 108 115  --   --  122   < > 138   CALCIUM mg/dL 10.3 10.4  --   --  10.1   < > 10.0   PHOSPHORUS mg/dL 4.6*  --   --   --  3.7  --   --    GLUCOSE mg/dL 259* 255*  --   --  244*   < > 271*   ALBUMIN g/dL  --  3.63  --   --  3.73  --  3.56   BILIRUBIN mg/dL  --  0.2  --   --  0.2  --  0.2   ALK PHOS U/L  --  123*  --    --  133*  --  135*   AST (SGOT) U/L  --  23  --   --  34*  --  36*   ALT (SGPT) U/L  --  37*  --   --  35*  --  26    < > = values in this interval not displayed.   Estimated Creatinine Clearance: 146.5 mL/min (by C-G formula based on SCr of 0.58 mg/dL).  Ammonia   Date Value Ref Range Status   01/31/2022 40 11 - 51 umol/L Final         Results from last 7 days   Lab Units 01/27/22  0045   PROBNP pg/mL 915.8*         Glucose   Date/Time Value Ref Range Status   02/01/2022 0535 248 (H) 70 - 130 mg/dL Final     Comment:     Meter: [id number] : [id number] JORGE MOTLEY   01/31/2022 1645 212 (H) 70 - 130 mg/dL Final     Comment:     Meter: [id number] : [id number] EBER WILSON   01/31/2022 1127 206 (H) 70 - 130 mg/dL Final     Comment:     Meter: [id number] : [id number] ARA JOEL     Lab Results   Component Value Date    TSH 2.290 01/19/2022     No results found for: PREGTESTUR, PREGSERUM, HCG, HCGQUANT  Pain Management Panel     Pain Management Panel Latest Ref Rng & Units 1/19/2022    AMPHETAMINES SCREEN, URINE Negative Negative    BARBITURATES SCREEN Negative Negative    BENZODIAZEPINE SCREEN, URINE Negative Positive(A)    BUPRENORPHINEUR Negative Negative    COCAINE SCREEN, URINE Negative Negative    METHADONE SCREEN, URINE Negative Negative    METHAMPHETAMINEUR Negative Negative        Brief Urine Lab Results  (Last result in the past 365 days)      Color   Clarity   Blood   Leuk Est   Nitrite   Protein   CREAT   Urine HCG        01/19/22 2010 Dark Yellow   Clear   Negative   Trace   Negative   100 mg/dL (2+)               No results found for: BLOODCX  No results found for: URINECX    I have personally looked at the labs and they are summarized above.  ----------------------------------------------------------------------------------------------------------------------  Detailed radiology reports for the last 24 hours:  Imaging Results (Last 24 Hours)     Procedure Component Value Units Date/Time     CT Angiogram Carotids [[id number]] Collected: 01/31/22 1823     Updated: 01/31/22 1827    Narrative:      EXAMINATION: CT ANGIOGRAM CAROTIDS-      CLINICAL INDICATION: Stroke, follow up        COMPARISON: None available     Radiation dose reduction techniques were utilized per ALARA protocol.  Automated exposure control was initiated through either or CareDose or  DoseRight software packages by  protocol.           PROCEDURE:  Thin cut axial images through the carotid arteries were acquired during  the rapid infusion of IV contrast.     Additional 3-D reformatted images obtained via post-processing for  aerated diagnostic accuracy and procedural planning.     FINDINGS:  No evidence of large vessel occlusion.  There is plaque at the origin of the right internal carotid artery  causing mild-to-moderate stenosis.     No extrinsic deviation of the carotid vessels.       Impression:      Mild-to-moderate stenosis of the right internal carotid artery at its  origin.       This report was finalized on 1/31/2022 6:24 PM by Dr. Deonte Reece MD.       CT Angiogram Head [[id number]] Collected: 01/31/22 1742     Updated: 01/31/22 1826    Narrative:      EXAMINATION: CT ANGIOGRAM HEAD-      CLINICAL INDICATION: Stroke, follow up        COMPARISON: None available.     Radiation dose reduction techniques were utilized per ALARA protocol.  Automated exposure control was initiated through either or Knowrom or  DoseRight software packages by  protocol.           PROCEDURE:  Thin cut axial images were acquired through the brain during the rapid  infusion of IV contrast     Additional 3-D reformatted images obtained via post-processing for  aerated diagnostic accuracy and procedural planning..     FINDINGS:  No evidence of large vessel occlusion.     Basilar artery is somewhat diminutive.     No aneurysm is seen     No evidence of vascular malformation.     There are no contrast-enhancing masses.        Impression:      1. No large vessel occlusion.  2. Basilar tip is somewhat diminutive  3. Other findings as above.       This report was finalized on 1/31/2022 6:24 PM by Dr. Deonte Reece MD.       CT Head Without Contrast [[id number]] Collected: 01/31/22 1619     Updated: 01/31/22 1632    Narrative:      CT HEAD WO CONTRAST-     CLINICAL INDICATION: Stroke, follow up        COMPARISON: 01/28/2022      TECHNIQUE: Axial images of the brain were obtained with out intravenous  contrast.  Reformatted images were created in the sagittal and coronal  planes.     DOSE:     Radiation dose reduction techniques were utilized per ALARA protocol.  Automated exposure control was initiated through either or Fluencr or  DoseRight software packages by  protocol.           FINDINGS:   Today's study shows no mass, hemorrhage, or midline shift.   The ventricles, cisterns, and sulci are unremarkable. There is no  hydrocephalus.   There is no evidence of acute ischemia.  I do not see epidural or subdural hematoma.  The gray-white differentiation is appropriate.   The bone window setting images show no destructive calvarial lesion or  acute calvarial fracture.   The posterior fossa is unremarkable.          Impression:         1. Study is degraded from patient motion  2. No parenchymal mass, hemorrhage, or midline shift  3. Mild chronic microangiopathic change stable from the previous study     This report was finalized on 1/31/2022 4:19 PM by Dr. Deonte Reece MD.           Assessment & Plan    56F Smoker, Obese by BMI PMH Anxiety/Depression, Leukemia, Chronic Pain, COPD, Diabetes Mellitus Type II, Hypertension, Hyperlipidemia, Peripheral Vascular Disease, Obstructive Sleep Apnea, chronic relapsing and remitting bilateral lower extremity foot ulcers and follows in wound care clinic, presented [facility] emergency room 1/19 for altered mental status.    #Septic Shock, Acute Metabolic Encephalopathy & Acute Hypoxic  Respiratory Failure requiring Intubation and Mechanical Ventilation 2/2 PNA, Bacterial, treating for MDR Organisms in setting of underlying COPD without acute exacerbation and Obstructive Sleep Apnea - Acute Metabolic Encephalopathy Persisting   - Patient presents w/ abrupt onset altered mental status, WBC count > 12K, heart rate > 90, Pneumonia  on imaging, SBP < 90 requiring IV pressors, intubated for airway protection, etiology of altered mental status suspected infectious/sepsis though has had persisting encephalopathy while on ventilator, repeat head CT from 1/19 to 1/28 was stable, no acute intracranial abnormalities, EEG's without epileptiform activity only generalized slowing.  1/31 off sedation thought to have lateral gaze deficits, repeat CTA's and EEGs without definitive etiology  - Pulmonary consulted; Following  - Infectious Disease consulted; Following  - TeleNeuro consulted and following  - Lumbar puncture without growth, encephalitis panel negative, cytology with elevated glucose and protein, unable to perform Cellblock; Pending ability to extubate may consider repeat lumbar puncture later today  - Continue Vancomycin and Flagyl  - Off sedation this AM, more awake, has not required IV pressors  - Continue APAP PRN for fevers, Duonebs as needed  - IV PPI on board for GI prophylaxis   - AM ABG reviewed and stable overall, awaiting Pulmonary recommendations on spontaneous breathin trial   - Monitor in CCU, monitor on telemetry, continue 02 but wean as able    #Relapsing and Remitting Lower Extremity Wounds/Ulcers now with Osteomyelitis L great toe and bilateral cellulitis   - Patient reported 2 year history lower extremity wounds, follows in wound care clinic, cultures from initial biopsy grew MSSA  - Rheumatology panel negative with normal C and P-ANCA, Anti-CCP, SSA/SSB, Atypical P-ANCA, Anti-Hu Ab   - Bone scan concern for osteomyelitis  - General Surgery consulted; Followed, status post debridement  1/22, signed off, re-evaluated 1/30 and recommended no surgical intervention.  - Infectious Disease consulted; Following as per above, continue antibiotics as per above  - Obtained Rheumatology panel and paraneoplastic panel as per above.    #Hypertension/Hyperlipidemia/Peripheral Vascular Disease   #Atrial Fibrillation w/ RVR, New onset (CHADsVasc = 3)  #NSTEMI Type II due to Atrial Fibrillation   - Patient with noted Atrial Fibrillation with RVR in emergency room on EKG, recurrent heart rate up to 160's.  - Labs showed troponins <0.010 -> 0.036 -> <0.010, proBNP 4800  - EKG showed Atrial Fibrillation with RVR  - Echo showed mild concentric left ventricular hypertrophy, normal LV function.   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 A BCR ABL PCR was obtained which was <0.001% as well as a flow cytometry which was concerning for a monotypical CD5+ B cell population with nonspecific immunophenotype, but significant for a variant B cell SLL or mantle cell lymphoma that could not be ruled out. CLL FISH was then obtained which was negative and CCND1/IGH - t(11:14) was not detected, JAK2 V617 and JAK2 exon 12 mutation which were negative as well.  - Pt had follow up scheduled 6/2020 and no showed appointment.    - Peripheral smear without evidence of acute leukemia  - Hematology/Oncology consulted; Evaluated and did not suspect current condition related to hematological condition, recommended follow up Dr. Menezes outpatient     #NIDDM  Type II, controlled, unknown complications, New diagnosis  - Hgb A1c = 6.5%  - No home oral agents, continue FSBG and SSI, continue new Levemir and increased to 30U nightly, titrate as indicated     #Anxiety/Depression  - Psychiatry consulted in emergency room and felt altered mental status not related to underlying psychiatric illness; Continue home Seroquel and Ativan    #Tobacco Dependence  - Rec'd cessation, NRT as needed    #Obesity by BMI  - BMI 30, complicates all aspects of care     F: NPO  E: Monitor & Replace PRN  N: NPO Diet; Tube Feeds   Ppx: on Hep gtt  Code: Full Code     Dispo: Pending workup and clinical improvement     *This patient is considered high risk due to sepsis, acute respiratory failure, PNA, intubation and mechanical ventilation.       I have spent 35 minutes of critical care time (7:45AM-8:10AM) with > 50% of time spent in direct patient care, evaluating the patient at bedside, reviewing all labs and images, reviewing ABG and vent settings, communicating plan of care w/ nursing staff, utilizing high complexity medical decision making to assess and treat vital organ system failure in an individual who has impairment of one or more vital organ systems such that there is a high probability of imminent or life threatening deterioration in the patient’s condition. Failure to initiate the above interventions on an urgent basis would likely result in sudden, clinically significant or life threatening deterioration in the patient's condition.    Edited by: Dick Whiting MD at 2/1/2022 1052  [facility]

## 2022-02-01 NOTE — PROGRESS NOTES
PROGRESS NOTE         Patient Identification:  Name:  Lynette Stein  Age:  56 y.o.  Sex:  female  :  1965  MRN:  [id number]  Visit Number:  [id number]  Primary Care Provider:  Orville Wu PA         LOS: 10 days       ----------------------------------------------------------------------------------------------------------------------  Subjective       Chief Complaints:    No chief complaint on file.        Interval History:      The patient remains intubated and sedated at 35% FiO2.  Low-grade fever, no diarrhea.  WBC is fairly stable at 11.33.      Review of Systems:    Unable to obtain.  Intubated and sedated.    ----------------------------------------------------------------------------------------------------------------------      Objective       Current Hospital Meds:  chlorhexidine, 15 mL, Mouth/Throat, Q12H  ethyl alcohol, 1 application, Nasal, BID  insulin aspart, 0-14 Units, Subcutaneous, TID AC  [START ON 2022] insulin detemir, 30 Units, Subcutaneous, Daily  LORazepam, 0.5 mg, Oral, Nightly  losartan, 50 mg, Oral, Daily  metoprolol tartrate, 75 mg, Nasogastric, Q12H  metroNIDAZOLE, 500 mg, Intravenous, Q8H  pantoprazole, 40 mg, Intravenous, Q AM  pravastatin, 40 mg, Oral, Daily  pregabalin, 75 mg, Nasogastric, Q12H  QUEtiapine, 50 mg, Oral, Nightly  sodium chloride, 10 mL, Intravenous, Q12H  sodium chloride, 10 mL, Intravenous, Q12H  spironolactone, 25 mg, Oral, Daily  vancomycin, 1,750 mg, Intravenous, Q12H      dexmedetomidine, 0.2-1.5 mcg/kg/hr, Last Rate: 0.3 mcg/kg/hr (22 1306)  heparin (porcine), 10.2 Units/kg/hr, Last Rate: Stopped (22 0000)  hold, 1 each  Pharmacy Consult - Pharmacy to dose,   Pharmacy Consult,   Pharmacy to dose vancomycin,       ----------------------------------------------------------------------------------------------------------------------    Vital Signs:  Temp:  [99.4 °F (37.4 °C)-100 °F (37.8 °C)] 100 °F (37.8 °C)  Heart Rate:   [] 99  Resp:  [16-33] 29  BP: ()/() 193/85  FiO2 (%):  [35 %] 35 %  Mean Arterial Pressure (Non-Invasive) for the past 24 hrs (Last 3 readings):   Noninvasive MAP (mmHg)   02/01/22 1332 124   02/01/22 1302 118   02/01/22 1230 105     SpO2 Percentage    02/01/22 1230 02/01/22 1302 02/01/22 1332   SpO2: 93% 91% 95%     SpO2:  [91 %-100 %] 95 %  on  Flow (L/min):  [2-3] 3;   Device (Oxygen Therapy): nasal cannula    Body mass index is 30.4 kg/m².  Wt Readings from Last 3 Encounters:   01/30/22 102 kg (224 lb 3.3 oz)   01/19/22 106 kg (233 lb)   12/14/21 106 kg (233 lb 9.6 oz)        Intake/Output Summary (Last 24 hours) at 2/1/2022 1440  Last data filed at 2/1/2022 0613  Gross per 24 hour   Intake 1917.98 ml   Output 1825 ml   Net 92.98 ml     NPO Diet  ----------------------------------------------------------------------------------------------------------------------      Physical Exam:    Constitutional: Chronically ill-appearing.  Intubated and sedated.   HENT:  Head: Normocephalic and atraumatic.  Mouth:  Moist mucous membranes.    Eyes:  Pupils equal.  Left eye chemosis.  Opens eyes, but does not track.  Neck:  Neck supple.  No JVD present.    Cardiovascular:  Normal rate, regular rhythm and normal heart sounds with no murmur. No edema.  Pulmonary/Chest: Coarse breath sounds bilaterally.  Abdominal:  Soft.  Bowel sounds are normal.  No distension and no tenderness.   Musculoskeletal:  No tenderness, and no deformity.  No swelling or redness of joints.  Mild bilateral lower extremity edema.  Neurological: Intubated and sedated.  Skin:  Skin is warm and dry.  No rash noted.  No pallor.  Scattered bruises and scabs to bilateral upper extremities, trunk, bilateral lower extremities and feet.  Bilateral feet ulcers in dressings today.  Psychiatric: Intubated and sedated.  ----------------------------------------------------------------------------------------------------------------------       Results from last 7 days   Lab Units 01/27/22  0045   PROBNP pg/mL 915.8*         Results from last 7 days   Lab Units 02/01/22  1049   PH, ARTERIAL pH units 7.429   PO2 ART mm Hg 95.4   PCO2, ARTERIAL mm Hg 45.7*   HCO3 ART mmol/L 30.2*     Results from last 7 days   Lab Units 01/31/22  2326 01/31/22  0103 01/30/22  0403 01/29/22  0521 01/29/22  0521   CRP mg/dL  --   --   --   --  5.37*   WBC 10*3/mm3 12.23* 11.33* 10.94*   < > 12.64*   HEMOGLOBIN g/dL 11.1* 11.0* 10.9*   < > 11.1*   HEMATOCRIT % 38.8 37.3 37.0   < > 37.8   MCV fL 84.2 80.9 81.9   < > 81.5   MCHC g/dL 28.6* 29.5* 29.5*   < > 29.4*   PLATELETS 10*3/mm3 509* 496* 496*   < > 554*    < > = values in this interval not displayed.     Results from last 7 days   Lab Units 01/31/22  2331 01/31/22  2326 01/31/22  1325 01/31/22  0103 01/31/22  0103 01/30/22  0403 01/30/22  0403   SODIUM mmol/L 144 143  --   --  139   < > 139   POTASSIUM mmol/L 4.0 3.9 4.3   < > 3.4*   < > 3.8   MAGNESIUM mg/dL 2.1 2.1  --   --  2.3   < > 1.9   CHLORIDE mmol/L 107 106  --   --  100   < > 100   CO2 mmol/L 25.8 26.3  --   --  29.0   < > 27.8   BUN mg/dL 15 15  --   --  16   < > 13   CREATININE mg/dL 0.58 0.55*  --   --  0.52*   < > 0.47*   EGFR IF NONAFRICN AM mL/min/1.73 108 115  --   --  122   < > 138   CALCIUM mg/dL 10.3 10.4  --   --  10.1   < > 10.0   GLUCOSE mg/dL 259* 255*  --   --  244*   < > 271*   ALBUMIN g/dL  --  3.63  --   --  3.73  --  3.56   BILIRUBIN mg/dL  --  0.2  --   --  0.2  --  0.2   ALK PHOS U/L  --  123*  --   --  133*  --  135*   AST (SGOT) U/L  --  23  --   --  34*  --  36*   ALT (SGPT) U/L  --  37*  --   --  35*  --  26    < > = values in this interval not displayed.   Estimated Creatinine Clearance: 146.5 mL/min (by C-G formula based on SCr of 0.58 mg/dL).  Ammonia   Date Value Ref Range Status   01/31/2022 40 11 - 51 umol/L Final       Glucose   Date/Time Value Ref Range Status   02/01/2022 1205 199 (H) 70 - 130 mg/dL Final     Comment:     Meter:  [id number] : [id number] VIJI HEREDIA   02/01/2022 0535 248 (H) 70 - 130 mg/dL Final     Comment:     Meter: [id number] : [id number] JORGE MOTLEY   01/31/2022 1645 212 (H) 70 - 130 mg/dL Final     Comment:     Meter: [id number] : [id number] EBER WILSON   01/31/2022 1127 206 (H) 70 - 130 mg/dL Final     Comment:     Meter: [id number] : [id number] ARA JOEL   01/31/2022 0515 211 (H) 70 - 130 mg/dL Final     Comment:     Meter: [id number] : [id number] JORGE MOTLEY   01/30/2022 1725 277 (H) 70 - 130 mg/dL Final     Comment:     Meter: [id number] : [id number] Padilla latonya   01/30/2022 1230 208 (H) 70 - 130 mg/dL Final     Comment:     Meter: [id number] : [id number] Padilla latonya   01/30/2022 0537 266 (H) 70 - 130 mg/dL Final     Comment:     Meter: [id number] : [id number] PIPPA MICHELLE     Lab Results   Component Value Date    HGBA1C 6.50 (H) 01/22/2022     Lab Results   Component Value Date    TSH 2.290 01/19/2022       Blood Culture   Date Value Ref Range Status   01/22/2022 No growth at 4 days  Preliminary   01/22/2022 No growth at 4 days  Preliminary     No results found for: URINECX  Wound Culture   Date Value Ref Range Status   01/24/2022 Rare Staphylococcus aureus (A)  Final   01/24/2022 Rare Normal Skin Melva  Final     No results found for: STOOLCX  Respiratory Culture   Date Value Ref Range Status   01/25/2022 Scant growth (1+) Candida albicans (A)  Final   01/25/2022 No Normal Respiratory Melva (A)  Final     Pain Management Panel       Pain Management Panel Latest Ref Rng & Units 1/19/2022    AMPHETAMINES SCREEN, URINE Negative Negative    BARBITURATES SCREEN Negative Negative    BENZODIAZEPINE SCREEN, URINE Negative Positive(A)    BUPRENORPHINEUR Negative Negative    COCAINE SCREEN, URINE Negative Negative    METHADONE SCREEN, URINE Negative Negative    METHAMPHETAMINEUR Negative Negative               ----------------------------------------------------------------------------------------------------------------------  Imaging Results (Last 24 Hours)       Procedure Component Value Units Date/Time    CT Angiogram Carotids [[id number]] Collected: 01/31/22 1823     Updated: 01/31/22 1827    Narrative:      EXAMINATION: CT ANGIOGRAM CAROTIDS-      CLINICAL INDICATION: Stroke, follow up        COMPARISON: None available     Radiation dose reduction techniques were utilized per ALARA protocol.  Automated exposure control was initiated through either or StudyRoom or  DoseRight software packages by  protocol.           PROCEDURE:  Thin cut axial images through the carotid arteries were acquired during  the rapid infusion of IV contrast.     Additional 3-D reformatted images obtained via post-processing for  aerated diagnostic accuracy and procedural planning.     FINDINGS:  No evidence of large vessel occlusion.  There is plaque at the origin of the right internal carotid artery  causing mild-to-moderate stenosis.     No extrinsic deviation of the carotid vessels.       Impression:      Mild-to-moderate stenosis of the right internal carotid artery at its  origin.       This report was finalized on 1/31/2022 6:24 PM by Dr. Deonte Reece MD.       CT Angiogram Head [[id number]] Collected: 01/31/22 1742     Updated: 01/31/22 1826    Narrative:      EXAMINATION: CT ANGIOGRAM HEAD-      CLINICAL INDICATION: Stroke, follow up        COMPARISON: None available.     Radiation dose reduction techniques were utilized per ALARA protocol.  Automated exposure control was initiated through either or StudyRoom or  DoseRig[employer]ekP[employer] software packages by  protocol.           PROCEDURE:  Thin cut axial images were acquired through the brain during the rapid  infusion of IV contrast     Additional 3-D reformatted images obtained via post-processing for  aerated diagnostic accuracy and procedural planning..      FINDINGS:  No evidence of large vessel occlusion.     Basilar artery is somewhat diminutive.     No aneurysm is seen     No evidence of vascular malformation.     There are no contrast-enhancing masses.       Impression:      1. No large vessel occlusion.  2. Basilar tip is somewhat diminutive  3. Other findings as above.       This report was finalized on 1/31/2022 6:24 PM by Dr. Deonte Reece MD.       CT Head Without Contrast [[id number]] Collected: 01/31/22 1619     Updated: 01/31/22 1632    Narrative:      CT HEAD WO CONTRAST-     CLINICAL INDICATION: Stroke, follow up        COMPARISON: 01/28/2022      TECHNIQUE: Axial images of the brain were obtained with out intravenous  contrast.  Reformatted images were created in the sagittal and coronal  planes.     DOSE:     Radiation dose reduction techniques were utilized per ALARA protocol.  Automated exposure control was initiated through either or [employer] or  DoseRight software packages by  protocol.           FINDINGS:   Today's study shows no mass, hemorrhage, or midline shift.   The ventricles, cisterns, and sulci are unremarkable. There is no  hydrocephalus.   There is no evidence of acute ischemia.  I do not see epidural or subdural hematoma.  The gray-white differentiation is appropriate.   The bone window setting images show no destructive calvarial lesion or  acute calvarial fracture.   The posterior fossa is unremarkable.          Impression:         1. Study is degraded from patient motion  2. No parenchymal mass, hemorrhage, or midline shift  3. Mild chronic microangiopathic change stable from the previous study     This report was finalized on 1/31/2022 4:19 PM by Dr. Deonte Reece MD.               ----------------------------------------------------------------------------------------------------------------------    Assessment/Plan       Assessment/Plan     ASSESSMENT:    1.  Severe sepsis with acute respiratory failure requiring  mechanical ventilation  2.  Pneumonia  3.  Left great toe osteomyelitis  4.  Enterocolitis    PLAN:      The patient remains intubated and sedated at 35% FiO2.  Low-grade fever, no diarrhea.  WBC is fairly stable at 11.33.      Bone scan on 1/28/2022 showed focal area of increased tracer uptake localizing to the left great toe.  If ulceration or infection in this location, osteomyelitis should be considered.  Periarticular type uptake throughout the right foot as detailed above in a pattern and distribution that is more favorable for reactive changes and neuropathic joint changes.  Soft tissue type uptake suggestive of cellulitis.      CT chest on 1/28/2022 showed new or worsening left basilar parenchymal opacity likely representing combination of atelectasis and/or infiltrate.  Continued right lower lobe atelectasis and likely infiltrate.  Patchy groundglass opacity left upper lobe and along the anterior aspect right upper lobe could represent very mild pneumonitis.  Increased tracheal and bronchial secretions may reflect underlying bronchitis.  This may be contributing factor of atelectasis.  Respiratory therapy may be of benefit.  Endotracheal tube in place.  Nasogastric tube appears in satisfactory position.  A second 2 passes alongside the nasogastric tube terminates in the distal esophagus.  Following discussion with patient's nurse it represents a temperature probe.  Increased colonic fluid with air-fluid levels.  Correlate for enteric colitis.  No focal inflammatory changes or obstruction.  CT abdomen pelvis on 1/28/2022 showed the same as CT chest.     COVID-19 and influenza PCR negative.  Lumbar puncture culture and meningitis encephalitis panel negative. Legionella negative.  Strep pneumo antigen negative.  MRSA PCR positive.  W[place]ster spotted fever IgM equivocal at 1.06. Wound culture of the right foot from 1/24/2022 preliminary reports growth of Staph aureus.  Respiratory culture from 1/25/2022  reports growth of Candida albicans.  Most recent chest x-ray from 1/26/2022 reports left lung airspace disease with minimal patchy right lower lobe airspace disease.  CT of the bilateral lower extremities reports no evidence of acute fracture, osteomyelitis, soft tissue gas or fluid collection. Wound culture of the right foot on 1/24/2022 finalized as MSSA. Respiratory culture on 1/25/2022 finalized as Candida albicans, likely a colonization. Blood cultures on 1/22/2022 finalized as no growth. Respiratory culture on 1/29/2022 is finalized as no growth.  Chest x-ray on 1/31/2021 shows slight interval improvement.    As bone scan shows evidence of left great toe osteomyelitis, recommended surgical evaluation and intraoperative cultures from the left great toe to help direct antibiotic therapy. Surgeon has reevaluated the patient and does not recommend debridement or amputation at this time.  For now, recommend to continue on vancomycin pharmacy to dose and Flagyl 500 mg IV every 8 hours for both aspiration pneumonia coverage and enterocolitis coverage as seen on CT abdomen pelvis. We will continue to follow closely and adjust antibiotic therapy as needed.     Code Status:   Code Status and Medical Interventions:   Ordered at: 01/22/22 0039     Level Of Support Discussed With:    Health Care Surrogate     Code Status (Patient has no pulse and is not breathing):    CPR (Attempt to Resuscitate)     Medical Interventions (Patient has pulse or is breathing):    Full Support     Scribed for To Christopher MD by YENIFER Green. 2/1/2022  14:43 EST    YENIFER Green  02/01/22  14:40 EST    Physician Attestation:    The documentation recorded by the scribe accurately reflects the service I personally performed and the decisions made by me.    To Christopher MD  02/02/22  05:18 EST

## 2022-02-01 NOTE — PROGRESS NOTES
Stroke Progress Note       Chief Complaint: Altered mental status, acute encephalopathy    Subjective    Subjective     Subjective:  Patient more alert and interactive today.  Responds to commands with left arm, no effort on right side.  Left gaze preference persists.  Noted to have had a 12 beat run of V. tach last night, cardiology is following.  Plan for extubation today.  Repeat CT of head yesterday evening was motion degraded, no mass hemorrhage or midline shift was appreciated.  Question of some hypodensity on right hemisphere, however exam is degraded by motion.  CTA head and neck showed mild to moderate stenosis of right ICA.    Review of Systems   Unable to perform ROS: Intubated            Objective    Objective      Temp:  [99.4 °F (37.4 °C)-100 °F (37.8 °C)] 99.4 °F (37.4 °C)  Heart Rate:  [] 93  Resp:  [17-33] 17  BP: ()/() 156/78  FiO2 (%):  [35 %] 35 %        Neurological Exam  Mental Status   Patient is nonverbal.  Patient is intubated and receiving Precedex infusion.  Patient is alert, makes eye contact, regards to me only on left side of the room..    Cranial Nerves  CN II: Patient blinks to visual threat on the left, no response to visual threat on the right.  CN III, IV, VI: Pupils equal round and reactive to light bilaterally. Patient has left gaze preference, appears to regard me on her left side.  She briefly crossed midline to right when her sister spoke to her.  CN V:  Right: Normal corneal reflex.  Left: Normal corneal reflex.  CN VII: No obvious facial droop.  CN IX, X: Normal gag reflex.    Motor  Normal muscle bulk throughout. Decreased muscle tone. No abnormal involuntary movements.  Patient moves left arm and squeezes my hand to command on the left side.  Right-sided hemiplegia noted, did not notice any movement of bilateral lower extremities.    Sensory  Patient grimaces to nailbed pressure bilaterally, she withdraws left hand in response to tactile  stimulation.      Physical Exam  Vitals and nursing note reviewed.   Constitutional:       Appearance: She is obese. She is ill-appearing and toxic-appearing.      Comments: Patient is intubated   HENT:      Head: Normocephalic and atraumatic.      Mouth/Throat:      Mouth: Mucous membranes are dry.   Eyes:      Pupils: Pupils are equal, round, and reactive to light.   Cardiovascular:      Rate and Rhythm: Normal rate and regular rhythm.   Pulmonary:      Effort: No respiratory distress.      Comments: Intubated  Skin:     General: Skin is warm.         Hospital Meds:  Scheduled- chlorhexidine, 15 mL, Mouth/Throat, Q12H  ethyl alcohol, 1 application, Nasal, BID  insulin aspart, 0-14 Units, Subcutaneous, TID AC  [START ON 2/2/2022] insulin detemir, 30 Units, Subcutaneous, Daily  LORazepam, 0.5 mg, Oral, Nightly  losartan, 50 mg, Oral, Daily  metoprolol tartrate, 50 mg, Nasogastric, Q12H  metroNIDAZOLE, 500 mg, Intravenous, Q8H  pantoprazole, 40 mg, Intravenous, Q AM  pravastatin, 40 mg, Oral, Daily  pregabalin, 75 mg, Nasogastric, Q12H  QUEtiapine, 50 mg, Oral, Nightly  sodium chloride, 10 mL, Intravenous, Q12H  sodium chloride, 10 mL, Intravenous, Q12H  spironolactone, 25 mg, Oral, Daily  vancomycin, 1,750 mg, Intravenous, Q12H      Infusions- dexmedetomidine, 0.2-1.5 mcg/kg/hr, Last Rate: 0.4 mcg/kg/hr (02/01/22 1136)  heparin (porcine), 10.2 Units/kg/hr, Last Rate: Stopped (01/31/22 0000)  hold, 1 each  Pharmacy Consult - Pharmacy to dose,   Pharmacy Consult,   Pharmacy to dose vancomycin,        PRNs- •  acetaminophen  •  artificial tears  •  dextrose  •  dextrose  •  glucagon (human recombinant)  •  heparin (porcine)  •  heparin (porcine)  •  hold  •  hydrALAZINE  •  HYDROmorphone  •  magnesium sulfate **OR** magnesium sulfate **OR** magnesium sulfate  •  metoprolol tartrate  •  Pharmacy Consult - Pharmacy to dose  •  Pharmacy Consult  •  Pharmacy to dose vancomycin  •  potassium chloride **OR** potassium  chloride **OR** [DISCONTINUED] potassium chloride  •  sodium chloride  •  sodium chloride    Results Review:    I reviewed the patient's new clinical results.    Imaging Results (Last 24 Hours)     Procedure Component Value Units Date/Time    CT Angiogram Carotids [[id number]] Collected: 01/31/22 1823     Updated: 01/31/22 1827    Narrative:      EXAMINATION: CT ANGIOGRAM CAROTIDS-      CLINICAL INDICATION: Stroke, follow up        COMPARISON: None available     Radiation dose reduction techniques were utilized per ALARA protocol.  Automated exposure control was initiated through either or [employer] or  [employer] software packages by  protocol.           PROCEDURE:  Thin cut axial images through the carotid arteries were acquired during  the rapid infusion of IV contrast.     Additional 3-D reformatted images obtained via post-processing for  aerated diagnostic accuracy and procedural planning.     FINDINGS:  No evidence of large vessel occlusion.  There is plaque at the origin of the right internal carotid artery  causing mild-to-moderate stenosis.     No extrinsic deviation of the carotid vessels.       Impression:      Mild-to-moderate stenosis of the right internal carotid artery at its  origin.       This report was finalized on 1/31/2022 6:24 PM by Dr. Deonte Reece MD.       CT Angiogram Head [[id number]] Collected: 01/31/22 1742     Updated: 01/31/22 1826    Narrative:      EXAMINATION: CT ANGIOGRAM HEAD-      CLINICAL INDICATION: Stroke, follow up        COMPARISON: None available.     Radiation dose reduction techniques were utilized per ALARA protocol.  Automated exposure control was initiated through either or [employer] or  [employer]ose[employer] software packages by  protocol.           PROCEDURE:  Thin cut axial images were acquired through the brain during the rapid  infusion of IV contrast     Additional 3-D reformatted images obtained via post-processing for  aerated diagnostic accuracy and  procedural planning..     FINDINGS:  No evidence of large vessel occlusion.     Basilar artery is somewhat diminutive.     No aneurysm is seen     No evidence of vascular malformation.     There are no contrast-enhancing masses.       Impression:      1. No large vessel occlusion.  2. Basilar tip is somewhat diminutive  3. Other findings as above.       This report was finalized on 1/31/2022 6:24 PM by Dr. Deonte Reece MD.       CT Head Without Contrast [[id number]] Collected: 01/31/22 1619     Updated: 01/31/22 1632    Narrative:      CT HEAD WO CONTRAST-     CLINICAL INDICATION: Stroke, follow up        COMPARISON: 01/28/2022      TECHNIQUE: Axial images of the brain were obtained with out intravenous  contrast.  Reformatted images were created in the sagittal and coronal  planes.     DOSE:     Radiation dose reduction techniques were utilized per ALARA protocol.  Automated exposure control was initiated through either or [employer] or  [employer] software packages by  protocol.           FINDINGS:   Today's study shows no mass, hemorrhage, or midline shift.   The ventricles, cisterns, and sulci are unremarkable. There is no  hydrocephalus.   There is no evidence of acute ischemia.  I do not see epidural or subdural hematoma.  The gray-white differentiation is appropriate.   The bone window setting images show no destructive calvarial lesion or  acute calvarial fracture.   The posterior fossa is unremarkable.          Impression:         1. Study is degraded from patient motion  2. No parenchymal mass, hemorrhage, or midline shift  3. Mild chronic microangiopathic change stable from the previous study     This report was finalized on 1/31/2022 4:19 PM by Dr. Deonte Reece MD.           Results for orders placed during the hospital encounter of 01/22/22    Adult Transthoracic Echo Limited W/ Cont if Necessary Per Protocol    Interpretation Summary  · Left ventricular wall thickness is consistent with mild  concentric hypertrophy.  · Left ventricular ejection fraction appears to be 56 - 60%. Left ventricular systolic function is normal.  · Left ventricular diastolic function is consistent with (grade II w/high LAP) pseudonormalization.  · This Was a technically limited study.            Assessment/Plan     Assessment/Plan:      Stroke-like symptoms, left gaze preference and right-sided hemiplegia noted when sedation was weaned.  Initial CT of head was highly degraded by motion, however patient was then sedated and repeat head CT did not show any acute abnormalities.  Unable to obtain MRI due to ventilator.  Patient currently being treated for pneumonia and septic shock, however this is out of proportion to her encephalopathy.  Family repeatedly denies any recreational drug use, urine toxicology was unremarkable.   Patient found to be in proximal A. fib while in ED,  was on heparin drip which was paused pending LP, cardiology is following.  Initial and repeat EEG showed moderate diffuse cerebral dysfunction, no epilepsy.  Lumbar puncture on 1/20/2022 ruled out meningitis/encephalitis.   -Hopefully patient can have MRI brain today when stable after extubation, the patient I will be asked to bated we can repeat CT head without in the morning 2/2/2022.  -Patient to have repeat LP today, heparin has been stopped in anticipation of this  -Resume heparin drip when able  -Consider repeat echocardiogram possibly with contrast to look for thrombus, as initial echo was technically limited.    The case was discussed with Dr. Mohsen, nursing, and Hospitalist.  Neurology will continue to follow.      Jayce Avelar, ALDEN  02/01/22  11:45 EST

## 2022-02-01 NOTE — PROGRESS NOTES
THC Physician - Brief Progress Note  PERMANENT  01/31/2022 23:27    [facility] - [place] [facility] [place] - CCU - [address] - [place], [place] ([facility])    ELKIN YUAN    Date of Service 01/31/2022 23:27    HPI/Events of Note Count includes the [facility] Provider Intervention Note    Pt had a 12 beat run of VT.  Will check stat electrolytes.      Contact Count includes the [facility] for any needs if bedside physician is not present.      Interventions Intermediate-Electrolyte abnormality - evaluation and management        Electronically Signed by: Miriam Carrington) on 01/31/2022 23:27

## 2022-02-02 NOTE — CASE MANAGEMENT/SOCIAL WORK
Discharge Planning Assessment   [place]     Patient Name: Lynette Stein  MRN: [id number]  Today's Date: 2/2/2022    Admit Date: 1/22/2022     Discharge Plan     Row Name 02/02/22 1337       Plan    Plan Pt admitted 1/22/22. Pt extubated yesterday, 2/1/22 now on 2L NC. Pt is from home and does not utilize home health services. Alexa utilizes for home oxygen. Pt will likely need rehab services, has only been able to move left arm. Discharge plan and needs pending PT/OT evaluations when stable. SS to continue to follow and assist.              BUSTER Bradford

## 2022-02-02 NOTE — PROGRESS NOTES
Stroke Progress Note       Chief Complaint: Altered mental status, acute encephalopathy    Subjective    Subjective     Subjective:  Patient not extubated, nasal cannula, seems to be tolerating well.  Repeat CT of head 01/31/2021 was motion degraded, no mass hemorrhage or midline shift was appreciated.  Question of some hypodensity on right hemisphere, however exam is degraded by motion.  CTA head and neck showed mild to moderate stenosis of right ICA.  She remains too restless for MRI.  Patient attends to me when on her left side, neglects the right.  She is nonverbal, does move left hand in response to command.      Review of Systems   Unable to perform ROS: Patient nonverbal            Objective    Objective      Temp:  [98.8 °F (37.1 °C)-101 °F (38.3 °C)] 98.8 °F (37.1 °C)  Heart Rate:  [] 100  Resp:  [8-25] 19  BP: (149-223)/() 151/87        Neurological Exam  Mental Status  Awake and alert. Patient is nonverbal.  Patient is alert, makes eye contact, regardsme only on left side of the room..    Cranial Nerves  CN II: Patient blinks to visual threat on the left, no response to visual threat on the right.  CN III, IV, VI: Pupils equal round and reactive to light bilaterally. Patient has left gaze preference, appears to regard me on her left side, does not cross midline to the right.  CN V:  Right: Normal corneal reflex.  Left: Normal corneal reflex.  CN VII: No obvious facial droop.    Motor  Normal muscle bulk throughout. Decreased muscle tone. No abnormal involuntary movements.  Patient moves left arm and squeezes my hand to command on the left side.  Right-sided hemiplegia noted, did not notice any movement of bilateral lower extremities.    Sensory  Patient grimaces to nailbed pressure bilaterally, she withdraws left hand in response to tactile stimulation.      Physical Exam  Vitals and nursing note reviewed.   Constitutional:       General: She is awake.      Appearance: She is obese. She is  ill-appearing.   HENT:      Head: Normocephalic and atraumatic.      Mouth/Throat:      Mouth: Mucous membranes are dry.      Pharynx: Oropharynx is clear.   Eyes:      Pupils: Pupils are equal, round, and reactive to light.   Cardiovascular:      Rate and Rhythm: Regular rhythm. Tachycardia present.   Pulmonary:      Effort: No respiratory distress.      Comments: Intubated  Skin:     General: Skin is warm and dry.   Neurological:      Mental Status: She is alert.         Hospital Meds:  Scheduled- cefepime, 2 g, Intravenous, Q8H  ethyl alcohol, 1 application, Nasal, BID  hydrALAZINE, 10 mg, Oral, Q8H  insulin aspart, 0-14 Units, Subcutaneous, TID AC  insulin detemir, 30 Units, Subcutaneous, Daily  LORazepam, 0.5 mg, Oral, Nightly  losartan, 50 mg, Oral, Daily  metoprolol tartrate, 75 mg, Nasogastric, Q12H  metroNIDAZOLE, 500 mg, Intravenous, Q8H  pantoprazole, 40 mg, Intravenous, Q AM  pravastatin, 40 mg, Oral, Daily  pregabalin, 75 mg, Nasogastric, Q12H  QUEtiapine, 50 mg, Oral, Nightly  sodium chloride, 10 mL, Intravenous, Q12H  sodium chloride, 10 mL, Intravenous, Q12H  spironolactone, 25 mg, Oral, Daily  vancomycin, 1,750 mg, Intravenous, Q12H      Infusions- dexmedetomidine, 0.2-1.5 mcg/kg/hr, Last Rate: Stopped (02/01/22 1847)  heparin (porcine), 10.2 Units/kg/hr, Last Rate: 18.2 Units/kg/hr (02/02/22 1153)  hold, 1 each  Pharmacy Consult,   Pharmacy to dose vancomycin,        PRNs- •  acetaminophen  •  artificial tears  •  dextrose  •  dextrose  •  glucagon (human recombinant)  •  heparin (porcine)  •  heparin (porcine)  •  hold  •  hydrALAZINE  •  HYDROmorphone  •  LORazepam  •  magnesium sulfate **OR** magnesium sulfate **OR** magnesium sulfate  •  metoprolol tartrate  •  Pharmacy Consult  •  Pharmacy to dose vancomycin  •  potassium chloride **OR** potassium chloride **OR** [DISCONTINUED] potassium chloride  •  sodium chloride  •  sodium chloride    Results Review:    I reviewed the patient's new  clinical results.    Transthoracic Echo:    Interpretation Summary    · Left ventricular wall thickness is consistent with mild concentric hypertrophy.  · Left ventricular ejection fraction appears to be 61 - 65%. Left ventricular systolic function is normal.  · Left ventricular diastolic dysfunction is noted.  · Saline test results are negative.  · This is a technically limited study with poor echo windows and no carotid Doppler.              Assessment/Plan     Assessment/Plan:      1. Stroke-like symptoms, left gaze preference and right-sided hemiplegia noted when sedation was weaned,based on symptoms likely left hemisphere embolic stroke.  Initial CT of head was highly degraded by motion, however patient was then sedated and repeat head CT did not show any acute abnormalities.  Unable to obtain MRI at the moment due to restlessness. Patient currently being treated for pneumonia and septic shock, however this is out of proportion to her encephalopathy.  Family repeatedly denies any recreational drug use, urine toxicology was unremarkable.   Patient found to be in proximal A. fib while in ED,  was on heparin drip which was paused pending LP, and has now been resumed, cardiology is following.  Initial and repeat EEG showed moderate diffuse cerebral dysfunction, no epilepsy.  Lumbar puncture on 1/20/2022 ruled out meningitis/encephalitis. Repeat LP has been considered however patient unlikely to tolerate at present.  Repeat TTE was okay, results above.  -MRI brain when able  -Heparin drip restarted  -Repeat CT Head without contrast since unable to do MRI today      The case was discussed with Dr. Mohsen, nursing, and Hospitalist.  Neurology will continue to follow.      Jayce Avelar, ALDEN  02/02/22  14:48 EST

## 2022-02-02 NOTE — PROGRESS NOTES
LOS: 11 days     Name: Lynette Stein  Age/Sex: 56 y.o. female  :  1965        PCP: Orville Wu PA  REF: No Known Provider    Active Problems:    Encephalopathy acute      Reason for follow-up: Atrial fibrillation and congestive heart failure     Subjective     Subjective     Lynette Stein is a 56 y.o. female with a past medical history significant for hypertension, dyslipidemia, COPD, type 2 diabetes, anxiety/depression, leukemia, obstructive sleep apnea, and bilateral leg ulcerations.  She initially presented to the [facility] ED on 2022 with altered mental status.  Patient developed atrial fibrillation with RVR are in the ED     Interval History: Patient remains in normal sinus rhythm. She was extubated yesterday. Noted to be hypertensive overnight. On IV heparin for history of atrial fibrillation. Suspected to have had a stroke and awaiting MRI.      Vital Signs  Temp:  [99.3 °F (37.4 °C)-101 °F (38.3 °C)] 100.1 °F (37.8 °C)  Heart Rate:  [] 122  Resp:  [8-] 19  BP: (129-223)/() 186/97  FiO2 (%):  [35 %] 35 %     Vital Signs (last 72 hrs)        0700   0659  0700   0659  07 0659  0700   0936   Most Recent      Temp (°F) 98.8 -  100.6    99.4 -  100    99.3 -  101       100.1 (37.8)  0602    Heart Rate 85 -  122    89 -  141    84 -  144       122  0624    Resp 21 -  29    22 -  33    8 -  30       19  0602    BP 73/46 -  189/93    81/50 -  197/87    113/57 -  223/98       186/97  0624    SpO2 (%) 94 -  100    92 -  100    91 -  98       97  0657        Documented weights    22 0029 22 0602 22 0602 22 0533   Weight: 96.6 kg (212 lb 15.4 oz) 97.2 kg (214 lb 4.6 oz) 97 kg (213 lb 13.5 oz) 97.8 kg (215 lb 9.8 oz)    22 0502 22 0700 22 0600 22 0600   Weight: 99.8 kg (220 lb 1.6 oz) 98.9 kg (218 lb) 101 kg (223 lb 12.3 oz) 98.6 kg (217 lb 6 oz)     01/30/22 0535 02/02/22 0602   Weight: 102 kg (224 lb 3.3 oz) 97.6 kg (215 lb 2.7 oz)      Body mass index is 29.18 kg/m².    Intake/Output Summary (Last 24 hours) at 2/2/2022 0936  Last data filed at 2/2/2022 0849  Gross per 24 hour   Intake 2925.9 ml   Output 2100 ml   Net 825.9 ml     Objective    Objective     I have seen and examined Ms. Stein today.  Physical Exam:     General Appearance:    Alert but confused, cooperative, in no acute distress   Head:    Normocephalic, without obvious abnormality, atraumatic   Eyes:            Conjunctivae and sclerae normal, no   icterus, no pallor, corneas clear.   Neck:   No adenopathy, supple, trachea midline, no thyromegaly, no   carotid bruit, no JVD   Lungs:     Clear to auscultation,respirations regular, even and                  unlabored    Heart:    Regular rhythm and normal rate, normal S1 and S2, no            murmur, no gallop, no rub, no click   Chest Wall:    No abnormalities observed   Abdomen:     Normal bowel sounds, no masses, no organomegaly, soft        non-tender, non-distended, no guarding, no rebound                tenderness   Extremities:   Weakness or right upper and lower extremities, no edema,        Neurologic:   Alert but confused.  Has weakness of the right upper and lower extremity.     Results review       Results Review:   Results from last 7 days   Lab Units 02/02/22  0008 01/31/22  2326 01/31/22  0103 01/30/22  0403 01/29/22  0521 01/28/22  0048 01/27/22  0045   WBC 10*3/mm3 12.67* 12.23* 11.33* 10.94* 12.64* 10.84* 13.33*   HEMOGLOBIN g/dL 11.6* 11.1* 11.0* 10.9* 11.1* 10.5* 9.6*   PLATELETS 10*3/mm3 421 509* 496* 496* 554* 372 370     Results from last 7 days   Lab Units 02/02/22  0008 01/31/22  2331 01/31/22  2326 01/31/22  1325 01/31/22  0103 01/30/22  0403 01/29/22  1430 01/29/22  0521 01/29/22  0521 01/28/22  0048 01/28/22  0048 01/27/22  0045 01/27/22 0045   SODIUM mmol/L 143 144 143  --  139 139  --   --  136  --  139   < > 141    POTASSIUM mmol/L 3.4* 4.0 3.9 4.3 3.4* 3.8 4.2   < > 3.4*   < > 3.6   < > 3.6  3.6   CHLORIDE mmol/L 107 107 106  --  100 100  --   --  92*  --  100   < > 106   CO2 mmol/L 22.8 25.8 26.3  --  29.0 27.8  --   --  30.3*  --  26.3   < > 26.2   BUN mg/dL 15 15 15  --  16 13  --   --  16  --  13   < > 10   CREATININE mg/dL 0.61 0.58 0.55*  --  0.52* 0.47*  --   --  0.54*  --  0.50*   < > 0.58   CALCIUM mg/dL 10.1 10.3 10.4  --  10.1 10.0  --   --  10.2  --  9.7   < > 9.3   GLUCOSE mg/dL 232* 259* 255*  --  244* 271*  --   --  295*  --  273*   < > 186*   ALT (SGPT) U/L 40*  --  37*  --  35* 26  --   --  19  --  13  --  12   AST (SGOT) U/L 31  --  23  --  34* 36*  --   --  28  --  13  --  9    < > = values in this interval not displayed.         Lab Results   Component Value Date    INR 1.18 (H) 01/22/2022    INR 1.07 01/19/2022    INR 0.89 12/01/2016     Lab Results   Component Value Date    MG 2.1 01/31/2022    MG 2.1 01/31/2022    MG 2.3 01/31/2022     Lab Results   Component Value Date    TSH 2.290 01/19/2022    TRIG 335 (H) 01/22/2022    HDL 32 (L) 01/22/2022    LDL 96 01/22/2022      Imaging Results (Last 48 Hours)     Procedure Component Value Units Date/Time    XR Chest 1 View [[id number]] Collected: 02/01/22 2035     Updated: 02/01/22 2037    Narrative:      CHEST X-RAY, 2/1/2022      HISTORY:    NG tube placement.      TECHNIQUE:  AP portable chest x-ray with image including the upper abdomen. (19:44)    COMPARISON:  *  Chest x-ray, 2/1/2022 (15:26)    FINDINGS:  NG tube is well-positioned with tip in the distal 3rd of the stomach. No visible gastric distention.    Mild bibasilar infiltrates, unchanged. No dense airspace consolidation or pleural effusion. Mid and upper lungs remain clear. Heart size and pulmonary vascularity are normal.      Impression:      1. NG tube in good position.  2. Mild bibasilar infiltrates.    Signer Name: Mirza Galicia MD   Signed: 2/1/2022 8:35 PM   Workstation Name:  [email or web address]    [facility]    XR Chest 1 View [[id number]] Collected: 02/01/22 1541     Updated: 02/01/22 1546    Narrative:      XR CHEST 1 VW-     CLINICAL INDICATION: ng tube        COMPARISON: 01/31/2022      TECHNIQUE: Single frontal view of the chest.     FINDINGS:     NG tube in the stomach  The cardiac silhouette is normal. The pulmonary vasculature is  unremarkable.  There is no evidence of an acute osseous abnormality.   There are no suspicious-appearing parenchymal soft tissue nodules.          Impression:      NG tube in the stomach     This report was finalized on 2/1/2022 3:41 PM by Dr. Deonte Reece MD.       CT Angiogram Carotids [[id number]] Collected: 01/31/22 1823     Updated: 01/31/22 1827    Narrative:      EXAMINATION: CT ANGIOGRAM CAROTIDS-      CLINICAL INDICATION: Stroke, follow up        COMPARISON: None available     Radiation dose reduction techniques were utilized per ALARA protocol.  Automated exposure control was initiated through either or [employer] or  DoseRight software packages by  protocol.           PROCEDURE:  Thin cut axial images through the carotid arteries were acquired during  the rapid infusion of IV contrast.     Additional 3-D reformatted images obtained via post-processing for  aerated diagnostic accuracy and procedural planning.     FINDINGS:  No evidence of large vessel occlusion.  There is plaque at the origin of the right internal carotid artery  causing mild-to-moderate stenosis.     No extrinsic deviation of the carotid vessels.       Impression:      Mild-to-moderate stenosis of the right internal carotid artery at its  origin.       This report was finalized on 1/31/2022 6:24 PM by Dr. Deonte Reece MD.       CT Angiogram Head [[id number]] Collected: 01/31/22 1742     Updated: 01/31/22 1826    Narrative:      EXAMINATION: CT ANGIOGRAM HEAD-      CLINICAL INDICATION: Stroke, follow up        COMPARISON: None available.      Radiation dose reduction techniques were utilized per ALARA protocol.  Automated exposure control was initiated through either or CareDose or  DoseRigVivaty software packages by  protocol.           PROCEDURE:  Thin cut axial images were acquired through the brain during the rapid  infusion of IV contrast     Additional 3-D reformatted images obtained via post-processing for  aerated diagnostic accuracy and procedural planning..     FINDINGS:  No evidence of large vessel occlusion.     Basilar artery is somewhat diminutive.     No aneurysm is seen     No evidence of vascular malformation.     There are no contrast-enhancing masses.       Impression:      1. No large vessel occlusion.  2. Basilar tip is somewhat diminutive  3. Other findings as above.       This report was finalized on 1/31/2022 6:24 PM by Dr. Deonte Reece MD.       CT Head Without Contrast [[id number]] Collected: 01/31/22 1619     Updated: 01/31/22 1632    Narrative:      CT HEAD WO CONTRAST-     CLINICAL INDICATION: Stroke, follow up        COMPARISON: 01/28/2022      TECHNIQUE: Axial images of the brain were obtained with out intravenous  contrast.  Reformatted images were created in the sagittal and coronal  planes.     DOSE:     Radiation dose reduction techniques were utilized per ALARA protocol.  Automated exposure control was initiated through either or [employer]Dose or  DoseRigVivaty software packages by  protocol.           FINDINGS:   Today's study shows no mass, hemorrhage, or midline shift.   The ventricles, cisterns, and sulci are unremarkable. There is no  hydrocephalus.   There is no evidence of acute ischemia.  I do not see epidural or subdural hematoma.  The gray-white differentiation is appropriate.   The bone window setting images show no destructive calvarial lesion or  acute calvarial fracture.   The posterior fossa is unremarkable.          Impression:         1. Study is degraded from patient motion  2. No  parenchymal mass, hemorrhage, or midline shift  3. Mild chronic microangiopathic change stable from the previous study     This report was finalized on 1/31/2022 4:19 PM by Dr. Deonte Reece MD.           Echo   Results for orders placed during the hospital encounter of 01/22/22    Adult Transthoracic Echo Limited W/ Cont if Necessary Per Protocol    Interpretation Summary  · Left ventricular wall thickness is consistent with mild concentric hypertrophy.  · Left ventricular ejection fraction appears to be 56 - 60%. Left ventricular systolic function is normal.  · Left ventricular diastolic function is consistent with (grade II w/high LAP) pseudonormalization.  · This Was a technically limited study.     I reviewed the patient's new clinical results.    Telemetry: NSR  bpm      Medication Review:   ethyl alcohol, 1 application, Nasal, BID  hydrALAZINE, 10 mg, Oral, Q8H  insulin aspart, 0-14 Units, Subcutaneous, TID AC  insulin detemir, 30 Units, Subcutaneous, Daily  LORazepam, 0.5 mg, Oral, Nightly  losartan, 50 mg, Oral, Daily  metoprolol tartrate, 75 mg, Nasogastric, Q12H  metroNIDAZOLE, 500 mg, Intravenous, Q8H  pantoprazole, 40 mg, Intravenous, Q AM  pravastatin, 40 mg, Oral, Daily  pregabalin, 75 mg, Nasogastric, Q12H  QUEtiapine, 50 mg, Oral, Nightly  sodium chloride, 10 mL, Intravenous, Q12H  sodium chloride, 10 mL, Intravenous, Q12H  spironolactone, 25 mg, Oral, Daily  vancomycin, 1,750 mg, Intravenous, Q12H        dexmedetomidine, 0.2-1.5 mcg/kg/hr, Last Rate: Stopped (02/01/22 1847)  heparin (porcine), 10.2 Units/kg/hr, Last Rate: 14.2 Units/kg/hr (02/02/22 0205)  hold, 1 each  Pharmacy Consult - Pharmacy to dose,   Pharmacy Consult,   Pharmacy to dose vancomycin,         Assessment      Assessment:  1. Paroxysmal atrial fibrillation, maintaining sinus rhythm over the last few days.  2. 1 episode of 12 beat run of wide-complex tachycardia with no recurrence.  3. Acute HFpEF,   Resolved.  4. Uncontrolled hypertension  5. Possible acute CVA with right hemiparesis, being evaluated by neurologists.  6. Acute respiratory failure with hypoxia, improved.    Plan     Recommendations:  1. Continue to optimize antihypertensive therapy to keep the systolic blood pressure at relatively higher level due to possible recent CVA.  2. May continue the IV heparin if this is okay with the neurologist since patient has history of A. fib and has possible CVA which could potentially be thromboembolic.  I incidentally the CT scan of the head has not revealed any evidence of obvious stroke.  MRI is pending.  3. Continue to monitor heart rhythm and adjust medications accordingly.    I discussed the patients findings and my recommendations with patient's  at bedside    Electronically signed by ALEDN Do, 02/02/22, 9:36 AM EST.    Electronically signed by Elkin Collins MD, 02/02/22, 5:29 PM EST.        Please note that portions of this note were completed with a voice recognition program.

## 2022-02-02 NOTE — PROGRESS NOTES
PROGRESS NOTE         Patient Identification:  Name:  Lynette Stein  Age:  56 y.o.  Sex:  female  :  1965  MRN:  [id number]  Visit Number:  [id number]  Primary Care Provider:  Orville Wu PA         LOS: 11 days       ----------------------------------------------------------------------------------------------------------------------  Subjective       Chief Complaints:    No chief complaint on file.        Interval History:      The patient is now off the ventilator and on 2 L nasal cannula in no apparent distress.  Awake and agitated, but not following commands.  Drooling at the mouth and experiencing oral automatisms.  Primary team reports patient would not tolerate lumbar puncture at this time.  Febrile with a T-max of 101 °F. No diarrhea.  WBC is stable at 12.67.  Chest x-ray on 2022 shows mild bibasilar infiltrates.  Respiratory culture on 2022 finalized as no growth.    Review of Systems:    Unable to obtain.  Altered mental status.  ----------------------------------------------------------------------------------------------------------------      Objective       Current Hospital Meds:  cefepime, 2 g, Intravenous, Q8H  ethyl alcohol, 1 application, Nasal, BID  hydrALAZINE, 10 mg, Oral, Q8H  insulin aspart, 0-14 Units, Subcutaneous, TID AC  insulin detemir, 30 Units, Subcutaneous, Daily  LORazepam, 0.5 mg, Oral, Nightly  losartan, 50 mg, Oral, Daily  metoprolol tartrate, 75 mg, Nasogastric, Q12H  metroNIDAZOLE, 500 mg, Intravenous, Q8H  pantoprazole, 40 mg, Intravenous, Q AM  pravastatin, 40 mg, Oral, Daily  pregabalin, 75 mg, Nasogastric, Q12H  QUEtiapine, 50 mg, Oral, Nightly  sodium chloride, 10 mL, Intravenous, Q12H  sodium chloride, 10 mL, Intravenous, Q12H  spironolactone, 25 mg, Oral, Daily  vancomycin, 1,750 mg, Intravenous, Q12H      dexmedetomidine, 0.2-1.5 mcg/kg/hr, Last Rate: Stopped (22)  heparin (porcine), 10.2 Units/kg/hr, Last Rate: 18.2  Units/kg/hr (02/02/22 1153)  hold, 1 each  Pharmacy Consult,   Pharmacy to dose vancomycin,       ----------------------------------------------------------------------------------------------------------------------    Vital Signs:  Temp:  [98.8 °F (37.1 °C)-101 °F (38.3 °C)] 98.8 °F (37.1 °C)  Heart Rate:  [] 100  Resp:  [8-30] 19  BP: (149-223)/() 151/87  Mean Arterial Pressure (Non-Invasive) for the past 24 hrs (Last 3 readings):   Noninvasive MAP (mmHg)   02/02/22 1205 120   02/02/22 0950 120   02/02/22 0805 134     SpO2 Percentage    02/02/22 0805 02/02/22 0950 02/02/22 1205   SpO2: 97% 97% 95%     SpO2:  [91 %-97 %] 95 %  on  Flow (L/min):  [2-3] 2;   Device (Oxygen Therapy): nasal cannula    Body mass index is 29.18 kg/m².  Wt Readings from Last 3 Encounters:   02/02/22 97.6 kg (215 lb 2.7 oz)   01/19/22 106 kg (233 lb)   12/14/21 106 kg (233 lb 9.6 oz)        Intake/Output Summary (Last 24 hours) at 2/2/2022 1332  Last data filed at 2/2/2022 1201  Gross per 24 hour   Intake 3025.9 ml   Output 2100 ml   Net 925.9 ml     NPO Diet  ----------------------------------------------------------------------------------------------------------------------      Physical Exam:    Constitutional: Chronically ill-appearing.  Awake and agitated, but not following commands.  Drooling and experiencing oral automatisms.  HENT:  Head: Normocephalic and atraumatic.  Mouth:  Moist mucous membranes.    Eyes:  Pupils equal.  Left eye chemosis.   Neck:  Neck supple.  No JVD present.    Cardiovascular:  Normal rate, regular rhythm and normal heart sounds with no murmur. No edema.  Pulmonary/Chest: Coarse breath sounds bilaterally.  Abdominal:  Soft.  Bowel sounds are normal.  No distension and no tenderness.   Musculoskeletal:  No tenderness, and no deformity.  No swelling or redness of joints.  Mild bilateral lower extremity edema.  Neurological:  Awake and agitated.  Not following commands.  Skin:  Skin is warm and  dry.  No rash noted.  No pallor.  Scattered bruises and scabs to bilateral upper extremities, trunk, bilateral lower extremities and feet.  Bilateral feet ulcers in dressings today.  Psychiatric: Awake and agitated.  Not following commands.  ----------------------------------------------------------------------------------------------------------------------      Results from last 7 days   Lab Units 01/27/22  0045   PROBNP pg/mL 915.8*         Results from last 7 days   Lab Units 02/01/22  1049   PH, ARTERIAL pH units 7.429   PO2 ART mm Hg 95.4   PCO2, ARTERIAL mm Hg 45.7*   HCO3 ART mmol/L 30.2*     Results from last 7 days   Lab Units 02/02/22  0008 01/31/22 2326 01/31/22  0103 01/30/22  0403 01/29/22  0521   CRP mg/dL  --   --   --   --  5.37*   WBC 10*3/mm3 12.67* 12.23* 11.33*   < > 12.64*   HEMOGLOBIN g/dL 11.6* 11.1* 11.0*   < > 11.1*   HEMATOCRIT % 39.5 38.8 37.3   < > 37.8   MCV fL 83.0 84.2 80.9   < > 81.5   MCHC g/dL 29.4* 28.6* 29.5*   < > 29.4*   PLATELETS 10*3/mm3 421 509* 496*   < > 554*    < > = values in this interval not displayed.     Results from last 7 days   Lab Units 02/02/22  1118 02/02/22  0008 01/31/22  2331 01/31/22 2326 01/31/22  2326 01/31/22  1325 01/31/22  0103   SODIUM mmol/L  --  143 144  --  143  --  139   POTASSIUM mmol/L 3.9 3.4* 4.0   < > 3.9   < > 3.4*   MAGNESIUM mg/dL  --   --  2.1  --  2.1  --  2.3   CHLORIDE mmol/L  --  107 107  --  106  --  100   CO2 mmol/L  --  22.8 25.8  --  26.3  --  29.0   BUN mg/dL  --  15 15  --  15  --  16   CREATININE mg/dL  --  0.61 0.58  --  0.55*  --  0.52*   EGFR IF NONAFRICN AM mL/min/1.73  --  102 108  --  115  --  122   CALCIUM mg/dL  --  10.1 10.3  --  10.4  --  10.1   GLUCOSE mg/dL  --  232* 259*  --  255*  --  244*   ALBUMIN g/dL  --  3.62  --   --  3.63  --  3.73   BILIRUBIN mg/dL  --  0.2  --   --  0.2  --  0.2   ALK PHOS U/L  --  125*  --   --  123*  --  133*   AST (SGOT) U/L  --  31  --   --  23  --  34*   ALT (SGPT) U/L  --  40*  --    --  37*  --  35*    < > = values in this interval not displayed.   Estimated Creatinine Clearance: 136.4 mL/min (by C-G formula based on SCr of 0.61 mg/dL).  Ammonia   Date Value Ref Range Status   01/31/2022 40 11 - 51 umol/L Final       Glucose   Date/Time Value Ref Range Status   02/02/2022 1209 256 (H) 70 - 130 mg/dL Final     Comment:     Meter: [id number] : [id number] Bridget Blanca   02/02/2022 0603 244 (H) 70 - 130 mg/dL Final     Comment:     Meter: [id number] : [id number] BRAD STYLES   02/01/2022 2348 203 (H) 70 - 130 mg/dL Final     Comment:     [id number] [id number]   02/01/2022 1739 197 (H) 70 - 130 mg/dL Final     Comment:     Meter: [id number] : [id number] VIJI HEREDIA   02/01/2022 1205 199 (H) 70 - 130 mg/dL Final     Comment:     Meter: [id number] : [id number] VIJI HEREDIA   02/01/2022 0535 248 (H) 70 - 130 mg/dL Final     Comment:     Meter: [id number] : [id number] CHESTERADRY MOTLEY   01/31/2022 1645 212 (H) 70 - 130 mg/dL Final     Comment:     Meter: [id number] : [id number] EBER WILSON   01/31/2022 1127 206 (H) 70 - 130 mg/dL Final     Comment:     Meter: [id number] : [id number] ARA JOEL     Lab Results   Component Value Date    HGBA1C 6.50 (H) 01/22/2022     Lab Results   Component Value Date    TSH 2.290 01/19/2022       Blood Culture   Date Value Ref Range Status   01/22/2022 No growth at 4 days  Preliminary   01/22/2022 No growth at 4 days  Preliminary     No results found for: URINECX  Wound Culture   Date Value Ref Range Status   01/24/2022 Rare Staphylococcus aureus (A)  Final   01/24/2022 Rare Normal Skin Melva  Final     No results found for: STOOLCX  Respiratory Culture   Date Value Ref Range Status   01/25/2022 Scant growth (1+) Candida albicans (A)  Final   01/25/2022 No Normal Respiratory Melva (A)  Final     Pain Management Panel       Pain Management Panel Latest Ref Rng & Units 1/19/2022    AMPHETAMINES SCREEN, URINE  Negative Negative    BARBITURATES SCREEN Negative Negative    BENZODIAZEPINE SCREEN, URINE Negative Positive(A)    BUPRENORPHINEUR Negative Negative    COCAINE SCREEN, URINE Negative Negative    METHADONE SCREEN, URINE Negative Negative    METHAMPHETAMINEUR Negative Negative              ----------------------------------------------------------------------------------------------------------------------  Imaging Results (Last 24 Hours)       Procedure Component Value Units Date/Time    XR Chest 1 View [[id number]] Collected: 02/01/22 2035     Updated: 02/01/22 2037    Narrative:      CHEST X-RAY, 2/1/2022      HISTORY:    NG tube placement.      TECHNIQUE:  AP portable chest x-ray with image including the upper abdomen. (19:44)    COMPARISON:  *  Chest x-ray, 2/1/2022 (15:26)    FINDINGS:  NG tube is well-positioned with tip in the distal 3rd of the stomach. No visible gastric distention.    Mild bibasilar infiltrates, unchanged. No dense airspace consolidation or pleural effusion. Mid and upper lungs remain clear. Heart size and pulmonary vascularity are normal.      Impression:      1. NG tube in good position.  2. Mild bibasilar infiltrates.    Signer Name: Mirza Galicia MD   Signed: 2/1/2022 8:35 PM   Workstation [facility]: [facility]    [facility]    XR Chest 1 View [[id number]] Collected: 02/01/22 1541     Updated: 02/01/22 1546    Narrative:      XR CHEST 1 VW-     CLINICAL INDICATION: ng tube        COMPARISON: 01/31/2022      TECHNIQUE: Single frontal view of the chest.     FINDINGS:     NG tube in the stomach  The cardiac silhouette is normal. The pulmonary vasculature is  unremarkable.  There is no evidence of an acute osseous abnormality.   There are no suspicious-appearing parenchymal soft tissue nodules.          Impression:      NG tube in the stomach     This report was finalized on 2/1/2022 3:41 PM by Dr. Deonte Reece MD.                ----------------------------------------------------------------------------------------------------------------------    Assessment/Plan       Assessment/Plan     ASSESSMENT:    1.  Severe sepsis with acute respiratory failure requiring mechanical ventilation  2.  Pneumonia  3.  Left great toe osteomyelitis  4.  Enterocolitis    PLAN:    The patient is now off the ventilator and on 2 L nasal cannula in no apparent distress.  Awake and agitated, but not following commands.  Drooling at the mouth and experiencing oral automatisms.  Primary team reports patient would not tolerate lumbar puncture at this time.  Febrile with a T-max of 101 °F.  No diarrhea.  WBC is stable at 12.67.  Chest x-ray on 2/1/2022 shows mild bibasilar infiltrates.  Respiratory culture on 1/29/2022 finalized as no growth.    Bone scan on 1/28/2022 showed focal area of increased tracer uptake localizing to the left great toe.  If ulceration or infection in this location, osteomyelitis should be considered.  Periarticular type uptake throughout the right foot as detailed above in a pattern and distribution that is more favorable for reactive changes and neuropathic joint changes.  Soft tissue type uptake suggestive of cellulitis.      CT chest on 1/28/2022 showed new or worsening left basilar parenchymal opacity likely representing combination of atelectasis and/or infiltrate.  Continued right lower lobe atelectasis and likely infiltrate.  Patchy groundglass opacity left upper lobe and along the anterior aspect right upper lobe could represent very mild pneumonitis.  Increased tracheal and bronchial secretions may reflect underlying bronchitis.  This may be contributing factor of atelectasis.  Respiratory therapy may be of benefit.  Endotracheal tube in place.  Nasogastric tube appears in satisfactory position.  A second 2 passes alongside the nasogastric tube terminates in the distal esophagus.  Following discussion with patient's nurse it  represents a temperature probe.  Increased colonic fluid with air-fluid levels.  Correlate for enteric colitis.  No focal inflammatory changes or obstruction.  CT abdomen pelvis on 1/28/2022 showed the same as CT chest.     COVID-19 and influenza PCR negative.  Lumbar puncture culture and meningitis encephalitis panel negative. Legionella negative.  Strep pneumo antigen negative.  MRSA PCR positive.  Reading spotted fever IgM equivocal at 1.06. Wound culture of the right foot from 1/24/2022 preliminary reports growth of Staph aureus.  Respiratory culture from 1/25/2022 reports growth of Candida albicans.  Most recent chest x-ray from 1/26/2022 reports left lung airspace disease with minimal patchy right lower lobe airspace disease.  CT of the bilateral lower extremities reports no evidence of acute fracture, osteomyelitis, soft tissue gas or fluid collection. Wound culture of the right foot on 1/24/2022 finalized as MSSA. Respiratory culture on 1/25/2022 finalized as Candida albicans, likely a colonization. Blood cultures on 1/22/2022 finalized as no growth. Respiratory culture on 1/29/2022 is finalized as no growth.  Chest x-ray on 1/31/2021 shows slight interval improvement.    As bone scan shows evidence of left great toe osteomyelitis, recommended surgical evaluation and intraoperative cultures from the left great toe to help direct antibiotic therapy. Surgeon has reevaluated the patient and does not recommend debridement or amputation at this time.  For now, recommend to continue on vancomycin pharmacy to dose and Flagyl 500 mg IV every 8 hours for both aspiration pneumonia coverage and enterocolitis coverage as seen on CT abdomen pelvis.  Cefepime 2 g IV every 8 hours was added today due to new onset fever and increased risk of nosocomial pneumonia.  Patient previously tolerated cefepime.  We will continue to follow closely and adjust antibiotic therapy as needed.     Code Status:   Code Status and Medical  Interventions:   Ordered at: 01/22/22 0039     Level Of Support Discussed With:    Health Care Surrogate     Code Status (Patient has no pulse and is not breathing):    CPR (Attempt to Resuscitate)     Medical Interventions (Patient has pulse or is breathing):    Full Support     Scribed for To Christpoher MD by YENIFER Green. 2/2/2022  13:32 EST    YENIFER Green  02/02/22  13:32 EST    Physician Attestation:    The documentation recorded by the scribe accurately reflects the service I personally performed and the decisions made by me.    To Christopher MD  02/02/22  13:32 EST

## 2022-02-02 NOTE — PLAN OF CARE
Consult received, chart reviewed. Ms. Stein was seen at bedside this am in CCU. She is unable to follow commands, unable to functionally participate in po acceptance for dysphagia assessment at this time.     Please continue NPO w/ universal aspiration precautions, NINO precautions, oral care protocol. SLP to f/u for status to participate.     Thank you   Bee Avelar M.S,. CCC/SLP    Goal Outcome Evaluation:

## 2022-02-02 NOTE — PROGRESS NOTES
Miquel Mora MD                          [phone]      Patient ID:    Name:  Lynette Stein    MRN:  [id number]    1965   56 y.o.  female            Patient Care Team:  Orivlle Wu PA as PCP - General (Family Medicine)  Apple Menezes MD as Consulting Physician (Hematology and Oncology)  Mercedes Calix APRN as Nurse Practitioner (Nurse Practitioner)    CC/ Reason for visit: Acute respiratory failure, substance abuse, encephalopathy, psychiatric issues    Subjective: Pt seen and examined this AM.  Patient successfully extubated to nasal cannula and is doing fairly well.  Not requiring continuous sedation.  As needed medications for agitation.  Ongoing neurology work-up.  Heparin drip.    ROS: Unable to obtain    Objective     Vital Signs past 24hrs    BP range: BP: (149-223)/() 151/87  Pulse range: Heart Rate:  [] 100  Resp rate range: Resp:  [8-30] 19  Temp range: Temp (24hrs), Av °F (37.8 °C), Min:98.8 °F (37.1 °C), Max:101 °F (38.3 °C)      Ventilator/Non-Invasive Ventilation Settings (From admission, onward)             Start     Ordered    22 1703  Ventilator - AC/VC; 20; 40; 5; 450  Continuous        Comments: Decrease rate 16 and abg at 2200   Question Answer Comment   Vent Mode AC/VC    Breath rate 20    FiO2 40    PEEP 5    Tidal Volume 450        22 1703    22 1529  Ventilator - AC/VC; (16); 30; 5; 450  Continuous,   Status:  Canceled        Comments: Decrease rate 16 and abg at 2200   Question Answer Comment   Vent Mode AC/VC    Breath rate  16   FiO2 30    PEEP 5    Tidal Volume 450        22 1529    22 0043  Ventilator - AC/VC; 20; 30; 5; 450  Continuous,   Status:  Canceled        Question Answer Comment   Vent Mode AC/VC    Breath rate 20    FiO2 30    PEEP 5    Tidal Volume 450        22 0042    22 0034  Ventilator - AC/VC; 20; 30; 5; 400  Continuous,   Status:  Canceled         Question Answer Comment   Vent Mode AC/VC    Breath rate 20    FiO2 30    PEEP 5    Tidal Volume 400        01/22/22 0039                Device (Oxygen Therapy): nasal cannula FiO2 (%): 35 %     97.6 kg (215 lb 2.7 oz); Body mass index is 29.18 kg/m².      Intake/Output Summary (Last 24 hours) at 2/2/2022 1340  Last data filed at 2/2/2022 1201  Gross per 24 hour   Intake 3025.9 ml   Output 2100 ml   Net 925.9 ml       PHYSICAL EXAM   Constitutional: Middle aged pt in bed, awake and following commands   Head: - NCAT  Eyes: No pallor.  Anicteric sclerae, EOMI.  ENMT:   Mallampati 3, no oral thrush. Moist MM.   NECK: Trachea midline, No thyromegaly, no palpable cervical lymphadenopathy  Heart: RRR, no murmur. No pedal edema   Lungs: SUSHIL +, occasional rhonchi.  No wheezes/ crackles heard    Abdomen: Soft. No tenderness, guarding or rigidity. No palpable masses  Extremities: Extremities warm and well perfused. No cyanosis/ clubbing  Neuro: Conscious, following commands with left gaze preference and ? right hemiplegia  Psych: Mood and affect -unable to obtain    PPE recommended per [facility] infectious disease Isolation protocol for the current clinical scenario(as mentioned below) was followed.     Scheduled meds:  cefepime, 2 g, Intravenous, Q8H  ethyl alcohol, 1 application, Nasal, BID  hydrALAZINE, 10 mg, Oral, Q8H  insulin aspart, 0-14 Units, Subcutaneous, TID AC  insulin detemir, 30 Units, Subcutaneous, Daily  LORazepam, 0.5 mg, Oral, Nightly  losartan, 50 mg, Oral, Daily  metoprolol tartrate, 75 mg, Nasogastric, Q12H  metroNIDAZOLE, 500 mg, Intravenous, Q8H  pantoprazole, 40 mg, Intravenous, Q AM  pravastatin, 40 mg, Oral, Daily  pregabalin, 75 mg, Nasogastric, Q12H  QUEtiapine, 50 mg, Oral, Nightly  sodium chloride, 10 mL, Intravenous, Q12H  sodium chloride, 10 mL, Intravenous, Q12H  spironolactone, 25 mg, Oral, Daily  vancomycin, 1,750 mg, Intravenous, Q12H        IV meds:                       dexmedetomidine, 0.2-1.5 mcg/kg/hr, Last Rate: Stopped (02/01/22 1847)  heparin (porcine), 10.2 Units/kg/hr, Last Rate: 18.2 Units/kg/hr (02/02/22 1153)  hold, 1 each  Pharmacy Consult,   Pharmacy to dose vancomycin,         Data Review:      Results from last 7 days   Lab Units 02/02/22  1118 02/02/22  0008 01/31/22  2331 01/31/22  2326 01/31/22  2326 01/31/22  1325 01/31/22  0103 01/28/22  0048 01/27/22  0045   SODIUM mmol/L  --  143 144  --  143  --  139   < > 141   POTASSIUM mmol/L 3.9 3.4* 4.0   < > 3.9   < > 3.4*   < > 3.6  3.6   CHLORIDE mmol/L  --  107 107  --  106  --  100   < > 106   CO2 mmol/L  --  22.8 25.8  --  26.3  --  29.0   < > 26.2   BUN mg/dL  --  15 15  --  15  --  16   < > 10   CREATININE mg/dL  --  0.61 0.58  --  0.55*  --  0.52*   < > 0.58   CALCIUM mg/dL  --  10.1 10.3  --  10.4  --  10.1   < > 9.3   BILIRUBIN mg/dL  --  0.2  --   --  0.2  --  0.2   < > 0.2   ALK PHOS U/L  --  125*  --   --  123*  --  133*   < > 99   ALT (SGPT) U/L  --  40*  --   --  37*  --  35*   < > 12   AST (SGOT) U/L  --  31  --   --  23  --  34*   < > 9   GLUCOSE mg/dL  --  232* 259*  --  255*  --  244*   < > 186*   WBC 10*3/mm3  --  12.67*  --   --  12.23*  --  11.33*   < > 13.33*   HEMOGLOBIN g/dL  --  11.6*  --   --  11.1*  --  11.0*   < > 9.6*   PLATELETS 10*3/mm3  --  421  --   --  509*  --  496*   < > 370   PROBNP pg/mL  --   --   --   --   --   --   --   --  915.8*    < > = values in this interval not displayed.       Lab Results   Component Value Date    CALCIUM 10.1 02/02/2022    PHOS 4.6 (H) 01/31/2022       Results from last 7 days   Lab Units 01/29/22  0640   RESPCX  No growth       Results from last 7 days   Lab Units 02/01/22  1049 02/01/22  0425 01/31/22  0423 01/30/22  0424 01/29/22  0431 01/28/22  0422 01/27/22  0502   PH, ARTERIAL pH units 7.429 7.423 7.479* 7.425 7.472* 7.438 7.412   PO2 ART mm Hg 95.4 95.5 75.9* 87.8 78.6* 82.2* 66.8*   PCO2, ARTERIAL mm Hg 45.7* 46.2* 44.3 49.2* 48.9* 49.6* 45.3*    HCO3 ART mmol/L 30.2* 30.1* 32.9* 32.3* 35.7* 33.5* 28.8*        Results Review:    I have reviewed the relevant laboratory results and independently reviewed the chest imaging from this hospitalization including the available echocardiogram reports personally and summarized it if/ when appropriate below    Assessment    Acute respiratory failure on mechanical ventilator-1/22 - 2/1  Acute toxic/metabolic encephalopathy  Substance abuse and withdrawal syndrome  Bilateral foot ulcers/osteomyelitis  COPD without exacerbation  A. fib with RVR  NSTEMI type II  Monoclonal lymphocytosis  PAD  Chronic pain syndrome  Anxiety/depression  DARON    PLAN:  Patient successfully extubated now and is on 2 L nasal cannula.  Continues to have significant issues with encephalopathy and right hemiplegia.  Await further neurology work-up and diagnosis.  Remains high risk for recurrent aspiration pneumonia  Continue with bronchodilators to help with mucus clearance  Cardiology managing NSTEMI and agree with anticoagulation  ID managing antibiotics  All other medical problems per primary  Guarded prognosis  Full code    Discussed with .  Patient should be okay to be transferred out of ICU from my standpoint and we will sign off and evaluate for any new pulmonary or critical care issues.    D/w team in MDR.     Miquel Mora MD  2/2/2022

## 2022-02-02 NOTE — PROGRESS NOTES
THC Physician - Brief Progress Note  PERMANENT  02/01/2022 23:45    [facility] [place] CCU - [facility] - [place], [place] ([facility])    ELKIN YUAN    Date of Service 02/01/2022 23:45    [facility]    Called regarding /97 111 94%.  She has been intermittently hypertensive for days.  She is suspected of having had a stroke.    Post hydralazine and Metoprolol.  The bedside increased BP meds tonight. Given her suspected post CVA state, I would be cautious about lowering her BP too much, and I defer overall BP goals to the relevant services following (CCM, Cards, Neuro).  For   now, will give a single low dose of an ACEI to try and keep her SBP < 180, which seems to be their goal.        _x___   Video Assessment performed  __x___   Most recent labs reviewed  __x___   Vital Signs reviewed  _x___      Spoke with bedside RN  __x__       Orders written  Contact [facility] for any needs if bedside physician is not present.      Interventions Major-Hypertension - evaluation and management        Electronically Signed by: Joe Martinez) on 02/01/2022 23:46

## 2022-02-02 NOTE — PROGRESS NOTES
Nutrition Services    Patient Name:  Lynette Stein  YOB: 1965  MRN: [id number]  Admit Date:  1/22/2022    F/u:  Increased TF to 60 ml/hr and added prostat 30 ml bid to better meet nutrition needs.     Electronically signed by:  Carine Ray RD  02/02/22 13:15 EST

## 2022-02-02 NOTE — PROGRESS NOTES
[facility] HOSPITALIST PROGRESS NOTE     Patient Identification:  Name:  Lynette Stein  Age:  56 y.o.  Sex:  female  :  1965  MRN:  [id number]  Visit Number:  [id number]  ROOM: [address]     Primary Care Provider:  Orville Wu PA    Length of stay in inpatient status:  11    Subjective     Chief Compliant:  altered mental status     History of Presenting Illness:    Patient remains ill, was extubated yesterday though, on 2LNC, remains agitated, awake but not following significant commands, having some oral automatisms, would not tolerate lumbar puncture at this time, have ordered MRI per TeleNeuro recommendations but also suspect would not tolerate at this time, repeat echocardiogram pending, appears to have been started on cefepime today, suspect per Infectious Disease, continued on Hep gtt, SLP for swallow evaluation when appropriate, currently with NG but high likelihood will pull out given current satus, on benzos at home and have added back as needed as may have component of withdrawal.    Objective     Current Hospital Meds:cefepime, 2 g, Intravenous, Once  cefepime, 2 g, Intravenous, Q8H  ethyl alcohol, 1 application, Nasal, BID  hydrALAZINE, 10 mg, Oral, Q8H  insulin aspart, 0-14 Units, Subcutaneous, TID AC  insulin detemir, 30 Units, Subcutaneous, Daily  LORazepam, 0.5 mg, Oral, Nightly  losartan, 50 mg, Oral, Daily  metoprolol tartrate, 75 mg, Nasogastric, Q12H  metroNIDAZOLE, 500 mg, Intravenous, Q8H  pantoprazole, 40 mg, Intravenous, Q AM  pravastatin, 40 mg, Oral, Daily  pregabalin, 75 mg, Nasogastric, Q12H  QUEtiapine, 50 mg, Oral, Nightly  sodium chloride, 10 mL, Intravenous, Q12H  sodium chloride, 10 mL, Intravenous, Q12H  spironolactone, 25 mg, Oral, Daily  vancomycin, 1,750 mg, Intravenous, Q12H    dexmedetomidine, 0.2-1.5 mcg/kg/hr, Last Rate: Stopped (22 184)  heparin (porcine), 10.2 Units/kg/hr, Last Rate: 14.2 Units/kg/hr (22 0205)  hold, 1  each  Pharmacy Consult,   Pharmacy to dose vancomycin,       ----------------------------------------------------------------------------------------------------------------------  Vital Signs:  Temp:  [99.3 °F (37.4 °C)-101 °F (38.3 °C)] 100.1 °F (37.8 °C)  Heart Rate:  [] 122  Resp:  [8-30] 19  BP: (149-223)/() 186/97  FiO2 (%):  [35 %] 35 %  SpO2:  [91 %-97 %] 97 %  on  Flow (L/min):  [2-3] 2;   Device (Oxygen Therapy): nasal cannula  Body mass index is 29.18 kg/m².    Intake & Output (last 3 days)       01/30 0701 01/31 0700 01/31 0701 02/01 0700 02/01 0701 02/02 0700 02/02 0701 02/03 0700    I.V. (mL/kg) 1171.3 (11.5) 463 (4.5) 351.9 (3.6)     Other 826 300 325     NG/GT 1215 472 549     IV Piggyback 1300 1300 1200 500    Total Intake(mL/kg) 4512.3 (44.2) 2535 (24.9) 2425.9 (24.9) 500 (5.1)    Urine (mL/kg/hr) 3550 (1.5) 1825 (0.7) 2100 (0.9)     Stool 0 0 0     Total Output 3550 1825 2100     Net +962.3 +710 +325.9 +500            Stool Unmeasured Occurrence 0 x 2 x 2 x         ----------------------------------------------------------------------------------------------------------------------  Physical exam:  Constitutional:  Well-developed and well-nourished. Awake, looking around room  HENT:  Head:  Normocephalic and atraumatic.  Mouth:  Moist mucous membranes.    Eyes:  Conjunctivae normal. No scleral icterus.    Neck:  Neck supple.  No JVD present.    Cardiovascular:  Normal rate, regular rhythm and normal heart sounds with no murmur.  Pulmonary/Chest:  course breath sounds bilateral, on 2LNC   Abdominal:  Soft. No distension and no tenderness.   Musculoskeletal:  No tenderness, and no deformity.  No red or swollen joints anywhere.    Neurological: awake, alert, tracking, not following significant commands   Skin:  Skin is warm and dry. No rash noted. No pallor.   Peripheral vascular: Bilateral lower extremity ulcers wrapped, clean and dry  : Nabeel in  place  ----------------------------------------------------------------------------------------------------------------------  Results from last 7 days   Lab Units 02/02/22  0008 01/31/22 2326 01/31/22  0103 01/30/22  0403 01/29/22  0521   CRP mg/dL  --   --   --   --  5.37*   WBC 10*3/mm3 12.67* 12.23* 11.33*   < > 12.64*   HEMOGLOBIN g/dL 11.6* 11.1* 11.0*   < > 11.1*   HEMATOCRIT % 39.5 38.8 37.3   < > 37.8   MCV fL 83.0 84.2 80.9   < > 81.5   MCHC g/dL 29.4* 28.6* 29.5*   < > 29.4*   PLATELETS 10*3/mm3 421 509* 496*   < > 554*    < > = values in this interval not displayed.     Results from last 7 days   Lab Units 02/01/22  1049   PH, ARTERIAL pH units 7.429   PO2 ART mm Hg 95.4   PCO2, ARTERIAL mm Hg 45.7*   HCO3 ART mmol/L 30.2*     Results from last 7 days   Lab Units 02/02/22  0008 01/31/22 2331 01/31/22 2326 01/31/22  1325 01/31/22  0103   SODIUM mmol/L 143 144 143  --  139   POTASSIUM mmol/L 3.4* 4.0 3.9   < > 3.4*   MAGNESIUM mg/dL  --  2.1 2.1  --  2.3   CHLORIDE mmol/L 107 107 106  --  100   CO2 mmol/L 22.8 25.8 26.3  --  29.0   BUN mg/dL 15 15 15  --  16   CREATININE mg/dL 0.61 0.58 0.55*  --  0.52*   EGFR IF NONAFRICN AM mL/min/1.73 102 108 115  --  122   CALCIUM mg/dL 10.1 10.3 10.4  --  10.1   PHOSPHORUS mg/dL  --  4.6*  --   --  3.7   GLUCOSE mg/dL 232* 259* 255*  --  244*   ALBUMIN g/dL 3.62  --  3.63  --  3.73   BILIRUBIN mg/dL 0.2  --  0.2  --  0.2   ALK PHOS U/L 125*  --  123*  --  133*   AST (SGOT) U/L 31  --  23  --  34*   ALT (SGPT) U/L 40*  --  37*  --  35*    < > = values in this interval not displayed.   Estimated Creatinine Clearance: 136.4 mL/min (by C-G formula based on SCr of 0.61 mg/dL).  Ammonia   Date Value Ref Range Status   01/31/2022 40 11 - 51 umol/L Final         Results from last 7 days   Lab Units 01/27/22  0045   PROBNP pg/mL 915.8*         Glucose   Date/Time Value Ref Range Status   02/02/2022 0603 244 (H) 70 - 130 mg/dL Final     Comment:     Meter: [id number]  : [id number] BRAD STYLES   02/01/2022 2348 203 (H) 70 - 130 mg/dL Final     Comment:     Meter: [id number] : [id number] BRAD STYLES   02/01/2022 1739 197 (H) 70 - 130 mg/dL Final     Comment:     Meter: [id number] : [id number] VIJI HEREDIA     Lab Results   Component Value Date    TSH 2.290 01/19/2022     No results found for: PREGTESTUR, PREGSERUM, HCG, HCGQUANT  Pain Management Panel     Pain Management Panel Latest Ref Rng & Units 1/19/2022    AMPHETAMINES SCREEN, URINE Negative Negative    BARBITURATES SCREEN Negative Negative    BENZODIAZEPINE SCREEN, URINE Negative Positive(A)    BUPRENORPHINEUR Negative Negative    COCAINE SCREEN, URINE Negative Negative    METHADONE SCREEN, URINE Negative Negative    METHAMPHETAMINEUR Negative Negative        Brief Urine Lab Results  (Last result in the past 365 days)      Color   Clarity   Blood   Leuk Est   Nitrite   Protein   CREAT   Urine HCG        01/19/22 2010 Dark Yellow   Clear   Negative   Trace   Negative   100 mg/dL (2+)               No results found for: BLOODCX  No results found for: URINECX    I have personally looked at the labs and they are summarized above.  ----------------------------------------------------------------------------------------------------------------------  Detailed radiology reports for the last 24 hours:  Imaging Results (Last 24 Hours)     Procedure Component Value Units Date/Time    XR Chest 1 View [[id number]] Collected: 02/01/22 2035     Updated: 02/01/22 2037    Narrative:      CHEST X-RAY, 2/1/2022      HISTORY:    NG tube placement.      TECHNIQUE:  AP portable chest x-ray with image including the upper abdomen. (19:44)    COMPARISON:  *  Chest x-ray, 2/1/2022 (15:26)    FINDINGS:  NG tube is well-positioned with tip in the distal 3rd of the stomach. No visible gastric distention.    Mild bibasilar infiltrates, unchanged. No dense airspace consolidation or pleural effusion. Mid and upper lungs remain clear. Heart  size and pulmonary vascularity are normal.      Impression:      1. NG tube in good position.  2. Mild bibasilar infiltrates.    Signer Name: Mirza Galicia MD   Signed: 2/1/2022 8:35 PM   Workstation Name: [email or web address] [id number]    [facility]    XR Chest 1 View [[id number]] Collected: 02/01/22 1541     Updated: 02/01/22 1546    Narrative:      XR CHEST 1 VW-     CLINICAL INDICATION: ng tube        COMPARISON: 01/31/2022      TECHNIQUE: Single frontal view of the chest.     FINDINGS:     NG tube in the stomach  The cardiac silhouette is normal. The pulmonary vasculature is  unremarkable.  There is no evidence of an acute osseous abnormality.   There are no suspicious-appearing parenchymal soft tissue nodules.          Impression:      NG tube in the stomach     This report was finalized on 2/1/2022 3:41 PM by Dr. Deonte Reece MD.           Assessment & Plan    56F Smoker, Obese by BMI PMH Anxiety/Depression, Leukemia, Chronic Pain, COPD, Diabetes Mellitus Type II, Hypertension, Hyperlipidemia, Peripheral Vascular Disease, Obstructive Sleep Apnea, chronic relapsing and remitting bilateral lower extremity foot ulcers and follows in wound care clinic, presented [facility] emergency room 1/19 for altered mental status.    #Septic Shock, Acute Metabolic Encephalopathy & Acute Hypoxic Respiratory Failure requiring Intubation and Mechanical Ventilation 2/2 PNA, Bacterial, treating for MDR Organisms in setting of underlying COPD without acute exacerbation and Obstructive Sleep Apnea - Acute Metabolic Encephalopathy Persisting   - Patient presents w/ abrupt onset altered mental status, WBC count > 12K, heart rate > 90, Pneumonia  on imaging, SBP < 90 requiring IV pressors, intubated for airway protection, etiology of altered mental status suspected infectious/sepsis though has had persisting encephalopathy while on ventilator, repeat head CT from 1/19 to 1/28 was stable, no acute intracranial  abnormalities, EEG's without epileptiform activity only generalized slowing.  1/31 off sedation thought to have lateral gaze deficits, repeat CTA's and EEGs without definitive etiology  - Pulmonary consulted; Following, extubated 2/1  - Infectious Disease consulted; Following  - TeleNeuro consulted; Following, recommended repeat echocardiogram and MRI which I have ordered  - Lumbar puncture without growth, encephalitis panel negative, cytology with elevated glucose and protein, unable to perform Cellblock; At this time deferred for extubation and unable to perform at this time due to agitation  - Continue Vancomycin, Cefepime and Flagyl  - Continue APAP PRN for fevers, Duonebs as needed  - Still unclear etiology of encephalopathy, will try on low dose ativan as she is on at home, possibly having component of withdrawal, has 0.5 ativan nightly started last night as well  - Monitor in CCU, monitor on telemetry, continue 02 but wean as able    #Relapsing and Remitting Lower Extremity Wounds/Ulcers now with Osteomyelitis L great toe and bilateral cellulitis   - Patient reported 2 year history lower extremity wounds, follows in wound care clinic, cultures from initial biopsy grew MSSA  - Rheumatology panel negative with normal C and P-ANCA, Anti-CCP, SSA/SSB, Atypical P-ANCA, Anti-Hu Ab, paraneoplastic panel pending but is send out test  - Bone scan concern for osteomyelitis  - General Surgery consulted; Followed, status post debridement 1/22, signed off, re-evaluated 1/30 and recommended no surgical intervention.  - Infectious Disease consulted; Following as per above, continue antibiotics as per above    #Hypertension/Hyperlipidemia/Peripheral Vascular Disease   #Atrial Fibrillation w/ RVR, New onset (CHADsVasc = 3)  #NSTEMI Type II due to Atrial Fibrillation   - Patient with noted Atrial Fibrillation with RVR in emergency room on EKG, recurrent heart rate up to 160's.  - Labs showed troponins <0.010 -> 0.036 ->  <0.010, proBNP 4800  - EKG showed Atrial Fibrillation with RVR  - Echo showed mild concentric left ventricular hypertrophy, normal LV function.   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 A BCR ABL PCR was obtained which was <0.001% as well as a flow cytometry which was concerning for a monotypical CD5+ B cell population with nonspecific immunophenotype, but significant for a variant B cell SLL or mantle cell lymphoma that could not be ruled out. CLL FISH was then obtained which was negative and CCND1/IGH - t(11:14) was not detected, JAK2 V617 and JAK2 exon 12 mutation which were negative as well.  - Pt had follow up scheduled 6/2020 and no showed appointment.    - Peripheral smear without evidence of acute leukemia  - Hematology/Oncology consulted; Evaluated and did not suspect current condition related to hematological condition, recommended follow up Dr. Menezes outpatient     #NIDDM Type II, controlled, unknown complications, New diagnosis  - Hgb A1c = 6.5%  - No home oral agents, continue FSBG and SSI, continue new Levemir 30U nightly, titrate as indicated     #Anxiety/Depression  - Psychiatry consulted in emergency room and felt altered mental status not related to underlying psychiatric illness; Continue home Seroquel and Ativan    #Tobacco Dependence  - Rec'd cessation, NRT as needed    #Obesity by BMI  - BMI 30, complicates all aspects of care     F: NPO  E:  Monitor & Replace PRN  N: NPO Diet; Tube Feeds   Ppx: on Hep gtt  Code: Full Code     Dispo: Pending workup and clinical improvement     *This patient is considered high risk due to sepsis, acute respiratory failure, PNA, intubation and mechanical ventilation.       Edited by: Dick Whiting MD at 2/2/2022 [address] [place] [address]

## 2022-02-03 NOTE — PLAN OF CARE
Follow up this pm for status to participate in dysphagia assessment. Mr. Stein continues tachycardic and tachypneic, continues with significant ams, unable to follow commands, poor secretion management. Noted that she has self removed her nasogastric tube.     Per this status, she is not able to functionally particpiate in dysphagia assessment. Noted guarded prognosis at this time.     She will most benefit from NPO w/ alternative method of nutrition/hydration/medications. Universal aspiration precautions, NINO precautions, oral care protocol.     Please reconsult with improved medical status to allow for functional participation.     Thank you-  Bee Avelar M.S., CCC/SLP    Goal Outcome Evaluation:

## 2022-02-03 NOTE — PLAN OF CARE
Goal Outcome Evaluation:              Outcome Summary: HTN still noted pt unable to have NG replaced and unable to give PO meds.  Pt still unable to move right side or talk.  Good UOP noted.

## 2022-02-03 NOTE — PAYOR COMM NOTE
"[facility]  NPI: [id number]    Utilization Review   Contact:Zoey Hernández MSN, APRN, NP-C  Phone: [phone]  Fax: [phone]      Continue Stay Update  REF#[id number]            Elkin Yuan (56 y.o. Female)             Date of Birth Social Security Number Address Home Phone MRN    1965  [address] [id number] [phone] [id number]    Advent Marital Status             Presybeterian        Admission Date Admission Type Admitting Provider Attending Provider Department, Room/Bed    1/22/22 Urgent Sebastian Baker MD Parks, Andrew, MD Psychiatric CRITICAL CARE, CC04/    Discharge Date Discharge Disposition Discharge Destination                         Attending Provider: Dick Whiting MD    Allergies: Penicillins    Isolation: None   Infection: MRSA No Isolation this Admit (01/23/22)   Code Status: CPR   Advance Care Planning Activity    Ht: 182.9 cm (72.01\")   Wt: 101 kg (223 lb 1.7 oz)    Admission Cmt: None   Principal Problem: None                Active Insurance as of 1/22/2022     Primary Coverage     Payor Plan Insurance Group Employer/Plan Group    [employer] BLUE [employer]OSS [employer]M BLUE CROSS BLUE SHIELD PPO [id number]     Payor Plan Address Payor Plan Phone Number Payor Plan Fax Number Effective Dates    [address] [phone]  1/8/2012 - None Entered    [facility] [id number]       Subscriber Name Subscriber Birth Date Member ID       TAMARA YUAN 4/10/1966 [id number]           Secondary Coverage     Payor Plan Insurance Group Employer/Plan Group    [facility] MEDICARE REPLACEMENT WELL[employer] MEDICARE REPLACEMENT      Payor Plan Address Payor Plan Phone Number Payor Plan Fax Number Effective Dates    [address] [address] [phone]  9/29/2021 - None Entered    [facility] [id number]       Subscriber Name Subscriber Birth Date Member ID       ELKIN YUAN 1965 [id number]                 Emergency Contacts      (Rel.) Home Phone Work " Phone Mobile Phone    Shine Stein (Spouse) [phone] -- --    Trisha Baxter (Sister) [phone] -- [phone]        Dick Whiting MD   Physician   Hospitalist   Progress Notes      Signed   Date of Service:  22 1111   Creation Time:  22 1111              Signed        Expand All Collapse All          Show:Clear all  [x]Manual[x]Template[]Copied    Added by:  [x]Dick Whiting MD      []Viridiana for details         [facility] HOSPITALIST PROGRESS NOTE     Patient Identification:  Name:  Lynette Stein  Age:  56 y.o.  Sex:  female  :  1965  MRN:  [id number]  Visit Number:  [id number]  ROOM: [address]      Primary Care Provider:  Orville Wu PA     Length of stay in inpatient status:  11     Subjective      Chief Compliant:  altered mental status      History of Presenting Illness:    Patient remains ill, was extubated yesterday though, on 2LNC, remains agitated, awake but not following significant commands, having some oral automatisms, would not tolerate lumbar puncture at this time, have ordered MRI per TeleNeuro recommendations but also suspect would not tolerate at this time, repeat echocardiogram pending, appears to have been started on cefepime today, suspect per Infectious Disease, continued on Hep gtt, SLP for swallow evaluation when appropriate, currently with NG but high likelihood will pull out given current satus, on benzos at home and have added back as needed as may have component of withdrawal.    Objective      Current Hospital Meds:cefepime, 2 g, Intravenous, Once  cefepime, 2 g, Intravenous, Q8H  ethyl alcohol, 1 application, Nasal, BID  hydrALAZINE, 10 mg, Oral, Q8H  insulin aspart, 0-14 Units, Subcutaneous, TID AC  insulin detemir, 30 Units, Subcutaneous, Daily  LORazepam, 0.5 mg, Oral, Nightly  losartan, 50 mg, Oral, Daily  metoprolol tartrate, 75 mg, Nasogastric, Q12H  metroNIDAZOLE, 500 mg, Intravenous, Q8H  pantoprazole, 40 mg, Intravenous, Q  AM  pravastatin, 40 mg, Oral, Daily  pregabalin, 75 mg, Nasogastric, Q12H  QUEtiapine, 50 mg, Oral, Nightly  sodium chloride, 10 mL, Intravenous, Q12H  sodium chloride, 10 mL, Intravenous, Q12H  spironolactone, 25 mg, Oral, Daily  vancomycin, 1,750 mg, Intravenous, Q12H     dexmedetomidine, 0.2-1.5 mcg/kg/hr, Last Rate: Stopped (02/01/22 1847)  heparin (porcine), 10.2 Units/kg/hr, Last Rate: 14.2 Units/kg/hr (02/02/22 0205)  hold, 1 each  Pharmacy Consult,   Pharmacy to dose vancomycin,         ----------------------------------------------------------------------------------------------------------------------  Vital Signs:  Temp:  [99.3 °F (37.4 °C)-101 °F (38.3 °C)] 100.1 °F (37.8 °C)  Heart Rate:  [] 122  Resp:  [8-30] 19  BP: (149-223)/() 186/97  FiO2 (%):  [35 %] 35 %  SpO2:  [91 %-97 %] 97 %  on  Flow (L/min):  [2-3] 2;   Device (Oxygen Therapy): nasal cannula  Body mass index is 29.18 kg/m².              Intake & Output (last 3 days)        01/30 0701 01/31 0700 01/31 0701 02/01 0700 02/01 0701 02/02 0700 02/02 0701 02/03 0700     I.V. (mL/kg) 1171.3 (11.5) 463 (4.5) 351.9 (3.6)       Other 826 300 325       NG/GT 1215 472 549       IV Piggyback 1300 1300 1200 500     Total Intake(mL/kg) 4512.3 (44.2) 2535 (24.9) 2425.9 (24.9) 500 (5.1)     Urine (mL/kg/hr) 3550 (1.5) 1825 (0.7) 2100 (0.9)       Stool 0 0 0       Total Output 3550 1825 2100       Net +962.3 +710 +325.9 +500                   Stool Unmeasured Occurrence 0 x 2 x 2 x            ----------------------------------------------------------------------------------------------------------------------  Physical exam:  Constitutional:  Well-developed and well-nourished. Awake, looking around room  HENT:  Head:  Normocephalic and atraumatic.  Mouth:  Moist mucous membranes.    Eyes:  Conjunctivae normal. No scleral icterus.    Neck:  Neck supple.  No JVD present.    Cardiovascular:  Normal rate, regular rhythm and normal heart sounds  with no murmur.  Pulmonary/Chest:  course breath sounds bilateral, on 2LNC   Abdominal:  Soft. No distension and no tenderness.   Musculoskeletal:  No tenderness, and no deformity.  No red or swollen joints anywhere.    Neurological: awake, alert, tracking, not following significant commands   Skin:  Skin is warm and dry. No rash noted. No pallor.   Peripheral vascular: Bilateral lower extremity ulcers wrapped, clean and dry  : Lees in place  ----------------------------------------------------------------------------------------------------------------------           Results from last 7 days   Lab Units 02/02/22 0008 01/31/22 2326 01/31/22  0103 01/30/22  0403 01/29/22  0521   CRP mg/dL  --   --   --   --  5.37*   WBC 10*3/mm3 12.67* 12.23* 11.33*   < > 12.64*   HEMOGLOBIN g/dL 11.6* 11.1* 11.0*   < > 11.1*   HEMATOCRIT % 39.5 38.8 37.3   < > 37.8   MCV fL 83.0 84.2 80.9   < > 81.5   MCHC g/dL 29.4* 28.6* 29.5*   < > 29.4*   PLATELETS 10*3/mm3 421 509* 496*   < > 554*    < > = values in this interval not displayed.           Results from last 7 days   Lab Units 02/01/22  1049   PH, ARTERIAL pH units 7.429   PO2 ART mm Hg 95.4   PCO2, ARTERIAL mm Hg 45.7*   HCO3 ART mmol/L 30.2*               Results from last 7 days   Lab Units 02/02/22  0008 01/31/22  2331 01/31/22  2326 01/31/22  1325 01/31/22  0103   SODIUM mmol/L 143 144 143  --  139   POTASSIUM mmol/L 3.4* 4.0 3.9   < > 3.4*   MAGNESIUM mg/dL  --  2.1 2.1  --  2.3   CHLORIDE mmol/L 107 107 106  --  100   CO2 mmol/L 22.8 25.8 26.3  --  29.0   BUN mg/dL 15 15 15  --  16   CREATININE mg/dL 0.61 0.58 0.55*  --  0.52*   EGFR IF NONAFRICN AM mL/min/1.73 102 108 115  --  122   CALCIUM mg/dL 10.1 10.3 10.4  --  10.1   PHOSPHORUS mg/dL  --  4.6*  --   --  3.7   GLUCOSE mg/dL 232* 259* 255*  --  244*   ALBUMIN g/dL 3.62  --  3.63  --  3.73   BILIRUBIN mg/dL 0.2  --  0.2  --  0.2   ALK PHOS U/L 125*  --  123*  --  133*   AST (SGOT) U/L 31  --  23  --  34*   ALT  (SGPT) U/L 40*  --  37*  --  35*    < > = values in this interval not displayed.   Estimated Creatinine Clearance: 136.4 mL/min (by C-G formula based on SCr of 0.61 mg/dL).        Ammonia   Date Value Ref Range Status   01/31/2022 40 11 - 51 umol/L Final               Results from last 7 days   Lab Units 01/27/22  0045   PROBNP pg/mL 915.8*                  Glucose   Date/Time Value Ref Range Status   02/02/2022 0603 244 (H) 70 - 130 mg/dL Final       Comment:       Meter: [id number] : [id number] eSolar   02/01/2022 2348 203 (H) 70 - 130 mg/dL Final       Comment:       Meter: [id number] : [id number] eSolar   02/01/2022 1739 197 (H) 70 - 130 mg/dL Final       Comment:       Meter: [id number] : [id number] VIJI [employer]            Lab Results   Component Value Date     TSH 2.290 01/19/2022      No results found for: PREGTESTUR, PREGSERUM, HCG, HCGQUANT         Pain Management Panel         Pain Management Panel Latest Ref Rng & Units 1/19/2022     AMPHETAMINES SCREEN, URINE Negative Negative     BARBITURATES SCREEN Negative Negative     BENZODIAZEPINE SCREEN, URINE Negative Positive(A)     BUPRENORPHINEUR Negative Negative     COCAINE SCREEN, URINE Negative Negative     METHADONE SCREEN, URINE Negative Negative     METHAMPHETAMINEUR Negative Negative                                   Brief Urine Lab Results  (Last result in the past 365 days)       Color   Clarity   Blood   Leuk Est   Nitrite   Protein   CREAT   Urine HCG         01/19/22 2010 Dark Yellow    Clear    Negative    Trace    Negative    100 mg/dL (2+)                       No results found for: BLOODCX  No results found for: URINECX     I have personally looked at the labs and they are summarized above.  ----------------------------------------------------------------------------------------------------------------------  Detailed radiology reports for the last 24 hours:           Imaging Results (Last 24 Hours)      Procedure  Component Value Units Date/Time     XR Chest 1 View [[id number]] Collected: 02/01/22 2035       Updated: 02/01/22 2037     Narrative:       CHEST X-RAY, 2/1/2022       HISTORY:    NG tube placement.       TECHNIQUE:  AP portable chest x-ray with image including the upper abdomen. (19:44)     COMPARISON:  *  Chest x-ray, 2/1/2022 (15:26)     FINDINGS:  NG tube is well-positioned with tip in the distal 3rd of the stomach. No visible gastric distention.     Mild bibasilar infiltrates, unchanged. No dense airspace consolidation or pleural effusion. Mid and upper lungs remain clear. Heart size and pulmonary vascularity are normal.        Impression:       1. NG tube in good position.  2. Mild bibasilar infiltrates.     Signer Name: Mirza Galicia MD   Signed: 2/1/2022 8:35 PM   Workstation Name: [facility]    [facility]     XR Chest 1 View [[id number]] Collected: 02/01/22 1541       Updated: 02/01/22 1546     Narrative:       XR CHEST 1 VW-     CLINICAL INDICATION: ng tube        COMPARISON: 01/31/2022      TECHNIQUE: Single frontal view of the chest.     FINDINGS:     NG tube in the stomach  The cardiac silhouette is normal. The pulmonary vasculature is  unremarkable.  There is no evidence of an acute osseous abnormality.   There are no suspicious-appearing parenchymal soft tissue nodules.           Impression:       NG tube in the stomach     This report was finalized on 2/1/2022 3:41 PM by Dr. Deonte Reece MD.             Assessment & Plan    56F Smoker, Obese by BMI PMH Anxiety/Depression, Leukemia, Chronic Pain, COPD, Diabetes Mellitus Type II, Hypertension, Hyperlipidemia, Peripheral Vascular Disease, Obstructive Sleep Apnea, chronic relapsing and remitting bilateral lower extremity foot ulcers and follows in wound care clinic, presented [facility] emergency room 1/19 for altered mental status.     #Septic Shock, Acute Metabolic Encephalopathy & Acute Hypoxic Respiratory  Failure requiring Intubation and Mechanical Ventilation 2/2 PNA, Bacterial, treating for MDR Organisms in setting of underlying COPD without acute exacerbation and Obstructive Sleep Apnea - Acute Metabolic Encephalopathy Persisting   - Patient presents w/ abrupt onset altered mental status, WBC count > 12K, heart rate > 90, Pneumonia  on imaging, SBP < 90 requiring IV pressors, intubated for airway protection, etiology of altered mental status suspected infectious/sepsis though has had persisting encephalopathy while on ventilator, repeat head CT from 1/19 to 1/28 was stable, no acute intracranial abnormalities, EEG's without epileptiform activity only generalized slowing.  1/31 off sedation thought to have lateral gaze deficits, repeat CTA's and EEGs without definitive etiology  - Pulmonary consulted; Following, extubated 2/1  - Infectious Disease consulted; Following  - TeleNeuro consulted; Following, recommended repeat echocardiogram and MRI which I have ordered  - Lumbar puncture without growth, encephalitis panel negative, cytology with elevated glucose and protein, unable to perform Cellblock; At this time deferred for extubation and unable to perform at this time due to agitation  - Continue Vancomycin, Cefepime and Flagyl  - Continue APAP PRN for fevers, Duonebs as needed  - Still unclear etiology of encephalopathy, will try on low dose ativan as she is on at home, possibly having component of withdrawal, has 0.5 ativan nightly started last night as well  - Monitor in CCU, monitor on telemetry, continue 02 but wean as able     #Relapsing and Remitting Lower Extremity Wounds/Ulcers now with Osteomyelitis L great toe and bilateral cellulitis   - Patient reported 2 year history lower extremity wounds, follows in wound care clinic, cultures from initial biopsy grew MSSA  - Rheumatology panel negative with normal C and P-ANCA, Anti-CCP, SSA/SSB, Atypical P-ANCA, Anti-Hu Ab, paraneoplastic panel pending but is  send out test  - Bone scan concern for osteomyelitis  - General Surgery consulted; Followed, status post debridement 1/22, signed off, re-evaluated 1/30 and recommended no surgical intervention.  - Infectious Disease consulted; Following as per above, continue antibiotics as per above     #Hypertension/Hyperlipidemia/Peripheral Vascular Disease   #Atrial Fibrillation w/ RVR, New onset (CHADsVasc = 3)  #NSTEMI Type II due to Atrial Fibrillation   - Patient with noted Atrial Fibrillation with RVR in emergency room on EKG, recurrent heart rate up to 160's.  - Labs showed troponins <0.010 -> 0.036 -> <0.010, proBNP 4800  - EKG showed Atrial Fibrillation with RVR  - Echo showed mild concentric left ventricular hypertrophy, normal LV function.   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 A BCR ABL PCR was obtained which was <0.001% as well as a flow cytometry which was concerning for a monotypical CD5+ B cell population with nonspecific immunophenotype, but significant for a variant B cell SLL or mantle cell lymphoma that could not be ruled out. CLL FISH was then obtained which was negative and CCND1/IGH - t(11:14) was not detected, JAK2 V617 and JAK2 exon 12 mutation which were negative as well.  - Pt had follow up scheduled 6/2020 and no showed appointment.    - Peripheral smear without evidence of acute leukemia  - Hematology/Oncology consulted; Evaluated and did not suspect  current condition related to hematological condition, recommended follow up Dr. Menezes outpatient      #NIDDM Type II, controlled, unknown complications, New diagnosis  - Hgb A1c = 6.5%  - No home oral agents, continue FSBG and SSI, continue new Levemir 30U nightly, titrate as indicated      #Anxiety/Depression  - Psychiatry consulted in emergency room and felt altered mental status not related to underlying psychiatric illness; Continue home Seroquel and Ativan     #Tobacco Dependence  - Rec'd cessation, NRT as needed     #Obesity by BMI  - BMI 30, complicates all aspects of care     F: NPO  E: Monitor & Replace PRN  N: NPO Diet; Tube Feeds   Ppx: on Hep gtt  Code: Full Code     Dispo: Pending workup and clinical improvement     *This patient is considered high risk due to sepsis, acute respiratory failure, PNA, intubation and mechanical ventilation.       Edited by: Dick Whiting MD at 2022 1111  [facility] Hospitalist                          Dick Whiting MD   Physician   Hospitalist   Progress Notes      Signed   Date of Service:  22   Creation Time:  22              Signed        Expand All Collapse All          Show:Clear all  [x]Manual[x]Template[]Copied    Added by:  [x]Dick Whiting MD      []Michelever for details         [facility] PROGRESS NOTE     Patient Identification:  Name:  Lynette Stein  Age:  56 y.o.  Sex:  female  :  1965  MRN:  [id number]  Visit Number:  [id number]  ROOM: [address]      Primary Care Provider:  Orville Wu PA     Length of stay in inpatient status:  10     Subjective      Chief Compliant:  altered mental status      History of Presenting Illness:    Patient remains critically ill but stable today, no acute events overnight, remains intubated for airway protection, more awake today, tracking some, could move left upper extremity upon request, did not follow thumbs up command, did not follow commands  right upper extremity or bilateral lower extremities though, CTA's and repeat EEG reviewed without definitive etiology, awaiting updated TeleNeuro recommendations, awaiting updated Pulmonary recommendations on plan for spontaneous breathing trial today or not, if not planned may consider adding back sedation and repeat lumbar puncture in radiology, continued on Antibiotics, Infectious Disease following, remains afebrile, General Surgery now signed off, Cardiology following peripherally as well for tentative ischemic workup when clinically improved. Called  to update him on current status and plan of care today, did not reach him, attempted sister listed and reached her for updates, no further questions at that time.  Objective      Current Hospital Meds:chlorhexidine, 15 mL, Mouth/Throat, Q12H  ethyl alcohol, 1 application, Nasal, BID  insulin aspart, 0-14 Units, Subcutaneous, TID AC  [START ON 2/2/2022] insulin detemir, 30 Units, Subcutaneous, Daily  LORazepam, 0.5 mg, Oral, Nightly  losartan, 50 mg, Oral, Daily  metoprolol tartrate, 50 mg, Nasogastric, Q12H  metroNIDAZOLE, 500 mg, Intravenous, Q8H  pantoprazole, 40 mg, Intravenous, Q AM  pravastatin, 40 mg, Oral, Daily  pregabalin, 75 mg, Nasogastric, Q12H  QUEtiapine, 50 mg, Oral, Nightly  sodium chloride, 10 mL, Intravenous, Q12H  sodium chloride, 10 mL, Intravenous, Q12H  spironolactone, 25 mg, Oral, Daily  vancomycin, 1,750 mg, Intravenous, Q12H     dexmedetomidine, 0.2-1.5 mcg/kg/hr, Last Rate: 0.5 mcg/kg/hr (02/01/22 1039)  heparin (porcine), 10.2 Units/kg/hr, Last Rate: Stopped (01/31/22 0000)  hold, 1 each  Pharmacy Consult - Pharmacy to dose,   Pharmacy Consult,   Pharmacy to dose vancomycin,         ----------------------------------------------------------------------------------------------------------------------  Vital Signs:  Temp:  [99.4 °F (37.4 °C)-100 °F (37.8 °C)] 99.4 °F (37.4 °C)  Heart Rate:  [] 85  Resp:  [22-33] 24  BP:  ()/() 113/68  FiO2 (%):  [35 %] 35 %  SpO2:  [92 %-100 %] 97 %  on  Flow (L/min):  [2] 2;   Device (Oxygen Therapy): ventilator  Body mass index is 30.4 kg/m².              Intake & Output (last 3 days)        01/29 0701  01/30 0700 01/30 0701 01/31 0700 01/31 0701 02/01 0700 02/01 0701 02/02 0700     I.V. (mL/kg) 1268.5 (12.4) 1171.3 (11.5) 463 (4.5)       Other 621 826 300       NG/GT 1049 1215 472       IV Piggyback 1300 1300 1300       Total Intake(mL/kg) 4238.5 (41.6) 4512.3 (44.2) 2535 (24.9)       Urine (mL/kg/hr) 1550 (0.6) 3550 (1.5) 1825 (0.7)       Stool 0 0 0       Total Output 1550 3550 1825       Net +2688.5 +962.3 +710                     Stool Unmeasured Occurrence 1 x 0 x 2 x            ----------------------------------------------------------------------------------------------------------------------  Physical exam:  Constitutional:  Well-developed and well-nourished. Awake, looking around room    HENT:  Head:  Normocephalic and atraumatic.  Mouth:  Moist mucous membranes.    Eyes:  Conjunctivae normal. No scleral icterus.    Neck:  Neck supple.  No JVD present.    Cardiovascular:  Normal rate, regular rhythm and normal heart sounds with no murmur.  Pulmonary/Chest:  Intubated/ventilated, bilateral course breath sounds  Abdominal:  Soft. No distension and no tenderness.   Musculoskeletal:  No tenderness, and no deformity.  No red or swollen joints anywhere.    Neurological: awake, tracking some now, left upper extremity more movement and possibly following some commands  Skin:  Skin is warm and dry. No rash noted. No pallor.   Peripheral vascular: Bilateral lower extremity ulcers wrapped, clean and dry  : Nabeel in place  ----------------------------------------------------------------------------------------------------------------------           Results from last 7 days   Lab Units 01/31/22  2326 01/31/22  0103 01/30/22  0403 01/29/22  0521 01/29/22  0521   CRP mg/dL  --   --    --   --  5.37*   WBC 10*3/mm3 12.23* 11.33* 10.94*   < > 12.64*   HEMOGLOBIN g/dL 11.1* 11.0* 10.9*   < > 11.1*   HEMATOCRIT % 38.8 37.3 37.0   < > 37.8   MCV fL 84.2 80.9 81.9   < > 81.5   MCHC g/dL 28.6* 29.5* 29.5*   < > 29.4*   PLATELETS 10*3/mm3 509* 496* 496*   < > 554*    < > = values in this interval not displayed.           Results from last 7 days   Lab Units 02/01/22  0425   PH, ARTERIAL pH units 7.423   PO2 ART mm Hg 95.5   PCO2, ARTERIAL mm Hg 46.2*   HCO3 ART mmol/L 30.1*                 Results from last 7 days   Lab Units 01/31/22  2331 01/31/22  2326 01/31/22  1325 01/31/22  0103 01/31/22  0103 01/30/22  0403 01/30/22  0403   SODIUM mmol/L 144 143  --   --  139   < > 139   POTASSIUM mmol/L 4.0 3.9 4.3   < > 3.4*   < > 3.8   MAGNESIUM mg/dL 2.1 2.1  --   --  2.3   < > 1.9   CHLORIDE mmol/L 107 106  --   --  100   < > 100   CO2 mmol/L 25.8 26.3  --   --  29.0   < > 27.8   BUN mg/dL 15 15  --   --  16   < > 13   CREATININE mg/dL 0.58 0.55*  --   --  0.52*   < > 0.47*   EGFR IF NONAFRICN AM mL/min/1.73 108 115  --   --  122   < > 138   CALCIUM mg/dL 10.3 10.4  --   --  10.1   < > 10.0   PHOSPHORUS mg/dL 4.6*  --   --   --  3.7  --   --    GLUCOSE mg/dL 259* 255*  --   --  244*   < > 271*   ALBUMIN g/dL  --  3.63  --   --  3.73  --  3.56   BILIRUBIN mg/dL  --  0.2  --   --  0.2  --  0.2   ALK PHOS U/L  --  123*  --   --  133*  --  135*   AST (SGOT) U/L  --  23  --   --  34*  --  36*   ALT (SGPT) U/L  --  37*  --   --  35*  --  26    < > = values in this interval not displayed.   Estimated Creatinine Clearance: 146.5 mL/min (by C-G formula based on SCr of 0.58 mg/dL).        Ammonia   Date Value Ref Range Status   01/31/2022 40 11 - 51 umol/L Final               Results from last 7 days   Lab Units 01/27/22  0045   PROBNP pg/mL 915.8*                  Glucose   Date/Time Value Ref Range Status   02/01/2022 0535 248 (H) 70 - 130 mg/dL Final       Comment:       Meter: [id number] : [id number] JORGE  TOLU   01/31/2022 1645 212 (H) 70 - 130 mg/dL Final       Comment:       Meter: [id number] : [id number] EBER WILSON   01/31/2022 1127 206 (H) 70 - 130 mg/dL Final       Comment:       Meter: [id number] : [id number] ARA JOEL            Lab Results   Component Value Date     TSH 2.290 01/19/2022      No results found for: PREGTESTUR, PREGSERUM, HCG, HCGQUANT         Pain Management Panel         Pain Management Panel Latest Ref Rng & Units 1/19/2022     AMPHETAMINES SCREEN, URINE Negative Negative     BARBITURATES SCREEN Negative Negative     BENZODIAZEPINE SCREEN, URINE Negative Positive(A)     BUPRENORPHINEUR Negative Negative     COCAINE SCREEN, URINE Negative Negative     METHADONE SCREEN, URINE Negative Negative     METHAMPHETAMINEUR Negative Negative                                   Brief Urine Lab Results  (Last result in the past 365 days)       Color   Clarity   Blood   Leuk Est   Nitrite   Protein   CREAT   Urine HCG         01/19/22 2010 Dark Yellow    Clear    Negative    Trace    Negative    100 mg/dL (2+)                       No results found for: BLOODCX  No results found for: URINECX     I have personally looked at the labs and they are summarized above.  ----------------------------------------------------------------------------------------------------------------------  Detailed radiology reports for the last 24 hours:           Imaging Results (Last 24 Hours)      Procedure Component Value Units Date/Time     CT Angiogram Carotids [[id number]] Collected: 01/31/22 1823       Updated: 01/31/22 1827     Narrative:       EXAMINATION: CT ANGIOGRAM CAROTIDS-      CLINICAL INDICATION: Stroke, follow up        COMPARISON: None available     Radiation dose reduction techniques were utilized per ALARA protocol.  Automated exposure control was initiated through either or [employer]gent[employer] or  PHRQL software packages by  protocol.           PROCEDURE:  Thin cut axial images through  the carotid arteries were acquired during  the rapid infusion of IV contrast.     Additional 3-D reformatted images obtained via post-processing for  aerated diagnostic accuracy and procedural planning.     FINDINGS:  No evidence of large vessel occlusion.  There is plaque at the origin of the right internal carotid artery  causing mild-to-moderate stenosis.     No extrinsic deviation of the carotid vessels.        Impression:       Mild-to-moderate stenosis of the right internal carotid artery at its  origin.       This report was finalized on 1/31/2022 6:24 PM by Dr. Deonte Reece MD.        CT Angiogram Head [[id number]] Collected: 01/31/22 1742       Updated: 01/31/22 1826     Narrative:       EXAMINATION: CT ANGIOGRAM HEAD-      CLINICAL INDICATION: Stroke, follow up        COMPARISON: None available.     Radiation dose reduction techniques were utilized per ALARA protocol.  Automated exposure control was initiated through either or [employer] or  DoseRight software packages by  protocol.           PROCEDURE:  Thin cut axial images were acquired through the brain during the rapid  infusion of IV contrast     Additional 3-D reformatted images obtained via post-processing for  aerated diagnostic accuracy and procedural planning..     FINDINGS:  No evidence of large vessel occlusion.     Basilar artery is somewhat diminutive.     No aneurysm is seen     No evidence of vascular malformation.     There are no contrast-enhancing masses.        Impression:       1. No large vessel occlusion.  2. Basilar tip is somewhat diminutive  3. Other findings as above.       This report was finalized on 1/31/2022 6:24 PM by Dr. Deonte Reece MD.        CT Head Without Contrast [[id number]] Collected: 01/31/22 1619       Updated: 01/31/22 1632     Narrative:       CT HEAD WO CONTRAST-     CLINICAL INDICATION: Stroke, follow up        COMPARISON: 01/28/2022      TECHNIQUE: Axial images of the brain were obtained with out  intravenous  contrast.  Reformatted images were created in the sagittal and coronal  planes.     DOSE:     Radiation dose reduction techniques were utilized per ALARA protocol.  Automated exposure control was initiated through either or [employer] or  DoseRight software packages by  protocol.           FINDINGS:   Today's study shows no mass, hemorrhage, or midline shift.   The ventricles, cisterns, and sulci are unremarkable. There is no  hydrocephalus.   There is no evidence of acute ischemia.  I do not see epidural or subdural hematoma.  The gray-white differentiation is appropriate.   The bone window setting images show no destructive calvarial lesion or  acute calvarial fracture.   The posterior fossa is unremarkable.           Impression:          1. Study is degraded from patient motion  2. No parenchymal mass, hemorrhage, or midline shift  3. Mild chronic microangiopathic change stable from the previous study     This report was finalized on 1/31/2022 4:19 PM by Dr. Deonte Reece MD.             Assessment & Plan    56F Smoker, Obese by BMI PMH Anxiety/Depression, Leukemia, Chronic Pain, COPD, Diabetes Mellitus Type II, Hypertension, Hyperlipidemia, Peripheral Vascular Disease, Obstructive Sleep Apnea, chronic relapsing and remitting bilateral lower extremity foot ulcers and follows in wound care clinic, presented [facility] emergency room 1/19 for altered mental status.     #Septic Shock, Acute Metabolic Encephalopathy & Acute Hypoxic Respiratory Failure requiring Intubation and Mechanical Ventilation 2/2 PNA, Bacterial, treating for MDR Organisms in setting of underlying COPD without acute exacerbation and Obstructive Sleep Apnea - Acute Metabolic Encephalopathy Persisting   - Patient presents w/ abrupt onset altered mental status, WBC count > 12K, heart rate > 90, Pneumonia  on imaging, SBP < 90 requiring IV pressors, intubated for airway protection, etiology of altered mental status  suspected infectious/sepsis though has had persisting encephalopathy while on ventilator, repeat head CT from 1/19 to 1/28 was stable, no acute intracranial abnormalities, EEG's without epileptiform activity only generalized slowing.  1/31 off sedation thought to have lateral gaze deficits, repeat CTA's and EEGs without definitive etiology  - Pulmonary consulted; Following  - Infectious Disease consulted; Following  - TeleNeuro consulted and following  - Lumbar puncture without growth, encephalitis panel negative, cytology with elevated glucose and protein, unable to perform Cellblock; Pending ability to extubate may consider repeat lumbar puncture later today  - Continue Vancomycin and Flagyl  - Off sedation this AM, more awake, has not required IV pressors  - Continue APAP PRN for fevers, Duonebs as needed  - IV PPI on board for GI prophylaxis   - AM ABG reviewed and stable overall, awaiting Pulmonary recommendations on spontaneous breathin trial   - Monitor in CCU, monitor on telemetry, continue 02 but wean as able     #Relapsing and Remitting Lower Extremity Wounds/Ulcers now with Osteomyelitis L great toe and bilateral cellulitis   - Patient reported 2 year history lower extremity wounds, follows in wound care clinic, cultures from initial biopsy grew MSSA  - Rheumatology panel negative with normal C and P-ANCA, Anti-CCP, SSA/SSB, Atypical P-ANCA, Anti-Hu Ab   - Bone scan concern for osteomyelitis  - General Surgery consulted; Followed, status post debridement 1/22, signed off, re-evaluated 1/30 and recommended no surgical intervention.  - Infectious Disease consulted; Following as per above, continue antibiotics as per above  - Obtained Rheumatology panel and paraneoplastic panel as per above.     #Hypertension/Hyperlipidemia/Peripheral Vascular Disease   #Atrial Fibrillation w/ RVR, New onset (CHADsVasc = 3)  #NSTEMI Type II due to Atrial Fibrillation   - Patient with noted Atrial Fibrillation with RVR in  emergency room on EKG, recurrent heart rate up to 160's.  - Labs showed troponins <0.010 -> 0.036 -> <0.010, proBNP 4800  - EKG showed Atrial Fibrillation with RVR  - Echo showed mild concentric left ventricular hypertrophy, normal LV function.   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 A BCR ABL PCR was obtained which was <0.001% as well as a flow cytometry which was concerning for a monotypical CD5+ B cell population with nonspecific immunophenotype, but significant for a variant B cell SLL or mantle cell lymphoma that could not be ruled out. CLL FISH was then obtained which was negative and CCND1/IGH - t(11:14) was not detected, JAK2 V617 and JAK2 exon 12 mutation which were negative as well.  - Pt had follow up scheduled 6/2020 and no showed appointment.    - Peripheral smear without evidence of acute leukemia  - Hematology/Oncology consulted; Evaluated and did not suspect current condition related to hematological condition, recommended follow up Dr. Menezes outpatient      #NIDDM Type II, controlled, unknown complications, New diagnosis  - Hgb A1c = 6.5%  - No home oral agents, continue FSBG and SSI, continue new Levemir and increased to 30U nightly, titrate as indicated      #Anxiety/Depression  - Psychiatry consulted in emergency room and felt altered mental status not related to underlying psychiatric illness; Continue home Seroquel and Ativan     #Tobacco Dependence  - Rec'd cessation, NRT as needed     #Obesity by BMI  -  BMI 30, complicates all aspects of care     F: NPO  E: Monitor & Replace PRN  N: NPO Diet; Tube Feeds   Ppx: on Hep gtt  Code: Full Code     Dispo: Pending workup and clinical improvement     *This patient is considered high risk due to sepsis, acute respiratory failure, PNA, intubation and mechanical ventilation.        I have spent 35 minutes of critical care time (7:45AM-8:10AM) with > 50% of time spent in direct patient care, evaluating the patient at bedside, reviewing all labs and images, reviewing ABG and vent settings, communicating plan of care w/ nursing staff, utilizing high complexity medical decision making to assess and treat vital organ system failure in an individual who has impairment of one or more vital organ systems such that there is a high probability of imminent or life threatening deterioration in the patient’s condition. Failure to initiate the above interventions on an urgent basis would likely result in sudden, clinically significant or life threatening deterioration in the patient's condition.     Edited by: Dick Whiting MD at 2/1/2022 1052  [facility]                          Dick Whiting MD   Physician   Hospitalist   Progress Notes      Signed   Date of Service:  02/01/22 1722   Creation Time:  02/01/22 1722              Signed              Show:Clear all  [x]Manual[]Template[]Copied    Added by:  [x]Dick Whiting MD      []Hover for details    Extubated to NC today, stable, blood pressure trending up and giving PRNs, treating pain as well, likely wouldn't tolerate lumbar puncture at this time, hold on order and resume Hep gtt now, communicated with Nurse, have ordered MRI and Echocardiogram as per TeleNeuro recommendations, pending.

## 2022-02-03 NOTE — PROGRESS NOTES
Stroke Progress Note       Chief Complaint: Altered mental status, acute encephalopathy    Subjective    Subjective     Subjective:  No acute events overnight.  Plan for CT head today, as patient remains too restless for MRI.     Review of Systems   Unable to perform ROS: Patient nonverbal            Objective    Objective      Temp:  [98.8 °F (37.1 °C)-99.7 °F (37.6 °C)] 98.9 °F (37.2 °C)  Heart Rate:  [] 124  Resp:  [19-32] 30  BP: (141-198)/() 171/84        Neurological Exam  Mental Status  Awake. Patient is nonverbal.  Patient is alert, makes eye contact, regards me only on left side of the room..    Cranial Nerves  CN II: Patient blinks to visual threat on the left, no response to visual threat on the right.  CN III, IV, VI: Pupils equal round and reactive to light bilaterally. Patient has left gaze preference, appears to regard me on her left side, does not cross midline to the right.  CN V:  Right: Normal corneal reflex.  Left: Normal corneal reflex.  CN VII: No obvious facial droop.    Motor  Normal muscle bulk throughout. Decreased muscle tone. No abnormal involuntary movements.  Patient frequently raising left arm to head and back down, nursing reports she will intermittently squeeze hand to command.  Right-sided hemiplegia noted, did not notice any movement of either lower extremity.    Sensory  Patient grimaces to nailbed pressure bilaterally, she withdraws left hand in response to tactile stimulation.      Physical Exam  Vitals and nursing note reviewed.   Constitutional:       General: She is awake.      Appearance: She is obese. She is ill-appearing.   HENT:      Head: Normocephalic and atraumatic.      Mouth/Throat:      Mouth: Mucous membranes are dry.      Pharynx: Oropharynx is clear.   Eyes:      Pupils: Pupils are equal, round, and reactive to light.   Cardiovascular:      Rate and Rhythm: Regular rhythm. Tachycardia present.   Pulmonary:      Effort: No respiratory distress.    Skin:     General: Skin is warm and dry.         Hospital Meds:  Scheduled- cefepime, 2 g, Intravenous, Q8H  hydrALAZINE, 10 mg, Oral, Q8H  insulin aspart, 0-14 Units, Subcutaneous, TID AC  insulin detemir, 30 Units, Subcutaneous, Daily  LORazepam, 0.5 mg, Oral, Nightly  losartan, 50 mg, Oral, Daily  metoprolol tartrate, 100 mg, Nasogastric, Q12H  metroNIDAZOLE, 500 mg, Intravenous, Q8H  pantoprazole, 40 mg, Intravenous, Q AM  pravastatin, 40 mg, Oral, Daily  pregabalin, 75 mg, Nasogastric, Q12H  QUEtiapine, 50 mg, Oral, Nightly  sodium chloride, 10 mL, Intravenous, Q12H  sodium chloride, 10 mL, Intravenous, Q12H  spironolactone, 25 mg, Oral, Daily  thiamine (VITAMIN B1) IVPB, 500 mg, Intravenous, Daily  vancomycin, 1,750 mg, Intravenous, Q12H      Infusions- dexmedetomidine, 0.2-1.5 mcg/kg/hr, Last Rate: 0.5 mcg/kg/hr (02/03/22 0348)  heparin (porcine), 10.2 Units/kg/hr, Last Rate: 26.2 Units/kg/hr (02/03/22 1035)  hold, 1 each  Pharmacy Consult,   Pharmacy to dose vancomycin,        PRNs- •  acetaminophen  •  artificial tears  •  dextrose  •  dextrose  •  glucagon (human recombinant)  •  heparin (porcine)  •  heparin (porcine)  •  hold  •  hydrALAZINE  •  LORazepam  •  magnesium sulfate **OR** magnesium sulfate **OR** magnesium sulfate  •  metoprolol tartrate  •  Pharmacy Consult  •  Pharmacy to dose vancomycin  •  potassium chloride **OR** potassium chloride **OR** [DISCONTINUED] potassium chloride  •  sodium chloride  •  sodium chloride    Results Review:    I reviewed the patient's new clinical results.    Transthoracic Echo:    Interpretation Summary    · Left ventricular wall thickness is consistent with mild concentric hypertrophy.  · Left ventricular ejection fraction appears to be 61 - 65%. Left ventricular systolic function is normal.  · Left ventricular diastolic dysfunction is noted.  · Saline test results are negative.  · This is a technically limited study with poor echo windows and no carotid  Doppler.              Assessment/Plan     Assessment/Plan:      1. Stroke-like symptoms, left gaze preference and right-sided hemiplegia noted when sedation was weaned,based on symptoms likely left hemisphere embolic stroke.  Initial CT of head was highly degraded by motion, however patient was then sedated and repeat head CT did not show any acute abnormalities.  Unable to obtain MRI due to restlessness. Patient currently being treated for pneumonia and septic shock, however this is out of proportion to her encephalopathy.  Family repeatedly denies any recreational drug use, urine toxicology was unremarkable.   Patient found to be in proximal A. fib while in ED,  was on heparin drip which was paused pending LP, and has now been resumed, cardiology is following.  Initial and repeat EEG showed moderate diffuse cerebral dysfunction, no epilepsy.  Lumbar puncture on 1/20/2022 ruled out meningitis/encephalitis. Repeat LP has been considered however patient unlikely to tolerate at present.  Repeat TTE was okay, results above.  -MRI brain when able  -Heparin drip restarted  -Repeat CT Head without contrast since unable to do MRI today  -Functional prognosis is guarded      The case was discussed with, nursing. Neurology will continue to follow.      Jayce Avelar, ALDEN  02/03/22  16:11 EST

## 2022-02-03 NOTE — PROGRESS NOTES
Chart reviewed and patient stable from a respiratory standpoint.  ABG reviewed.  Noted oxygen needs.  Suspect ongoing issues with aspiration and poor secretion clearance playing a role.  Continue with multiple antibiotics per ID.  Unfortunately guarded prognosis

## 2022-02-03 NOTE — PROGRESS NOTES
PROGRESS NOTE         Patient Identification:  Name:  Lynette Stein  Age:  56 y.o.  Sex:  female  :  1965  MRN:  [id number]  Visit Number:  [id number]  Primary Care Provider:  Orville Wu PA         LOS: 12 days       ----------------------------------------------------------------------------------------------------------------------  Subjective       Chief Complaints:    No chief complaint on file.        Interval History:      Patient resting in bed.  Awake, confused and does not follow commands.  Reaching no air with her left arm.  Currently on 4 L per nasal cannula with no apparent distress.  WBC elevated at  14.09.      Review of Systems:    Unable to obtain.  Altered mental status.  ----------------------------------------------------------------------------------------------------------------      Objective       Current Hospital Meds:  cefepime, 2 g, Intravenous, Q8H  hydrALAZINE, 10 mg, Oral, Q8H  insulin aspart, 0-14 Units, Subcutaneous, TID AC  insulin detemir, 30 Units, Subcutaneous, Daily  LORazepam, 0.5 mg, Oral, Nightly  losartan, 50 mg, Oral, Daily  metoprolol tartrate, 100 mg, Nasogastric, Q12H  metroNIDAZOLE, 500 mg, Intravenous, Q8H  pantoprazole, 40 mg, Intravenous, Q AM  pravastatin, 40 mg, Oral, Daily  pregabalin, 75 mg, Nasogastric, Q12H  QUEtiapine, 50 mg, Oral, Nightly  sodium chloride, 10 mL, Intravenous, Q12H  sodium chloride, 10 mL, Intravenous, Q12H  spironolactone, 25 mg, Oral, Daily  thiamine (VITAMIN B1) IVPB, 500 mg, Intravenous, Daily  vancomycin, 1,750 mg, Intravenous, Q12H      dexmedetomidine, 0.2-1.5 mcg/kg/hr, Last Rate: 0.5 mcg/kg/hr (22 8588)  heparin (porcine), 10.2 Units/kg/hr, Last Rate: 26.2 Units/kg/hr (22 1035)  hold, 1 each  Pharmacy Consult,   Pharmacy to dose vancomycin,       ----------------------------------------------------------------------------------------------------------------------    Vital Signs:  Temp:  [98.8 °F  (37.1 °C)-99.7 °F (37.6 °C)] 98.9 °F (37.2 °C)  Heart Rate:  [] 124  Resp:  [19-32] 30  BP: (141-198)/() 171/84  Mean Arterial Pressure (Non-Invasive) for the past 24 hrs (Last 3 readings):   Noninvasive MAP (mmHg)   02/03/22 0900 106   02/03/22 0800 122   02/03/22 0700 120     SpO2 Percentage    02/03/22 0705 02/03/22 0800 02/03/22 0900   SpO2: (!) 89% 91% 90%     SpO2:  [87 %-96 %] 90 %  on  Flow (L/min):  [3-4] 4;   Device (Oxygen Therapy): nasal cannula    Body mass index is 30.25 kg/m².  Wt Readings from Last 3 Encounters:   02/03/22 101 kg (223 lb 1.7 oz)   01/19/22 106 kg (233 lb)   12/14/21 106 kg (233 lb 9.6 oz)        Intake/Output Summary (Last 24 hours) at 2/3/2022 1237  Last data filed at 2/3/2022 0921  Gross per 24 hour   Intake 4244.06 ml   Output 2600 ml   Net 1644.06 ml     NPO Diet  ----------------------------------------------------------------------------------------------------------------------      Physical Exam:    Constitutional: Chronically ill-appearing.  Awake and agitated, but not following commands.  Drooling and experiencing oral automatisms.  HENT:  Head: Normocephalic and atraumatic.  Mouth:  Moist mucous membranes.    Eyes:  Pupils equal.  Left eye chemosis.   Neck:  Neck supple.  No JVD present.    Cardiovascular:  Normal rate, regular rhythm and normal heart sounds with no murmur. No edema.  Pulmonary/Chest: Coarse breath sounds bilaterally.  Abdominal:  Soft.  Bowel sounds are normal.  No distension and no tenderness.   Musculoskeletal:  No tenderness, and no deformity.  No swelling or redness of joints.  Mild bilateral lower extremity edema.  Neurological:  Awake and agitated.  Not following commands.  Skin:  Skin is warm and dry.  No rash noted.  No pallor.  Scattered bruises and scabs to bilateral upper extremities, trunk, bilateral lower extremities and feet.  Bilateral feet ulcers in dressings today.  Psychiatric: Awake and agitated.  Not following  commands.  ----------------------------------------------------------------------------------------------------------------------              Results from last 7 days   Lab Units 02/03/22  1112   PH, ARTERIAL pH units 7.480*   PO2 ART mm Hg 88.7   PCO2, ARTERIAL mm Hg 32.6*   HCO3 ART mmol/L 24.2     Results from last 7 days   Lab Units 02/03/22  1111 02/03/22  0025 02/02/22 0008 01/31/22 2326 01/30/22  0403 01/29/22  0521   CRP mg/dL  --   --   --   --   --  5.37*   LACTATE mmol/L 0.9  --   --   --   --   --    WBC 10*3/mm3  --  14.09* 12.67* 12.23*   < > 12.64*   HEMOGLOBIN g/dL  --  12.0 11.6* 11.1*   < > 11.1*   HEMATOCRIT %  --  40.1 39.5 38.8   < > 37.8   MCV fL  --  82.7 83.0 84.2   < > 81.5   MCHC g/dL  --  29.9* 29.4* 28.6*   < > 29.4*   PLATELETS 10*3/mm3  --  472* 421 509*   < > 554*    < > = values in this interval not displayed.     Results from last 7 days   Lab Units 02/03/22  0025 02/02/22  1118 02/02/22 0008 01/31/22 2331 01/31/22  2331 01/31/22 2326 01/31/22  2326 01/31/22  1325 01/31/22  0103   SODIUM mmol/L 143  --  143  --  144   < > 143  --  139   POTASSIUM mmol/L 3.7 3.9 3.4*   < > 4.0   < > 3.9   < > 3.4*   MAGNESIUM mg/dL  --   --   --   --  2.1  --  2.1  --  2.3   CHLORIDE mmol/L 110*  --  107  --  107   < > 106  --  100   CO2 mmol/L 20.2*  --  22.8  --  25.8   < > 26.3  --  29.0   BUN mg/dL 15  --  15  --  15   < > 15  --  16   CREATININE mg/dL 0.54*  --  0.61  --  0.58   < > 0.55*  --  0.52*   EGFR IF NONAFRICN AM mL/min/1.73 117  --  102  --  108   < > 115  --  122   CALCIUM mg/dL 10.2  --  10.1  --  10.3   < > 10.4  --  10.1   GLUCOSE mg/dL 261*  --  232*  --  259*   < > 255*  --  244*   ALBUMIN g/dL 3.19*  --  3.62  --   --   --  3.63  --  3.73   BILIRUBIN mg/dL 0.2  --  0.2  --   --   --  0.2  --  0.2   ALK PHOS U/L 118*  --  125*  --   --   --  123*  --  133*   AST (SGOT) U/L 23  --  31  --   --   --  23  --  34*   ALT (SGPT) U/L 31  --  40*  --   --   --  37*  --  35*    < > =  values in this interval not displayed.   Estimated Creatinine Clearance: 156.7 mL/min (A) (by C-G formula based on SCr of 0.54 mg/dL (L)).  Ammonia   Date Value Ref Range Status   01/31/2022 40 11 - 51 umol/L Final       Glucose   Date/Time Value Ref Range Status   02/03/2022 1047 204 (H) 70 - 130 mg/dL Final     Comment:     Meter: [id number] : [id number] Jasiel Cole A   02/03/2022 0744 241 (H) 70 - 130 mg/dL Final     Comment:     Meter: [id number] : [id number] Gainluca Pace N   02/02/2022 1654 248 (H) 70 - 130 mg/dL Final     Comment:     Meter: [id number] : [id number] Bridget Blanca   02/02/2022 1209 256 (H) 70 - 130 mg/dL Final     Comment:     Meter: [id number] : [id number] Bridget Blanca   02/02/2022 0603 244 (H) 70 - 130 mg/dL Final     Comment:     Meter: [id number] : [id number] [facility]   02/01/2022 2348 203 (H) 70 - 130 mg/dL Final     Comment:     Meter: [id number] : [id number] [facility]   02/01/2022 1739 197 (H) 70 - 130 mg/dL Final     Comment:     Meter: [id number] : [id number] VIJI HEREDIA   02/01/2022 1205 199 (H) 70 - 130 mg/dL Final     Comment:     Meter: [id number] : [id number] VIJI HEREIDA     Lab Results   Component Value Date    HGBA1C 6.50 (H) 01/22/2022     Lab Results   Component Value Date    TSH 2.290 01/19/2022       Blood Culture   Date Value Ref Range Status   01/22/2022 No growth at 4 days  Preliminary   01/22/2022 No growth at 4 days  Preliminary     No results found for: URINECX  Wound Culture   Date Value Ref Range Status   01/24/2022 Rare Staphylococcus aureus (A)  Final   01/24/2022 Rare Normal Skin Melva  Final     No results found for: STOOLCX  Respiratory Culture   Date Value Ref Range Status   01/25/2022 Scant growth (1+) Candida albicans (A)  Final   01/25/2022 No Normal Respiratory Melva (A)  Final     Pain Management Panel       Pain Management Panel Latest Ref Rng & Units 1/19/2022    AMPHETAMINES SCREEN, URINE Negative Negative     BARBITURATES SCREEN Negative Negative    BENZODIAZEPINE SCREEN, URINE Negative Positive(A)    BUPRENORPHINEUR Negative Negative    COCAINE SCREEN, URINE Negative Negative    METHADONE SCREEN, URINE Negative Negative    METHAMPHETAMINEUR Negative Negative              ----------------------------------------------------------------------------------------------------------------------  Imaging Results (Last 24 Hours)       ** No results found for the last 24 hours. **            ----------------------------------------------------------------------------------------------------------------------    Assessment/Plan       Assessment/Plan     ASSESSMENT:    1.  Severe sepsis with acute respiratory failure requiring mechanical ventilation  2.  Pneumonia  3.  Left great toe osteomyelitis  4.  Enterocolitis    PLAN:    Patient resting in bed.  Awake, confused and does not follow commands.  Reaching no air with her left arm.  Currently on 4 L per nasal cannula with no apparent distress.  WBC elevated at  14.09.      Chest x-ray on 2/1/2022 shows mild bibasilar infiltrates.  Respiratory culture on 1/29/2022 finalized as no growth.    Bone scan on 1/28/2022 showed focal area of increased tracer uptake localizing to the left great toe.  If ulceration or infection in this location, osteomyelitis should be considered.  Periarticular type uptake throughout the right foot as detailed above in a pattern and distribution that is more favorable for reactive changes and neuropathic joint changes.  Soft tissue type uptake suggestive of cellulitis.      CT chest on 1/28/2022 showed new or worsening left basilar parenchymal opacity likely representing combination of atelectasis and/or infiltrate.  Continued right lower lobe atelectasis and likely infiltrate.  Patchy groundglass opacity left upper lobe and along the anterior aspect right upper lobe could represent very mild pneumonitis.  Increased tracheal and bronchial secretions may  reflect underlying bronchitis.  This may be contributing factor of atelectasis.  Respiratory therapy may be of benefit.  Endotracheal tube in place.  Nasogastric tube appears in satisfactory position.  A second 2 passes alongside the nasogastric tube terminates in the distal esophagus.  Following discussion with patient's nurse it represents a temperature probe.  Increased colonic fluid with air-fluid levels.  Correlate for enteric colitis.  No focal inflammatory changes or obstruction.  CT abdomen pelvis on 1/28/2022 showed the same as CT chest.     COVID-19 and influenza PCR negative.  Lumbar puncture culture and meningitis encephalitis panel negative. Legionella negative.  Strep pneumo antigen negative.  MRSA PCR positive.  Henderson spotted fever IgM equivocal at 1.06. Wound culture of the right foot from 1/24/2022 preliminary reports growth of Staph aureus.  Respiratory culture from 1/25/2022 reports growth of Candida albicans.  CT of the bilateral lower extremities reports no evidence of acute fracture, osteomyelitis, soft tissue gas or fluid collection. Wound culture of the right foot on 1/24/2022 finalized as MSSA. Respiratory culture on 1/25/2022 finalized as Candida albicans, likely a colonization. Blood cultures on 1/22/2022 finalized as no growth. Respiratory culture on 1/29/2022 is finalized as no growth.     As bone scan shows evidence of left great toe osteomyelitis, recommended surgical evaluation and intraoperative cultures from the left great toe to help direct antibiotic therapy. Surgeon has reevaluated the patient and does not recommend debridement or amputation at this time.      For now recommend to continue cefepime 2 g IV every 8 hours, Flagyl 500 mg IV every 8 hours and vancomycin pharmacy to dose.  We will continue to follow closely and adjust antibiotic therapy as needed.     Code Status:   Code Status and Medical Interventions:   Ordered at: 01/22/22 0039     Level Of Support Discussed  With:    Health Care Surrogate     Code Status (Patient has no pulse and is not breathing):    CPR (Attempt to Resuscitate)     Medical Interventions (Patient has pulse or is breathing):    Full Support     Scribed for Dr. To Christopher MD by ALDEN Nails  02/03/22 12:41 EST        ALDEN Nails  02/03/22  12:37 EST    Physician Attestation:    The documentation recorded by the scribe accurately reflects the service I personally performed and the decisions made by me.    To Christopher MD  02/03/22  12:37 EST

## 2022-02-03 NOTE — PROGRESS NOTES
LOS: 12 days     Name: Lynette Stein  Age/Sex: 56 y.o. female  :  1965        PCP: Orville Wu PA  REF: No Known Provider    Active Problems:    Encephalopathy acute      Reason for follow-up: Atrial fibrillation and congestive heart failure     Subjective       Subjective     Lynette Stein is a 56 y.o. female with a past medical history significant for hypertension, dyslipidemia, COPD, type 2 diabetes, anxiety/depression, leukemia, obstructive sleep apnea, and bilateral leg ulcerations.  She initially presented to the [facility] ED on 2022 with altered mental status.  Patient developed atrial fibrillation with RVR are in the ED    Interval History: Repeat echocardiogram showed normal LV function with negative saline results.  Patient alert but does not respond to commands.  Moving left upper extremity but right upper extremity flaccid.  No further arrhythmias noted on telemetry.  Noted to be more tachycardic today in the 120s.  Remains in sinus rhythm.  On IV heparin.    Vital Signs  Temp:  [98.8 °F (37.1 °C)-99.7 °F (37.6 °C)] 98.9 °F (37.2 °C)  Heart Rate:  [] 128  Resp:  [-32] 30  BP: (141-198)/() 169/86     Vital Signs (last 72 hrs)        0700   0659  0700   0659  0700   0659  0700  / 0915   Most Recent      Temp (°F) 99.4 -  100    99.3 -  101    98.8 -  99.7      98.9     98.9 (37.2) / 0800    Heart Rate 89 -  141    84 -  144    93 -  130    117 -  128     128 02/03 0900    Resp 22 -  33    8 -  30    19 -  32    28 -  30     30 / 0900    BP 81/50 -  197/87    113/57 -  223/98    141/78 -  198/95    169/86 -  172/88     169/86 / 0900    SpO2 (%) 92 -  100    91 -  98    87 -  97    89 -  93     90 / 0900        Documented weights    22 0029 22 0602 22 0602 22 0533   Weight: 96.6 kg (212 lb 15.4 oz) 97.2 kg (214 lb 4.6 oz) 97 kg (213 lb 13.5 oz) 97.8 kg (215 lb 9.8 oz)    22  0502 01/26/22 0700 01/28/22 0600 01/29/22 0600   Weight: 99.8 kg (220 lb 1.6 oz) 98.9 kg (218 lb) 101 kg (223 lb 12.3 oz) 98.6 kg (217 lb 6 oz)    01/30/22 0535 02/02/22 0602 02/03/22 0600   Weight: 102 kg (224 lb 3.3 oz) 97.6 kg (215 lb 2.7 oz) 101 kg (223 lb 1.7 oz)      Body mass index is 30.25 kg/m².    Intake/Output Summary (Last 24 hours) at 2/3/2022 0915  Last data filed at 2/3/2022 0749  Gross per 24 hour   Intake 3844.06 ml   Output 2600 ml   Net 1244.06 ml     Objective    Objective     I have seen and evaluated  today.  Physical Exam:     General Appearance:    Alert.disoriented in no acute distress   Head:    Normocephalic, without obvious abnormality, atraumatic   Eyes:            Conjunctivae and sclerae normal, no   icterus, no pallor, corneas clear.   Neck:   No adenopathy, supple, trachea midline, no thyromegaly, no   carotid bruit, no JVD   Lungs:     Clear to auscultation,respirations regular, even and                  unlabored    Heart:    Regular rhythm and normal rate, normal S1 and S2, no            murmur, no gallop, no rub, no click   Chest Wall:    No abnormalities observed   Abdomen:     Normal bowel sounds, no masses, no organomegaly, soft        non-tender, non-distended, no guarding, no rebound                tenderness   Extremities:  Moving left upper extremity but right upper extremity flaccid, no edema, no cyanosis, no             redness   Pulses:   Pulses palpable and equal bilaterally   Skin:   No bleeding, bruising or rash       Neurologic:   Alert, right hemiparesis.      Results review       Results Review:   Results from last 7 days   Lab Units 02/03/22  0025 02/02/22  0008 01/31/22  2326 01/31/22  0103 01/30/22  0403 01/29/22  0521 01/28/22  0048   WBC 10*3/mm3 14.09* 12.67* 12.23* 11.33* 10.94* 12.64* 10.84*   HEMOGLOBIN g/dL 12.0 11.6* 11.1* 11.0* 10.9* 11.1* 10.5*   PLATELETS 10*3/mm3 472* 421 509* 496* 496* 554* 372     Results from last 7 days   Lab Units  02/03/22  0025 02/02/22  1118 02/02/22  0008 01/31/22  2331 01/31/22  2326 01/31/22  1325 01/31/22  0103 01/30/22  0403 01/30/22  0403 01/29/22  1430 01/29/22  0521 01/28/22  0048 01/28/22  0048   SODIUM mmol/L 143  --  143 144 143  --  139  --  139  --  136   < > 139   POTASSIUM mmol/L 3.7 3.9 3.4* 4.0 3.9 4.3 3.4*   < > 3.8   < > 3.4*   < > 3.6   CHLORIDE mmol/L 110*  --  107 107 106  --  100  --  100  --  92*   < > 100   CO2 mmol/L 20.2*  --  22.8 25.8 26.3  --  29.0  --  27.8  --  30.3*   < > 26.3   BUN mg/dL 15  --  15 15 15  --  16  --  13  --  16   < > 13   CREATININE mg/dL 0.54*  --  0.61 0.58 0.55*  --  0.52*  --  0.47*  --  0.54*   < > 0.50*   CALCIUM mg/dL 10.2  --  10.1 10.3 10.4  --  10.1  --  10.0  --  10.2   < > 9.7   GLUCOSE mg/dL 261*  --  232* 259* 255*  --  244*  --  271*  --  295*   < > 273*   ALT (SGPT) U/L 31  --  40*  --  37*  --  35*  --  26  --  19  --  13   AST (SGOT) U/L 23  --  31  --  23  --  34*  --  36*  --  28  --  13    < > = values in this interval not displayed.         Lab Results   Component Value Date    INR 1.18 (H) 01/22/2022    INR 1.07 01/19/2022    INR 0.89 12/01/2016     Lab Results   Component Value Date    MG 2.1 01/31/2022    MG 2.1 01/31/2022    MG 2.3 01/31/2022     Lab Results   Component Value Date    TSH 2.290 01/19/2022    TRIG 335 (H) 01/22/2022    HDL 32 (L) 01/22/2022    LDL 96 01/22/2022      Imaging Results (Last 48 Hours)     Procedure Component Value Units Date/Time    XR Chest 1 View [[id number]] Collected: 02/01/22 2035     Updated: 02/01/22 2037    Narrative:      CHEST X-RAY, 2/1/2022      HISTORY:    NG tube placement.      TECHNIQUE:  AP portable chest x-ray with image including the upper abdomen. (19:44)    COMPARISON:  *  Chest x-ray, 2/1/2022 (15:26)    FINDINGS:  NG tube is well-positioned with tip in the distal 3rd of the stomach. No visible gastric distention.    Mild bibasilar infiltrates, unchanged. No dense airspace consolidation or pleural  effusion. Mid and upper lungs remain clear. Heart size and pulmonary vascularity are normal.      Impression:      1. NG tube in good position.  2. Mild bibasilar infiltrates.    Signer Name: Mirza Galicia MD   Signed: 2/1/2022 8:35 PM   Workstation Name: [email or web address] [id number]    [facility]    XR Chest 1 View [[id number]] Collected: 02/01/22 1541     Updated: 02/01/22 1546    Narrative:      XR CHEST 1 VW-     CLINICAL INDICATION: ng tube        COMPARISON: 01/31/2022      TECHNIQUE: Single frontal view of the chest.     FINDINGS:     NG tube in the stomach  The cardiac silhouette is normal. The pulmonary vasculature is  unremarkable.  There is no evidence of an acute osseous abnormality.   There are no suspicious-appearing parenchymal soft tissue nodules.          Impression:      NG tube in the stomach     This report was finalized on 2/1/2022 3:41 PM by Dr. Deonte Reece MD.           Echo   Results for orders placed during the hospital encounter of 01/22/22    Adult Transthoracic Echo Limited W/ Cont if Necessary Per Protocol    Interpretation Summary  · Left ventricular wall thickness is consistent with mild concentric hypertrophy.  · Left ventricular ejection fraction appears to be 61 - 65%. Left ventricular systolic function is normal.  · Left ventricular diastolic dysfunction is noted.  · Saline test results are negative.  · This is a technically limited study with poor echo windows and no carotid Doppler.     I reviewed the patient's new clinical results.    Telemetry: Sinus tachycardia 100 to 120 bpm     Medication Review:   cefepime, 2 g, Intravenous, Q8H  hydrALAZINE, 10 mg, Oral, Q8H  insulin aspart, 0-14 Units, Subcutaneous, TID AC  insulin detemir, 30 Units, Subcutaneous, Daily  LORazepam, 0.5 mg, Oral, Nightly  losartan, 50 mg, Oral, Daily  metoprolol tartrate, 100 mg, Nasogastric, Q12H  metroNIDAZOLE, 500 mg, Intravenous, Q8H  pantoprazole, 40 mg, Intravenous, Q AM  pravastatin,  40 mg, Oral, Daily  pregabalin, 75 mg, Nasogastric, Q12H  QUEtiapine, 50 mg, Oral, Nightly  sodium chloride, 10 mL, Intravenous, Q12H  sodium chloride, 10 mL, Intravenous, Q12H  spironolactone, 25 mg, Oral, Daily  thiamine (VITAMIN B1) IVPB, 500 mg, Intravenous, Daily  vancomycin, 1,750 mg, Intravenous, Q12H        dexmedetomidine, 0.2-1.5 mcg/kg/hr, Last Rate: 0.5 mcg/kg/hr (02/03/22 0348)  heparin (porcine), 10.2 Units/kg/hr, Last Rate: 24.2 Units/kg/hr (02/03/22 0245)  hold, 1 each  Pharmacy Consult,   Pharmacy to dose vancomycin,         Assessment      Assessment:  1. Paroxysmal atrial fibrillation, maintaining sinus rhythm over the last few days.  2. 1 episode of 12 beat run of wide-complex tachycardia with no recurrence.  3. Acute HFpEF,  Resolved.  4. Uncontrolled hypertension  5. Possible acute CVA with right hemiparesis, being evaluated by neurologists.  6. Acute respiratory failure with hypoxia, improved.      Plan     Recommendations:  1. Increase losartan to 100 mg daily for elevated BP.  2. Continue to monitor the rhythm.    I discussed the patients findings and my recommendations with patient's  at bedside.    Electronically signed by ALDEN Do, 02/03/22, 9:15 AM EST.    Electronically signed by Elkin Collins MD, 02/03/22, 9:16 PM EST.      Please note that portions of this note were completed with a voice recognition program.

## 2022-02-03 NOTE — PROGRESS NOTES
[facility] HOSPITALIST PROGRESS NOTE     Patient Identification:  Name:  Lynette Stein  Age:  56 y.o.  Sex:  female  :  1965  MRN:  [id number]  Visit Number:  [id number]  ROOM: [address]     Primary Care Provider:  Orville Wu PA    Length of stay in inpatient status:  12    Subjective     Chief Compliant:  altered mental status     History of Presenting Illness:    Patient remains ill but stable today, no acute events overnight, more tachycardic and tachypneic, antibiotics excalated per Infectious Disease, afebrile this AM, remains on 02, repeating ABG and lactate this AM, workup so far unrevealing, moving left upper extremity and could squeeze hand, right upper extremity flaccid, no significant lower extremity movement, attempting to go for CT Head today, would not lay still long enough for MRI, repeat TTE with negative bubble study, prognosis remains guarded, has pulled out NG as well.   Objective     Current Hospital Meds:cefepime, 2 g, Intravenous, Q8H  hydrALAZINE, 10 mg, Oral, Q8H  insulin aspart, 0-14 Units, Subcutaneous, TID AC  insulin detemir, 30 Units, Subcutaneous, Daily  LORazepam, 0.5 mg, Oral, Nightly  losartan, 50 mg, Oral, Daily  metoprolol tartrate, 100 mg, Nasogastric, Q12H  metroNIDAZOLE, 500 mg, Intravenous, Q8H  pantoprazole, 40 mg, Intravenous, Q AM  pravastatin, 40 mg, Oral, Daily  pregabalin, 75 mg, Nasogastric, Q12H  QUEtiapine, 50 mg, Oral, Nightly  sodium chloride, 10 mL, Intravenous, Q12H  sodium chloride, 10 mL, Intravenous, Q12H  spironolactone, 25 mg, Oral, Daily  thiamine (VITAMIN B1) IVPB, 500 mg, Intravenous, Daily  vancomycin, 1,750 mg, Intravenous, Q12H    dexmedetomidine, 0.2-1.5 mcg/kg/hr, Last Rate: 0.5 mcg/kg/hr (22 2338)  heparin (porcine), 10.2 Units/kg/hr, Last Rate: 26.2 Units/kg/hr (22 1035)  hold, 1 each  Pharmacy Consult,   Pharmacy to dose vancomycin,        ----------------------------------------------------------------------------------------------------------------------  Vital Signs:  Temp:  [98.8 °F (37.1 °C)-99.7 °F (37.6 °C)] 98.9 °F (37.2 °C)  Heart Rate:  [] 128  Resp:  [19-32] 30  BP: (141-198)/() 169/86  SpO2:  [87 %-96 %] 90 %  on  Flow (L/min):  [3-4] 4;   Device (Oxygen Therapy): nasal cannula  Body mass index is 30.25 kg/m².    Intake & Output (last 3 days)       01/31 0701  02/01 0700 02/01 0701  02/02 0700 02/02 0701  02/03 0700 02/03 0701  02/04 0700    I.V. (mL/kg) 463 (4.5) 351.9 (3.6) 488.1 (4.8)     Other      NG/ 958 9595     IV Piggyback 1300 1200 1600 600    Total Intake(mL/kg) 2535 (24.9) 2425.9 (24.9) 4244.1 (42) 600 (5.9)    Urine (mL/kg/hr) 1825 (0.7) 2100 (0.9) 2600 (1.1)     Stool 0 0      Total Output 1825 2100 2600     Net +710 +325.9 +1644.1 +600            Stool Unmeasured Occurrence 2 x 2 x          ----------------------------------------------------------------------------------------------------------------------  Physical exam:  Constitutional:  Well-developed and well-nourished. Awake, looking around room  HENT:  Head:  Normocephalic and atraumatic.  Mouth:  Moist mucous membranes.    Eyes:  Conjunctivae normal. No scleral icterus.    Neck:  Neck supple.  No JVD present.    Cardiovascular:  Normal rate, regular rhythm and normal heart sounds with no murmur.  Pulmonary/Chest:  course breath sounds bilateral, on 2LNC   Abdominal:  Soft. No distension and no tenderness.   Musculoskeletal:  No tenderness, and no deformity.  No red or swollen joints anywhere.    Neurological: awake, alert, would squeeze hand left upper extremity but right upper extremity flaccid, bilateral lower extremities without clonus, not moving on command either  Skin:  Skin is warm and dry. No rash noted. No pallor.   Peripheral vascular: Bilateral lower extremity ulcers wrapped, clean and dry  : Nabeel in  place  ----------------------------------------------------------------------------------------------------------------------  Results from last 7 days   Lab Units 02/03/22  0025 02/02/22  0008 01/31/22 2326 01/30/22  0403 01/29/22  0521   CRP mg/dL  --   --   --   --  5.37*   WBC 10*3/mm3 14.09* 12.67* 12.23*   < > 12.64*   HEMOGLOBIN g/dL 12.0 11.6* 11.1*   < > 11.1*   HEMATOCRIT % 40.1 39.5 38.8   < > 37.8   MCV fL 82.7 83.0 84.2   < > 81.5   MCHC g/dL 29.9* 29.4* 28.6*   < > 29.4*   PLATELETS 10*3/mm3 472* 421 509*   < > 554*    < > = values in this interval not displayed.     Results from last 7 days   Lab Units 02/01/22  1049   PH, ARTERIAL pH units 7.429   PO2 ART mm Hg 95.4   PCO2, ARTERIAL mm Hg 45.7*   HCO3 ART mmol/L 30.2*     Results from last 7 days   Lab Units 02/03/22  0025 02/02/22  1118 02/02/22  0008 01/31/22  2331 01/31/22  2331 01/31/22  2326 01/31/22  2326 01/31/22  1325 01/31/22  0103   SODIUM mmol/L 143  --  143  --  144   < > 143  --  139   POTASSIUM mmol/L 3.7 3.9 3.4*   < > 4.0   < > 3.9   < > 3.4*   MAGNESIUM mg/dL  --   --   --   --  2.1  --  2.1  --  2.3   CHLORIDE mmol/L 110*  --  107  --  107   < > 106  --  100   CO2 mmol/L 20.2*  --  22.8  --  25.8   < > 26.3  --  29.0   BUN mg/dL 15  --  15  --  15   < > 15  --  16   CREATININE mg/dL 0.54*  --  0.61  --  0.58   < > 0.55*  --  0.52*   EGFR IF NONAFRICN AM mL/min/1.73 117  --  102  --  108   < > 115  --  122   CALCIUM mg/dL 10.2  --  10.1  --  10.3   < > 10.4  --  10.1   PHOSPHORUS mg/dL  --   --   --   --  4.6*  --   --   --  3.7   GLUCOSE mg/dL 261*  --  232*  --  259*   < > 255*  --  244*   ALBUMIN g/dL 3.19*  --  3.62  --   --   --  3.63  --  3.73   BILIRUBIN mg/dL 0.2  --  0.2  --   --   --  0.2  --  0.2   ALK PHOS U/L 118*  --  125*  --   --   --  123*  --  133*   AST (SGOT) U/L 23  --  31  --   --   --  23  --  34*   ALT (SGPT) U/L 31  --  40*  --   --   --  37*  --  35*    < > = values in this interval not displayed.    Estimated Creatinine Clearance: 156.7 mL/min (A) (by C-G formula based on SCr of 0.54 mg/dL (L)).  Ammonia   Date Value Ref Range Status   01/31/2022 40 11 - 51 umol/L Final                 Glucose   Date/Time Value Ref Range Status   02/03/2022 0744 241 (H) 70 - 130 mg/dL Final     Comment:     Meter: [id number] : [id number] Gianluca Pace N   02/02/2022 1654 248 (H) 70 - 130 mg/dL Final     Comment:     Meter: [id number] : [id number] Bridget Blanca   02/02/2022 1209 256 (H) 70 - 130 mg/dL Final     Comment:     Meter: [id number] : [id number] Bridget Blanca     Lab Results   Component Value Date    TSH 2.290 01/19/2022     No results found for: PREGTESTUR, PREGSERUM, HCG, HCGQUANT  Pain Management Panel     Pain Management Panel Latest Ref Rng & Units 1/19/2022    AMPHETAMINES SCREEN, URINE Negative Negative    BARBITURATES SCREEN Negative Negative    BENZODIAZEPINE SCREEN, URINE Negative Positive(A)    BUPRENORPHINEUR Negative Negative    COCAINE SCREEN, URINE Negative Negative    METHADONE SCREEN, URINE Negative Negative    METHAMPHETAMINEUR Negative Negative        Brief Urine Lab Results  (Last result in the past 365 days)      Color   Clarity   Blood   Leuk Est   Nitrite   Protein   CREAT   Urine HCG        01/19/22 2010 Dark Yellow   Clear   Negative   Trace   Negative   100 mg/dL (2+)               No results found for: BLOODCX  No results found for: URINECX    I have personally looked at the labs and they are summarized above.  ----------------------------------------------------------------------------------------------------------------------  Detailed radiology reports for the last 24 hours:  Imaging Results (Last 24 Hours)     ** No results found for the last 24 hours. **        Assessment & Plan    56F Smoker, Obese by BMI PMH Anxiety/Depression, Leukemia, Chronic Pain, COPD, Diabetes Mellitus Type II, Hypertension, Hyperlipidemia, Peripheral Vascular Disease, Obstructive Sleep Apnea, chronic  relapsing and remitting bilateral lower extremity foot ulcers and follows in wound care clinic, presented [facility] emergency room 1/19 for altered mental status.    #Septic Shock, Acute Metabolic Encephalopathy & Acute Hypoxic Respiratory Failure requiring Intubation and Mechanical Ventilation 2/2 PNA, Bacterial, treating for MDR Organisms in setting of underlying COPD without acute exacerbation and Obstructive Sleep Apnea - Acute Metabolic Encephalopathy Persisting   - Patient presents w/ abrupt onset altered mental status, WBC count > 12K, heart rate > 90, Pneumonia  on imaging, SBP < 90 requiring IV pressors, intubated for airway protection, etiology of altered mental status suspected infectious/sepsis though has had persisting encephalopathy while on ventilator, repeat head CT from 1/19 to 1/28 was stable, no acute intracranial abnormalities, EEG's without epileptiform activity only generalized slowing.  1/31 off sedation thought to have lateral gaze deficits, repeat CTA's and EEGs without definitive etiology  - Pulmonary consulted; Following, extubated 2/1  - Infectious Disease consulted; Following  - TeleNeuro consulted; Following  - Lumbar puncture without growth, encephalitis panel negative, cytology with elevated glucose and protein, unable to perform Cellblock; At this time deferred for extubation and unable to perform at this time due to agitation  - Continue Vancomycin, Cefepime and Flagyl  - Continue APAP PRN for fevers, Duonebs as needed  - Heart rate and respiratory rate increased, obtaining ABG and lactate, unable to go for MRI, will try CT Head when able  - No significant improvement with addition of ativan, will trial high dose thiamine x 3 days as well  - Monitor in CCU, monitor on telemetry, continue 02 but wean as able    #Relapsing and Remitting Lower Extremity Wounds/Ulcers now with Osteomyelitis L great toe and bilateral cellulitis   - Patient reported 2 year history lower  extremity wounds, follows in wound care clinic, cultures from initial biopsy grew MSSA  - Rheumatology panel negative with normal C and P-ANCA, Anti-CCP, SSA/SSB, Atypical P-ANCA, Anti-Hu Ab, paraneoplastic panel pending but is send out test  - Bone scan concern for osteomyelitis  - General Surgery consulted; Followed, status post debridement 1/22, signed off, re-evaluated 1/30 and recommended no surgical intervention.  - Infectious Disease consulted; Following as per above, continue antibiotics as per above    #Hypertension/Hyperlipidemia/Peripheral Vascular Disease   #Atrial Fibrillation w/ RVR, New onset (CHADsVasc = 3)  #NSTEMI Type II due to Atrial Fibrillation   - Patient with noted Atrial Fibrillation with RVR in emergency room on EKG, recurrent heart rate up to 160's.  - Labs showed troponins <0.010 -> 0.036 -> <0.010, proBNP 4800  - EKG showed Atrial Fibrillation with RVR  - Echo showed mild concentric left ventricular hypertrophy, normal LV function.   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 A BCR ABL PCR was obtained which was <0.001% as well as a flow cytometry which was concerning for a monotypical CD5+ B cell population with nonspecific immunophenotype, but significant for a variant B cell SLL or mantle cell lymphoma that could not be ruled out. CLL FISH was then obtained which was negative and CCND1/IGH - t(11:14) was not detected, JAK2 V617 and JAK2 exon 12 mutation which were  negative as well.  - Pt had follow up scheduled 6/2020 and no showed appointment.    - Peripheral smear without evidence of acute leukemia  - Hematology/Oncology consulted; Evaluated and did not suspect current condition related to hematological condition, recommended follow up Dr. Menezes outpatient     #NIDDM Type II, controlled, unknown complications, New diagnosis  - Hgb A1c = 6.5%  - No home oral agents, continue FSBG and SSI, continue new Levemir 30U nightly, titrate as indicated     #Anxiety/Depression  - Psychiatry consulted in emergency room and felt altered mental status not related to underlying psychiatric illness; Continue home Seroquel and Ativan    #Tobacco Dependence  - Rec'd cessation, NRT as needed    #Obesity by BMI  - BMI 30, complicates all aspects of care     F: NPO  E: Monitor & Replace PRN  N: NPO Diet; Tube Feeds when can place NG  Ppx: on Hep gtt  Code: Full Code     Dispo: Pending workup and clinical improvement     *This patient is considered high risk due to sepsis, acute respiratory failure, PNA, intubation and mechanical ventilation, persisting encephalopathy, History monoclonal B cell lymphocytosis, NSTEMI, Atrial Fibrillation.     Edited by: Dick Whiting MD at 2/3/2022 [address] [place] [address]

## 2022-02-04 NOTE — PROGRESS NOTES
[facility] HOSPITALIST PROGRESS NOTE     Patient Identification:  Name:  Lynette Stein  Age:  56 y.o.  Sex:  female  :  1965  MRN:  [id number]  Visit Number:  [id number]  ROOM: [address]     Primary Care Provider:  Orville Wu PA    Length of stay in inpatient status:  13    Subjective     Chief Compliant:  altered mental status     History of Presenting Illness:    Patient remains ill, no acute events overnight, remains afebrile, hemodynamically stable, on room air now, mentation not much improved, oral movements seem to have improved though, repeated CT head and unrevealing, still significantly motion degraded, unable to go for MRI or lumbar puncture at this time due to persisting agitation, has pulled out NG, awaiting updated consultant recommendations, will attempt to reach out to family this afternoon.  Pending stable respiratory status and mentation may consider moving to PCU soon.  Objective     Current Hospital Meds:cefepime, 2 g, Intravenous, Q8H  hydrALAZINE, 10 mg, Oral, Q8H  insulin aspart, 0-14 Units, Subcutaneous, TID AC  insulin detemir, 30 Units, Subcutaneous, Daily  LORazepam, 0.5 mg, Oral, Nightly  losartan, 50 mg, Oral, Daily  metoprolol tartrate, 100 mg, Nasogastric, Q12H  metroNIDAZOLE, 500 mg, Intravenous, Q8H  pantoprazole, 40 mg, Intravenous, Q AM  pravastatin, 40 mg, Oral, Daily  pregabalin, 75 mg, Nasogastric, Q12H  QUEtiapine, 50 mg, Oral, Nightly  sodium chloride, 10 mL, Intravenous, Q12H  sodium chloride, 10 mL, Intravenous, Q12H  spironolactone, 25 mg, Oral, Daily  vancomycin, 1,500 mg, Intravenous, Q12H    dexmedetomidine, 0.2-1.5 mcg/kg/hr, Last Rate: 0.5 mcg/kg/hr (22 0254)  heparin (porcine), 10.2 Units/kg/hr, Last Rate: 24.6 Units/kg/hr (22 0710)  hold, 1 each  Pharmacy Consult,   Pharmacy to dose vancomycin,       ----------------------------------------------------------------------------------------------------------------------  Vital  Signs:  Temp:  [98.9 °F (37.2 °C)-100.3 °F (37.9 °C)] 98.9 °F (37.2 °C)  Heart Rate:  [] 116  Resp:  [20-28] 28  BP: (149-191)/() 155/68  SpO2:  [87 %-99 %] 98 %  on  Flow (L/min):  [3] 3;   Device (Oxygen Therapy): room air  Body mass index is 29.8 kg/m².    Intake & Output (last 3 days)       02/01 0701  02/02 0700 02/02 0701  02/03 0700 02/03 0701 02/04 0700 02/04 0701 02/05 0700    I.V. (mL/kg) 351.9 (3.6) 488.1 (4.8) 856.8 (8.6)     Other 325 1020      NG/ 1136      IV Piggyback 1200 1600 1500     Total Intake(mL/kg) 2425.9 (24.9) 4244.1 (42) 2356.8 (23.6)     Urine (mL/kg/hr) 2100 (0.9) 2600 (1.1) 2200 (0.9)     Stool 0  0     Total Output 2100 2600 2200     Net +325.9 +1644.1 +156.8             Stool Unmeasured Occurrence 2 x  2 x         ----------------------------------------------------------------------------------------------------------------------  Physical exam:  Constitutional:  Well-developed and well-nourished. Awake, looking around room  HENT:  Head:  Normocephalic and atraumatic.  Mouth:  Moist mucous membranes.    Eyes:  Conjunctivae normal. No scleral icterus.    Neck:  Neck supple.  No JVD present.    Cardiovascular:  Normal rate, regular rhythm and normal heart sounds with no murmur.  Pulmonary/Chest:  course breath sounds bilateral, on room air.  Abdominal:  Soft. No distension and no tenderness.   Musculoskeletal:  No tenderness, and no deformity.  No red or swollen joints anywhere.    Neurological: awake, alert, would squeeze hand left upper extremity, persisting right upper extremity flaccid, could wiggle faintly LLE, no movement RLE, left upper extremity with frequent raising over head, oral automatisms seem to have improved some.  Skin:  Skin is warm and dry. No rash noted. No pallor.   Peripheral vascular: Bilateral lower extremity ulcers wrapped, clean and dry  : Nabeel in  place  ----------------------------------------------------------------------------------------------------------------------  Results from last 7 days   Lab Units 02/04/22  0246 02/03/22  1111 02/03/22  0025 02/02/22 0008 01/30/22  0403 01/29/22  0521   CRP mg/dL  --   --   --   --   --  5.37*   LACTATE mmol/L  --  0.9  --   --   --   --    WBC 10*3/mm3 12.22*  --  14.09* 12.67*   < > 12.64*   HEMOGLOBIN g/dL 12.6  --  12.0 11.6*   < > 11.1*   HEMATOCRIT % 43.4  --  40.1 39.5   < > 37.8   MCV fL 83.0  --  82.7 83.0   < > 81.5   MCHC g/dL 29.0*  --  29.9* 29.4*   < > 29.4*   PLATELETS 10*3/mm3 461*  --  472* 421   < > 554*    < > = values in this interval not displayed.     Results from last 7 days   Lab Units 02/03/22  1112   PH, ARTERIAL pH units 7.480*   PO2 ART mm Hg 88.7   PCO2, ARTERIAL mm Hg 32.6*   HCO3 ART mmol/L 24.2     Results from last 7 days   Lab Units 02/04/22  0246 02/03/22  0025 02/02/22  1118 02/02/22  0008 02/02/22  0008 01/31/22  2331 01/31/22  2331 01/31/22  2326 01/31/22  2326 01/31/22  1325 01/31/22  0103   SODIUM mmol/L 144 143  --   --  143   < > 144   < > 143  --  139   POTASSIUM mmol/L 3.7 3.7 3.9   < > 3.4*   < > 4.0   < > 3.9   < > 3.4*   MAGNESIUM mg/dL  --   --   --   --   --   --  2.1  --  2.1  --  2.3   CHLORIDE mmol/L 109* 110*  --   --  107   < > 107   < > 106  --  100   CO2 mmol/L 20.9* 20.2*  --   --  22.8   < > 25.8   < > 26.3  --  29.0   BUN mg/dL 13 15  --   --  15   < > 15   < > 15  --  16   CREATININE mg/dL 0.56* 0.54*  --   --  0.61   < > 0.58   < > 0.55*  --  0.52*   EGFR IF NONAFRICN AM mL/min/1.73 112 117  --   --  102   < > 108   < > 115  --  122   CALCIUM mg/dL 10.4 10.2  --   --  10.1   < > 10.3   < > 10.4  --  10.1   PHOSPHORUS mg/dL  --   --   --   --   --   --  4.6*  --   --   --  3.7   GLUCOSE mg/dL 183* 261*  --   --  232*   < > 259*   < > 255*  --  244*   ALBUMIN g/dL 3.73 3.19*  --   --  3.62   < >  --   --  3.63  --  3.73   BILIRUBIN mg/dL 0.3 0.2  --   --   0.2   < >  --   --  0.2  --  0.2   ALK PHOS U/L 120* 118*  --   --  125*   < >  --   --  123*  --  133*   AST (SGOT) U/L 22 23  --   --  31   < >  --   --  23  --  34*   ALT (SGPT) U/L 33 31  --   --  40*   < >  --   --  37*  --  35*    < > = values in this interval not displayed.   Estimated Creatinine Clearance: 150 mL/min (A) (by C-G formula based on SCr of 0.56 mg/dL (L)).  No results found for: AMMONIA              Glucose   Date/Time Value Ref Range Status   02/04/2022 0659 177 (H) 70 - 130 mg/dL Final     Comment:     Meter: [id number] : [id number] Gianluca Pace N   02/03/2022 2130 146 (H) 70 - 130 mg/dL Final     Comment:     Meter: [id number] : [id number] Gianluca Pace N   02/03/2022 1803 158 (H) 70 - 130 mg/dL Final     Comment:     Meter: [id number] : [id number] Suresh Lindsey     Lab Results   Component Value Date    TSH 2.290 01/19/2022     No results found for: PREGTESTUR, PREGSERUM, HCG, HCGQUANT  Pain Management Panel     Pain Management Panel Latest Ref Rng & Units 1/19/2022    AMPHETAMINES SCREEN, URINE Negative Negative    BARBITURATES SCREEN Negative Negative    BENZODIAZEPINE SCREEN, URINE Negative Positive(A)    BUPRENORPHINEUR Negative Negative    COCAINE SCREEN, URINE Negative Negative    METHADONE SCREEN, URINE Negative Negative    METHAMPHETAMINEUR Negative Negative        Brief Urine Lab Results  (Last result in the past 365 days)      Color   Clarity   Blood   Leuk Est   Nitrite   Protein   CREAT   Urine HCG        01/19/22 2010 Dark Yellow   Clear   Negative   Trace   Negative   100 mg/dL (2+)               No results found for: BLOODCX  No results found for: URINECX    I have personally looked at the labs and they are summarized above.  ----------------------------------------------------------------------------------------------------------------------  Detailed radiology reports for the last 24 hours:  Imaging Results (Last 24 Hours)     Procedure Component Value Units  Date/Time    CT Head Without Contrast [[id number]] Collected: 02/03/22 1756     Updated: 02/03/22 1759    Narrative:      CT HEAD WO CONTRAST-     CLINICAL INDICATION: Neuro deficit, acute, stroke suspected        COMPARISON: 01/31/2022      TECHNIQUE: Axial images of the brain were obtained with out intravenous  contrast.  Reformatted images were created in the sagittal and coronal  planes.     DOSE:     Radiation dose reduction techniques were utilized per ALARA protocol.  Automated exposure control was initiated through either or [employer] or  [employer] software packages by  protocol.           FINDINGS:   There is extensive patient motion, thus degrading image quality.  No parenchymal mass, hemorrhage, or midline shift  There is no evidence of acute ischemia.  I do not see epidural or subdural hematoma.  No evidence of ventriculomegaly  The bone window setting images show no destructive calvarial lesion or  acute calvarial fracture.   The posterior fossa is unremarkable.          Impression:         1. Extensive patient motion  2. No evidence of an acute ischemic event  3. No evidence of parenchymal hemorrhage     This report was finalized on 2/3/2022 5:57 PM by Dr. Deonte Reece MD.           Assessment & Plan    56F Smoker, Obese by BMI PMH Anxiety/Depression, Leukemia, Chronic Pain, COPD, Diabetes Mellitus Type II, Hypertension, Hyperlipidemia, Peripheral Vascular Disease, Obstructive Sleep Apnea, chronic relapsing and remitting bilateral lower extremity foot ulcers and follows in wound care clinic, presented [facility] emergency room 1/19 for altered mental status.    #Septic Shock, Acute Metabolic Encephalopathy & Acute Hypoxic Respiratory Failure requiring Intubation and Mechanical Ventilation 2/2 PNA, Bacterial, treating for MDR Organisms in setting of underlying COPD without acute exacerbation and Obstructive Sleep Apnea - Acute Metabolic Encephalopathy Persisting   - Patient  presents w/ abrupt onset altered mental status, WBC count > 12K, heart rate > 90, Pneumonia  on imaging, SBP < 90 requiring IV pressors, intubated for airway protection, etiology of altered mental status suspected infectious/sepsis though has had persisting encephalopathy while on ventilator, repeat head CT from 1/19 to 1/28 was stable, no acute intracranial abnormalities, EEG's without epileptiform activity only generalized slowing.  1/31 off sedation thought to have lateral gaze deficits, repeat CTA's and EEGs without definitive etiology  - Pulmonary consulted; Following, extubated 2/1  - Infectious Disease consulted; Following  - TeleNeuro consulted; Following  - Lumbar puncture without growth, encephalitis panel negative, cytology with elevated glucose and protein, unable to perform Cellblock; At this time deferred for extubation and unable to perform at this time due to agitation  - Continue Vancomycin, Cefepime and Flagyl, should complete Flagyl soon  - Continue APAP PRN for fevers, Duonebs as needed  - Unable to go for MRI or lumbar puncture due to agitation, continuous movements, Head CT performed with motion artifact but no new gross abnormalities noted  - Monitor in CCU, monitor on telemetry, off oxygen today, awaiting mentation improvement.    #Relapsing and Remitting Lower Extremity Wounds/Ulcers now with Osteomyelitis L great toe and bilateral cellulitis   - Patient reported 2 year history lower extremity wounds, follows in wound care clinic, cultures from initial biopsy grew MSSA  - Rheumatology panel negative with normal C and P-ANCA, Anti-CCP, SSA/SSB, Atypical P-ANCA, Anti-Hu Ab, paraneoplastic panel pending but is send out test  - Bone scan concern for osteomyelitis  - General Surgery consulted; Followed, status post debridement 1/22, signed off, re-evaluated 1/30 and recommended no surgical intervention.  - Infectious Disease consulted; Following as per above, continue antibiotics as per  above    #Hypertension/Hyperlipidemia/Peripheral Vascular Disease   #Atrial Fibrillation w/ RVR, New onset (CHADsVasc = 3)  #NSTEMI Type II due to Atrial Fibrillation   - Patient with noted Atrial Fibrillation with RVR in emergency room on EKG, recurrent heart rate up to 160's.  - Labs showed troponins <0.010 -> 0.036 -> <0.010, proBNP 4800  - EKG showed Atrial Fibrillation with RVR  - Echo showed mild concentric left ventricular hypertrophy, normal LV function.   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 A BCR ABL PCR was obtained which was <0.001% as well as a flow cytometry which was concerning for a monotypical CD5+ B cell population with nonspecific immunophenotype, but significant for a variant B cell SLL or mantle cell lymphoma that could not be ruled out. CLL FISH was then obtained which was negative and CCND1/IGH - t(11:14) was not detected, JAK2 V617 and JAK2 exon 12 mutation which were negative as well.  - Pt had follow up scheduled 6/2020 and no showed appointment.    - Peripheral smear without evidence of acute leukemia  - Hematology/Oncology consulted; Evaluated and did not suspect current condition related to hematological condition, recommended follow up Dr. Menezes outpatient     #NIDDM Type II, controlled, unknown complications, New diagnosis  - Hgb A1c = 6.5%  - No home oral agents, continue FSBG and SSI, continue new Levemir 30U nightly, titrate as indicated      #Anxiety/Depression  - Psychiatry consulted in emergency room and felt altered mental status not related to underlying psychiatric illness; Continue home Seroquel and Ativan    #Tobacco Dependence  - Rec'd cessation, NRT as needed    #Obesity by BMI  - BMI 30, complicates all aspects of care     F: NPO  E: Monitor & Replace PRN  N: NPO Diet; Tube Feeds when can place NG  Ppx: on Hep gtt  Code: Full Code     Dispo: Pending workup and clinical improvement     *This patient is considered high risk due to sepsis, acute respiratory failure, PNA, intubation and mechanical ventilation, persisting encephalopathy, History monoclonal B cell lymphocytosis, NSTEMI, Atrial Fibrillation.     Edited by: Dick Whiting MD at 2/4/2022 1154  [facility]

## 2022-02-04 NOTE — PLAN OF CARE
"Goal Outcome Evaluation:              Outcome Summary: pt still having HTN.  pt able to verbalize \"it hurts\" when brushing hair.  Still flacid on Right side.  "

## 2022-02-04 NOTE — PROGRESS NOTES
Kinetics :  Vancomycin    The pre-dose vancomycin level was above the desired goal range for this therapy.   The regimen shall now be vancomycin 1500mg q 12 hrs and we will continue to monitor with you.

## 2022-02-04 NOTE — PROGRESS NOTES
Stroke Progress Note       Chief Complaint: Altered mental status, acute encephalopathy    Subjective    Subjective     Subjective:  No acute events overnight.  Had repeat CT head yesterday which was stable from prior exam but degraded by motion, patient still not able to have MRI due to movement.    Review of Systems   Unable to perform ROS: Patient nonverbal            Objective    Objective      Temp:  [98.9 °F (37.2 °C)-100.2 °F (37.9 °C)] 98.9 °F (37.2 °C)  Heart Rate:  [] 115  Resp:  [20-28] 28  BP: (149-198)/() 198/99        Neurological Exam  Mental Status  Awake. Patient is nonverbal.  Patient is alert, makes eye contact, regards me only on left side of the room..    Cranial Nerves  CN II: Patient blinks to visual threat on the left, no response to visual threat on the right.  CN III, IV, VI: Pupils equal round and reactive to light bilaterally. Patient has left gaze preference, appears to regard me on her left side, does not cross midline to the right.  CN V:  Right: Normal corneal reflex.  Left: Normal corneal reflex.  CN VII: No obvious facial droop.    Motor  Normal muscle bulk throughout. Decreased muscle tone. No abnormal involuntary movements.  Patient frequently raising left arm to head and back down, nursing reports she will intermittently squeeze hand to command.  Right-sided hemiplegia noted, did not notice any movement of either lower extremity.    Sensory  Patient grimaces to nailbed pressure bilaterally, she withdraws left hand in response to tactile stimulation.      Physical Exam  Vitals and nursing note reviewed.   Constitutional:       General: She is awake.      Appearance: She is obese. She is ill-appearing.   HENT:      Head: Normocephalic and atraumatic.      Mouth/Throat:      Mouth: Mucous membranes are dry.      Pharynx: Oropharynx is clear.   Eyes:      Pupils: Pupils are equal, round, and reactive to light.   Cardiovascular:      Rate and Rhythm: Regular rhythm.  Tachycardia present.   Pulmonary:      Effort: No respiratory distress.   Skin:     General: Skin is warm and dry.         Hospital Meds:  Scheduled- cefepime, 2 g, Intravenous, Q8H  hydrALAZINE, 10 mg, Oral, Q8H  insulin aspart, 0-14 Units, Subcutaneous, TID AC  insulin detemir, 30 Units, Subcutaneous, Daily  LORazepam, 0.5 mg, Oral, Nightly  losartan, 50 mg, Oral, Daily  metoprolol tartrate, 100 mg, Nasogastric, Q12H  metroNIDAZOLE, 500 mg, Intravenous, Q8H  pantoprazole, 40 mg, Intravenous, Q AM  pravastatin, 40 mg, Oral, Daily  pregabalin, 75 mg, Nasogastric, Q12H  QUEtiapine, 50 mg, Oral, Nightly  sodium chloride, 10 mL, Intravenous, Q12H  sodium chloride, 10 mL, Intravenous, Q12H  sodium chloride, 10 mL, Intravenous, Q12H  sodium chloride, 10 mL, Intravenous, Q12H  sodium chloride, 10 mL, Intravenous, Q12H  spironolactone, 25 mg, Oral, Daily  vancomycin, 1,500 mg, Intravenous, Q12H      Infusions- dexmedetomidine, 0.2-1.5 mcg/kg/hr, Last Rate: 0.5 mcg/kg/hr (02/04/22 0254)  heparin (porcine), 10.2 Units/kg/hr, Last Rate: 24.6 Units/kg/hr (02/04/22 0710)  hold, 1 each  Pharmacy Consult,   Pharmacy to dose vancomycin,        PRNs- •  acetaminophen  •  artificial tears  •  dextrose  •  dextrose  •  glucagon (human recombinant)  •  heparin (porcine)  •  heparin (porcine)  •  hold  •  hydrALAZINE  •  LORazepam  •  magnesium sulfate **OR** magnesium sulfate **OR** magnesium sulfate  •  metoprolol tartrate  •  Pharmacy Consult  •  Pharmacy to dose vancomycin  •  potassium chloride **OR** potassium chloride **OR** [DISCONTINUED] potassium chloride  •  sodium chloride  •  sodium chloride  •  sodium chloride  •  sodium chloride    Results Review:    I reviewed the patient's new clinical results.    Transthoracic Echo:    Interpretation Summary    · Left ventricular wall thickness is consistent with mild concentric hypertrophy.  · Left ventricular ejection fraction appears to be 61 - 65%. Left ventricular systolic  function is normal.  · Left ventricular diastolic dysfunction is noted.  · Saline test results are negative.  · This is a technically limited study with poor echo windows and no carotid Doppler.              Assessment/Plan     Assessment/Plan:      1. Stroke-like symptoms, left gaze preference and right-sided hemiplegia noted when sedation was weaned,based on symptoms likely left hemisphere embolic stroke.  Initial CT of head was highly degraded by motion, however patient was then sedated and repeat head CT did not show any acute abnormalities.  Unable to obtain MRI due to restlessness. Patient currently being treated for pneumonia and septic shock, however this is out of proportion to her encephalopathy.  Family repeatedly denies any recreational drug use, urine toxicology was unremarkable.   Patient found to be in proximal A. fib while in ED,  was on heparin drip which was paused pending LP, and has now been resumed, cardiology is following.  Initial and repeat EEG showed moderate diffuse cerebral dysfunction, no epilepsy.  Lumbar puncture on 1/20/2022 ruled out meningitis/encephalitis. Repeat LP has been considered however patient unlikely to tolerate at present.  Repeat TTE was okay, results above.  -MRI brain when able  -Heparin drip restarted  -Repeat CT head without yesterday was stable, no evidence of ischemic event, scan degraded by motion  -Functional prognosis unfortunately remains guarded      The case was discussed with her  at bedside, and Dr. Mohsen.  Neurology will continue to follow peripherally over the weekend, but feel free to call with questions or with MRI results.  Otherwise we will follow up on Monday.    Jayce Avelar, APRN  02/04/22  14:24 EST

## 2022-02-04 NOTE — CASE MANAGEMENT/SOCIAL WORK
Discharge Planning Assessment   [facility]     Patient Name: Lynette Stein  MRN: [id number]  Today's Date: 2/4/2022    Admit Date: 1/22/2022     Discharge Plan     Row Name 02/04/22 1018       Plan    Plan Pt admitted 1/22/22. Pt is now on room air. Pt is from home and does not utilize home health services. [facility] utilized for home oxygen. Pt will likely need rehab services prior to return home, has been unable to move right side or talk. Discharge plan and needs pending improvement and PT/OT evaluations when pt is medically stable. SS to continue to follow and assist.              BUSTER Bradford

## 2022-02-04 NOTE — PROGRESS NOTES
LOS: 13 days     Name: Lynette Stein  Age/Sex: 56 y.o. female  :  1965        PCP: Orville Wu PA  REF: No Known Provider    Active Problems:    Encephalopathy acute      Reason for follow-up: Atrial fibrillation and congestive heart failure     Subjective     Subjective     Lynette Stein is a 56 y.o. female with a past medical history significant for hypertension, dyslipidemia, COPD, type 2 diabetes, anxiety/depression, leukemia, obstructive sleep apnea, and bilateral leg ulcerations.  She initially presented to the [facility] ED on 2022 with altered mental status.  Patient developed atrial fibrillation with RVR are in the ED     Interval History: Patient remains ill. Alert but unable to give a history.  Continues to move left upper extremity but no movement of right upper extremity.  Currently tachycardic in the 120s.  Noted to be hypertensive.    Vital Signs  Temp:  [98.9 °F (37.2 °C)-100.2 °F (37.9 °C)] 98.9 °F (37.2 °C)  Heart Rate:  [] 115  Resp:  [20-28] 28  BP: (149-198)/() 198/99     Vital Signs (last 72 hrs)       02 0700  02/ 0659 02/ 0700  02/03 0659 02/03 0700  02/04 0659 02/ 0700  02/04 1335   Most Recent      Temp (°F) 99.3 -  101    98.8 -  99.7    98.9 -  100.3       98.9 (37.2) 02/04 0400    Heart Rate 84 -  144    93 -  130    81 -  130      115     115 02/04 1324    Resp 8 -  30    19 -  32    20 -  30       28 02/04 0600    /57 -  223/98    141/78 -  198/95    145/88 -  191/84      198/99     198/99 02/04 1324    SpO2 (%) 91 -  98    87 -  97    85 -  99       98 / 0600        Documented weights    22 0029 22 0602 22 0602 22 0533   Weight: 96.6 kg (212 lb 15.4 oz) 97.2 kg (214 lb 4.6 oz) 97 kg (213 lb 13.5 oz) 97.8 kg (215 lb 9.8 oz)    22 0502 22 0700 22 0600 22 0600   Weight: 99.8 kg (220 lb 1.6 oz) 98.9 kg (218 lb) 101 kg (223 lb 12.3 oz) 98.6 kg (217 lb 6 oz)    22 0535  02/02/22 0602 02/03/22 0600 02/04/22 0400   Weight: 102 kg (224 lb 3.3 oz) 97.6 kg (215 lb 2.7 oz) 101 kg (223 lb 1.7 oz) 99.7 kg (219 lb 12.8 oz)      Body mass index is 29.8 kg/m².    Intake/Output Summary (Last 24 hours) at 2/4/2022 1335  Last data filed at 2/4/2022 1324  Gross per 24 hour   Intake 2256.84 ml   Output 2200 ml   Net 56.84 ml     Objective    Objective       Physical Exam:     General Appearance:    Alert, in no acute distress   Head:    Normocephalic, without obvious abnormality, atraumatic   Eyes:            Conjunctivae and sclerae normal, no   icterus, no pallor, corneas clear.   Neck:   No adenopathy, supple, trachea midline, no thyromegaly, no   carotid bruit, no JVD   Lungs:     Clear to auscultation,respirations regular, even and                  unlabored    Heart:    Regular rhythm and tachycardic, normal S1 and S2, no            murmur, no gallop, no rub, no click   Chest Wall:    No abnormalities observed   Abdomen:     Normal bowel sounds, no masses, no organomegaly, soft        non-tender, non-distended, no guarding, no rebound                tenderness   Extremities:  No movement of right upper and lower extremities, no edema, no cyanosis, no   redness   Pulses:   Pulses palpable and equal bilaterally   Skin:   No bleeding, bruising or rash       Neurologic:   Alert and oriented      Results review       Results Review:   Results from last 7 days   Lab Units 02/04/22  0246 02/03/22  0025 02/02/22  0008 01/31/22  2326 01/31/22  0103 01/30/22  0403 01/29/22  0521   WBC 10*3/mm3 12.22* 14.09* 12.67* 12.23* 11.33* 10.94* 12.64*   HEMOGLOBIN g/dL 12.6 12.0 11.6* 11.1* 11.0* 10.9* 11.1*   PLATELETS 10*3/mm3 461* 472* 421 509* 496* 496* 554*     Results from last 7 days   Lab Units 02/04/22  0246 02/03/22  0025 02/02/22  1118 02/02/22  0008 01/31/22  2331 01/31/22  2326 01/31/22  1325 01/31/22  0103 01/31/22  0103 01/30/22  0403 01/30/22  0403 01/29/22  1430 01/29/22  0521   SODIUM mmol/L  144 143  --  143 144 143  --   --  139  --  139  --  136   POTASSIUM mmol/L 3.7 3.7 3.9 3.4* 4.0 3.9 4.3   < > 3.4*   < > 3.8   < > 3.4*   CHLORIDE mmol/L 109* 110*  --  107 107 106  --   --  100  --  100  --  92*   CO2 mmol/L 20.9* 20.2*  --  22.8 25.8 26.3  --   --  29.0  --  27.8  --  30.3*   BUN mg/dL 13 15  --  15 15 15  --   --  16  --  13  --  16   CREATININE mg/dL 0.56* 0.54*  --  0.61 0.58 0.55*  --   --  0.52*  --  0.47*  --  0.54*   CALCIUM mg/dL 10.4 10.2  --  10.1 10.3 10.4  --   --  10.1  --  10.0  --  10.2   GLUCOSE mg/dL 183* 261*  --  232* 259* 255*  --   --  244*  --  271*  --  295*   ALT (SGPT) U/L 33 31  --  40*  --  37*  --   --  35*  --  26  --  19   AST (SGOT) U/L 22 23  --  31  --  23  --   --  34*  --  36*  --  28    < > = values in this interval not displayed.         Lab Results   Component Value Date    INR 1.18 (H) 01/22/2022    INR 1.07 01/19/2022    INR 0.89 12/01/2016     Lab Results   Component Value Date    MG 2.1 01/31/2022    MG 2.1 01/31/2022    MG 2.3 01/31/2022     Lab Results   Component Value Date    TSH 2.290 01/19/2022    TRIG 335 (H) 01/22/2022    HDL 32 (L) 01/22/2022    LDL 96 01/22/2022      Imaging Results (Last 48 Hours)     Procedure Component Value Units Date/Time    XR Chest 1 View [[id number]] Collected: 02/04/22 1308     Updated: 02/04/22 1310    Narrative:      XR CHEST 1 VW-     CLINICAL INDICATION: verify picc placement        COMPARISON: 02/01/2022      TECHNIQUE: Single frontal view of the chest.     FINDINGS:     PICC line tip cavoatrial junction  The cardiac silhouette is normal. The pulmonary vasculature is  unremarkable.  There is no evidence of an acute osseous abnormality.   There are no suspicious-appearing parenchymal soft tissue nodules.          Impression:      PICC line tip at the cavoatrial junction     This report was finalized on 2/4/2022 1:08 PM by Dr. Deonte Reece MD.       CT Head Without Contrast [[id number]] Collected: 02/03/22 8105      Updated: 02/03/22 1759    Narrative:      CT HEAD WO CONTRAST-     CLINICAL INDICATION: Neuro deficit, acute, stroke suspected        COMPARISON: 01/31/2022      TECHNIQUE: Axial images of the brain were obtained with out intravenous  contrast.  Reformatted images were created in the sagittal and coronal  planes.     DOSE:     Radiation dose reduction techniques were utilized per ALARA protocol.  Automated exposure control was initiated through either or Hymite or  DoseRight software packages by  protocol.           FINDINGS:   There is extensive patient motion, thus degrading image quality.  No parenchymal mass, hemorrhage, or midline shift  There is no evidence of acute ischemia.  I do not see epidural or subdural hematoma.  No evidence of ventriculomegaly  The bone window setting images show no destructive calvarial lesion or  acute calvarial fracture.   The posterior fossa is unremarkable.          Impression:         1. Extensive patient motion  2. No evidence of an acute ischemic event  3. No evidence of parenchymal hemorrhage     This report was finalized on 2/3/2022 5:57 PM by Dr. Deonte Reece MD.           Echo   Results for orders placed during the hospital encounter of 01/22/22    Adult Transthoracic Echo Limited W/ Cont if Necessary Per Protocol    Interpretation Summary  · Left ventricular wall thickness is consistent with mild concentric hypertrophy.  · Left ventricular ejection fraction appears to be 61 - 65%. Left ventricular systolic function is normal.  · Left ventricular diastolic dysfunction is noted.  · Saline test results are negative.  · This is a technically limited study with poor echo windows and no carotid Doppler.     I reviewed the patient's new clinical results.    Telemetry: Sinus tachycardia 115 to 120 bpm     Medication Review:   cefepime, 2 g, Intravenous, Q8H  hydrALAZINE, 10 mg, Oral, Q8H  insulin aspart, 0-14 Units, Subcutaneous, TID AC  insulin detemir, 30 Units,  Subcutaneous, Daily  LORazepam, 0.5 mg, Oral, Nightly  losartan, 50 mg, Oral, Daily  metoprolol tartrate, 100 mg, Nasogastric, Q12H  metroNIDAZOLE, 500 mg, Intravenous, Q8H  pantoprazole, 40 mg, Intravenous, Q AM  pravastatin, 40 mg, Oral, Daily  pregabalin, 75 mg, Nasogastric, Q12H  QUEtiapine, 50 mg, Oral, Nightly  sodium chloride, 10 mL, Intravenous, Q12H  sodium chloride, 10 mL, Intravenous, Q12H  sodium chloride, 10 mL, Intravenous, Q12H  sodium chloride, 10 mL, Intravenous, Q12H  sodium chloride, 10 mL, Intravenous, Q12H  spironolactone, 25 mg, Oral, Daily  vancomycin, 1,500 mg, Intravenous, Q12H        dexmedetomidine, 0.2-1.5 mcg/kg/hr, Last Rate: 0.5 mcg/kg/hr (02/04/22 0254)  heparin (porcine), 10.2 Units/kg/hr, Last Rate: 24.6 Units/kg/hr (02/04/22 0710)  hold, 1 each  Pharmacy Consult,   Pharmacy to dose vancomycin,         Assessment      Assessment:  1. Paroxysmal atrial fibrillation, patient is maintaining sinus rhythm  2. 1 episode of 12 beat run of wide-complex tachycardia with no recurrence  3. Acute HFpEF, resolved  4. Poorly controlled hypertension  5. Possible acute CVA with right hemiparesis possible embolic  6. Acute respiratory failure with hypoxia, resolved        Plan     Recommendations:  1. Patient is maintaining sinus rhythm will continue with current medications including beta-blockers  2. Patient with labile blood pressure still not well controlled losartan was not increased yesterday to 100 we will increase it today may have to increase also the dose of the metoprolol 250 mg if is still not well controlled    I discussed the patients findings and my recommendations with patient and family    Electronically signed by ALDEN Do, 02/04/22, 1:35 PM EST.  Electronically signed by Roney Pringle MD, 02/04/22, 2:22 PM EST.    Please note that portions of this note were completed with a voice recognition program.

## 2022-02-04 NOTE — PROGRESS NOTES
PROGRESS NOTE         Patient Identification:  Name:  Lynette Stein  Age:  56 y.o.  Sex:  female  :  1965  MRN:  [id number]  Visit Number:  [id number]  Primary Care Provider:  Orville Wu PA         LOS: 13 days       ----------------------------------------------------------------------------------------------------------------------  Subjective       Chief Complaints:    No chief complaint on file.        Interval History:      Patient resting in bed.  Remains confused.  On room air with no apparent distress.  WBC improving at 12.22.  CT of the head from 2/3/2022 reports no evidence of acute ischemic event or parenchymal hemorrhage.    Review of Systems:    Unable to obtain.  Altered mental status.  ----------------------------------------------------------------------------------------------------------------      Objective       Current [facility] Meds:  cefepime, 2 g, Intravenous, Q8H  hydrALAZINE, 10 mg, Oral, Q8H  insulin aspart, 0-14 Units, Subcutaneous, TID AC  insulin detemir, 30 Units, Subcutaneous, Daily  LORazepam, 0.5 mg, Oral, Nightly  losartan, 50 mg, Oral, Daily  metoprolol tartrate, 100 mg, Nasogastric, Q12H  metroNIDAZOLE, 500 mg, Intravenous, Q8H  pantoprazole, 40 mg, Intravenous, Q AM  pravastatin, 40 mg, Oral, Daily  pregabalin, 75 mg, Nasogastric, Q12H  QUEtiapine, 50 mg, Oral, Nightly  sodium chloride, 10 mL, Intravenous, Q12H  sodium chloride, 10 mL, Intravenous, Q12H  spironolactone, 25 mg, Oral, Daily  vancomycin, 1,500 mg, Intravenous, Q12H      dexmedetomidine, 0.2-1.5 mcg/kg/hr, Last Rate: 0.5 mcg/kg/hr (22 0254)  heparin (porcine), 10.2 Units/kg/hr, Last Rate: 24.6 Units/kg/hr (22 0710)  hold, 1 each  Pharmacy Consult,   Pharmacy to dose vancomycin,       ----------------------------------------------------------------------------------------------------------------------    Vital Signs:  Temp:  [98.9 °F (37.2 °C)-100.2 °F (37.9 °C)] 98.9 °F (37.2  °C)  Heart Rate:  [] 116  Resp:  [20-28] 28  BP: (149-191)/() 155/68  Mean Arterial Pressure (Non-Invasive) for the past 24 hrs (Last 3 readings):   Noninvasive MAP (mmHg)   02/04/22 0600 96   02/04/22 0400 114   02/04/22 0300 128     SpO2 Percentage    02/04/22 0300 02/04/22 0400 02/04/22 0600   SpO2: 97% 98% 98%     SpO2:  [87 %-99 %] 98 %  on  Flow (L/min):  [3] 3;   Device (Oxygen Therapy): room air    Body mass index is 29.8 kg/m².  Wt Readings from Last 3 Encounters:   02/04/22 99.7 kg (219 lb 12.8 oz)   01/19/22 106 kg (233 lb)   12/14/21 106 kg (233 lb 9.6 oz)        Intake/Output Summary (Last 24 hours) at 2/4/2022 1230  Last data filed at 2/4/2022 0655  Gross per 24 hour   Intake 1756.84 ml   Output 2200 ml   Net -443.16 ml     NPO Diet  ----------------------------------------------------------------------------------------------------------------------      Physical Exam:    Constitutional: Chronically ill-appearing.  Awake and agitated, but not following commands.  Drooling and experiencing oral automatisms.  HENT:  Head: Normocephalic and atraumatic.  Mouth:  Moist mucous membranes.    Eyes:  Pupils equal.  Left eye chemosis.   Neck:  Neck supple.  No JVD present.    Cardiovascular:  Normal rate, regular rhythm and normal heart sounds with no murmur. No edema.  Pulmonary/Chest: Coarse breath sounds bilaterally.  Abdominal:  Soft.  Bowel sounds are normal.  No distension and no tenderness.   Musculoskeletal:  No tenderness, and no deformity.  No swelling or redness of joints.  Mild bilateral lower extremity edema.  Neurological:  Awake and agitated.  Not following commands.  Skin:  Skin is warm and dry.  No rash noted.  No pallor.  Scattered bruises and scabs to bilateral upper extremities, trunk, bilateral lower extremities and feet.  Bilateral feet ulcers in dressings today.  Psychiatric: Awake and agitated.  Not following  commands.  ----------------------------------------------------------------------------------------------------------------------              Results from last 7 days   Lab Units 02/03/22  1112   PH, ARTERIAL pH units 7.480*   PO2 ART mm Hg 88.7   PCO2, ARTERIAL mm Hg 32.6*   HCO3 ART mmol/L 24.2     Results from last 7 days   Lab Units 02/04/22  0246 02/03/22  1111 02/03/22  0025 02/02/22 0008 01/30/22  0403 01/29/22  0521   CRP mg/dL  --   --   --   --   --  5.37*   LACTATE mmol/L  --  0.9  --   --   --   --    WBC 10*3/mm3 12.22*  --  14.09* 12.67*   < > 12.64*   HEMOGLOBIN g/dL 12.6  --  12.0 11.6*   < > 11.1*   HEMATOCRIT % 43.4  --  40.1 39.5   < > 37.8   MCV fL 83.0  --  82.7 83.0   < > 81.5   MCHC g/dL 29.0*  --  29.9* 29.4*   < > 29.4*   PLATELETS 10*3/mm3 461*  --  472* 421   < > 554*    < > = values in this interval not displayed.     Results from last 7 days   Lab Units 02/04/22  0246 02/03/22  0025 02/02/22  1118 02/02/22  0008 02/02/22  0008 01/31/22  2331 01/31/22  2331 01/31/22  2326 01/31/22  2326 01/31/22  1325 01/31/22  0103   SODIUM mmol/L 144 143  --   --  143   < > 144   < > 143  --  139   POTASSIUM mmol/L 3.7 3.7 3.9   < > 3.4*   < > 4.0   < > 3.9   < > 3.4*   MAGNESIUM mg/dL  --   --   --   --   --   --  2.1  --  2.1  --  2.3   CHLORIDE mmol/L 109* 110*  --   --  107   < > 107   < > 106  --  100   CO2 mmol/L 20.9* 20.2*  --   --  22.8   < > 25.8   < > 26.3  --  29.0   BUN mg/dL 13 15  --   --  15   < > 15   < > 15  --  16   CREATININE mg/dL 0.56* 0.54*  --   --  0.61   < > 0.58   < > 0.55*  --  0.52*   EGFR IF NONAFRICN AM mL/min/1.73 112 117  --   --  102   < > 108   < > 115  --  122   CALCIUM mg/dL 10.4 10.2  --   --  10.1   < > 10.3   < > 10.4  --  10.1   GLUCOSE mg/dL 183* 261*  --   --  232*   < > 259*   < > 255*  --  244*   ALBUMIN g/dL 3.73 3.19*  --   --  3.62   < >  --   --  3.63  --  3.73   BILIRUBIN mg/dL 0.3 0.2  --   --  0.2   < >  --   --  0.2  --  0.2   ALK PHOS U/L 120* 118*   --   --  125*   < >  --   --  123*  --  133*   AST (SGOT) U/L 22 23  --   --  31   < >  --   --  23  --  34*   ALT (SGPT) U/L 33 31  --   --  40*   < >  --   --  37*  --  35*    < > = values in this interval not displayed.   Estimated Creatinine Clearance: 150 mL/min (A) (by C-G formula based on SCr of 0.56 mg/dL (L)).  No results found for: AMMONIA    Glucose   Date/Time Value Ref Range Status   02/04/2022 0659 177 (H) 70 - 130 mg/dL Final     Comment:     Meter: [id number] : [id number] Gianluca Pace N   02/03/2022 2130 146 (H) 70 - 130 mg/dL Final     Comment:     Meter: [id number] : [id number] Gianluca Pace N   02/03/2022 1803 158 (H) 70 - 130 mg/dL Final     Comment:     Meter: [id number] : [id number] Suresh Lindsey   02/03/2022 1047 204 (H) 70 - 130 mg/dL Final     Comment:     Meter: [id number] : [id number] Jasielbhavya Spencera A   02/03/2022 0744 241 (H) 70 - 130 mg/dL Final     Comment:     Meter: [id number] : [id number] Gianluca Pace N   02/02/2022 1654 248 (H) 70 - 130 mg/dL Final     Comment:     Meter: [id number] : [id number] Bridget Blanca   02/02/2022 1209 256 (H) 70 - 130 mg/dL Final     Comment:     Meter: [id number] : [id number] Bridget Blanca   02/02/2022 0603 244 (H) 70 - 130 mg/dL Final     Comment:     Meter: [id number] : [id number] [facility]     Lab Results   Component Value Date    HGBA1C 6.50 (H) 01/22/2022     Lab Results   Component Value Date    TSH 2.290 01/19/2022       Blood Culture   Date Value Ref Range Status   01/22/2022 No growth at 4 days  Preliminary   01/22/2022 No growth at 4 days  Preliminary     No results found for: URINECX  Wound Culture   Date Value Ref Range Status   01/24/2022 Rare Staphylococcus aureus (A)  Final   01/24/2022 Rare Normal Skin Melva  Final     No results found for: STOOLCX  Respiratory Culture   Date Value Ref Range Status   01/25/2022 Scant growth (1+) Candida albicans (A)  Final   01/25/2022 No Normal Respiratory Melva (A)  Final      Pain Management Panel       Pain Management Panel Latest Ref Rng & Units 1/19/2022    AMPHETAMINES SCREEN, URINE Negative Negative    BARBITURATES SCREEN Negative Negative    BENZODIAZEPINE SCREEN, URINE Negative Positive(A)    BUPRENORPHINEUR Negative Negative    COCAINE SCREEN, URINE Negative Negative    METHADONE SCREEN, URINE Negative Negative    METHAMPHETAMINEUR Negative Negative              ----------------------------------------------------------------------------------------------------------------------  Imaging Results (Last 24 Hours)       Procedure Component Value Units Date/Time    CT Head Without Contrast [[id number]] Collected: 02/03/22 1756     Updated: 02/03/22 1759    Narrative:      CT HEAD WO CONTRAST-     CLINICAL INDICATION: Neuro deficit, acute, stroke suspected        COMPARISON: 01/31/2022      TECHNIQUE: Axial images of the brain were obtained with out intravenous  contrast.  Reformatted images were created in the sagittal and coronal  planes.     DOSE:     Radiation dose reduction techniques were utilized per ALARA protocol.  Automated exposure control was initiated through either or Optimalize.me or  DoseRight software packages by  protocol.           FINDINGS:   There is extensive patient motion, thus degrading image quality.  No parenchymal mass, hemorrhage, or midline shift  There is no evidence of acute ischemia.  I do not see epidural or subdural hematoma.  No evidence of ventriculomegaly  The bone window setting images show no destructive calvarial lesion or  acute calvarial fracture.   The posterior fossa is unremarkable.          Impression:         1. Extensive patient motion  2. No evidence of an acute ischemic event  3. No evidence of parenchymal hemorrhage     This report was finalized on 2/3/2022 5:57 PM by Dr. Deonte Reece MD.                ----------------------------------------------------------------------------------------------------------------------    Assessment/Plan       Assessment/Plan     ASSESSMENT:    1.  Severe sepsis with acute respiratory failure requiring mechanical ventilation  2.  Pneumonia  3.  Left great toe osteomyelitis  4.  Enterocolitis    PLAN:    Patient resting in bed.  Remains confused.  On room air with no apparent distress.  WBC improving at 12.22.  CT of the head from 2/3/2022 reports no evidence of acute ischemic event or parenchymal hemorrhage.    Chest x-ray on 2/1/2022 shows mild bibasilar infiltrates.  Respiratory culture on 1/29/2022 finalized as no growth.    Bone scan on 1/28/2022 showed focal area of increased tracer uptake localizing to the left great toe.  If ulceration or infection in this location, osteomyelitis should be considered.  Periarticular type uptake throughout the right foot as detailed above in a pattern and distribution that is more favorable for reactive changes and neuropathic joint changes.  Soft tissue type uptake suggestive of cellulitis.      CT chest on 1/28/2022 showed new or worsening left basilar parenchymal opacity likely representing combination of atelectasis and/or infiltrate.  Continued right lower lobe atelectasis and likely infiltrate.  Patchy groundglass opacity left upper lobe and along the anterior aspect right upper lobe could represent very mild pneumonitis.  Increased tracheal and bronchial secretions may reflect underlying bronchitis.  This may be contributing factor of atelectasis.  Respiratory therapy may be of benefit.  Endotracheal tube in place.  Nasogastric tube appears in satisfactory position.  A second 2 passes alongside the nasogastric tube terminates in the distal esophagus.  Following discussion with patient's nurse it represents a temperature probe.  Increased colonic fluid with air-fluid levels.  Correlate for enteric colitis.  No focal inflammatory  changes or obstruction.  CT abdomen pelvis on 1/28/2022 showed the same as CT chest.     COVID-19 and influenza PCR negative.  Lumbar puncture culture and meningitis encephalitis panel negative. Legionella negative.  Strep pneumo antigen negative.  MRSA PCR positive.  [place] spotted fever IgM equivocal at 1.06. Wound culture of the right foot from 1/24/2022 preliminary reports growth of Staph aureus.  Respiratory culture from 1/25/2022 reports growth of Candida albicans.  CT of the bilateral lower extremities reports no evidence of acute fracture, osteomyelitis, soft tissue gas or fluid collection. Wound culture of the right foot on 1/24/2022 finalized as MSSA. Respiratory culture on 1/25/2022 finalized as Candida albicans, likely a colonization. Blood cultures on 1/22/2022 finalized as no growth. Respiratory culture on 1/29/2022 is finalized as no growth.     As bone scan shows evidence of left great toe osteomyelitis, recommended surgical evaluation and intraoperative cultures from the left great toe to help direct antibiotic therapy. Surgeon has reevaluated the patient and does not recommend debridement or amputation at this time.      For now recommend to continue cefepime 2 g IV every 8 hours, Flagyl 500 mg IV every 8 hours and vancomycin pharmacy to dose.  We will continue to follow closely and adjust antibiotic therapy as needed.     Code Status:   Code Status and Medical Interventions:   Ordered at: 01/22/22 0039     Level Of Support Discussed With:    Health Care Surrogate     Code Status (Patient has no pulse and is not breathing):    CPR (Attempt to Resuscitate)     Medical Interventions (Patient has pulse or is breathing):    Full Support     Scribed for Dr. To Christopher MD by ALDEN Nails  02/04/22 12:30 EST        ALDEN Nails  02/04/22  12:30 EST    Physician Attestation:    The documentation recorded by the scribe accurately reflects the service I personally performed and the  decisions made by me.    To Christopher MD  02/04/22  12:30 EST

## 2022-02-04 NOTE — NURSING NOTE
Assessment:  Diagnosis: sepsis    Allergies   Allergen Reactions   • Penicillins Unknown (See Comments)     Pt states was in coma and does not know reaction         Order Date/Time: 2/4/22  Indications: Poor Access   LABS:  Lab Results   Component Value Date    INR 1.18 (H) 01/22/2022    PROTIME 15.4 (H) 01/22/2022     Lab Results   Component Value Date    PTT 83.7 (H) 02/04/2022     Lab Results   Component Value Date    WBC 12.22 (H) 02/04/2022    HGB 12.6 02/04/2022    HCT 43.4 02/04/2022    MCV 83.0 02/04/2022     (H) 02/04/2022     Lab Results   Component Value Date    BUN 13 02/04/2022     Lab Results   Component Value Date    CREATININE 0.56 (L) 02/04/2022     Lab Results   Component Value Date    EGFRIFNONA 112 02/04/2022     Labs Reviewed: WNL    Contraindications for PICC/Midline:  No Contraindication      Recommendations:  Peripherally inserted central catheter    Procedure Time Out:  Time out Time: 1200  Correct Patient Identity: Yes  Correct Surgical Side and Site Are Marked: Yes  Agreement on Procedure to be done: Yes  Antibiotic Given: N/A  RN: Grace Uriostegui RN  RN: n/a  Other: n/a      Grace Uriostegui, RN

## 2022-02-05 NOTE — PLAN OF CARE
Goal Outcome Evaluation:              Outcome Summary: Pt still having HTN even with treatment.  Unable to verbalize.  Right side flacid

## 2022-02-05 NOTE — PROGRESS NOTES
[facility] HOSPITALIST PROGRESS NOTE     Patient Identification:  Name:  Lynette Stein  Age:  56 y.o.  Sex:  female  :  1965  MRN:  [id number]  Visit Number:  [id number]  ROOM: [address]     Primary Care Provider:  Orville Wu PA    Length of stay in inpatient status:  14    Subjective     Chief Compliant:  altered mental status     History of Presenting Illness:    Patient remains ill but stable today, no acute events overnight, reportedly spoke to Nurse this AM, resting when seen finally, when starts to wake up begins moving left upper extremity again though, R side still flaccid, workup so far still unrevealing, Infectious Disease following, will complete Flagyl today, should complete Cefepime in a few days, continued on Vancomycin, TeleNeuro following as well. MRI and lumbar puncture when able, maybe could Monday-Tuesday if mentation improves by then.   Objective     Current Hospital Meds:cefepime, 2 g, Intravenous, Q8H  hydrALAZINE, 10 mg, Oral, Q8H  insulin aspart, 0-14 Units, Subcutaneous, TID AC  insulin detemir, 30 Units, Subcutaneous, Daily  LORazepam, 0.5 mg, Oral, Nightly  losartan, 100 mg, Oral, Daily  metoprolol tartrate, 100 mg, Nasogastric, Q12H  metroNIDAZOLE, 500 mg, Intravenous, Q8H  pantoprazole, 40 mg, Intravenous, Q AM  pravastatin, 40 mg, Oral, Daily  pregabalin, 75 mg, Nasogastric, Q12H  QUEtiapine, 50 mg, Oral, Nightly  sodium chloride, 10 mL, Intravenous, Q12H  sodium chloride, 10 mL, Intravenous, Q12H  sodium chloride, 10 mL, Intravenous, Q12H  sodium chloride, 10 mL, Intravenous, Q12H  sodium chloride, 10 mL, Intravenous, Q12H  spironolactone, 25 mg, Oral, Daily  vancomycin, 1,500 mg, Intravenous, Q12H    dexmedetomidine, 0.2-1.5 mcg/kg/hr, Last Rate: 0.5 mcg/kg/hr (22 0254)  heparin (porcine), 10.2 Units/kg/hr, Last Rate: 24.6 Units/kg/hr (22 0904)  hold, 1 each  Pharmacy Consult,   Pharmacy to dose vancomycin,        ----------------------------------------------------------------------------------------------------------------------  Vital Signs:  Temp:  [98.3 °F (36.8 °C)-98.9 °F (37.2 °C)] 98.3 °F (36.8 °C)  Heart Rate:  [] 111  Resp:  [14-28] 24  BP: (131-203)/() 197/121  SpO2:  [94 %-100 %] 94 %  on  Flow (L/min):  [3] 3;   Device (Oxygen Therapy): room air  Body mass index is 29.59 kg/m².    Intake & Output (last 3 days)       02/02 0701  02/03 0700 02/03 0701  02/04 0700 02/04 0701  02/05 0700 02/05 0701  02/06 0700    I.V. (mL/kg) 488.1 (4.8) 856.8 (8.6) 784 (7.9)     Other 1020       NG/GT 1136       IV Piggyback 1600 1500 1400     Total Intake(mL/kg) 4244.1 (42) 2356.8 (23.6) 2184 (22.1)     Urine (mL/kg/hr) 2600 (1.1) 2200 (0.9) 1575 (0.7)     Stool  0 0     Total Output 2600 2200 1575     Net +1644.1 +156.8 +609             Stool Unmeasured Occurrence  2 x 0 x         ----------------------------------------------------------------------------------------------------------------------  Physical exam:  Constitutional:  Well-developed and well-nourished. Awake, looking around room  HENT:  Head:  Normocephalic and atraumatic.  Mouth:  Moist mucous membranes.    Eyes:  Conjunctivae normal. No scleral icterus.    Neck:  Neck supple.  No JVD present.    Cardiovascular:  Normal rate, regular rhythm and normal heart sounds with no murmur.  Pulmonary/Chest:  course breath sounds bilateral, on room air.  Abdominal:  Soft. No distension and no tenderness.   Musculoskeletal:  No tenderness, and no deformity.  No red or swollen joints anywhere.    Neurological: resting, not moving as move at this time, spoke to Nurse this AM, R side still flaccid.  Skin:  Skin is warm and dry. No rash noted. No pallor.   Peripheral vascular: Bilateral lower extremity ulcers wrapped, clean and dry  : Nabeel in  place  ----------------------------------------------------------------------------------------------------------------------  Results from last 7 days   Lab Units 02/05/22  0046 02/04/22  0246 02/03/22  1111 02/03/22  0025   LACTATE mmol/L  --   --  0.9  --    WBC 10*3/mm3 11.63* 12.22*  --  14.09*   HEMOGLOBIN g/dL 11.9* 12.6  --  12.0   HEMATOCRIT % 40.5 43.4  --  40.1   MCV fL 83.5 83.0  --  82.7   MCHC g/dL 29.4* 29.0*  --  29.9*   PLATELETS 10*3/mm3 343 461*  --  472*     Results from last 7 days   Lab Units 02/03/22  1112   PH, ARTERIAL pH units 7.480*   PO2 ART mm Hg 88.7   PCO2, ARTERIAL mm Hg 32.6*   HCO3 ART mmol/L 24.2     Results from last 7 days   Lab Units 02/05/22  0046 02/04/22  1249 02/04/22  0246 02/03/22  0025 02/02/22  0008 01/31/22  2331 01/31/22  2326 01/31/22  2326 01/31/22  1325 01/31/22  0103   SODIUM mmol/L 146*  --  144 143   < > 144   < > 143  --  139   POTASSIUM mmol/L 3.1*  --  3.7 3.7   < > 4.0   < > 3.9   < > 3.4*   MAGNESIUM mg/dL  --  2.0  --   --   --  2.1  --  2.1  --  2.3   CHLORIDE mmol/L 114*  --  109* 110*   < > 107   < > 106  --  100   CO2 mmol/L 17.8*  --  20.9* 20.2*   < > 25.8   < > 26.3  --  29.0   BUN mg/dL 11  --  13 15   < > 15   < > 15  --  16   CREATININE mg/dL 0.52*  --  0.56* 0.54*   < > 0.58   < > 0.55*  --  0.52*   EGFR IF NONAFRICN AM mL/min/1.73 122  --  112 117   < > 108   < > 115  --  122   CALCIUM mg/dL 9.9  --  10.4 10.2   < > 10.3   < > 10.4  --  10.1   PHOSPHORUS mg/dL  --   --   --   --   --  4.6*  --   --   --  3.7   GLUCOSE mg/dL 159*  --  183* 261*   < > 259*   < > 255*  --  244*   ALBUMIN g/dL 3.45*  --  3.73 3.19*   < >  --   --  3.63  --  3.73   BILIRUBIN mg/dL 0.3  --  0.3 0.2   < >  --   --  0.2  --  0.2   ALK PHOS U/L 107  --  120* 118*   < >  --   --  123*  --  133*   AST (SGOT) U/L 19  --  22 23   < >  --   --  23  --  34*   ALT (SGPT) U/L 29  --  33 31   < >  --   --  37*  --  35*    < > = values in this interval not displayed.   Estimated  Creatinine Clearance: 161.1 mL/min (A) (by C-G formula based on SCr of 0.52 mg/dL (L)).  No results found for: AMMONIA              Glucose   Date/Time Value Ref Range Status   02/05/2022 1109 138 (H) 70 - 130 mg/dL Final     Comment:     Meter: [id number] : [id number] Suresh Lindsey   02/05/2022 0634 183 (H) 70 - 130 mg/dL Final     Comment:     Meter: [id number] : [id number] Kayli Koehler   02/04/2022 2005 137 (H) 70 - 130 mg/dL Final     Comment:     Meter: [id number] : [id number] Gianluca [place]     Lab Results   Component Value Date    TSH 2.290 01/19/2022     No results found for: PREGTESTUR, PREGSERUM, HCG, HCGQUANT  Pain Management Panel     Pain Management Panel Latest Ref Rng & Units 1/19/2022    AMPHETAMINES SCREEN, URINE Negative Negative    BARBITURATES SCREEN Negative Negative    BENZODIAZEPINE SCREEN, URINE Negative Positive(A)    BUPRENORPHINEUR Negative Negative    COCAINE SCREEN, URINE Negative Negative    METHADONE SCREEN, URINE Negative Negative    METHAMPHETAMINEUR Negative Negative        Brief Urine Lab Results  (Last result in the past 365 days)      Color   Clarity   Blood   Leuk Est   Nitrite   Protein   CREAT   Urine HCG        01/19/22 2010 Dark Yellow   Clear   Negative   Trace   Negative   100 mg/dL (2+)               No results found for: BLOODCX  No results found for: URINECX    I have personally looked at the labs and they are summarized above.  ----------------------------------------------------------------------------------------------------------------------  Detailed radiology reports for the last 24 hours:  Imaging Results (Last 24 Hours)     Procedure Component Value Units Date/Time    XR Chest 1 View [[id number]] Collected: 02/04/22 1308     Updated: 02/04/22 1310    Narrative:      XR CHEST 1 VW-     CLINICAL INDICATION: verify picc placement        COMPARISON: 02/01/2022      TECHNIQUE: Single frontal view of the chest.     FINDINGS:     PICC line tip cavoatrial  junction  The cardiac silhouette is normal. The pulmonary vasculature is  unremarkable.  There is no evidence of an acute osseous abnormality.   There are no suspicious-appearing parenchymal soft tissue nodules.          Impression:      PICC line tip at the cavoatrial junction     This report was finalized on 2/4/2022 1:08 PM by Dr. Deonte Reece MD.           Assessment & Plan    56F Smoker, Obese by BMI PMH Anxiety/Depression, Leukemia, Chronic Pain, COPD, Diabetes Mellitus Type II, Hypertension, Hyperlipidemia, Peripheral Vascular Disease, Obstructive Sleep Apnea, chronic relapsing and remitting bilateral lower extremity foot ulcers and follows in wound care clinic, presented [facility] emergency room 1/19 for altered mental status.    #Septic Shock, Acute Metabolic Encephalopathy & Acute Hypoxic Respiratory Failure requiring Intubation and Mechanical Ventilation 2/2 PNA, Bacterial, treating for MDR Organisms in setting of underlying COPD without acute exacerbation and Obstructive Sleep Apnea - Acute Metabolic Encephalopathy Persisting   - Patient presents w/ abrupt onset altered mental status, WBC count > 12K, heart rate > 90, Pneumonia  on imaging, SBP < 90 requiring IV pressors, intubated for airway protection, etiology of altered mental status suspected infectious/sepsis though has had persisting encephalopathy while on ventilator, repeat head CT from 1/19 to 1/28 was stable, no acute intracranial abnormalities, EEG's without epileptiform activity only generalized slowing.  1/31 off sedation thought to have lateral gaze deficits, repeat CTA's and EEGs without definitive etiology  - Pulmonary consulted; Following at a distance now, extubated 2/1, not here over the weekend  - Infectious Disease consulted; Following  - TeleNeuro consulted; Following  - Lumbar puncture without growth, encephalitis panel negative, cytology with elevated glucose and protein, unable to perform Cellblock; At this time  deferred for extubation and unable to perform at this time due to agitation  - Continue Vancomycin, Cefepime and Flagyl, should complete Flagyl today  - Continue APAP PRN for fevers, Duonebs as needed  - Monitor in CCU, monitor on telemetry, off oxygen, awaiting mentation improvement, does appear to have some signs of improvement and hopefully can go for MRI and lumbar puncture Monday-Tuesday    #Relapsing and Remitting Lower Extremity Wounds/Ulcers now with Osteomyelitis L great toe and bilateral cellulitis   - Patient reported 2 year history lower extremity wounds, follows in wound care clinic, cultures from initial biopsy grew MSSA  - Rheumatology panel negative with normal C and P-ANCA, Anti-CCP, SSA/SSB, Atypical P-ANCA, Anti-Hu Ab, paraneoplastic panel pending but is send out test  - Bone scan concern for osteomyelitis  - General Surgery consulted; Followed, status post debridement 1/22, signed off, re-evaluated 1/30 and recommended no surgical intervention.  - Infectious Disease consulted; Following as per above, continue antibiotics as per above    #Hypertension/Hyperlipidemia/Peripheral Vascular Disease   #Atrial Fibrillation w/ RVR, New onset (CHADsVasc = 3)  #NSTEMI Type II due to Atrial Fibrillation   - Patient with noted Atrial Fibrillation with RVR in emergency room on EKG, recurrent heart rate up to 160's.  - Labs showed troponins <0.010 -> 0.036 -> <0.010, proBNP 4800  - EKG showed Atrial Fibrillation with RVR  - Echo showed mild concentric left ventricular hypertrophy, normal LV function.   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 A BCR ABL PCR was obtained which was <0.001% as well as a flow cytometry which was concerning for a monotypical CD5+ B cell population with nonspecific immunophenotype, but significant for a variant B cell SLL or mantle cell lymphoma that could not be ruled out. CLL FISH was then obtained which was negative and CCND1/IGH - t(11:14) was not detected, JAK2 V617 and JAK2 exon 12 mutation which were negative as well.  - Pt had follow up scheduled 6/2020 and no showed appointment.    - Peripheral smear without evidence of acute leukemia  - Hematology/Oncology consulted; Evaluated and did not suspect current condition related to hematological condition, recommended follow up Dr. Menezes outpatient     #NIDDM Type II, controlled, unknown complications, New diagnosis  - Hgb A1c = 6.5%  - No home oral agents, continue FSBG and SSI, continue new Levemir 30U nightly, titrate as indicated     #Anxiety/Depression  - Psychiatry consulted in emergency room and felt altered mental status not related to underlying psychiatric illness; Continue home Seroquel and Ativan    #Tobacco Dependence  - Rec'd cessation, NRT as needed    #Obesity by BMI  - BMI 30, complicates all aspects of care     F: NPO  E: Monitor & Replace PRN  N: NPO Diet; Tube Feeds if can place NG  Ppx: on Hep gtt  Code: Full Code     Dispo: Pending workup and clinical improvement     *This patient is considered high risk due to sepsis, acute respiratory failure, PNA, intubation and mechanical ventilation, persisting encephalopathy, History monoclonal B cell lymphocytosis, NSTEMI, Atrial Fibrillation.     Edited by: Dick Whiting MD at 2/5/2022 7309  [facility]

## 2022-02-05 NOTE — PROGRESS NOTES
PROGRESS NOTE         Patient Identification:  Name:  Lynette Stein  Age:  56 y.o.  Sex:  female  :  1965  MRN:  [id number]  Visit Number:  [id number]  Primary Care Provider:  Orville Wu PA         LOS: 14 days       ----------------------------------------------------------------------------------------------------------------------  Subjective       Chief Complaints:    No chief complaint on file.        Interval History:      Patient resting in bed.  On room air with no apparent distress.  WBC improving 11.63.  Sister at bedside.  No issues reported overnight.    Review of Systems:    Unable to obtain.  Altered mental status.  ----------------------------------------------------------------------------------------------------------------      Objective       Current Hospital Meds:  cefepime, 2 g, Intravenous, Q8H  hydrALAZINE, 10 mg, Oral, Q8H  insulin aspart, 0-14 Units, Subcutaneous, TID AC  insulin detemir, 30 Units, Subcutaneous, Daily  LORazepam, 0.5 mg, Oral, Nightly  losartan, 100 mg, Oral, Daily  metoprolol tartrate, 100 mg, Nasogastric, Q12H  metroNIDAZOLE, 500 mg, Intravenous, Q8H  pantoprazole, 40 mg, Intravenous, Q AM  pravastatin, 40 mg, Oral, Daily  pregabalin, 75 mg, Nasogastric, Q12H  QUEtiapine, 50 mg, Oral, Nightly  sodium chloride, 10 mL, Intravenous, Q12H  sodium chloride, 10 mL, Intravenous, Q12H  sodium chloride, 10 mL, Intravenous, Q12H  sodium chloride, 10 mL, Intravenous, Q12H  sodium chloride, 10 mL, Intravenous, Q12H  spironolactone, 25 mg, Oral, Daily  vancomycin, 1,500 mg, Intravenous, Q12H      dexmedetomidine, 0.2-1.5 mcg/kg/hr, Last Rate: 0.5 mcg/kg/hr (22 0254)  heparin (porcine), 10.2 Units/kg/hr, Last Rate: 24.6 Units/kg/hr (22 0904)  hold, 1 each  Pharmacy Consult,   Pharmacy to dose vancomycin,       ----------------------------------------------------------------------------------------------------------------------    Vital  Signs:  Temp:  [98.3 °F (36.8 °C)-98.9 °F (37.2 °C)] 98.3 °F (36.8 °C)  Heart Rate:  [] 111  Resp:  [14-28] 24  BP: (131-197)/() 197/121  Mean Arterial Pressure (Non-Invasive) for the past 24 hrs (Last 3 readings):   Noninvasive MAP (mmHg)   02/05/22 0835 99   02/05/22 0805 97   02/05/22 0745 107     SpO2 Percentage    02/05/22 0745 02/05/22 0805 02/05/22 0835   SpO2: 98% 94% 94%     SpO2:  [94 %-100 %] 94 %  on  Flow (L/min):  [3] 3;   Device (Oxygen Therapy): room air    Body mass index is 29.59 kg/m².  Wt Readings from Last 3 Encounters:   02/05/22 99 kg (218 lb 4.1 oz)   01/19/22 106 kg (233 lb)   12/14/21 106 kg (233 lb 9.6 oz)        Intake/Output Summary (Last 24 hours) at 2/5/2022 1340  Last data filed at 2/5/2022 0620  Gross per 24 hour   Intake 1684 ml   Output 1575 ml   Net 109 ml     NPO Diet  ----------------------------------------------------------------------------------------------------------------------      Physical Exam:    Constitutional: Chronically ill-appearing.  Awake and agitated, but not following commands.  Drooling and experiencing oral automatisms.  HENT:  Head: Normocephalic and atraumatic.  Mouth:  Moist mucous membranes.    Eyes:  Pupils equal.  Left eye chemosis.   Neck:  Neck supple.  No JVD present.    Cardiovascular:  Normal rate, regular rhythm and normal heart sounds with no murmur. No edema.  Pulmonary/Chest: Coarse breath sounds bilaterally.  Abdominal:  Soft.  Bowel sounds are normal.  No distension and no tenderness.   Musculoskeletal:  No tenderness, and no deformity.  No swelling or redness of joints.  Mild bilateral lower extremity edema.  Neurological:  Awake and agitated.  Not following commands.  Skin:  Skin is warm and dry.  No rash noted.  No pallor.  Scattered bruises and scabs to bilateral upper extremities, trunk, bilateral lower extremities and feet.  Bilateral feet ulcers in dressings today.  Psychiatric: Awake and agitated.  Not following  commands.  ----------------------------------------------------------------------------------------------------------------------              Results from last 7 days   Lab Units 02/03/22  1112   PH, ARTERIAL pH units 7.480*   PO2 ART mm Hg 88.7   PCO2, ARTERIAL mm Hg 32.6*   HCO3 ART mmol/L 24.2     Results from last 7 days   Lab Units 02/05/22  0046 02/04/22  0246 02/03/22  1111 02/03/22  0025   LACTATE mmol/L  --   --  0.9  --    WBC 10*3/mm3 11.63* 12.22*  --  14.09*   HEMOGLOBIN g/dL 11.9* 12.6  --  12.0   HEMATOCRIT % 40.5 43.4  --  40.1   MCV fL 83.5 83.0  --  82.7   MCHC g/dL 29.4* 29.0*  --  29.9*   PLATELETS 10*3/mm3 343 461*  --  472*     Results from last 7 days   Lab Units 02/05/22  0046 02/04/22  1249 02/04/22  0246 02/03/22  0025 02/02/22 0008 01/31/22  2331 01/31/22  2326 01/31/22 2326   SODIUM mmol/L 146*  --  144 143   < > 144   < > 143   POTASSIUM mmol/L 3.1*  --  3.7 3.7   < > 4.0   < > 3.9   MAGNESIUM mg/dL  --  2.0  --   --   --  2.1  --  2.1   CHLORIDE mmol/L 114*  --  109* 110*   < > 107   < > 106   CO2 mmol/L 17.8*  --  20.9* 20.2*   < > 25.8   < > 26.3   BUN mg/dL 11  --  13 15   < > 15   < > 15   CREATININE mg/dL 0.52*  --  0.56* 0.54*   < > 0.58   < > 0.55*   EGFR IF NONAFRICN AM mL/min/1.73 122  --  112 117   < > 108   < > 115   CALCIUM mg/dL 9.9  --  10.4 10.2   < > 10.3   < > 10.4   GLUCOSE mg/dL 159*  --  183* 261*   < > 259*   < > 255*   ALBUMIN g/dL 3.45*  --  3.73 3.19*   < >  --   --  3.63   BILIRUBIN mg/dL 0.3  --  0.3 0.2   < >  --   --  0.2   ALK PHOS U/L 107  --  120* 118*   < >  --   --  123*   AST (SGOT) U/L 19  --  22 23   < >  --   --  23   ALT (SGPT) U/L 29  --  33 31   < >  --   --  37*    < > = values in this interval not displayed.   Estimated Creatinine Clearance: 161.1 mL/min (A) (by C-G formula based on SCr of 0.52 mg/dL (L)).  No results found for: AMMONIA    Glucose   Date/Time Value Ref Range Status   02/05/2022 1109 138 (H) 70 - 130 mg/dL Final     Comment:      Meter: [id number] : [id number] Suresh Lindsey   02/05/2022 0634 183 (H) 70 - 130 mg/dL Final     Comment:     Meter: [id number] : [id number] Kayli Koehler   02/04/2022 2005 137 (H) 70 - 130 mg/dL Final     Comment:     Meter: [id number] : [id number] Gianluca Katelynn N   02/04/2022 1738 159 (H) 70 - 130 mg/dL Final     Comment:     Meter: [id number] : [id number] Suresh Rosalia   02/04/2022 1246 159 (H) 70 - 130 mg/dL Final     Comment:     Meter: [id number] : [id number] Surseh Rosalia   02/04/2022 0659 177 (H) 70 - 130 mg/dL Final     Comment:     Meter: [id number] : [id number] Gianluca Katelynn N   02/03/2022 2130 146 (H) 70 - 130 mg/dL Final     Comment:     Meter: [id number] : [id number] Gianluca Katelynn N   02/03/2022 1803 158 (H) 70 - 130 mg/dL Final     Comment:     Meter: [id number] : [id number] Suresh Rosalia     Lab Results   Component Value Date    HGBA1C 6.50 (H) 01/22/2022     Lab Results   Component Value Date    TSH 2.290 01/19/2022       Blood Culture   Date Value Ref Range Status   01/22/2022 No growth at 4 days  Preliminary   01/22/2022 No growth at 4 days  Preliminary     No results found for: URINECX  Wound Culture   Date Value Ref Range Status   01/24/2022 Rare Staphylococcus aureus (A)  Final   01/24/2022 Rare Normal Skin Melva  Final     No results found for: STOOLCX  Respiratory Culture   Date Value Ref Range Status   01/25/2022 Scant growth (1+) Candida albicans (A)  Final   01/25/2022 No Normal Respiratory Melva (A)  Final     Pain Management Panel       Pain Management Panel Latest Ref Rng & Units 1/19/2022    AMPHETAMINES SCREEN, URINE Negative Negative    BARBITURATES SCREEN Negative Negative    BENZODIAZEPINE SCREEN, URINE Negative Positive(A)    BUPRENORPHINEUR Negative Negative    COCAINE SCREEN, URINE Negative Negative    METHADONE SCREEN, URINE Negative Negative    METHAMPHETAMINEUR Negative Negative               ----------------------------------------------------------------------------------------------------------------------  Imaging Results (Last 24 Hours)       ** No results found for the last 24 hours. **            ----------------------------------------------------------------------------------------------------------------------    Assessment/Plan       Assessment/Plan     ASSESSMENT:    1.  Severe sepsis with acute respiratory failure requiring mechanical ventilation  2.  Pneumonia  3.  Left great toe osteomyelitis  4.  Enterocolitis    PLAN:    Patient resting in bed.  On room air with no apparent distress.  WBC improving 11.63.  Sister at bedside.  No issues reported overnight.    CT of the head from 2/3/2022 reports no evidence of acute ischemic event or parenchymal hemorrhage.    Chest x-ray on 2/1/2022 shows mild bibasilar infiltrates.  Respiratory culture on 1/29/2022 finalized as no growth.    Bone scan on 1/28/2022 showed focal area of increased tracer uptake localizing to the left great toe.  If ulceration or infection in this location, osteomyelitis should be considered.  Periarticular type uptake throughout the right foot as detailed above in a pattern and distribution that is more favorable for reactive changes and neuropathic joint changes.  Soft tissue type uptake suggestive of cellulitis.      CT chest on 1/28/2022 showed new or worsening left basilar parenchymal opacity likely representing combination of atelectasis and/or infiltrate.  Continued right lower lobe atelectasis and likely infiltrate.  Patchy groundglass opacity left upper lobe and along the anterior aspect right upper lobe could represent very mild pneumonitis.  Increased tracheal and bronchial secretions may reflect underlying bronchitis.  This may be contributing factor of atelectasis.  Respiratory therapy may be of benefit.  Endotracheal tube in place.  Nasogastric tube appears in satisfactory position.  A second 2 passes  alongside the nasogastric tube terminates in the distal esophagus.  Following discussion with patient's nurse it represents a temperature probe.  Increased colonic fluid with air-fluid levels.  Correlate for enteric colitis.  No focal inflammatory changes or obstruction.  CT abdomen pelvis on 1/28/2022 showed the same as CT chest.     COVID-19 and influenza PCR negative.  Lumbar puncture culture and meningitis encephalitis panel negative. Legionella negative.  Strep pneumo antigen negative.  MRSA PCR positive.  [place] spotted fever IgM equivocal at 1.06. Wound culture of the right foot from 1/24/2022 preliminary reports growth of Staph aureus.  Respiratory culture from 1/25/2022 reports growth of Candida albicans.  CT of the bilateral lower extremities reports no evidence of acute fracture, osteomyelitis, soft tissue gas or fluid collection. Wound culture of the right foot on 1/24/2022 finalized as MSSA. Respiratory culture on 1/25/2022 finalized as Candida albicans, likely a colonization. Blood cultures on 1/22/2022 finalized as no growth. Respiratory culture on 1/29/2022 is finalized as no growth.     As bone scan shows evidence of left great toe osteomyelitis, recommended surgical evaluation and intraoperative cultures from the left great toe to help direct antibiotic therapy. Surgeon has reevaluated the patient and does not recommend debridement or amputation at this time.      For now recommend to continue cefepime 2 g IV every 8 hours, Flagyl 500 mg IV every 8 hours and vancomycin pharmacy to dose.  We will continue to follow closely and adjust antibiotic therapy as needed.     Code Status:   Code Status and Medical Interventions:   Ordered at: 01/22/22 0039     Level Of Support Discussed With:    Health Care Surrogate     Code Status (Patient has no pulse and is not breathing):    CPR (Attempt to Resuscitate)     Medical Interventions (Patient has pulse or is breathing):    Full Support     Scribed for  Dr. To Christopher MD by ALDEN Nails  02/05/22 13:40 EST        ALDEN Nails  02/05/22  13:40 EST    Physician Attestation:    The documentation recorded by the scribe accurately reflects the service I personally performed and the decisions made by me.    To Christopher MD  02/05/22  13:40 EST

## 2022-02-05 NOTE — PAYOR COMM NOTE
"[facility]  RUSS CASTELLANOS  PHONE  [phone]  FAX  [phone]  NPI:  [id number]    CLINICAL UPDATE  REF# [id number]      Elkin Yuan (56 y.o. Female)             Date of Birth Social Security Number Address Home Phone MRN    1965  [id number] [place] [address] [phone] [id number]    Congregation Marital Status             Yazidism        Admission Date Admission Type Admitting Provider Attending Provider Department, Room/Bed    1/22/22 Urgent Sebastian Baker MD Parks, Andrew, MD [facility] CRITICAL CARE, CC04/1C    Discharge Date Discharge Disposition Discharge Destination                         Attending Provider: Dick Whiting MD    Allergies: Penicillins    Isolation: None   Infection: MRSA No Isolation this Admit (01/23/22)   Code Status: CPR   Advance Care Planning Activity    Ht: 182.9 cm (72.01\")   Wt: 99 kg (218 lb 4.1 oz)    Admission Cmt: None   Principal Problem: None                Active Insurance as of 1/22/2022     Primary Coverage     Payor Plan Insurance Group Employer/Plan Group    [employer] BLUE [employer] PPO [id number]     Payor Plan Address Payor Plan Phone Number Payor Plan Fax Number Effective Dates    [address] [phone]  1/8/2012 - None Entered    [facility] [id number]       Subscriber Name Subscriber Birth Date Member ID       TAMARA YUAN 4/10/1966 [id number]           Secondary Coverage     Payor Plan Insurance Group Employer/Plan Group    [facility] MEDICARE REPLACEMENT [facility]      Payor Plan Address Payor Plan Phone Number Payor Plan Fax Number Effective Dates    [address] [address] [phone]  9/29/2021 - None Entered    [facility] [id number]       Subscriber Name Subscriber Birth Date Member ID       ELKIN YUAN 1965 [id number]                 Emergency Contacts      (Rel.) Home Phone Work Phone Mobile Phone    Tamara Yuan (Spouse) [phone] -- " --    Trisha Baxter (Sister) [phone] -- [phone]              Current Facility-Administered Medications   Medication Dose Route Frequency Provider Last Rate Last Admin   • acetaminophen (TYLENOL) tablet 650 mg  650 mg Oral Q6H PRN Sebastian Baker MD   650 mg at 02/02/22 0626   • artificial tears ophthalmic ointment   Both Eyes Q1H PRN Avery Horan MD       • cefepime 2 gm IVPB in 100 ml NS (VTB)  2 g Intravenous Q8H To Christopher MD   2 g at 02/05/22 0620   • DEXMEDETOMIDINE 1000 MCG/250ML INFUSION infusion  0.2-1.5 mcg/kg/hr Intravenous Titrated Sebastian Baker MD 12.1 mL/hr at 02/04/22 0254 0.5 mcg/kg/hr at 02/04/22 0254   • dextrose (D50W) (25 g/50 mL) IV injection 25 g  25 g Intravenous Q15 Min PRN Mario West MD       • dextrose (GLUTOSE) oral gel 15 g  15 g Oral Q15 Min PRN Mario West MD       • glucagon (human recombinant) (GLUCAGEN DIAGNOSTIC) injection 1 mg  1 mg Subcutaneous Q15 Min PRN Mario West MD       • heparin (porcine) 5000 UNIT/ML injection 2,500 Units  2,500 Units Intravenous PRN Dick Whiting MD   2,500 Units at 02/03/22 1034   • heparin (porcine) 5000 UNIT/ML injection 5,000 Units  5,000 Units Intravenous PRN Dick Whiting MD   5,000 Units at 02/02/22 0205   • heparin 23832 units/250 mL (100 units/mL) in 0.45 % NaCl infusion  10.2 Units/kg/hr Intravenous Titrated Dick Whiting MD 23.9 mL/hr at 02/05/22 0904 24.6 Units/kg/hr at 02/05/22 0904   • Hold medication  1 each Does not apply Continuous PRN Dick Whiting MD       • hydrALAZINE (APRESOLINE) injection 10 mg  10 mg Intravenous Q4H PRN Joe Martinez MD   10 mg at 02/05/22 0619   • hydrALAZINE (APRESOLINE) tablet 10 mg  10 mg Oral Q8H Dick Whiting MD   10 mg at 02/02/22 2128   • insulin aspart (novoLOG) injection 0-14 Units  0-14 Units Subcutaneous TID Mario Lopes MD   5 Units at 02/03/22 1050   • insulin detemir (LEVEMIR) injection 30 Units  30 Units Subcutaneous Daily  Miquel Mora MD   30 Units at 02/05/22 0749   • LORazepam (ATIVAN) injection 0.5 mg  0.5 mg Intravenous Q4H PRN Tyler Lopez MD   0.5 mg at 02/05/22 0619   • LORazepam (ATIVAN) tablet 0.5 mg  0.5 mg Oral Nightly Miquel Mora MD   0.5 mg at 02/02/22 2129   • losartan (COZAAR) tablet 100 mg  100 mg Oral Daily Roney Pringle MD       • Magnesium Sulfate 2 gram Bolus, followed by 8 gram infusion (total Mg dose 10 grams)- Mg less than or equal to 1mg/dL  2 g Intravenous PRN Sebastian Baker MD        Or   • Magnesium Sulfate 2 gram / 50mL Infusion (GIVE X 3 BAGS TO EQUAL 6GM TOTAL DOSE) - Mg 1.1 - 1.5 mg/dl  2 g Intravenous PRN Sebastian Baker MD        Or   • Magnesium Sulfate 4 gram infusion- Mg 1.6-1.9 mg/dL  4 g Intravenous PRN Sebastian Baker MD       • metoprolol tartrate (LOPRESSOR) injection 5 mg  5 mg Intravenous Q6H PRN Sebastian Baker MD   5 mg at 02/04/22 0034   • metoprolol tartrate (LOPRESSOR) tablet 100 mg  100 mg Nasogastric Q12H Elkin Collins MD   100 mg at 02/02/22 2128   • metroNIDAZOLE (FLAGYL) 500 mg/100mL IVPB  500 mg Intravenous Q8H Mercedes Webre PA   500 mg at 02/05/22 0620   • pantoprazole (PROTONIX) injection 40 mg  40 mg Intravenous Q AM Sebastian Baker MD   40 mg at 02/05/22 0619   • Pharmacy Consult   Does not apply Continuous PRN Roney Pringle MD       • Pharmacy to dose vancomycin   Does not apply Continuous PRN Dick Whiting MD       • potassium chloride (K-DUR,KLOR-CON) ER tablet 40 mEq  40 mEq Oral PRN Dick Whiting MD        Or   • potassium chloride (KLOR-CON) packet 40 mEq  40 mEq Oral PRN Dick Whiting MD   40 mEq at 01/29/22 0828   • pravastatin (PRAVACHOL) tablet 40 mg  40 mg Oral Daily Dick Whiting MD   40 mg at 02/02/22 0849   • pregabalin (LYRICA) capsule 75 mg  75 mg Nasogastric Q12H Yaya Forman MD   75 mg at 02/02/22 2128   • QUEtiapine (SEROquel) tablet 50 mg  50 mg Oral Nightly Yaya Forman,  MD   50 mg at 02/02/22 2128   • sodium chloride 0.9 % flush 10 mL  10 mL Intravenous Q12H Sebastian Baker MD   10 mL at 02/04/22 2114   • sodium chloride 0.9 % flush 10 mL  10 mL Intravenous PRN Sebastian Baker MD       • sodium chloride 0.9 % flush 10 mL  10 mL Intravenous Q12H Sebastian Baker MD   10 mL at 02/04/22 2114   • sodium chloride 0.9 % flush 10 mL  10 mL Intravenous PRN Sebastian Baker MD       • sodium chloride 0.9 % flush 10 mL  10 mL Intravenous Q12H Dick Whiting MD   10 mL at 02/05/22 0752   • sodium chloride 0.9 % flush 10 mL  10 mL Intravenous Q12H Dick Whiting MD   10 mL at 02/05/22 0752   • sodium chloride 0.9 % flush 10 mL  10 mL Intravenous Q12H Dick Whiting MD   10 mL at 02/05/22 0750   • sodium chloride 0.9 % flush 10 mL  10 mL Intravenous PRN Dick Whiting MD       • sodium chloride 0.9 % flush 20 mL  20 mL Intravenous PRN Dick Whiting MD       • spironolactone (ALDACTONE) tablet 25 mg  25 mg Oral Daily Elkin Collins MD   25 mg at 02/02/22 0849   • vancomycin 1500 mg/500 mL 0.9% NS IVPB ([facility])  1,500 mg Intravenous Q12H Dick Whiting MD   1,500 mg at 02/05/22 0136     Orders (last 24 hrs)      Start     Ordered    02/06/22 0600  aPTT  Morning Draw         02/05/22 0138    02/05/22 1116  POC Glucose Once  PROCEDURE ONCE         02/05/22 1109    02/05/22 0900  losartan (COZAAR) tablet 100 mg  Daily         02/04/22 1432    02/05/22 0655  POC Glucose Once  PROCEDURE ONCE         02/05/22 0634    02/05/22 0600  aPTT  Morning Draw,   Status:  Canceled         02/04/22 1700    02/05/22 0600  CBC Auto Differential  PROCEDURE ONCE         02/04/22 2203    02/05/22 0033  aPTT  STAT         02/05/22 0034    02/04/22 2012  POC Glucose Once  PROCEDURE ONCE         02/04/22 2005 02/04/22 1746  POC Glucose Once  PROCEDURE ONCE         02/04/22 1738    02/04/22 1400  vancomycin 1500 mg/500 mL 0.9% NS IVPB ([facility])  Every 12 Hours         02/04/22 1016    02/04/22  1400  sodium chloride 0.9 % flush 10 mL  Every 12 Hours Scheduled         02/04/22 1300    02/04/22 1400  sodium chloride 0.9 % flush 10 mL  Every 12 Hours Scheduled         02/04/22 1300    02/04/22 1400  sodium chloride 0.9 % flush 10 mL  Every 12 Hours Scheduled         02/04/22 1300    02/04/22 1303  POC Glucose Once  PROCEDURE ONCE         02/04/22 1246    02/04/22 1301  Connectors / Hubs Must Be Scrubbed 15 Seconds Using 70% Alcohol Before Access - Allow to Dry Before Accessing Line  Continuous         02/04/22 1300    02/04/22 1301  Change Needleless Connectors  Per Order Details        Comments: Change Needleless Connectors When:  - Removed For Any Reason  - Residual Blood or Debris Within Connector  - Prior to Drawing Blood Cultures  - Contamination of Connector  - After Administration of Blood or Blood Components    02/04/22 1300    02/04/22 1301  PICC LINE CARE - Change CHG Dressing or Transparent Dressing with CHG Disk and Securement Device Every 7 Days  Weekly        Comments: Per [facility] policy    02/04/22 1300    02/04/22 1301  PICC LINE CARE - Change Needleless Connectors  Per Order Details        Comments: Per [facility] Policy    02/04/22 1300    02/04/22 1300  sodium chloride 0.9 % flush 10 mL  As Needed         02/04/22 1300    02/04/22 1300  sodium chloride 0.9 % flush 20 mL  As Needed         02/04/22 1300    02/04/22 1256  XR Chest 1 View  1 Time Imaging         02/04/22 1255    02/04/22 1200  Magnesium  Timed         02/04/22 0546    02/04/22 1200  aPTT  Timed         02/04/22 0546    02/04/22 0515  LORazepam (ATIVAN) injection 0.5 mg  Every 4 Hours PRN         02/04/22 0511    02/02/22 2100  cefepime 2 gm IVPB in 100 ml NS (VTB)  Every 8 Hours         02/02/22 1036    02/02/22 2100  metoprolol tartrate (LOPRESSOR) tablet 100 mg  Every 12 Hours Scheduled         02/02/22 1731    02/02/22 1316  No Carb Liquid Protein 15g/pkt  Daily      Comments: Prostat 30 ml bid (or prosource)    02/02/22 1315     "02/02/22 0900  insulin detemir (LEVEMIR) injection 30 Units  Daily         02/01/22 0954    02/01/22 2344  hydrALAZINE (APRESOLINE) injection 10 mg  Every 4 Hours PRN         02/01/22 2344    02/01/22 2200  hydrALAZINE (APRESOLINE) tablet 10 mg  Every 8 Hours Scheduled         02/01/22 1749    02/01/22 2100  LORazepam (ATIVAN) tablet 0.5 mg  Nightly         02/01/22 0954    01/31/22 2325  Patient Currently On Electrolyte Replacement Protocol - Please Refer to MAR for Protocol Details  Misc Nursing Order (Specify)  Daily      Comments: Patient Currently On Electrolyte Replacement Protocol - Please Refer to MAR for Protocol Details    01/31/22 2324 01/31/22 2324  Magnesium Sulfate 2 gram Bolus, followed by 8 gram infusion (total Mg dose 10 grams)- Mg less than or equal to 1mg/dL  As Needed        \"Or\" Linked Group Details    01/31/22 2324    01/31/22 2324  Magnesium Sulfate 2 gram / 50mL Infusion (GIVE X 3 BAGS TO EQUAL 6GM TOTAL DOSE) - Mg 1.1 - 1.5 mg/dl  As Needed        \"Or\" Linked Group Details    01/31/22 2324    01/31/22 2324  Magnesium Sulfate 4 gram infusion- Mg 1.6-1.9 mg/dL  As Needed        \"Or\" Linked Group Details    01/31/22 2324    01/31/22 0737  Hold medication  Continuous PRN         01/31/22 0740    01/30/22 1300  losartan (COZAAR) tablet 50 mg  Daily,   Status:  Discontinued         01/30/22 1118    01/29/22 1500  metroNIDAZOLE (FLAGYL) 500 mg/100mL IVPB  Every 8 Hours         01/29/22 1259    01/29/22 0845  insulin aspart (novoLOG) injection 0-14 Units  3 Times Daily Before Meals         01/29/22 0748    01/29/22 0728  dextrose (GLUTOSE) oral gel 15 g  Every 15 Minutes PRN         01/29/22 0748    01/29/22 0728  dextrose (D50W) (25 g/50 mL) IV injection 25 g  Every 15 Minutes PRN         01/29/22 0748    01/29/22 0728  glucagon (human recombinant) (GLUCAGEN DIAGNOSTIC) injection 1 mg  Every 15 Minutes PRN         01/29/22 0748    01/28/22 1749  Pharmacy to dose vancomycin  Continuous PRN      " "   01/28/22 1750    01/28/22 1300  spironolactone (ALDACTONE) tablet 25 mg  Daily         01/28/22 1141    01/27/22 1035  artificial tears ophthalmic ointment  Every 1 Hour PRN         01/27/22 1036    01/25/22 2100  QUEtiapine (SEROquel) tablet 50 mg  Nightly         01/25/22 1059    01/25/22 1200  pregabalin (LYRICA) capsule 75 mg  Every 12 Hours Scheduled         01/25/22 1059    01/25/22 0600  aPTT  Daily       01/24/22 2337    01/24/22 1152  Pharmacy Consult  Continuous PRN         01/24/22 1152    01/23/22 1146  Patient Currently On Electrolyte Replacement Protocol - Please Refer to MAR for Protocol Details  Misc Nursing Order (Specify)  Daily      Comments: Patient Currently On Electrolyte Replacement Protocol - Please Refer to MAR for Protocol Details    01/23/22 1145    01/23/22 1145  potassium chloride (K-DUR,KLOR-CON) ER tablet 40 mEq  As Needed        \"Or\" Linked Group Details    01/23/22 1145    01/23/22 1145  potassium chloride (KLOR-CON) packet 40 mEq  As Needed        \"Or\" Linked Group Details    01/23/22 1145    01/23/22 0704  Comprehensive Metabolic Panel  Daily       01/23/22 0703    01/23/22 0600  CBC & Differential  Daily       01/22/22 1507    01/22/22 1600  heparin 48548 units/250 mL (100 units/mL) in 0.45 % NaCl infusion  Titrated         01/22/22 1507    01/22/22 1504  heparin (porcine) 5000 UNIT/ML injection 5,000 Units  As Needed         01/22/22 1507    01/22/22 1504  heparin (porcine) 5000 UNIT/ML injection 2,500 Units  As Needed         01/22/22 1507    01/22/22 1045  pravastatin (PRAVACHOL) tablet 40 mg  Daily         01/22/22 0951    01/22/22 0600  pantoprazole (PROTONIX) injection 40 mg  Every Early Morning         01/22/22 0132    01/22/22 0400  Vital Signs Every Hour and Per Hospital Policy Based on Patient Condition  Every 4 Hours       01/22/22 0039    01/22/22 0230  sodium chloride 0.9 % flush 10 mL  Every 12 Hours Scheduled         01/22/22 0144    01/22/22 0144  Urinary " "Catheter Care  Every Shift      \"And\" Linked Group Details    01/22/22 0144    01/22/22 0143  sodium chloride 0.9 % flush 10 mL  As Needed         01/22/22 0144    01/22/22 0130  DEXMEDETOMIDINE 1000 MCG/250ML INFUSION infusion  Titrated         01/22/22 0039    01/22/22 0130  sodium chloride 0.9 % flush 10 mL  Every 12 Hours Scheduled         01/22/22 0039    01/22/22 0100  Intake and Output  Every Hour       01/22/22 0039    01/22/22 0041  POC Glucose Q6H  Every 6 Hours       01/22/22 0040    01/22/22 0040  Daily Weights  Daily       01/22/22 0039    01/22/22 0037  Oral care for patients not on NPPV or intubated  Every Shift      Comments: Oral care for patients not on NPPV or intubated    01/22/22 0039    01/22/22 0036  sodium chloride 0.9 % flush 10 mL  As Needed         01/22/22 0039    01/22/22 0032  acetaminophen (TYLENOL) tablet 650 mg  Every 6 Hours PRN         01/22/22 0039    01/22/22 0031  metoprolol tartrate (LOPRESSOR) injection 5 mg  Every 6 Hours PRN         01/22/22 0039    Unscheduled  Change Dressing to IV Site As Needed When Damp, Loose or Soiled  As Needed       01/22/22 0144    Unscheduled  Insert New Peripheral IV  As Needed      Comments: Frequency Per Facility Policy    01/22/22 0144    Unscheduled  aPTT  As Needed       01/22/22 1507    Unscheduled  RN To Release aPTT Order 6 Hours After Heparin Bolus & 6 Hours After Any Heparin Rate Change  As Needed       01/22/22 1507    Unscheduled  After 2 Consecutive Therapeutic aPTTs, Obtain aPTT Daily.  If a Rate Adjustment is Necessary, Resume Every 6 Hour aPTT Draws  As Needed       01/22/22 1507    Unscheduled  Apply Moisture Barrier After Any Incontinence  As Needed       01/24/22 1544    Unscheduled  Magnesium  As Needed       01/31/22 2324    Unscheduled  Potassium  As Needed       01/31/22 2324    Unscheduled  Change Dressing to IV Site As Needed When Damp, Loose or Soiled  As Needed       02/04/22 1300    --  SCANNED - TELEMETRY           " 01/22/22 0000    --  SCANNED - TELEMETRY           01/22/22 0000    --  SCANNED - TELEMETRY           01/22/22 0000    --  SCANNED - TELEMETRY           01/22/22 0000    --  SCANNED - TELEMETRY           01/22/22 0000    --  SCANNED - TELEMETRY           01/22/22 0000    --  SCANNED - TELEMETRY           01/22/22 0000    --  SCANNED - TELEMETRY           01/22/22 0000    --  SCANNED - TELEMETRY           01/22/22 0000    --  SCANNED - TELEMETRY           01/22/22 0000    --  SCANNED - TELEMETRY           01/22/22 0000    --  SCANNED - TELEMETRY           01/22/22 0000    --  SCANNED - TELEMETRY           01/22/22 0000    --  SCANNED - TELEMETRY           01/22/22 0000    --  SCANNED - TELEMETRY           01/22/22 0000    --  SCANNED - TELEMETRY           01/22/22 0000    --  SCANNED - TELEMETRY           01/22/22 0000    --  SCANNED - TELEMETRY           01/22/22 0000    --  SCANNED - TELEMETRY           01/22/22 0000    --  SCANNED - TELEMETRY           01/22/22 0000    --  SCANNED - TELEMETRY           01/22/22 0000    --  SCANNED - TELEMETRY           01/22/22 0000    --  SCANNED - TELEMETRY           01/22/22 0000    --  SCANNED - TELEMETRY           01/22/22 0000    Signed and Held  Vital Signs - Every 1 Hour x4  Every Hour       Signed and Held    Signed and Held  Vital Signs - Every 4 Hours  Every 4 Hours       Signed and Held    Signed and Held  Obtain Informed Consent  Once         Signed and Held    Signed and Held  Protein, CSF - Cerebrospinal Fluid, Lumbar Puncture  STAT         Signed and Held    Signed and Held  Glucose, CSF - Cerebrospinal Fluid, Lumbar Puncture  STAT         Signed and Held    Signed and Held  Cell Count With Differential, CSF Use CSF Tube: 1  STAT        Comments: Specimen Collected from 1st Tube      Signed and Held    Signed and Held  Cell Count With Differential, CSF  STAT        Comments: Specimen Collected from 4th Tube      Signed and Held    Signed and Held  Non-gynecologic  Cytology  STAT         Signed and Held    Signed and Held  Flow Cytometry  STAT         Signed and Held    Signed and Held  Fungus Culture - Cerebrospinal Fluid, Lumbar Puncture  STAT         Signed and Held    Signed and Held  Body Fluid Culture - Cerebrospinal Fluid, Lumbar Puncture  Once         Signed and Held    --  SCANNED - TELEMETRY           01/22/22 0000    --  SCANNED - TELEMETRY           01/22/22 0000    --  SCANNED - TELEMETRY           01/22/22 0000    --  SCANNED - TELEMETRY           01/22/22 0000    --  SCANNED - TELEMETRY           01/22/22 0000    --  SCANNED - TELEMETRY           01/22/22 0000    --  SCANNED - TELEMETRY           01/22/22 0000    --  SCANNED - TELEMETRY           01/22/22 0000    --  SCANNED - TELEMETRY           01/22/22 0000    --  SCANNED - TELEMETRY           01/22/22 0000    --  SCANNED - TELEMETRY           01/22/22 0000    --  SCANNED - TELEMETRY           01/22/22 0000                   Physician Progress Notes (last 48 hours)      Jayce Avelar APRN at 02/04/22 1400     Attestation signed by Mohsen, Bashar A, MD at 02/05/22 0730    I have reviewed this documentation and agree.                    Stroke Progress Note       Chief Complaint: Altered mental status, acute encephalopathy    Subjective    Subjective     Subjective:  No acute events overnight.  Had repeat CT head yesterday which was stable from prior exam but degraded by motion, patient still not able to have MRI due to movement.    Review of Systems   Unable to perform ROS: Patient nonverbal           Objective    Objective      Temp:  [98.9 °F (37.2 °C)-100.2 °F (37.9 °C)] 98.9 °F (37.2 °C)  Heart Rate:  [] 115  Resp:  [20-28] 28  BP: (149-198)/() 198/99        Neurological Exam  Mental Status  Awake. Patient is nonverbal.  Patient is alert, makes eye contact, regards me only on left side of the room..    Cranial Nerves  CN II: Patient blinks to visual threat on the left, no response to  visual threat on the right.  CN III, IV, VI: Pupils equal round and reactive to light bilaterally. Patient has left gaze preference, appears to regard me on her left side, does not cross midline to the right.  CN V:  Right: Normal corneal reflex.  Left: Normal corneal reflex.  CN VII: No obvious facial droop.    Motor  Normal muscle bulk throughout. Decreased muscle tone. No abnormal involuntary movements.  Patient frequently raising left arm to head and back down, nursing reports she will intermittently squeeze hand to command.  Right-sided hemiplegia noted, did not notice any movement of either lower extremity.    Sensory  Patient grimaces to nailbed pressure bilaterally, she withdraws left hand in response to tactile stimulation.      Physical Exam  Vitals and nursing note reviewed.   Constitutional:       General: She is awake.      Appearance: She is obese. She is ill-appearing.   HENT:      Head: Normocephalic and atraumatic.      Mouth/Throat:      Mouth: Mucous membranes are dry.      Pharynx: Oropharynx is clear.   Eyes:      Pupils: Pupils are equal, round, and reactive to light.   Cardiovascular:      Rate and Rhythm: Regular rhythm. Tachycardia present.   Pulmonary:      Effort: No respiratory distress.   Skin:     General: Skin is warm and dry.         Hospital Meds:  Scheduled- cefepime, 2 g, Intravenous, Q8H  hydrALAZINE, 10 mg, Oral, Q8H  insulin aspart, 0-14 Units, Subcutaneous, TID AC  insulin detemir, 30 Units, Subcutaneous, Daily  LORazepam, 0.5 mg, Oral, Nightly  losartan, 50 mg, Oral, Daily  metoprolol tartrate, 100 mg, Nasogastric, Q12H  metroNIDAZOLE, 500 mg, Intravenous, Q8H  pantoprazole, 40 mg, Intravenous, Q AM  pravastatin, 40 mg, Oral, Daily  pregabalin, 75 mg, Nasogastric, Q12H  QUEtiapine, 50 mg, Oral, Nightly  sodium chloride, 10 mL, Intravenous, Q12H  sodium chloride, 10 mL, Intravenous, Q12H  sodium chloride, 10 mL, Intravenous, Q12H  sodium chloride, 10 mL, Intravenous,  Q12H  sodium chloride, 10 mL, Intravenous, Q12H  spironolactone, 25 mg, Oral, Daily  vancomycin, 1,500 mg, Intravenous, Q12H      Infusions- dexmedetomidine, 0.2-1.5 mcg/kg/hr, Last Rate: 0.5 mcg/kg/hr (02/04/22 0254)  heparin (porcine), 10.2 Units/kg/hr, Last Rate: 24.6 Units/kg/hr (02/04/22 0710)  hold, 1 each  Pharmacy Consult,   Pharmacy to dose vancomycin,        PRNs- •  acetaminophen  •  artificial tears  •  dextrose  •  dextrose  •  glucagon (human recombinant)  •  heparin (porcine)  •  heparin (porcine)  •  hold  •  hydrALAZINE  •  LORazepam  •  magnesium sulfate **OR** magnesium sulfate **OR** magnesium sulfate  •  metoprolol tartrate  •  Pharmacy Consult  •  Pharmacy to dose vancomycin  •  potassium chloride **OR** potassium chloride **OR** [DISCONTINUED] potassium chloride  •  sodium chloride  •  sodium chloride  •  sodium chloride  •  sodium chloride    Results Review:    I reviewed the patient's new clinical results.    Transthoracic Echo:    Interpretation Summary    · Left ventricular wall thickness is consistent with mild concentric hypertrophy.  · Left ventricular ejection fraction appears to be 61 - 65%. Left ventricular systolic function is normal.  · Left ventricular diastolic dysfunction is noted.  · Saline test results are negative.  · This is a technically limited study with poor echo windows and no carotid Doppler.             Assessment/Plan     Assessment/Plan:      1. Stroke-like symptoms, left gaze preference and right-sided hemiplegia noted when sedation was weaned,based on symptoms likely left hemisphere embolic stroke.  Initial CT of head was highly degraded by motion, however patient was then sedated and repeat head CT did not show any acute abnormalities.  Unable to obtain MRI due to restlessness. Patient currently being treated for pneumonia and septic shock, however this is out of proportion to her encephalopathy.  Family repeatedly denies any recreational drug use, urine  toxicology was unremarkable.   Patient found to be in proximal A. fib while in ED,  was on heparin drip which was paused pending LP, and has now been resumed, cardiology is following.  Initial and repeat EEG showed moderate diffuse cerebral dysfunction, no epilepsy.  Lumbar puncture on 2022 ruled out meningitis/encephalitis. Repeat LP has been considered however patient unlikely to tolerate at present.  Repeat TTE was okay, results above.  -MRI brain when able  -Heparin drip restarted  -Repeat CT head without yesterday was stable, no evidence of ischemic event, scan degraded by motion  -Functional prognosis unfortunately remains guarded      The case was discussed with her  at bedside, and Dr. Mohsen.  Neurology will continue to follow peripherally over the weekend, but feel free to call with questions or with MRI results.  Otherwise we will follow up on Monday.    Jayce Low Rosio, APRN  22  14:24 EST        Electronically signed by Mohsen, Bashar A, MD at 22 0730     Roney Pringle MD at 22 1335               LOS: 13 days     Name: Lynette Stein  Age/Sex: 56 y.o. female  :  1965        PCP: Orville Wu PA  REF: No Known Provider    Active Problems:    Encephalopathy acute      Reason for follow-up: Atrial fibrillation and congestive heart failure     Subjective     Subjective     Lynette Stein is a 56 y.o. female with a past medical history significant for hypertension, dyslipidemia, COPD, type 2 diabetes, anxiety/depression, leukemia, obstructive sleep apnea, and bilateral leg ulcerations.  She initially presented to the [facility] ED on 2022 with altered mental status.  Patient developed atrial fibrillation with RVR are in the ED     Interval History: Patient remains ill. Alert but unable to give a history.  Continues to move left upper extremity but no movement of right upper extremity.  Currently tachycardic in the 120s.  Noted to be  hypertensive.    Vital Signs  Temp:  [98.9 °F (37.2 °C)-100.2 °F (37.9 °C)] 98.9 °F (37.2 °C)  Heart Rate:  [] 115  Resp:  [20-28] 28  BP: (149-198)/() 198/99     Vital Signs (last 72 hrs)       02/01 0700  02/02 0659 02/02 0700  02/03 0659 02/03 0700  02/04 0659 02/04 0700  02/04 1335   Most Recent      Temp (°F) 99.3 -  101    98.8 -  99.7    98.9 -  100.3       98.9 (37.2) 02/04 0400    Heart Rate 84 -  144    93 -  130    81 -  130      115     115 02/04 1324    Resp 8 -  30    19 -  32    20 -  30       28 02/04 0600    /57 -  223/98    141/78 -  198/95    145/88 -  191/84      198/99     198/99 02/04 1324    SpO2 (%) 91 -  98    87 -  97    85 -  99       98 02/04 0600        Documented weights    01/22/22 0029 01/22/22 0602 01/23/22 0602 01/24/22 0533   Weight: 96.6 kg (212 lb 15.4 oz) 97.2 kg (214 lb 4.6 oz) 97 kg (213 lb 13.5 oz) 97.8 kg (215 lb 9.8 oz)    01/25/22 0502 01/26/22 0700 01/28/22 0600 01/29/22 0600   Weight: 99.8 kg (220 lb 1.6 oz) 98.9 kg (218 lb) 101 kg (223 lb 12.3 oz) 98.6 kg (217 lb 6 oz)    01/30/22 0535 02/02/22 0602 02/03/22 0600 02/04/22 0400   Weight: 102 kg (224 lb 3.3 oz) 97.6 kg (215 lb 2.7 oz) 101 kg (223 lb 1.7 oz) 99.7 kg (219 lb 12.8 oz)      Body mass index is 29.8 kg/m².    Intake/Output Summary (Last 24 hours) at 2/4/2022 1335  Last data filed at 2/4/2022 1324  Gross per 24 hour   Intake 2256.84 ml   Output 2200 ml   Net 56.84 ml     Objective    Objective       Physical Exam:     General Appearance:    Alert, in no acute distress   Head:    Normocephalic, without obvious abnormality, atraumatic   Eyes:            Conjunctivae and sclerae normal, no   icterus, no pallor, corneas clear.   Neck:   No adenopathy, supple, trachea midline, no thyromegaly, no   carotid bruit, no JVD   Lungs:     Clear to auscultation,respirations regular, even and                  unlabored    Heart:    Regular rhythm and tachycardic, normal S1 and S2, no            murmur, no  gallop, no rub, no click   Chest Wall:    No abnormalities observed   Abdomen:     Normal bowel sounds, no masses, no organomegaly, soft        non-tender, non-distended, no guarding, no rebound                tenderness   Extremities:  No movement of right upper and lower extremities, no edema, no cyanosis, no   redness   Pulses:   Pulses palpable and equal bilaterally   Skin:   No bleeding, bruising or rash       Neurologic:   Alert and oriented      Results review       Results Review:   Results from last 7 days   Lab Units 02/04/22  0246 02/03/22  0025 02/02/22  0008 01/31/22  2326 01/31/22  0103 01/30/22  0403 01/29/22  0521   WBC 10*3/mm3 12.22* 14.09* 12.67* 12.23* 11.33* 10.94* 12.64*   HEMOGLOBIN g/dL 12.6 12.0 11.6* 11.1* 11.0* 10.9* 11.1*   PLATELETS 10*3/mm3 461* 472* 421 509* 496* 496* 554*     Results from last 7 days   Lab Units 02/04/22  0246 02/03/22  0025 02/02/22  1118 02/02/22  0008 01/31/22  2331 01/31/22  2326 01/31/22  1325 01/31/22  0103 01/31/22  0103 01/30/22  0403 01/30/22  0403 01/29/22  1430 01/29/22  0521   SODIUM mmol/L 144 143  --  143 144 143  --   --  139  --  139  --  136   POTASSIUM mmol/L 3.7 3.7 3.9 3.4* 4.0 3.9 4.3   < > 3.4*   < > 3.8   < > 3.4*   CHLORIDE mmol/L 109* 110*  --  107 107 106  --   --  100  --  100  --  92*   CO2 mmol/L 20.9* 20.2*  --  22.8 25.8 26.3  --   --  29.0  --  27.8  --  30.3*   BUN mg/dL 13 15  --  15 15 15  --   --  16  --  13  --  16   CREATININE mg/dL 0.56* 0.54*  --  0.61 0.58 0.55*  --   --  0.52*  --  0.47*  --  0.54*   CALCIUM mg/dL 10.4 10.2  --  10.1 10.3 10.4  --   --  10.1  --  10.0  --  10.2   GLUCOSE mg/dL 183* 261*  --  232* 259* 255*  --   --  244*  --  271*  --  295*   ALT (SGPT) U/L 33 31  --  40*  --  37*  --   --  35*  --  26  --  19   AST (SGOT) U/L 22 23  --  31  --  23  --   --  34*  --  36*  --  28    < > = values in this interval not displayed.         Lab Results   Component Value Date    INR 1.18 (H) 01/22/2022    INR 1.07  01/19/2022    INR 0.89 12/01/2016     Lab Results   Component Value Date    MG 2.1 01/31/2022    MG 2.1 01/31/2022    MG 2.3 01/31/2022     Lab Results   Component Value Date    TSH 2.290 01/19/2022    TRIG 335 (H) 01/22/2022    HDL 32 (L) 01/22/2022    LDL 96 01/22/2022      Imaging Results (Last 48 Hours)     Procedure Component Value Units Date/Time    XR Chest 1 View [[id number]] Collected: 02/04/22 1308     Updated: 02/04/22 1310    Narrative:      XR CHEST 1 VW-     CLINICAL INDICATION: verify picc placement        COMPARISON: 02/01/2022      TECHNIQUE: Single frontal view of the chest.     FINDINGS:     PICC line tip cavoatrial junction  The cardiac silhouette is normal. The pulmonary vasculature is  unremarkable.  There is no evidence of an acute osseous abnormality.   There are no suspicious-appearing parenchymal soft tissue nodules.          Impression:      PICC line tip at the cavoatrial junction     This report was finalized on 2/4/2022 1:08 PM by Dr. Deonte Reece MD.       CT Head Without Contrast [[id number]] Collected: 02/03/22 1756     Updated: 02/03/22 1759    Narrative:      CT HEAD WO CONTRAST-     CLINICAL INDICATION: Neuro deficit, acute, stroke suspected        COMPARISON: 01/31/2022      TECHNIQUE: Axial images of the brain were obtained with out intravenous  contrast.  Reformatted images were created in the sagittal and coronal  planes.     DOSE:     Radiation dose reduction techniques were utilized per ALARA protocol.  Automated exposure control was initiated through either or CareDose or  DoseRigByteLight software packages by  protocol.           FINDINGS:   There is extensive patient motion, thus degrading image quality.  No parenchymal mass, hemorrhage, or midline shift  There is no evidence of acute ischemia.  I do not see epidural or subdural hematoma.  No evidence of ventriculomegaly  The bone window setting images show no destructive calvarial lesion or  acute calvarial fracture.    The posterior fossa is unremarkable.          Impression:         1. Extensive patient motion  2. No evidence of an acute ischemic event  3. No evidence of parenchymal hemorrhage     This report was finalized on 2/3/2022 5:57 PM by Dr. Deonte Reece MD.           Echo   Results for orders placed during the hospital encounter of 01/22/22    Adult Transthoracic Echo Limited W/ Cont if Necessary Per Protocol    Interpretation Summary  · Left ventricular wall thickness is consistent with mild concentric hypertrophy.  · Left ventricular ejection fraction appears to be 61 - 65%. Left ventricular systolic function is normal.  · Left ventricular diastolic dysfunction is noted.  · Saline test results are negative.  · This is a technically limited study with poor echo windows and no carotid Doppler.     I reviewed the patient's new clinical results.    Telemetry: Sinus tachycardia 115 to 120 bpm     Medication Review:   cefepime, 2 g, Intravenous, Q8H  hydrALAZINE, 10 mg, Oral, Q8H  insulin aspart, 0-14 Units, Subcutaneous, TID AC  insulin detemir, 30 Units, Subcutaneous, Daily  LORazepam, 0.5 mg, Oral, Nightly  losartan, 50 mg, Oral, Daily  metoprolol tartrate, 100 mg, Nasogastric, Q12H  metroNIDAZOLE, 500 mg, Intravenous, Q8H  pantoprazole, 40 mg, Intravenous, Q AM  pravastatin, 40 mg, Oral, Daily  pregabalin, 75 mg, Nasogastric, Q12H  QUEtiapine, 50 mg, Oral, Nightly  sodium chloride, 10 mL, Intravenous, Q12H  sodium chloride, 10 mL, Intravenous, Q12H  sodium chloride, 10 mL, Intravenous, Q12H  sodium chloride, 10 mL, Intravenous, Q12H  sodium chloride, 10 mL, Intravenous, Q12H  spironolactone, 25 mg, Oral, Daily  vancomycin, 1,500 mg, Intravenous, Q12H        dexmedetomidine, 0.2-1.5 mcg/kg/hr, Last Rate: 0.5 mcg/kg/hr (02/04/22 0254)  heparin (porcine), 10.2 Units/kg/hr, Last Rate: 24.6 Units/kg/hr (02/04/22 0710)  hold, 1 each  Pharmacy Consult,   Pharmacy to dose vancomycin,         Assessment       Assessment:  1. Paroxysmal atrial fibrillation, patient is maintaining sinus rhythm  2. 1 episode of 12 beat run of wide-complex tachycardia with no recurrence  3. Acute HFpEF, resolved  4. Poorly controlled hypertension  5. Possible acute CVA with right hemiparesis possible embolic  6. Acute respiratory failure with hypoxia, resolved        Plan     Recommendations:  1. Patient is maintaining sinus rhythm will continue with current medications including beta-blockers  2. Patient with labile blood pressure still not well controlled losartan was not increased yesterday to 100 we will increase it today may have to increase also the dose of the metoprolol 250 mg if is still not well controlled    I discussed the patients findings and my recommendations with patient and family    Electronically signed by ALDEN Do, 22, 1:35 PM EST.  Electronically signed by Roney Pringle MD, 22, 2:22 PM EST.    Please note that portions of this note were completed with a voice recognition program.    Electronically signed by Roney Pringle MD at 22 1431     To Christopher MD at 22 1230                     PROGRESS NOTE         Patient Identification:  Name:  Lynette Stein  Age:  56 y.o.  Sex:  female  :  1965  MRN:  [id number]  Visit Number:  [id number]  Primary Care Provider:  Orville Wu PA         LOS: 13 days       ----------------------------------------------------------------------------------------------------------------------  Subjective       Chief Complaints:    No chief complaint on file.        Interval History:      Patient resting in bed.  Remains confused.  On room air with no apparent distress.  WBC improving at 12.22.  CT of the head from 2/3/2022 reports no evidence of acute ischemic event or parenchymal hemorrhage.    Review of Systems:    Unable to obtain.  Altered mental  status.  ----------------------------------------------------------------------------------------------------------------      Objective       [facility] Meds:  cefepime, 2 g, Intravenous, Q8H  hydrALAZINE, 10 mg, Oral, Q8H  insulin aspart, 0-14 Units, Subcutaneous, TID AC  insulin detemir, 30 Units, Subcutaneous, Daily  LORazepam, 0.5 mg, Oral, Nightly  losartan, 50 mg, Oral, Daily  metoprolol tartrate, 100 mg, Nasogastric, Q12H  metroNIDAZOLE, 500 mg, Intravenous, Q8H  pantoprazole, 40 mg, Intravenous, Q AM  pravastatin, 40 mg, Oral, Daily  pregabalin, 75 mg, Nasogastric, Q12H  QUEtiapine, 50 mg, Oral, Nightly  sodium chloride, 10 mL, Intravenous, Q12H  sodium chloride, 10 mL, Intravenous, Q12H  spironolactone, 25 mg, Oral, Daily  vancomycin, 1,500 mg, Intravenous, Q12H      dexmedetomidine, 0.2-1.5 mcg/kg/hr, Last Rate: 0.5 mcg/kg/hr (02/04/22 0254)  heparin (porcine), 10.2 Units/kg/hr, Last Rate: 24.6 Units/kg/hr (02/04/22 6110)  hold, 1 each  Pharmacy Consult,   Pharmacy to dose vancomycin,       ----------------------------------------------------------------------------------------------------------------------    Vital Signs:  Temp:  [98.9 °F (37.2 °C)-100.2 °F (37.9 °C)] 98.9 °F (37.2 °C)  Heart Rate:  [] 116  Resp:  [20-28] 28  BP: (149-191)/() 155/68  Mean Arterial Pressure (Non-Invasive) for the past 24 hrs (Last 3 readings):   Noninvasive MAP (mmHg)   02/04/22 0600 96   02/04/22 0400 114   02/04/22 0300 128     SpO2 Percentage    02/04/22 0300 02/04/22 0400 02/04/22 0600   SpO2: 97% 98% 98%     SpO2:  [87 %-99 %] 98 %  on  Flow (L/min):  [3] 3;   Device (Oxygen Therapy): room air    Body mass index is 29.8 kg/m².  Wt Readings from Last 3 Encounters:   02/04/22 99.7 kg (219 lb 12.8 oz)   01/19/22 106 kg (233 lb)   12/14/21 106 kg (233 lb 9.6 oz)        Intake/Output Summary (Last 24 hours) at 2/4/2022 1230  Last data filed at 2/4/2022 0655  Gross per 24 hour   Intake 1756.84 ml   Output  2200 ml   Net -443.16 ml     NPO Diet  ----------------------------------------------------------------------------------------------------------------------      Physical Exam:    Constitutional: Chronically ill-appearing.  Awake and agitated, but not following commands.  Drooling and experiencing oral automatisms.  HENT:  Head: Normocephalic and atraumatic.  Mouth:  Moist mucous membranes.    Eyes:  Pupils equal.  Left eye chemosis.   Neck:  Neck supple.  No JVD present.    Cardiovascular:  Normal rate, regular rhythm and normal heart sounds with no murmur. No edema.  Pulmonary/Chest: Coarse breath sounds bilaterally.  Abdominal:  Soft.  Bowel sounds are normal.  No distension and no tenderness.   Musculoskeletal:  No tenderness, and no deformity.  No swelling or redness of joints.  Mild bilateral lower extremity edema.  Neurological:  Awake and agitated.  Not following commands.  Skin:  Skin is warm and dry.  No rash noted.  No pallor.  Scattered bruises and scabs to bilateral upper extremities, trunk, bilateral lower extremities and feet.  Bilateral feet ulcers in dressings today.  Psychiatric: Awake and agitated.  Not following commands.  ----------------------------------------------------------------------------------------------------------------------              Results from last 7 days   Lab Units 02/03/22  1112   PH, ARTERIAL pH units 7.480*   PO2 ART mm Hg 88.7   PCO2, ARTERIAL mm Hg 32.6*   HCO3 ART mmol/L 24.2     Results from last 7 days   Lab Units 02/04/22  0246 02/03/22  1111 02/03/22  0025 02/02/22  0008 01/30/22  0403 01/29/22  0521   CRP mg/dL  --   --   --   --   --  5.37*   LACTATE mmol/L  --  0.9  --   --   --   --    WBC 10*3/mm3 12.22*  --  14.09* 12.67*   < > 12.64*   HEMOGLOBIN g/dL 12.6  --  12.0 11.6*   < > 11.1*   HEMATOCRIT % 43.4  --  40.1 39.5   < > 37.8   MCV fL 83.0  --  82.7 83.0   < > 81.5   MCHC g/dL 29.0*  --  29.9* 29.4*   < > 29.4*   PLATELETS 10*3/mm3 461*  --  472* 421    < > 554*    < > = values in this interval not displayed.     Results from last 7 days   Lab Units 02/04/22  0246 02/03/22  0025 02/02/22  1118 02/02/22  0008 02/02/22  0008 01/31/22  2331 01/31/22  2331 01/31/22  2326 01/31/22  2326 01/31/22  1325 01/31/22  0103   SODIUM mmol/L 144 143  --   --  143   < > 144   < > 143  --  139   POTASSIUM mmol/L 3.7 3.7 3.9   < > 3.4*   < > 4.0   < > 3.9   < > 3.4*   MAGNESIUM mg/dL  --   --   --   --   --   --  2.1  --  2.1  --  2.3   CHLORIDE mmol/L 109* 110*  --   --  107   < > 107   < > 106  --  100   CO2 mmol/L 20.9* 20.2*  --   --  22.8   < > 25.8   < > 26.3  --  29.0   BUN mg/dL 13 15  --   --  15   < > 15   < > 15  --  16   CREATININE mg/dL 0.56* 0.54*  --   --  0.61   < > 0.58   < > 0.55*  --  0.52*   EGFR IF NONAFRICN AM mL/min/1.73 112 117  --   --  102   < > 108   < > 115  --  122   CALCIUM mg/dL 10.4 10.2  --   --  10.1   < > 10.3   < > 10.4  --  10.1   GLUCOSE mg/dL 183* 261*  --   --  232*   < > 259*   < > 255*  --  244*   ALBUMIN g/dL 3.73 3.19*  --   --  3.62   < >  --   --  3.63  --  3.73   BILIRUBIN mg/dL 0.3 0.2  --   --  0.2   < >  --   --  0.2  --  0.2   ALK PHOS U/L 120* 118*  --   --  125*   < >  --   --  123*  --  133*   AST (SGOT) U/L 22 23  --   --  31   < >  --   --  23  --  34*   ALT (SGPT) U/L 33 31  --   --  40*   < >  --   --  37*  --  35*    < > = values in this interval not displayed.   Estimated Creatinine Clearance: 150 mL/min (A) (by C-G formula based on SCr of 0.56 mg/dL (L)).  No results found for: AMMONIA    Glucose   Date/Time Value Ref Range Status   02/04/2022 0659 177 (H) 70 - 130 mg/dL Final     Comment:     Meter: [id number] : [id number] Gianluca Katelynn N   02/03/2022 2130 146 (H) 70 - 130 mg/dL Final     Comment:     Meter: [id number] : [id number] Gianluca Katelynn N   02/03/2022 1803 158 (H) 70 - 130 mg/dL Final     Comment:     Meter: [id number] : [id number] Suresh Lindsey   02/03/2022 1047 204 (H) 70 - 130 mg/dL Final      Comment:     Meter: [id number] : [id number] Jasiel FISHER   02/03/2022 0744 241 (H) 70 - 130 mg/dL Final     Comment:     Meter: [id number] : [id number] Gianluca MONTANA   02/02/2022 1654 248 (H) 70 - 130 mg/dL Final     Comment:     Meter: [id number] : [id number] Bridget Blanca   02/02/2022 1209 256 (H) 70 - 130 mg/dL Final     Comment:     Meter: [id number] : [id number] Bridget Blanca   02/02/2022 0603 244 (H) 70 - 130 mg/dL Final     Comment:     Meter: [id number] : [id number] BRAD STYLES     Lab Results   Component Value Date    HGBA1C 6.50 (H) 01/22/2022     Lab Results   Component Value Date    TSH 2.290 01/19/2022       Blood Culture   Date Value Ref Range Status   01/22/2022 No growth at 4 days  Preliminary   01/22/2022 No growth at 4 days  Preliminary     No results found for: URINECX  Wound Culture   Date Value Ref Range Status   01/24/2022 Rare Staphylococcus aureus (A)  Final   01/24/2022 Rare Normal Skin Melva  Final     No results found for: STOOLCX  Respiratory Culture   Date Value Ref Range Status   01/25/2022 Scant growth (1+) Candida albicans (A)  Final   01/25/2022 No Normal Respiratory Melva (A)  Final     Pain Management Panel       Pain Management Panel Latest Ref Rng & Units 1/19/2022    AMPHETAMINES SCREEN, URINE Negative Negative    BARBITURATES SCREEN Negative Negative    BENZODIAZEPINE SCREEN, URINE Negative Positive(A)    BUPRENORPHINEUR Negative Negative    COCAINE SCREEN, URINE Negative Negative    METHADONE SCREEN, URINE Negative Negative    METHAMPHETAMINEUR Negative Negative              ----------------------------------------------------------------------------------------------------------------------  Imaging Results (Last 24 Hours)       Procedure Component Value Units Date/Time    CT Head Without Contrast [[id number]] Collected: 02/03/22 1756     Updated: 02/03/22 1759    Narrative:      CT HEAD WO CONTRAST-     CLINICAL INDICATION: Neuro deficit, acute, stroke  suspected        COMPARISON: 01/31/2022      TECHNIQUE: Axial images of the brain were obtained with out intravenous  contrast.  Reformatted images were created in the sagittal and coronal  planes.     DOSE:     Radiation dose reduction techniques were utilized per ALARA protocol.  Automated exposure control was initiated through either or RedBee or  DoseRight software packages by  protocol.           FINDINGS:   There is extensive patient motion, thus degrading image quality.  No parenchymal mass, hemorrhage, or midline shift  There is no evidence of acute ischemia.  I do not see epidural or subdural hematoma.  No evidence of ventriculomegaly  The bone window setting images show no destructive calvarial lesion or  acute calvarial fracture.   The posterior fossa is unremarkable.          Impression:         1. Extensive patient motion  2. No evidence of an acute ischemic event  3. No evidence of parenchymal hemorrhage     This report was finalized on 2/3/2022 5:57 PM by Dr. Deonte Reece MD.               ----------------------------------------------------------------------------------------------------------------------    Assessment/Plan       Assessment/Plan     ASSESSMENT:    1.  Severe sepsis with acute respiratory failure requiring mechanical ventilation  2.  Pneumonia  3.  Left great toe osteomyelitis  4.  Enterocolitis    PLAN:    Patient resting in bed.  Remains confused.  On room air with no apparent distress.  WBC improving at 12.22.  CT of the head from 2/3/2022 reports no evidence of acute ischemic event or parenchymal hemorrhage.    Chest x-ray on 2/1/2022 shows mild bibasilar infiltrates.  Respiratory culture on 1/29/2022 finalized as no growth.    Bone scan on 1/28/2022 showed focal area of increased tracer uptake localizing to the left great toe.  If ulceration or infection in this location, osteomyelitis should be considered.  Periarticular type uptake throughout the right foot as  detailed above in a pattern and distribution that is more favorable for reactive changes and neuropathic joint changes.  Soft tissue type uptake suggestive of cellulitis.      CT chest on 1/28/2022 showed new or worsening left basilar parenchymal opacity likely representing combination of atelectasis and/or infiltrate.  Continued right lower lobe atelectasis and likely infiltrate.  Patchy groundglass opacity left upper lobe and along the anterior aspect right upper lobe could represent very mild pneumonitis.  Increased tracheal and bronchial secretions may reflect underlying bronchitis.  This may be contributing factor of atelectasis.  Respiratory therapy may be of benefit.  Endotracheal tube in place.  Nasogastric tube appears in satisfactory position.  A second 2 passes alongside the nasogastric tube terminates in the distal esophagus.  Following discussion with patient's nurse it represents a temperature probe.  Increased colonic fluid with air-fluid levels.  Correlate for enteric colitis.  No focal inflammatory changes or obstruction.  CT abdomen pelvis on 1/28/2022 showed the same as CT chest.     COVID-19 and influenza PCR negative.  Lumbar puncture culture and meningitis encephalitis panel negative. Legionella negative.  Strep pneumo antigen negative.  MRSA PCR positive.  [place] spotted fever IgM equivocal at 1.06. Wound culture of the right foot from 1/24/2022 preliminary reports growth of Staph aureus.  Respiratory culture from 1/25/2022 reports growth of Candida albicans.  CT of the bilateral lower extremities reports no evidence of acute fracture, osteomyelitis, soft tissue gas or fluid collection. Wound culture of the right foot on 1/24/2022 finalized as MSSA. Respiratory culture on 1/25/2022 finalized as Candida albicans, likely a colonization. Blood cultures on 1/22/2022 finalized as no growth. Respiratory culture on 1/29/2022 is finalized as no growth.     As bone scan shows evidence of left  great toe osteomyelitis, recommended surgical evaluation and intraoperative cultures from the left great toe to help direct antibiotic therapy. Surgeon has reevaluated the patient and does not recommend debridement or amputation at this time.      For now recommend to continue cefepime 2 g IV every 8 hours, Flagyl 500 mg IV every 8 hours and vancomycin pharmacy to dose.  We will continue to follow closely and adjust antibiotic therapy as needed.     Code Status:   Code Status and Medical Interventions:   Ordered at: 22 0039     Level Of Support Discussed With:    Health Care Surrogate     Code Status (Patient has no pulse and is not breathing):    CPR (Attempt to Resuscitate)     Medical Interventions (Patient has pulse or is breathing):    Full Support     Scribed for Dr. To Christopher MD by ALDEN Nails  22 12:30 EST        ALDEN Nails  22  12:30 EST    Physician Attestation:    The documentation recorded by the scribe accurately reflects the service I personally performed and the decisions made by me.    To Christopher MD  22  12:30 EST      Electronically signed by To Christopher MD at 22 1037     Dick Whiting MD at 22 1154              [facility] HOSPITALIST PROGRESS NOTE     Patient Identification:  Name:  Lynette Stein  Age:  56 y.o.  Sex:  female  :  1965  MRN:  [id number]  Visit Number:  [id number]  ROOM: [address]     Primary Care Provider:  Orville Wu PA    Length of stay in inpatient status:  13    Subjective     Chief Compliant:  altered mental status     History of Presenting Illness:    Patient remains ill, no acute events overnight, remains afebrile, hemodynamically stable, on room air now, mentation not much improved, oral movements seem to have improved though, repeated CT head and unrevealing, still significantly motion degraded, unable to go for MRI or lumbar puncture at this time due to persisting agitation, has  pulled out NG, awaiting updated consultant recommendations, will attempt to reach out to family this afternoon.  Pending stable respiratory status and mentation may consider moving to PCU soon.  Objective     Current Hospital Meds:cefepime, 2 g, Intravenous, Q8H  hydrALAZINE, 10 mg, Oral, Q8H  insulin aspart, 0-14 Units, Subcutaneous, TID AC  insulin detemir, 30 Units, Subcutaneous, Daily  LORazepam, 0.5 mg, Oral, Nightly  losartan, 50 mg, Oral, Daily  metoprolol tartrate, 100 mg, Nasogastric, Q12H  metroNIDAZOLE, 500 mg, Intravenous, Q8H  pantoprazole, 40 mg, Intravenous, Q AM  pravastatin, 40 mg, Oral, Daily  pregabalin, 75 mg, Nasogastric, Q12H  QUEtiapine, 50 mg, Oral, Nightly  sodium chloride, 10 mL, Intravenous, Q12H  sodium chloride, 10 mL, Intravenous, Q12H  spironolactone, 25 mg, Oral, Daily  vancomycin, 1,500 mg, Intravenous, Q12H    dexmedetomidine, 0.2-1.5 mcg/kg/hr, Last Rate: 0.5 mcg/kg/hr (02/04/22 0254)  heparin (porcine), 10.2 Units/kg/hr, Last Rate: 24.6 Units/kg/hr (02/04/22 0710)  hold, 1 each  Pharmacy Consult,   Pharmacy to dose vancomycin,       ----------------------------------------------------------------------------------------------------------------------  Vital Signs:  Temp:  [98.9 °F (37.2 °C)-100.3 °F (37.9 °C)] 98.9 °F (37.2 °C)  Heart Rate:  [] 116  Resp:  [20-28] 28  BP: (149-191)/() 155/68  SpO2:  [87 %-99 %] 98 %  on  Flow (L/min):  [3] 3;   Device (Oxygen Therapy): room air  Body mass index is 29.8 kg/m².    Intake & Output (last 3 days)       02/01 0701 02/02 0700 02/02 0701 02/03 0700 02/03 0701 02/04 0700 02/04 0701  02/05 0700    I.V. (mL/kg) 351.9 (3.6) 488.1 (4.8) 856.8 (8.6)     Other 325 1020      NG/ 1136      IV Piggyback 1200 1600 1500     Total Intake(mL/kg) 2425.9 (24.9) 4244.1 (42) 2356.8 (23.6)     Urine (mL/kg/hr) 2100 (0.9) 2600 (1.1) 2200 (0.9)     Stool 0  0     Total Output 2100 2600 2200     Net +325.9 +1644.1 +156.8             Stool  Unmeasured Occurrence 2 x  2 x         ----------------------------------------------------------------------------------------------------------------------  Physical exam:  Constitutional:  Well-developed and well-nourished. Awake, looking around room  HENT:  Head:  Normocephalic and atraumatic.  Mouth:  Moist mucous membranes.    Eyes:  Conjunctivae normal. No scleral icterus.    Neck:  Neck supple.  No JVD present.    Cardiovascular:  Normal rate, regular rhythm and normal heart sounds with no murmur.  Pulmonary/Chest:  course breath sounds bilateral, on room air.  Abdominal:  Soft. No distension and no tenderness.   Musculoskeletal:  No tenderness, and no deformity.  No red or swollen joints anywhere.    Neurological: awake, alert, would squeeze hand left upper extremity, persisting right upper extremity flaccid, could wiggle faintly LLE, no movement RLE, left upper extremity with frequent raising over head, oral automatisms seem to have improved some.  Skin:  Skin is warm and dry. No rash noted. No pallor.   Peripheral vascular: Bilateral lower extremity ulcers wrapped, clean and dry  : Nabeel in place  ----------------------------------------------------------------------------------------------------------------------  Results from last 7 days   Lab Units 02/04/22  0246 02/03/22  1111 02/03/22  0025 02/02/22  0008 01/30/22  0403 01/29/22  0521   CRP mg/dL  --   --   --   --   --  5.37*   LACTATE mmol/L  --  0.9  --   --   --   --    WBC 10*3/mm3 12.22*  --  14.09* 12.67*   < > 12.64*   HEMOGLOBIN g/dL 12.6  --  12.0 11.6*   < > 11.1*   HEMATOCRIT % 43.4  --  40.1 39.5   < > 37.8   MCV fL 83.0  --  82.7 83.0   < > 81.5   MCHC g/dL 29.0*  --  29.9* 29.4*   < > 29.4*   PLATELETS 10*3/mm3 461*  --  472* 421   < > 554*    < > = values in this interval not displayed.     Results from last 7 days   Lab Units 02/03/22  1112   PH, ARTERIAL pH units 7.480*   PO2 ART mm Hg 88.7   PCO2, ARTERIAL mm Hg 32.6*   HCO3 ART  mmol/L 24.2     Results from last 7 days   Lab Units 02/04/22  0246 02/03/22  0025 02/02/22  1118 02/02/22  0008 02/02/22  0008 01/31/22  2331 01/31/22  2331 01/31/22  2326 01/31/22  2326 01/31/22  1325 01/31/22  0103   SODIUM mmol/L 144 143  --   --  143   < > 144   < > 143  --  139   POTASSIUM mmol/L 3.7 3.7 3.9   < > 3.4*   < > 4.0   < > 3.9   < > 3.4*   MAGNESIUM mg/dL  --   --   --   --   --   --  2.1  --  2.1  --  2.3   CHLORIDE mmol/L 109* 110*  --   --  107   < > 107   < > 106  --  100   CO2 mmol/L 20.9* 20.2*  --   --  22.8   < > 25.8   < > 26.3  --  29.0   BUN mg/dL 13 15  --   --  15   < > 15   < > 15  --  16   CREATININE mg/dL 0.56* 0.54*  --   --  0.61   < > 0.58   < > 0.55*  --  0.52*   EGFR IF NONAFRICN AM mL/min/1.73 112 117  --   --  102   < > 108   < > 115  --  122   CALCIUM mg/dL 10.4 10.2  --   --  10.1   < > 10.3   < > 10.4  --  10.1   PHOSPHORUS mg/dL  --   --   --   --   --   --  4.6*  --   --   --  3.7   GLUCOSE mg/dL 183* 261*  --   --  232*   < > 259*   < > 255*  --  244*   ALBUMIN g/dL 3.73 3.19*  --   --  3.62   < >  --   --  3.63  --  3.73   BILIRUBIN mg/dL 0.3 0.2  --   --  0.2   < >  --   --  0.2  --  0.2   ALK PHOS U/L 120* 118*  --   --  125*   < >  --   --  123*  --  133*   AST (SGOT) U/L 22 23  --   --  31   < >  --   --  23  --  34*   ALT (SGPT) U/L 33 31  --   --  40*   < >  --   --  37*  --  35*    < > = values in this interval not displayed.   Estimated Creatinine Clearance: 150 mL/min (A) (by C-G formula based on SCr of 0.56 mg/dL (L)).  No results found for: AMMONIA              Glucose   Date/Time Value Ref Range Status   02/04/2022 0659 177 (H) 70 - 130 mg/dL Final     Comment:     Meter: [id number] : [id number] Gianluca Pace N   02/03/2022 2130 146 (H) 70 - 130 mg/dL Final     Comment:     Meter: [id number] : [id number] Gianluca Pace N   02/03/2022 1803 158 (H) 70 - 130 mg/dL Final     Comment:     Meter: [id number] : [id number] Suresh Lindsey     Lab Results    Component Value Date    TSH 2.290 01/19/2022     No results found for: PREGTESTUR, PREGSERUM, HCG, HCGQUANT  Pain Management Panel     Pain Management Panel Latest Ref Rng & Units 1/19/2022    AMPHETAMINES SCREEN, URINE Negative Negative    BARBITURATES SCREEN Negative Negative    BENZODIAZEPINE SCREEN, URINE Negative Positive(A)    BUPRENORPHINEUR Negative Negative    COCAINE SCREEN, URINE Negative Negative    METHADONE SCREEN, URINE Negative Negative    METHAMPHETAMINEUR Negative Negative        Brief Urine Lab Results  (Last result in the past 365 days)      Color   Clarity   Blood   Leuk Est   Nitrite   Protein   CREAT   Urine HCG        01/19/22 2010 Dark Yellow   Clear   Negative   Trace   Negative   100 mg/dL (2+)               No results found for: BLOODCX  No results found for: URINECX    I have personally looked at the labs and they are summarized above.  ----------------------------------------------------------------------------------------------------------------------  Detailed radiology reports for the last 24 hours:  Imaging Results (Last 24 Hours)     Procedure Component Value Units Date/Time    CT Head Without Contrast [[id number]] Collected: 02/03/22 1756     Updated: 02/03/22 1759    Narrative:      CT HEAD WO CONTRAST-     CLINICAL INDICATION: Neuro deficit, acute, stroke suspected        COMPARISON: 01/31/2022      TECHNIQUE: Axial images of the brain were obtained with out intravenous  contrast.  Reformatted images were created in the sagittal and coronal  planes.     DOSE:     Radiation dose reduction techniques were utilized per ALARA protocol.  Automated exposure control was initiated through either or [employer] or  [employer] software packages by  protocol.           FINDINGS:   There is extensive patient motion, thus degrading image quality.  No parenchymal mass, hemorrhage, or midline shift  There is no evidence of acute ischemia.  I do not see epidural or subdural  hematoma.  No evidence of ventriculomegaly  The bone window setting images show no destructive calvarial lesion or  acute calvarial fracture.   The posterior fossa is unremarkable.          Impression:         1. Extensive patient motion  2. No evidence of an acute ischemic event  3. No evidence of parenchymal hemorrhage     This report was finalized on 2/3/2022 5:57 PM by Dr. Deonte Reece MD.           Assessment & Plan    56F Smoker, Obese by BMI PMH Anxiety/Depression, Leukemia, Chronic Pain, COPD, Diabetes Mellitus Type II, Hypertension, Hyperlipidemia, Peripheral Vascular Disease, Obstructive Sleep Apnea, chronic relapsing and remitting bilateral lower extremity foot ulcers and follows in wound care clinic, presented [facility] emergency room 1/19 for altered mental status.    #Septic Shock, Acute Metabolic Encephalopathy & Acute Hypoxic Respiratory Failure requiring Intubation and Mechanical Ventilation 2/2 PNA, Bacterial, treating for MDR Organisms in setting of underlying COPD without acute exacerbation and Obstructive Sleep Apnea - Acute Metabolic Encephalopathy Persisting   - Patient presents w/ abrupt onset altered mental status, WBC count > 12K, heart rate > 90, Pneumonia  on imaging, SBP < 90 requiring IV pressors, intubated for airway protection, etiology of altered mental status suspected infectious/sepsis though has had persisting encephalopathy while on ventilator, repeat head CT from 1/19 to 1/28 was stable, no acute intracranial abnormalities, EEG's without epileptiform activity only generalized slowing.  1/31 off sedation thought to have lateral gaze deficits, repeat CTA's and EEGs without definitive etiology  - Pulmonary consulted; Following, extubated 2/1  - Infectious Disease consulted; Following  - TeleNeuro consulted; Following  - Lumbar puncture without growth, encephalitis panel negative, cytology with elevated glucose and protein, unable to perform Cellblock; At this time  deferred for extubation and unable to perform at this time due to agitation  - Continue Vancomycin, Cefepime and Flagyl, should complete Flagyl soon  - Continue APAP PRN for fevers, Duonebs as needed  - Unable to go for MRI or lumbar puncture due to agitation, continuous movements, Head CT performed with motion artifact but no new gross abnormalities noted  - Monitor in CCU, monitor on telemetry, off oxygen today, awaiting mentation improvement.    #Relapsing and Remitting Lower Extremity Wounds/Ulcers now with Osteomyelitis L great toe and bilateral cellulitis   - Patient reported 2 year history lower extremity wounds, follows in wound care clinic, cultures from initial biopsy grew MSSA  - Rheumatology panel negative with normal C and P-ANCA, Anti-CCP, SSA/SSB, Atypical P-ANCA, Anti-Hu Ab, paraneoplastic panel pending but is send out test  - Bone scan concern for osteomyelitis  - General Surgery consulted; Followed, status post debridement 1/22, signed off, re-evaluated 1/30 and recommended no surgical intervention.  - Infectious Disease consulted; Following as per above, continue antibiotics as per above    #Hypertension/Hyperlipidemia/Peripheral Vascular Disease   #Atrial Fibrillation w/ RVR, New onset (CHADsVasc = 3)  #NSTEMI Type II due to Atrial Fibrillation   - Patient with noted Atrial Fibrillation with RVR in emergency room on EKG, recurrent heart rate up to 160's.  - Labs showed troponins <0.010 -> 0.036 -> <0.010, proBNP 4800  - EKG showed Atrial Fibrillation with RVR  - Echo showed mild concentric left ventricular hypertrophy, normal LV function.   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 A BCR ABL PCR was obtained which was <0.001% as well as a flow cytometry which was concerning for a monotypical CD5+ B cell population with nonspecific immunophenotype, but significant for a variant B cell SLL or mantle cell lymphoma that could not be ruled out. CLL FISH was then obtained which was negative and CCND1/IGH - t(11:14) was not detected, JAK2 V617 and JAK2 exon 12 mutation which were negative as well.  - Pt had follow up scheduled 6/2020 and no showed appointment.    - Peripheral smear without evidence of acute leukemia  - Hematology/Oncology consulted; Evaluated and did not suspect current condition related to hematological condition, recommended follow up Dr. Menezes outpatient     #NIDDM Type II, controlled, unknown complications, New diagnosis  - Hgb A1c = 6.5%  - No home oral agents, continue FSBG and SSI, continue new Levemir 30U nightly, titrate as indicated     #Anxiety/Depression  - Psychiatry consulted in emergency room and felt altered mental status not related to underlying psychiatric illness; Continue home Seroquel and Ativan    #Tobacco Dependence  - Rec'd cessation, NRT as needed    #Obesity by BMI  - BMI 30, complicates all aspects of care     F: NPO  E: Monitor & Replace PRN  N: NPO Diet; Tube Feeds when can place NG  Ppx: on Hep gtt  Code: Full Code     Dispo: Pending workup and clinical improvement     *This patient is considered high risk due to sepsis, acute respiratory failure, PNA, intubation and mechanical ventilation, persisting encephalopathy, History monoclonal B cell lymphocytosis, NSTEMI, Atrial Fibrillation.     Edited by: Dick Whiting MD at 2/4/2022 1154  [facility]        Electronically signed by Dick Whiting MD at 02/04/22 1208      Rosio Jayce Devante, APRN at 02/03/22 1611          Stroke Progress Note       Chief Complaint: Altered mental status, acute encephalopathy    Subjective    Subjective     Subjective:  No acute events overnight.  Plan for CT head today, as patient remains too restless for MRI.     Review of Systems   Unable to perform ROS: Patient nonverbal           Objective    Objective      Temp:  [98.8 °F (37.1 °C)-99.7 °F (37.6 °C)] 98.9 °F (37.2 °C)  Heart Rate:  [] 124  Resp:  [19-32] 30  BP: (141-198)/() 171/84        Neurological Exam  Mental Status  Awake. Patient is nonverbal.  Patient is alert, makes eye contact, regards me only on left side of the room..    Cranial Nerves  CN II: Patient blinks to visual threat on the left, no response to visual threat on the right.  CN III, IV, VI: Pupils equal round and reactive to light bilaterally. Patient has left gaze preference, appears to regard me on her left side, does not cross midline to the right.  CN V:  Right: Normal corneal reflex.  Left: Normal corneal reflex.  CN VII: No obvious facial droop.    Motor  Normal muscle bulk throughout. Decreased muscle tone. No abnormal involuntary movements.  Patient frequently raising left arm to head and back down, nursing reports she will intermittently squeeze hand to command.  Right-sided hemiplegia noted, did not notice any movement of either lower extremity.    Sensory  Patient grimaces to nailbed pressure bilaterally, she withdraws left hand in response to tactile stimulation.      Physical Exam  Vitals and nursing note reviewed.   Constitutional:       General: She is awake.      Appearance: She is obese. She is ill-appearing.   HENT:      Head: Normocephalic and atraumatic.      Mouth/Throat:      Mouth: Mucous membranes are dry.      Pharynx: Oropharynx is clear.   Eyes:      Pupils: Pupils are equal, round, and reactive to light.   Cardiovascular:      Rate and Rhythm: Regular rhythm. Tachycardia present.    Pulmonary:      Effort: No respiratory distress.   Skin:     General: Skin is warm and dry.         Hospital Meds:  Scheduled- cefepime, 2 g, Intravenous, Q8H  hydrALAZINE, 10 mg, Oral, Q8H  insulin aspart, 0-14 Units, Subcutaneous, TID AC  insulin detemir, 30 Units, Subcutaneous, Daily  LORazepam, 0.5 mg, Oral, Nightly  losartan, 50 mg, Oral, Daily  metoprolol tartrate, 100 mg, Nasogastric, Q12H  metroNIDAZOLE, 500 mg, Intravenous, Q8H  pantoprazole, 40 mg, Intravenous, Q AM  pravastatin, 40 mg, Oral, Daily  pregabalin, 75 mg, Nasogastric, Q12H  QUEtiapine, 50 mg, Oral, Nightly  sodium chloride, 10 mL, Intravenous, Q12H  sodium chloride, 10 mL, Intravenous, Q12H  spironolactone, 25 mg, Oral, Daily  thiamine (VITAMIN B1) IVPB, 500 mg, Intravenous, Daily  vancomycin, 1,750 mg, Intravenous, Q12H      Infusions- dexmedetomidine, 0.2-1.5 mcg/kg/hr, Last Rate: 0.5 mcg/kg/hr (02/03/22 0348)  heparin (porcine), 10.2 Units/kg/hr, Last Rate: 26.2 Units/kg/hr (02/03/22 1035)  hold, 1 each  Pharmacy Consult,   Pharmacy to dose vancomycin,        PRNs- •  acetaminophen  •  artificial tears  •  dextrose  •  dextrose  •  glucagon (human recombinant)  •  heparin (porcine)  •  heparin (porcine)  •  hold  •  hydrALAZINE  •  LORazepam  •  magnesium sulfate **OR** magnesium sulfate **OR** magnesium sulfate  •  metoprolol tartrate  •  Pharmacy Consult  •  Pharmacy to dose vancomycin  •  potassium chloride **OR** potassium chloride **OR** [DISCONTINUED] potassium chloride  •  sodium chloride  •  sodium chloride    Results Review:    I reviewed the patient's new clinical results.    Transthoracic Echo:    Interpretation Summary    · Left ventricular wall thickness is consistent with mild concentric hypertrophy.  · Left ventricular ejection fraction appears to be 61 - 65%. Left ventricular systolic function is normal.  · Left ventricular diastolic dysfunction is noted.  · Saline test results are negative.  · This is a technically  limited study with poor echo windows and no carotid Doppler.             Assessment/Plan     Assessment/Plan:      2. Stroke-like symptoms, left gaze preference and right-sided hemiplegia noted when sedation was weaned,based on symptoms likely left hemisphere embolic stroke.  Initial CT of head was highly degraded by motion, however patient was then sedated and repeat head CT did not show any acute abnormalities.  Unable to obtain MRI due to restlessness. Patient currently being treated for pneumonia and septic shock, however this is out of proportion to her encephalopathy.  Family repeatedly denies any recreational drug use, urine toxicology was unremarkable.   Patient found to be in proximal A. fib while in ED,  was on heparin drip which was paused pending LP, and has now been resumed, cardiology is following.  Initial and repeat EEG showed moderate diffuse cerebral dysfunction, no epilepsy.  Lumbar puncture on 2022 ruled out meningitis/encephalitis. Repeat LP has been considered however patient unlikely to tolerate at present.  Repeat TTE was okay, results above.  -MRI brain when able  -Heparin drip restarted  -Repeat CT Head without contrast since unable to do MRI today  -Functional prognosis is guarded      The case was discussed with, nursing. Neurology will continue to follow.      ALDEN Robbins  22  16:11 EST        Electronically signed by Jayce Avelar APRN at 22 [phone]     To Christopher MD at 22 1237                     PROGRESS NOTE         Patient Identification:  Name:  Lynette Stein  Age:  56 y.o.  Sex:  female  :  1965  MRN:  [id number]  Visit Number:  [id number]  Primary Care Provider:  Orville Wu PA         LOS: 12 days       ----------------------------------------------------------------------------------------------------------------------  Subjective       Chief Complaints:    No chief complaint on file.        Interval History:       Patient resting in bed.  Awake, confused and does not follow commands.  Reaching no air with her left arm.  Currently on 4 L per nasal cannula with no apparent distress.  WBC elevated at  14.09.      Review of Systems:    Unable to obtain.  Altered mental status.  ----------------------------------------------------------------------------------------------------------------      Objective       Current Hospital Meds:  cefepime, 2 g, Intravenous, Q8H  hydrALAZINE, 10 mg, Oral, Q8H  insulin aspart, 0-14 Units, Subcutaneous, TID AC  insulin detemir, 30 Units, Subcutaneous, Daily  LORazepam, 0.5 mg, Oral, Nightly  losartan, 50 mg, Oral, Daily  metoprolol tartrate, 100 mg, Nasogastric, Q12H  metroNIDAZOLE, 500 mg, Intravenous, Q8H  pantoprazole, 40 mg, Intravenous, Q AM  pravastatin, 40 mg, Oral, Daily  pregabalin, 75 mg, Nasogastric, Q12H  QUEtiapine, 50 mg, Oral, Nightly  sodium chloride, 10 mL, Intravenous, Q12H  sodium chloride, 10 mL, Intravenous, Q12H  spironolactone, 25 mg, Oral, Daily  thiamine (VITAMIN B1) IVPB, 500 mg, Intravenous, Daily  vancomycin, 1,750 mg, Intravenous, Q12H      dexmedetomidine, 0.2-1.5 mcg/kg/hr, Last Rate: 0.5 mcg/kg/hr (02/03/22 2018)  heparin (porcine), 10.2 Units/kg/hr, Last Rate: 26.2 Units/kg/hr (02/03/22 1035)  hold, 1 each  Pharmacy Consult,   Pharmacy to dose vancomycin,       ----------------------------------------------------------------------------------------------------------------------    Vital Signs:  Temp:  [98.8 °F (37.1 °C)-99.7 °F (37.6 °C)] 98.9 °F (37.2 °C)  Heart Rate:  [] 124  Resp:  [19-32] 30  BP: (141-198)/() 171/84  Mean Arterial Pressure (Non-Invasive) for the past 24 hrs (Last 3 readings):   Noninvasive MAP (mmHg)   02/03/22 0900 106   02/03/22 0800 122   02/03/22 0700 120     SpO2 Percentage    02/03/22 0705 02/03/22 0800 02/03/22 0900   SpO2: (!) 89% 91% 90%     SpO2:  [87 %-96 %] 90 %  on  Flow (L/min):  [3-4] 4;   Device (Oxygen  Therapy): nasal cannula    Body mass index is 30.25 kg/m².  Wt Readings from Last 3 Encounters:   02/03/22 101 kg (223 lb 1.7 oz)   01/19/22 106 kg (233 lb)   12/14/21 106 kg (233 lb 9.6 oz)        Intake/Output Summary (Last 24 hours) at 2/3/2022 1237  Last data filed at 2/3/2022 0921  Gross per 24 hour   Intake 4244.06 ml   Output 2600 ml   Net 1644.06 ml     NPO Diet  ----------------------------------------------------------------------------------------------------------------------      Physical Exam:    Constitutional: Chronically ill-appearing.  Awake and agitated, but not following commands.  Drooling and experiencing oral automatisms.  HENT:  Head: Normocephalic and atraumatic.  Mouth:  Moist mucous membranes.    Eyes:  Pupils equal.  Left eye chemosis.   Neck:  Neck supple.  No JVD present.    Cardiovascular:  Normal rate, regular rhythm and normal heart sounds with no murmur. No edema.  Pulmonary/Chest: Coarse breath sounds bilaterally.  Abdominal:  Soft.  Bowel sounds are normal.  No distension and no tenderness.   Musculoskeletal:  No tenderness, and no deformity.  No swelling or redness of joints.  Mild bilateral lower extremity edema.  Neurological:  Awake and agitated.  Not following commands.  Skin:  Skin is warm and dry.  No rash noted.  No pallor.  Scattered bruises and scabs to bilateral upper extremities, trunk, bilateral lower extremities and feet.  Bilateral feet ulcers in dressings today.  Psychiatric: Awake and agitated.  Not following commands.  ----------------------------------------------------------------------------------------------------------------------              Results from last 7 days   Lab Units 02/03/22  1112   PH, ARTERIAL pH units 7.480*   PO2 ART mm Hg 88.7   PCO2, ARTERIAL mm Hg 32.6*   HCO3 ART mmol/L 24.2     Results from last 7 days   Lab Units 02/03/22  1111 02/03/22  0025 02/02/22  0008 01/31/22  2326 01/30/22  0403 01/29/22  0521   CRP mg/dL  --   --   --   --    --  5.37*   LACTATE mmol/L 0.9  --   --   --   --   --    WBC 10*3/mm3  --  14.09* 12.67* 12.23*   < > 12.64*   HEMOGLOBIN g/dL  --  12.0 11.6* 11.1*   < > 11.1*   HEMATOCRIT %  --  40.1 39.5 38.8   < > 37.8   MCV fL  --  82.7 83.0 84.2   < > 81.5   MCHC g/dL  --  29.9* 29.4* 28.6*   < > 29.4*   PLATELETS 10*3/mm3  --  472* 421 509*   < > 554*    < > = values in this interval not displayed.     Results from last 7 days   Lab Units 02/03/22  0025 02/02/22  1118 02/02/22  0008 01/31/22  2331 01/31/22  2331 01/31/22  2326 01/31/22  2326 01/31/22  1325 01/31/22  0103   SODIUM mmol/L 143  --  143  --  144   < > 143  --  139   POTASSIUM mmol/L 3.7 3.9 3.4*   < > 4.0   < > 3.9   < > 3.4*   MAGNESIUM mg/dL  --   --   --   --  2.1  --  2.1  --  2.3   CHLORIDE mmol/L 110*  --  107  --  107   < > 106  --  100   CO2 mmol/L 20.2*  --  22.8  --  25.8   < > 26.3  --  29.0   BUN mg/dL 15  --  15  --  15   < > 15  --  16   CREATININE mg/dL 0.54*  --  0.61  --  0.58   < > 0.55*  --  0.52*   EGFR IF NONAFRICN AM mL/min/1.73 117  --  102  --  108   < > 115  --  122   CALCIUM mg/dL 10.2  --  10.1  --  10.3   < > 10.4  --  10.1   GLUCOSE mg/dL 261*  --  232*  --  259*   < > 255*  --  244*   ALBUMIN g/dL 3.19*  --  3.62  --   --   --  3.63  --  3.73   BILIRUBIN mg/dL 0.2  --  0.2  --   --   --  0.2  --  0.2   ALK PHOS U/L 118*  --  125*  --   --   --  123*  --  133*   AST (SGOT) U/L 23  --  31  --   --   --  23  --  34*   ALT (SGPT) U/L 31  --  40*  --   --   --  37*  --  35*    < > = values in this interval not displayed.   Estimated Creatinine Clearance: 156.7 mL/min (A) (by C-G formula based on SCr of 0.54 mg/dL (L)).  Ammonia   Date Value Ref Range Status   01/31/2022 40 11 - 51 umol/L Final       Glucose   Date/Time Value Ref Range Status   02/03/2022 1047 204 (H) 70 - 130 mg/dL Final     Comment:     Meter: [id number] : [id number] Jasiel Cole PHILLIP   02/03/2022 0744 241 (H) 70 - 130 mg/dL Final     Comment:     Meter: [id number]  : [id number] Gianluca Pace RUBÉN   02/02/2022 1654 248 (H) 70 - 130 mg/dL Final     Comment:     Meter: [id number] : [id number] Bridget Blanca   02/02/2022 1209 256 (H) 70 - 130 mg/dL Final     Comment:     Meter: [id number] : [id number] Bridget Blanca   02/02/2022 0603 244 (H) 70 - 130 mg/dL Final     Comment:     Meter: [id number] : [id number] BRAD STYLES   02/01/2022 2348 203 (H) 70 - 130 mg/dL Final     Comment:     Meter: [id number] : [id number] BRAD STYLES   02/01/2022 1739 197 (H) 70 - 130 mg/dL Final     Comment:     Meter: [id number] : [id number] VIJI HEREDIA   02/01/2022 1205 199 (H) 70 - 130 mg/dL Final     Comment:     Meter: [id number] : [id number] VIJI HEREDIA     Lab Results   Component Value Date    HGBA1C 6.50 (H) 01/22/2022     Lab Results   Component Value Date    TSH 2.290 01/19/2022       Blood Culture   Date Value Ref Range Status   01/22/2022 No growth at 4 days  Preliminary   01/22/2022 No growth at 4 days  Preliminary     No results found for: URINECX  Wound Culture   Date Value Ref Range Status   01/24/2022 Rare Staphylococcus aureus (A)  Final   01/24/2022 Rare Normal Skin Melva  Final     No results found for: STOOLCX  Respiratory Culture   Date Value Ref Range Status   01/25/2022 Scant growth (1+) Candida albicans (A)  Final   01/25/2022 No Normal Respiratory Melva (A)  Final     Pain Management Panel       Pain Management Panel Latest Ref Rng & Units 1/19/2022    AMPHETAMINES SCREEN, URINE Negative Negative    BARBITURATES SCREEN Negative Negative    BENZODIAZEPINE SCREEN, URINE Negative Positive(A)    BUPRENORPHINEUR Negative Negative    COCAINE SCREEN, URINE Negative Negative    METHADONE SCREEN, URINE Negative Negative    METHAMPHETAMINEUR Negative Negative              ----------------------------------------------------------------------------------------------------------------------  Imaging Results (Last 24 Hours)       ** No results found for the last  24 hours. **            ----------------------------------------------------------------------------------------------------------------------    Assessment/Plan       Assessment/Plan     ASSESSMENT:    1.  Severe sepsis with acute respiratory failure requiring mechanical ventilation  2.  Pneumonia  3.  Left great toe osteomyelitis  4.  Enterocolitis    PLAN:    Patient resting in bed.  Awake, confused and does not follow commands.  Reaching no air with her left arm.  Currently on 4 L per nasal cannula with no apparent distress.  WBC elevated at  14.09.      Chest x-ray on 2/1/2022 shows mild bibasilar infiltrates.  Respiratory culture on 1/29/2022 finalized as no growth.    Bone scan on 1/28/2022 showed focal area of increased tracer uptake localizing to the left great toe.  If ulceration or infection in this location, osteomyelitis should be considered.  Periarticular type uptake throughout the right foot as detailed above in a pattern and distribution that is more favorable for reactive changes and neuropathic joint changes.  Soft tissue type uptake suggestive of cellulitis.      CT chest on 1/28/2022 showed new or worsening left basilar parenchymal opacity likely representing combination of atelectasis and/or infiltrate.  Continued right lower lobe atelectasis and likely infiltrate.  Patchy groundglass opacity left upper lobe and along the anterior aspect right upper lobe could represent very mild pneumonitis.  Increased tracheal and bronchial secretions may reflect underlying bronchitis.  This may be contributing factor of atelectasis.  Respiratory therapy may be of benefit.  Endotracheal tube in place.  Nasogastric tube appears in satisfactory position.  A second 2 passes alongside the nasogastric tube terminates in the distal esophagus.  Following discussion with patient's nurse it represents a temperature probe.  Increased colonic fluid with air-fluid levels.  Correlate for enteric colitis.  No focal  inflammatory changes or obstruction.  CT abdomen pelvis on 1/28/2022 showed the same as CT chest.     COVID-19 and influenza PCR negative.  Lumbar puncture culture and meningitis encephalitis panel negative. Legionella negative.  Strep pneumo antigen negative.  MRSA PCR positive.  Olivet spotted fever IgM equivocal at 1.06. Wound culture of the right foot from 1/24/2022 preliminary reports growth of Staph aureus.  Respiratory culture from 1/25/2022 reports growth of Candida albicans.  CT of the bilateral lower extremities reports no evidence of acute fracture, osteomyelitis, soft tissue gas or fluid collection. Wound culture of the right foot on 1/24/2022 finalized as MSSA. Respiratory culture on 1/25/2022 finalized as Candida albicans, likely a colonization. Blood cultures on 1/22/2022 finalized as no growth. Respiratory culture on 1/29/2022 is finalized as no growth.     As bone scan shows evidence of left great toe osteomyelitis, recommended surgical evaluation and intraoperative cultures from the left great toe to help direct antibiotic therapy. Surgeon has reevaluated the patient and does not recommend debridement or amputation at this time.      For now recommend to continue cefepime 2 g IV every 8 hours, Flagyl 500 mg IV every 8 hours and vancomycin pharmacy to dose.  We will continue to follow closely and adjust antibiotic therapy as needed.     Code Status:   Code Status and Medical Interventions:   Ordered at: 01/22/22 0039     Level Of Support Discussed With:    Health Care Surrogate     Code Status (Patient has no pulse and is not breathing):    CPR (Attempt to Resuscitate)     Medical Interventions (Patient has pulse or is breathing):    Full Support     Scribed for Dr. To Christopher MD by ALDEN Nails  02/03/22 12:41 EST        ALDEN Nails  02/03/22  12:37 EST    Physician Attestation:    The documentation recorded by the scribe accurately reflects the service I personally performed  and the decisions made by me.    To Christopher MD  02/03/22  12:37 EST      Electronically signed by To Christopher MD at 02/03/22 9698     Miquel Mora MD at 02/03/22 1238        Chart reviewed and patient stable from a respiratory standpoint.  ABG reviewed.  Noted oxygen needs.  Suspect ongoing issues with aspiration and poor secretion clearance playing a role.  Continue with multiple antibiotics per ID.  Unfortunately guarded prognosis    Electronically signed by Miquel Mora MD at 02/03/22 1238

## 2022-02-06 NOTE — PLAN OF CARE
Problem: Adult Inpatient Plan of Care  Goal: Plan of Care Review  Outcome: Ongoing, Progressing  Flowsheets (Taken 2/6/2022 0641)  Plan of Care Reviewed With: patient  Goal: Patient-Specific Goal (Individualized)  Outcome: Ongoing, Progressing  Goal: Absence of Hospital-Acquired Illness or Injury  Outcome: Ongoing, Progressing  Intervention: Identify and Manage Fall Risk  Recent Flowsheet Documentation  Taken 2/6/2022 0600 by Erika Ritchie RN  Safety Promotion/Fall Prevention:   safety round/check completed   fall prevention program maintained  Taken 2/6/2022 0500 by Erika Ritchie RN  Safety Promotion/Fall Prevention:   safety round/check completed   fall prevention program maintained  Taken 2/6/2022 0400 by Erika Ritchie RN  Safety Promotion/Fall Prevention:   safety round/check completed   fall prevention program maintained  Taken 2/6/2022 0300 by Eriak Ritchie RN  Safety Promotion/Fall Prevention:   safety round/check completed   fall prevention program maintained  Taken 2/6/2022 0200 by Erika Ritchie RN  Safety Promotion/Fall Prevention:   safety round/check completed   fall prevention program maintained  Taken 2/6/2022 0100 by Erika Ritchie RN  Safety Promotion/Fall Prevention:   safety round/check completed   fall prevention program maintained  Taken 2/6/2022 0000 by Erika Ritchie RN  Safety Promotion/Fall Prevention:   safety round/check completed   fall prevention program maintained  Taken 2/5/2022 2300 by Erika Ritchie RN  Safety Promotion/Fall Prevention:   safety round/check completed   fall prevention program maintained  Taken 2/5/2022 2200 by Erika Ritchie RN  Safety Promotion/Fall Prevention:   safety round/check completed   fall prevention program maintained  Taken 2/5/2022 2100 by Erika Ritchie RN  Safety Promotion/Fall Prevention:   safety round/check completed   fall prevention program maintained  Taken 2/5/2022 2000 by Erika Ritchie RN  Safety  Promotion/Fall Prevention:   safety round/check completed   fall prevention program maintained  Taken 2/5/2022 1900 by Erika Ritchie RN  Safety Promotion/Fall Prevention:   safety round/check completed   fall prevention program maintained  Intervention: Prevent Skin Injury  Recent Flowsheet Documentation  Taken 2/6/2022 0600 by Erika Ritchie RN  Body Position: turned  Taken 2/6/2022 0500 by Erika Ritchie RN  Body Position: position maintained  Taken 2/6/2022 0400 by Erika Ritchie RN  Body Position: turned  Taken 2/6/2022 0300 by Erika Ritchie RN  Body Position: position maintained  Taken 2/6/2022 0200 by Erika Ritchie RN  Body Position: turned  Taken 2/6/2022 0135 by Erika Ritchie RN  Skin Protection:   adhesive use limited   tubing/devices free from skin contact  Taken 2/6/2022 0100 by Erika Ritchie RN  Body Position: position maintained  Taken 2/6/2022 0000 by Erika Ritchie RN  Body Position: turned  Taken 2/5/2022 2300 by Erika Ritchie RN  Body Position: position maintained  Taken 2/5/2022 2200 by Erika Ritchie RN  Body Position:   turned   side-lying, right  Taken 2/5/2022 2100 by Erika Ritchie RN  Body Position: position maintained  Taken 2/5/2022 2000 by Erika Ritchie RN  Body Position:   turned   side-lying, left  Taken 2/5/2022 1935 by Erika Ritchie RN  Skin Protection:   adhesive use limited   electrode sites changed   pulse oximeter probe site changed   tubing/devices free from skin contact  Taken 2/5/2022 1900 by Erika Ritchie RN  Body Position: position maintained  Intervention: Prevent and Manage VTE (venous thromboembolism) Risk  Recent Flowsheet Documentation  Taken 2/6/2022 0135 by Erika Ritchie RN  VTE Prevention/Management: (see MAR) other (see comments)  Taken 2/5/2022 1935 by Erika Ritchie RN  VTE Prevention/Management: (see MAR) other (see comments)  Intervention: Prevent Infection  Recent Flowsheet Documentation  Taken 2/6/2022  0600 by Erika Ritchie RN  Infection Prevention:   environmental surveillance performed   hand hygiene promoted   personal protective equipment utilized   rest/sleep promoted   single patient room provided  Taken 2/6/2022 0400 by Erika Ritchie RN  Infection Prevention:   environmental surveillance performed   hand hygiene promoted   personal protective equipment utilized   rest/sleep promoted   single patient room provided  Taken 2/6/2022 0200 by Erika Ritchie RN  Infection Prevention:   environmental surveillance performed   hand hygiene promoted   personal protective equipment utilized   rest/sleep promoted   single patient room provided  Taken 2/6/2022 0000 by Erika Ritchie RN  Infection Prevention:   environmental surveillance performed   hand hygiene promoted   personal protective equipment utilized   rest/sleep promoted   single patient room provided  Taken 2/5/2022 2200 by Erika Ritchie RN  Infection Prevention:   environmental surveillance performed   hand hygiene promoted   personal protective equipment utilized   rest/sleep promoted   single patient room provided  Taken 2/5/2022 2000 by Erika Ritchie RN  Infection Prevention:   environmental surveillance performed   hand hygiene promoted   personal protective equipment utilized   rest/sleep promoted  Goal: Optimal Comfort and Wellbeing  Outcome: Ongoing, Progressing  Intervention: Provide Person-Centered Care  Recent Flowsheet Documentation  Taken 2/6/2022 0135 by Erika Ritchie RN  Trust Relationship/Rapport:   care explained   reassurance provided  Taken 2/5/2022 1935 by Erika Ritchie RN  Trust Relationship/Rapport:   care explained   reassurance provided  Goal: Readiness for Transition of Care  Outcome: Ongoing, Progressing     Problem: Skin Injury Risk Increased  Goal: Skin Health and Integrity  Outcome: Ongoing, Progressing  Intervention: Optimize Skin Protection  Recent Flowsheet Documentation  Taken 2/6/2022 0600 by  Erika Ritchie RN  Head of Bed (HOB):   HOB elevated   HOB at 30-45 degrees  Taken 2/6/2022 0400 by Erika Ritchie RN  Head of Bed (HOB):   HOB elevated   HOB at 30-45 degrees  Taken 2/6/2022 0200 by Erika Ritchie RN  Head of Bed (HOB):   HOB elevated   HOB at 30-45 degrees  Taken 2/6/2022 0135 by Erika Ritchie RN  Pressure Reduction Techniques:   heels elevated off bed   positioned off wounds   pressure points protected   weight shift assistance provided  Pressure Reduction Devices:   heel offloading device utilized   positioning supports utilized   pressure-redistributing mattress utilized  Skin Protection:   adhesive use limited   tubing/devices free from skin contact  Taken 2/6/2022 0000 by Erika Ritchie RN  Head of Bed (HOB):   HOB elevated   HOB at 30-45 degrees  Taken 2/5/2022 2200 by Erika Ritchie RN  Head of Bed (HOB):   HOB elevated   HOB at 30-45 degrees  Taken 2/5/2022 2000 by Erika Ritchie RN  Head of Bed (HOB):   HOB at 30-45 degrees   HOB elevated  Taken 2/5/2022 1935 by Erika Ritchie RN  Pressure Reduction Techniques:   heels elevated off bed   positioned off wounds   pressure points protected   weight shift assistance provided  Pressure Reduction Devices:   heel offloading device utilized   positioning supports utilized   pressure-redistributing mattress utilized  Skin Protection:   adhesive use limited   electrode sites changed   pulse oximeter probe site changed   tubing/devices free from skin contact     Problem: Diabetes Comorbidity  Goal: Blood Glucose Level Within Desired Range  Outcome: Ongoing, Progressing  Intervention: Maintain Glycemic Control  Recent Flowsheet Documentation  Taken 2/6/2022 0135 by rEika Ritchie RN  Glycemic Management: blood glucose monitoring  Taken 2/5/2022 1935 by Erika Ritchie RN  Glycemic Management: blood glucose monitoring     Problem: Hypertension Comorbidity  Goal: Blood Pressure in Desired Range  Outcome: Ongoing,  Progressing  Intervention: Maintain Hypertension-Management Strategies  Recent Flowsheet Documentation  Taken 2/6/2022 0600 by Erika Ritchie RN  Medication Review/Management: medications reviewed  Taken 2/6/2022 0400 by Erika Ritchie RN  Medication Review/Management: medications reviewed  Taken 2/6/2022 0200 by Erika Ritchie RN  Medication Review/Management: medications reviewed  Taken 2/6/2022 0135 by Erika Ritchie RN  Syncope Management: legs elevated  Taken 2/6/2022 0000 by Erika Ritchie RN  Medication Review/Management: medications reviewed  Taken 2/5/2022 2200 by Erika Ritchie RN  Medication Review/Management: medications reviewed  Taken 2/5/2022 2000 by Erika Ritchie RN  Medication Review/Management: medications reviewed     Problem: Airway Clearance Ineffective  Goal: Effective Airway Clearance  Outcome: Ongoing, Progressing  Intervention: Promote Airway Secretion Clearance  Recent Flowsheet Documentation  Taken 2/6/2022 0135 by Erika Ritchie RN  Activity Management: bedrest  Taken 2/5/2022 1935 by Erika Ritchie RN  Activity Management: bedrest  Cough And Deep Breathing: done independently per patient     Problem: Fall Injury Risk  Goal: Absence of Fall and Fall-Related Injury  Outcome: Ongoing, Progressing  Intervention: Identify and Manage Contributors to Fall Injury Risk  Recent Flowsheet Documentation  Taken 2/6/2022 0600 by Erika Ritchie RN  Medication Review/Management: medications reviewed  Taken 2/6/2022 0400 by Erika Ritchie RN  Medication Review/Management: medications reviewed  Taken 2/6/2022 0200 by Erika Ritchie RN  Medication Review/Management: medications reviewed  Taken 2/6/2022 0000 by Erika Ritchie RN  Medication Review/Management: medications reviewed  Taken 2/5/2022 2200 by Erika Ritchie RN  Medication Review/Management: medications reviewed  Taken 2/5/2022 2000 by Erika Ritchie RN  Medication Review/Management: medications  reviewed  Intervention: Promote Injury-Free Environment  Recent Flowsheet Documentation  Taken 2/6/2022 0600 by Erika Ritchie RN  Safety Promotion/Fall Prevention:   safety round/check completed   fall prevention program maintained  Taken 2/6/2022 0500 by Erika Ritchie RN  Safety Promotion/Fall Prevention:   safety round/check completed   fall prevention program maintained  Taken 2/6/2022 0400 by Erika Ritchie RN  Safety Promotion/Fall Prevention:   safety round/check completed   fall prevention program maintained  Taken 2/6/2022 0300 by Erika Ritchie RN  Safety Promotion/Fall Prevention:   safety round/check completed   fall prevention program maintained  Taken 2/6/2022 0200 by Erika Ritchie RN  Safety Promotion/Fall Prevention:   safety round/check completed   fall prevention program maintained  Taken 2/6/2022 0100 by Erika Ritchie RN  Safety Promotion/Fall Prevention:   safety round/check completed   fall prevention program maintained  Taken 2/6/2022 0000 by Erika Ritchie RN  Safety Promotion/Fall Prevention:   safety round/check completed   fall prevention program maintained  Taken 2/5/2022 2300 by Erika Ritchie RN  Safety Promotion/Fall Prevention:   safety round/check completed   fall prevention program maintained  Taken 2/5/2022 2200 by Erika Ritchie RN  Safety Promotion/Fall Prevention:   safety round/check completed   fall prevention program maintained  Taken 2/5/2022 2100 by Erika Ritchie RN  Safety Promotion/Fall Prevention:   safety round/check completed   fall prevention program maintained  Taken 2/5/2022 2000 by Erika iRtchie RN  Safety Promotion/Fall Prevention:   safety round/check completed   fall prevention program maintained  Taken 2/5/2022 1900 by Erika Ritchie RN  Safety Promotion/Fall Prevention:   safety round/check completed   fall prevention program maintained     Problem: Infection  Goal: Infection Symptom Resolution  Outcome: Ongoing,  Progressing  Intervention: Prevent or Manage Infection  Recent Flowsheet Documentation  Taken 2/6/2022 0600 by Erika Ritchie RN  Infection Management: aseptic technique maintained  Taken 2/6/2022 0500 by Erika Ritchie RN  Infection Management: aseptic technique maintained  Taken 2/6/2022 0400 by Erika Ritchie RN  Infection Management: aseptic technique maintained  Taken 2/6/2022 0200 by Erika Ritchie RN  Infection Management: aseptic technique maintained  Taken 2/6/2022 0135 by Erika Ritchie RN  Infection Management: aseptic technique maintained  Fever Reduction/Comfort Measures:   lightweight bedding   lightweight clothing  Taken 2/6/2022 0000 by Erika Ritchie RN  Infection Management: aseptic technique maintained  Taken 2/5/2022 2200 by Erika Ritchie RN  Infection Management: aseptic technique maintained  Taken 2/5/2022 2000 by Erika Ritchie RN  Infection Management: aseptic technique maintained  Taken 2/5/2022 1935 by Erika Ritchie RN  Infection Management: aseptic technique maintained  Fever Reduction/Comfort Measures:   lightweight bedding   lightweight clothing   Goal Outcome Evaluation:  Plan of Care Reviewed With: patient

## 2022-02-06 NOTE — PROGRESS NOTES
PROGRESS NOTE         Patient Identification:  Name:  Lynette Stein  Age:  56 y.o.  Sex:  female  :  1965  MRN:  [id number]  Visit Number:  [id number]  Primary Care Provider:  Orville Wu PA         LOS: 15 days       ----------------------------------------------------------------------------------------------------------------------  Subjective       Chief Complaints:    No chief complaint on file.        Interval History:      Patient continues on room air today with no apparent distress.  Low-grade fever 99.4.  WBC stable at 12.17.  No issues reported overnight.    Review of Systems:    Unable to obtain.  Altered mental status.  ----------------------------------------------------------------------------------------------------------------      Objective       Current Hospital Meds:  cefepime, 2 g, Intravenous, Q8H  hydrALAZINE, 10 mg, Oral, Q8H  insulin aspart, 0-14 Units, Subcutaneous, TID AC  insulin detemir, 30 Units, Subcutaneous, Daily  losartan, 100 mg, Oral, Daily  metoprolol tartrate, 100 mg, Nasogastric, Q12H  metroNIDAZOLE, 500 mg, Intravenous, Q8H  pantoprazole, 40 mg, Intravenous, Q AM  pravastatin, 40 mg, Oral, Daily  pregabalin, 75 mg, Nasogastric, Q12H  QUEtiapine, 50 mg, Oral, Nightly  sodium chloride, 10 mL, Intravenous, Q12H  sodium chloride, 10 mL, Intravenous, Q12H  sodium chloride, 10 mL, Intravenous, Q12H  sodium chloride, 10 mL, Intravenous, Q12H  sodium chloride, 10 mL, Intravenous, Q12H  spironolactone, 25 mg, Oral, Daily  vancomycin, 1,500 mg, Intravenous, Q12H      dexmedetomidine, 0.2-1.5 mcg/kg/hr, Last Rate: 0.5 mcg/kg/hr (22)  heparin (porcine), 10.2 Units/kg/hr, Last Rate: 24.6 Units/kg/hr (22)  hold, 1 each  Pharmacy Consult,   Pharmacy to dose vancomycin,       ----------------------------------------------------------------------------------------------------------------------    Vital Signs:  Temp:  [98.6 °F (37 °C)-99.4 °F  (37.4 °C)] 99.4 °F (37.4 °C)  Heart Rate:  [104-130] 104  Resp:  [15-26] 24  BP: (143-196)/() 143/75  Mean Arterial Pressure (Non-Invasive) for the past 24 hrs (Last 3 readings):   Noninvasive MAP (mmHg)   02/06/22 1303 107   02/06/22 1203 131   02/06/22 1152 129     SpO2 Percentage    02/06/22 1103 02/06/22 1203 02/06/22 1303   SpO2: 96% 95% 95%     SpO2:  [91 %-96 %] 95 %  on   ;   Device (Oxygen Therapy): room air    Body mass index is 29.59 kg/m².  Wt Readings from Last 3 Encounters:   02/06/22 99 kg (218 lb 4.1 oz)   01/19/22 106 kg (233 lb)   12/14/21 106 kg (233 lb 9.6 oz)        Intake/Output Summary (Last 24 hours) at 2/6/2022 1353  Last data filed at 2/6/2022 0600  Gross per 24 hour   Intake 1546 ml   Output 1750 ml   Net -204 ml     NPO Diet  ----------------------------------------------------------------------------------------------------------------------      Physical Exam:    Constitutional: Chronically ill-appearing.  Awake and agitated, but not following commands.  Drooling and experiencing oral automatisms.  HENT:  Head: Normocephalic and atraumatic.  Mouth:  Moist mucous membranes.    Eyes:  Pupils equal.  Left eye chemosis.   Neck:  Neck supple.  No JVD present.    Cardiovascular:  Normal rate, regular rhythm and normal heart sounds with no murmur. No edema.  Pulmonary/Chest: Coarse breath sounds bilaterally.  Abdominal:  Soft.  Bowel sounds are normal.  No distension and no tenderness.   Musculoskeletal:  No tenderness, and no deformity.  No swelling or redness of joints.  Mild bilateral lower extremity edema.  Neurological:  Awake and agitated.  Not following commands.  Skin:  Skin is warm and dry.  No rash noted.  No pallor.  Scattered bruises and scabs to bilateral upper extremities, trunk, bilateral lower extremities and feet.  Bilateral feet ulcers in dressings today.  Psychiatric: Awake and agitated.  Not following  commands.  ----------------------------------------------------------------------------------------------------------------------              Results from last 7 days   Lab Units 02/03/22  1112   PH, ARTERIAL pH units 7.480*   PO2 ART mm Hg 88.7   PCO2, ARTERIAL mm Hg 32.6*   HCO3 ART mmol/L 24.2     Results from last 7 days   Lab Units 02/06/22  0150 02/05/22  0046 02/04/22  0246 02/03/22  1111 02/03/22  0025   LACTATE mmol/L  --   --   --  0.9  --    WBC 10*3/mm3 12.17* 11.63* 12.22*  --    < >   HEMOGLOBIN g/dL 11.9* 11.9* 12.6  --    < >   HEMATOCRIT % 40.2 40.5 43.4  --    < >   MCV fL 82.7 83.5 83.0  --    < >   MCHC g/dL 29.6* 29.4* 29.0*  --    < >   PLATELETS 10*3/mm3 409 343 461*  --    < >    < > = values in this interval not displayed.     Results from last 7 days   Lab Units 02/06/22 0150 02/05/22  0046 02/04/22  1249 02/04/22  0246 02/02/22  0008 01/31/22  2331 01/31/22  2326 01/31/22 2326   SODIUM mmol/L 147* 146*  --  144   < > 144   < > 143   POTASSIUM mmol/L 3.1* 3.1*  --  3.7   < > 4.0   < > 3.9   MAGNESIUM mg/dL  --   --  2.0  --   --  2.1  --  2.1   CHLORIDE mmol/L 116* 114*  --  109*   < > 107   < > 106   CO2 mmol/L 16.9* 17.8*  --  20.9*   < > 25.8   < > 26.3   BUN mg/dL 10 11  --  13   < > 15   < > 15   CREATININE mg/dL 0.56* 0.52*  --  0.56*   < > 0.58   < > 0.55*   EGFR IF NONAFRICN AM mL/min/1.73 112 122  --  112   < > 108   < > 115   CALCIUM mg/dL 9.7 9.9  --  10.4   < > 10.3   < > 10.4   GLUCOSE mg/dL 158* 159*  --  183*   < > 259*   < > 255*   ALBUMIN g/dL 3.36* 3.45*  --  3.73   < >  --   --  3.63   BILIRUBIN mg/dL 0.2 0.3  --  0.3   < >  --   --  0.2   ALK PHOS U/L 109 107  --  120*   < >  --   --  123*   AST (SGOT) U/L 20 19  --  22   < >  --   --  23   ALT (SGPT) U/L 27 29  --  33   < >  --   --  37*    < > = values in this interval not displayed.   Estimated Creatinine Clearance: 149.6 mL/min (A) (by C-G formula based on SCr of 0.56 mg/dL (L)).  No results found for:  AMMONIA    Glucose   Date/Time Value Ref Range Status   02/06/2022 1103 156 (H) 70 - 130 mg/dL Final     Comment:     Meter: [id number] : [id number] GERI HOOVER   02/06/2022 0635 153 (H) 70 - 130 mg/dL Final     Comment:     Meter: [id number] : [id number] summer butain   02/06/2022 0011 136 (H) 70 - 130 mg/dL Final     Comment:     Meter: [id number] : [id number] summer butain   02/05/2022 1622 125 70 - 130 mg/dL Final     Comment:     Meter: [id number] : [id number] Suresh Lindsey   02/05/2022 1109 138 (H) 70 - 130 mg/dL Final     Comment:     Meter: [id number] : [id number] Suresh Lindsey   02/05/2022 0634 183 (H) 70 - 130 mg/dL Final     Comment:     Meter: [id number] : [id number] Kayli Koehler   02/04/2022 2005 137 (H) 70 - 130 mg/dL Final     Comment:     Meter: [id number] : [id number] Gianluca [place]   02/04/2022 1738 159 (H) 70 - 130 mg/dL Final     Comment:     Meter: [id number] : [id number] Suresh Lindsey     Lab Results   Component Value Date    HGBA1C 6.50 (H) 01/22/2022     Lab Results   Component Value Date    TSH 2.290 01/19/2022       Blood Culture   Date Value Ref Range Status   01/22/2022 No growth at 4 days  Preliminary   01/22/2022 No growth at 4 days  Preliminary     No results found for: URINECX  Wound Culture   Date Value Ref Range Status   01/24/2022 Rare Staphylococcus aureus (A)  Final   01/24/2022 Rare Normal Skin Melva  Final     No results found for: STOOLCX  Respiratory Culture   Date Value Ref Range Status   01/25/2022 Scant growth (1+) Candida albicans (A)  Final   01/25/2022 No Normal Respiratory Melva (A)  Final     Pain Management Panel       Pain Management Panel Latest Ref Rng & Units 1/19/2022    AMPHETAMINES SCREEN, URINE Negative Negative    BARBITURATES SCREEN Negative Negative    BENZODIAZEPINE SCREEN, URINE Negative Positive(A)    BUPRENORPHINEUR Negative Negative    COCAINE SCREEN, URINE Negative Negative    METHADONE SCREEN, URINE  Negative Negative    METHAMPHETAMINEUR Negative Negative              ----------------------------------------------------------------------------------------------------------------------  Imaging Results (Last 24 Hours)       ** No results found for the last 24 hours. **            ----------------------------------------------------------------------------------------------------------------------    Assessment/Plan       Assessment/Plan     ASSESSMENT:    1.  Severe sepsis with acute respiratory failure requiring mechanical ventilation  2.  Pneumonia  3.  Left great toe osteomyelitis  4.  Enterocolitis    PLAN:    Patient continues on room air today with no apparent distress.  Low-grade fever 99.4.  WBC stable at 12.17.  No issues reported overnight.    CT of the head from 2/3/2022 reports no evidence of acute ischemic event or parenchymal hemorrhage.    Chest x-ray on 2/1/2022 shows mild bibasilar infiltrates.  Respiratory culture on 1/29/2022 finalized as no growth.    Bone scan on 1/28/2022 showed focal area of increased tracer uptake localizing to the left great toe.  If ulceration or infection in this location, osteomyelitis should be considered.  Periarticular type uptake throughout the right foot as detailed above in a pattern and distribution that is more favorable for reactive changes and neuropathic joint changes.  Soft tissue type uptake suggestive of cellulitis.      CT chest on 1/28/2022 showed new or worsening left basilar parenchymal opacity likely representing combination of atelectasis and/or infiltrate.  Continued right lower lobe atelectasis and likely infiltrate.  Patchy groundglass opacity left upper lobe and along the anterior aspect right upper lobe could represent very mild pneumonitis.  Increased tracheal and bronchial secretions may reflect underlying bronchitis.  This may be contributing factor of atelectasis.  Respiratory therapy may be of benefit.  Endotracheal tube in place.   Nasogastric tube appears in satisfactory position.  A second 2 passes alongside the nasogastric tube terminates in the distal esophagus.  Following discussion with patient's nurse it represents a temperature probe.  Increased colonic fluid with air-fluid levels.  Correlate for enteric colitis.  No focal inflammatory changes or obstruction.  CT abdomen pelvis on 1/28/2022 showed the same as CT chest.     COVID-19 and influenza PCR negative.  Lumbar puncture culture and meningitis encephalitis panel negative. Legionella negative.  Strep pneumo antigen negative.  MRSA PCR positive.  [place] spotted fever IgM equivocal at 1.06. Wound culture of the right foot from 1/24/2022 preliminary reports growth of Staph aureus.  Respiratory culture from 1/25/2022 reports growth of Candida albicans.  CT of the bilateral lower extremities reports no evidence of acute fracture, osteomyelitis, soft tissue gas or fluid collection. Wound culture of the right foot on 1/24/2022 finalized as MSSA. Respiratory culture on 1/25/2022 finalized as Candida albicans, likely a colonization. Blood cultures on 1/22/2022 finalized as no growth. Respiratory culture on 1/29/2022 is finalized as no growth.     As bone scan shows evidence of left great toe osteomyelitis, recommended surgical evaluation and intraoperative cultures from the left great toe to help direct antibiotic therapy. Surgeon has reevaluated the patient and does not recommend debridement or amputation at this time.      For now recommend to continue cefepime 2 g IV every 8 hours, Flagyl 500 mg IV every 8 hours and vancomycin pharmacy to dose.  We will continue to follow closely and adjust antibiotic therapy as needed.     Code Status:   Code Status and Medical Interventions:   Ordered at: 01/22/22 0039     Level Of Support Discussed With:    Health Care Surrogate     Code Status (Patient has no pulse and is not breathing):    CPR (Attempt to Resuscitate)     Medical Interventions  (Patient has pulse or is breathing):    Full Support         ALDEN Nails  02/06/22  13:53 EST

## 2022-02-06 NOTE — PROGRESS NOTES
[facility] HOSPITALIST PROGRESS NOTE     Patient Identification:  Name:  Lynette Stein  Age:  56 y.o.  Sex:  female  :  1965  MRN:  [id number]  Visit Number:  [id number]  ROOM: [address]     Primary Care Provider:  Orville Wu PA    Length of stay in inpatient status:  15    Subjective     Chief Compliant:  altered mental status     History of Presenting Illness:    Patient remains ill but stable today, no acute events overnight, resting this AM, no significant clinical changes, may be moving less today, hopefully may be able to go for MRI and lumbar puncture soon, continued on antibiotics, labs and vitals stable other than remains persistently hypertensive, treating IV as needed due to inability to take oral at this time, with improved mentation can have SLP evaluate but got choked on oral swab a day ago.    Objective     Current Hospital Meds:cefepime, 2 g, Intravenous, Q8H  hydrALAZINE, 10 mg, Oral, Q8H  insulin aspart, 0-14 Units, Subcutaneous, TID AC  insulin detemir, 30 Units, Subcutaneous, Daily  losartan, 100 mg, Oral, Daily  metoprolol tartrate, 100 mg, Nasogastric, Q12H  metroNIDAZOLE, 500 mg, Intravenous, Q8H  pantoprazole, 40 mg, Intravenous, Q AM  potassium chloride, 10 mEq, Intravenous, Q1H  pravastatin, 40 mg, Oral, Daily  pregabalin, 75 mg, Nasogastric, Q12H  QUEtiapine, 50 mg, Oral, Nightly  sodium chloride, 10 mL, Intravenous, Q12H  sodium chloride, 10 mL, Intravenous, Q12H  sodium chloride, 10 mL, Intravenous, Q12H  sodium chloride, 10 mL, Intravenous, Q12H  sodium chloride, 10 mL, Intravenous, Q12H  spironolactone, 25 mg, Oral, Daily  vancomycin, 1,500 mg, Intravenous, Q12H    dexmedetomidine, 0.2-1.5 mcg/kg/hr, Last Rate: 0.5 mcg/kg/hr (22)  heparin (porcine), 10.2 Units/kg/hr, Last Rate: 24.6 Units/kg/hr (22 0940)  hold, 1 each  Pharmacy Consult,   Pharmacy to dose vancomycin,        ----------------------------------------------------------------------------------------------------------------------  Vital Signs:  Temp:  [98.6 °F (37 °C)-99.3 °F (37.4 °C)] 99.1 °F (37.3 °C)  Heart Rate:  [104-130] 120  Resp:  [15-26] 22  BP: (154-197)/() 169/85  SpO2:  [91 %-97 %] 94 %  on   ;   Device (Oxygen Therapy): room air  Body mass index is 29.59 kg/m².    Intake & Output (last 3 days)       02/03 0701  02/04 0700 02/04 0701  02/05 0700 02/05 0701  02/06 0700 02/06 0701  02/07 0700    I.V. (mL/kg) 856.8 (8.6) 784 (7.9) 1416 (14.3)     Other   30     NG/GT        IV Piggyback 1500 1400 100     Total Intake(mL/kg) 2356.8 (23.6) 2184 (22.1) 1546 (15.6)     Urine (mL/kg/hr) 2200 (0.9) 1575 (0.7) 1750 (0.7)     Stool 0 0 0     Total Output 2200 1575 1750     Net +156.8 +609 -204             Stool Unmeasured Occurrence 2 x 0 x          ----------------------------------------------------------------------------------------------------------------------  Physical exam:  Constitutional:  Well-developed and well-nourished. resting  HENT:  Head:  Normocephalic and atraumatic.  Mouth:  Moist mucous membranes.    Eyes:  Conjunctivae normal. No scleral icterus.    Neck:  Neck supple.  No JVD present.    Cardiovascular:  Normal rate, regular rhythm and normal heart sounds with no murmur.  Pulmonary/Chest:  course breath sounds bilateral, on room air.  Abdominal:  Soft. No distension and no tenderness.   Musculoskeletal:  No tenderness, and no deformity.  No red or swollen joints anywhere.    Neurological: resting upon arrival, R side still flaccid, still attempts to move left upper extremity when waking up  Skin:  Skin is warm and dry. No rash noted. No pallor.   Peripheral vascular: Bilateral lower extremity ulcers wrapped, clean and dry  : Nabeel in place  ----------------------------------------------------------------------------------------------------------------------  Results from last 7 days    Lab Units 02/06/22  0150 02/05/22  0046 02/04/22  0246 02/03/22  1111 02/03/22  0025   LACTATE mmol/L  --   --   --  0.9  --    WBC 10*3/mm3 12.17* 11.63* 12.22*  --    < >   HEMOGLOBIN g/dL 11.9* 11.9* 12.6  --    < >   HEMATOCRIT % 40.2 40.5 43.4  --    < >   MCV fL 82.7 83.5 83.0  --    < >   MCHC g/dL 29.6* 29.4* 29.0*  --    < >   PLATELETS 10*3/mm3 409 343 461*  --    < >    < > = values in this interval not displayed.     Results from last 7 days   Lab Units 02/03/22  1112   PH, ARTERIAL pH units 7.480*   PO2 ART mm Hg 88.7   PCO2, ARTERIAL mm Hg 32.6*   HCO3 ART mmol/L 24.2     Results from last 7 days   Lab Units 02/06/22  0150 02/05/22  0046 02/04/22  1249 02/04/22  0246 02/02/22  0008 01/31/22  2331 01/31/22  2326 01/31/22  2326 01/31/22  1325 01/31/22  0103   SODIUM mmol/L 147* 146*  --  144   < > 144   < > 143  --  139   POTASSIUM mmol/L 3.1* 3.1*  --  3.7   < > 4.0   < > 3.9   < > 3.4*   MAGNESIUM mg/dL  --   --  2.0  --   --  2.1  --  2.1  --  2.3   CHLORIDE mmol/L 116* 114*  --  109*   < > 107   < > 106  --  100   CO2 mmol/L 16.9* 17.8*  --  20.9*   < > 25.8   < > 26.3  --  29.0   BUN mg/dL 10 11  --  13   < > 15   < > 15  --  16   CREATININE mg/dL 0.56* 0.52*  --  0.56*   < > 0.58   < > 0.55*  --  0.52*   EGFR IF NONAFRICN AM mL/min/1.73 112 122  --  112   < > 108   < > 115  --  122   CALCIUM mg/dL 9.7 9.9  --  10.4   < > 10.3   < > 10.4  --  10.1   PHOSPHORUS mg/dL  --   --   --   --   --  4.6*  --   --   --  3.7   GLUCOSE mg/dL 158* 159*  --  183*   < > 259*   < > 255*  --  244*   ALBUMIN g/dL 3.36* 3.45*  --  3.73   < >  --   --  3.63  --  3.73   BILIRUBIN mg/dL 0.2 0.3  --  0.3   < >  --   --  0.2  --  0.2   ALK PHOS U/L 109 107  --  120*   < >  --   --  123*  --  133*   AST (SGOT) U/L 20 19  --  22   < >  --   --  23  --  34*   ALT (SGPT) U/L 27 29  --  33   < >  --   --  37*  --  35*    < > = values in this interval not displayed.   Estimated Creatinine Clearance: 149.6 mL/min (A) (by C-G  formula based on SCr of 0.56 mg/dL (L)).  No results found for: AMMONIA              Glucose   Date/Time Value Ref Range Status   02/06/2022 0635 153 (H) 70 - 130 mg/dL Final     Comment:     Meter: [id number] : [id number] summer amanda   02/06/2022 0011 136 (H) 70 - 130 mg/dL Final     Comment:     Meter: [id number] : [id number] summer amanda   02/05/2022 1622 125 70 - 130 mg/dL Final     Comment:     Meter: [id number] : [id number] Suresh Lindsey     Lab Results   Component Value Date    TSH 2.290 01/19/2022     No results found for: PREGTESTUR, PREGSERUM, HCG, HCGQUANT  Pain Management Panel     Pain Management Panel Latest Ref Rng & Units 1/19/2022    AMPHETAMINES SCREEN, URINE Negative Negative    BARBITURATES SCREEN Negative Negative    BENZODIAZEPINE SCREEN, URINE Negative Positive(A)    BUPRENORPHINEUR Negative Negative    COCAINE SCREEN, URINE Negative Negative    METHADONE SCREEN, URINE Negative Negative    METHAMPHETAMINEUR Negative Negative        Brief Urine Lab Results  (Last result in the past 365 days)      Color   Clarity   Blood   Leuk Est   Nitrite   Protein   CREAT   Urine HCG        01/19/22 2010 Dark Yellow   Clear   Negative   Trace   Negative   100 mg/dL (2+)               No results found for: BLOODCX  No results found for: URINECX    I have personally looked at the labs and they are summarized above.  ----------------------------------------------------------------------------------------------------------------------  Detailed radiology reports for the last 24 hours:  Imaging Results (Last 24 Hours)     ** No results found for the last 24 hours. **        Assessment & Plan    56F Smoker, Obese by BMI PMH Anxiety/Depression, Leukemia, Chronic Pain, COPD, Diabetes Mellitus Type II, Hypertension, Hyperlipidemia, Peripheral Vascular Disease, Obstructive Sleep Apnea, chronic relapsing and remitting bilateral lower extremity foot ulcers and follows in wound care clinic, presented  [facility] emergency room 1/19 for altered mental status.    #Septic Shock, Acute Metabolic Encephalopathy & Acute Hypoxic Respiratory Failure requiring Intubation and Mechanical Ventilation 2/2 PNA, Bacterial, treating for MDR Organisms in setting of underlying COPD without acute exacerbation and Obstructive Sleep Apnea - Acute Metabolic Encephalopathy Persisting   - Patient presents w/ abrupt onset altered mental status, WBC count > 12K, heart rate > 90, Pneumonia  on imaging, SBP < 90 requiring IV pressors, intubated for airway protection, etiology of altered mental status suspected infectious/sepsis though has had persisting encephalopathy while on ventilator, repeat head CT from 1/19 to 1/28 was stable, no acute intracranial abnormalities, EEG's without epileptiform activity only generalized slowing.  1/31 off sedation thought to have lateral gaze deficits, repeat CTA's and EEGs without definitive etiology  - Pulmonary consulted; Following at a distance now, extubated 2/1, not here over the weekend  - Infectious Disease consulted; Following  - TeleNeuro consulted; Following  - Lumbar puncture without growth, encephalitis panel negative, cytology with elevated glucose and protein, unable to perform Cellblock; At this time deferred for extubation and unable to perform at this time due to agitation, hopefully with improvement can repeat in a day or so  - Continue Vancomycin, Cefepime and Flagyl per Infectious Disease   - Continue APAP PRN for fevers, Duonebs as needed  - Monitor in CCU, monitor on telemetry, off oxygen, awaiting mentation improvement, hopefully MRI and lumbar puncture Monday-Tuesday if able    #Relapsing and Remitting Lower Extremity Wounds/Ulcers now with Osteomyelitis L great toe and bilateral cellulitis   - Patient reported 2 year history lower extremity wounds, follows in wound care clinic, cultures from initial biopsy grew MSSA  - Rheumatology panel negative with normal C and  P-ANCA, Anti-CCP, SSA/SSB, Atypical P-ANCA, Anti-Hu Ab, paraneoplastic panel pending but is send out test  - Bone scan concern for osteomyelitis  - General Surgery consulted; Followed, status post debridement 1/22, signed off, re-evaluated 1/30 and recommended no surgical intervention.  - Infectious Disease consulted; Following as per above, continue antibiotics as per above    #Hypertension/Hyperlipidemia/Peripheral Vascular Disease   #Atrial Fibrillation w/ RVR, New onset (CHADsVasc = 3)  #NSTEMI Type II due to Atrial Fibrillation   - Patient with noted Atrial Fibrillation with RVR in emergency room on EKG, recurrent heart rate up to 160's.  - Labs showed troponins <0.010 -> 0.036 -> <0.010, proBNP 4800  - EKG showed Atrial Fibrillation with RVR  - Echo showed mild concentric left ventricular hypertrophy, normal LV function.   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 A BCR ABL PCR was obtained which was <0.001% as well as a flow cytometry which was concerning for a monotypical CD5+ B cell population with nonspecific immunophenotype, but significant for a variant B cell SLL or mantle cell lymphoma that could not be ruled out. CLL FISH was then obtained which was negative and CCND1/IGH - t(11:14) was not detected, JAK2 V617 and JAK2 exon 12 mutation which were negative as well.  - Pt had follow up scheduled 6/2020 and no showed appointment.    - Peripheral smear without evidence of acute leukemia  -  Hematology/Oncology consulted; Evaluated and did not suspect current condition related to hematological condition, recommended follow up Dr. Menezes outpatient     #NIDDM Type II, controlled, unknown complications, New diagnosis  - Hgb A1c = 6.5%  - No home oral agents, continue FSBG and SSI, continue new Levemir 30U nightly, titrate as indicated     #Anxiety/Depression  - Psychiatry consulted in emergency room and felt altered mental status not related to underlying psychiatric illness; Continue home Seroquel and Ativan    #Tobacco Dependence  - Rec'd cessation, NRT as needed    #Obesity by BMI  - BMI 30, complicates all aspects of care     F: NPO  E: Monitor & Replace PRN  N: NPO Diet; Tube Feeds if can place NG  Ppx: on Hep gtt  Code: Full Code     Dispo: Pending workup and clinical improvement     *This patient is considered high risk due to sepsis, acute respiratory failure, PNA, intubation and mechanical ventilation, persisting encephalopathy, History monoclonal B cell lymphocytosis, NSTEMI, Atrial Fibrillation.   Edited by: Dick Whiting MD at 2/6/2022 [address] [place] [address]

## 2022-02-07 NOTE — PROGRESS NOTES
LOS: 16 days     Name: Lynette Stein  Age/Sex: 56 y.o. female  :  1965        PCP: Orville Wu PA  REF: No Known Provider    Active Problems:    Encephalopathy acute      Reason for follow-up: Atrial fibrillation and uncontrolled hypertension    Subjective     Subjective     Lynette Stein is a 56 y.o. female with a past medical history significant for hypertension, dyslipidemia, COPD, type 2 diabetes, anxiety/depression, leukemia, obstructive sleep apnea, and bilateral leg ulcerations.  She initially presented to the [facility] ED on 2022 with altered mental status.  Patient developed atrial fibrillation with RVR are in the ED    Interval History: Patient alert but does not respond to commands. Scheduled for MRI today. Currently sinus tachycardia. Remains hypertensive.     Vital Signs  Temp:  [98.1 °F (36.7 °C)-99.4 °F (37.4 °C)] 98.1 °F (36.7 °C)  Heart Rate:  [104-134] 122  Resp:  [-24] 20  BP: (140-198)/() 179/100     Vital Signs (last 72 hrs)       02/04 0700  02/05 0659 02/05 0700  02/06 0659 / 0700  / 0659 / 0700   0903   Most Recent      Temp (°F) 98.6 -  98.9    98.3 -  99.3    98.1 -  99.4       98.1 (36.7)  0640    Heart Rate 98 -  125    96 -  130    104 -  134    117 -  122     122 / 0740    Resp 14 -  28    15 -  26    17 -  24       20 / 0604    /70 -  203/104    145/69 -  197/121    140/78 -  198/100    179/100 -  182/97     179/100  0740    SpO2 (%) 95 -  100    91 -  98    93 -  97      96     96  0740        Documented weights    22 0029 22 0602 22 0602 22 0533   Weight: 96.6 kg (212 lb 15.4 oz) 97.2 kg (214 lb 4.6 oz) 97 kg (213 lb 13.5 oz) 97.8 kg (215 lb 9.8 oz)    22 0502 22 0700 22 0600 22 0600   Weight: 99.8 kg (220 lb 1.6 oz) 98.9 kg (218 lb) 101 kg (223 lb 12.3 oz) 98.6 kg (217 lb 6 oz)    22 0535 22 0602 22 0600 22 0400   Weight:  102 kg (224 lb 3.3 oz) 97.6 kg (215 lb 2.7 oz) 101 kg (223 lb 1.7 oz) 99.7 kg (219 lb 12.8 oz)    02/05/22 0600 02/06/22 0600 02/07/22 0600   Weight: 99 kg (218 lb 4.1 oz) 99 kg (218 lb 4.1 oz) 101 kg (222 lb 0.1 oz)      Body mass index is 30.1 kg/m².    Intake/Output Summary (Last 24 hours) at 2/7/2022 0903  Last data filed at 2/7/2022 0847  Gross per 24 hour   Intake 1280.26 ml   Output 1925 ml   Net -644.74 ml     Objective    Objective       Physical Exam:     General Appearance:   in no acute distress   Head:    Normocephalic, without obvious abnormality, atraumatic   Eyes:            Conjunctivae and sclerae normal, no   icterus, no pallor, corneas clear.   Neck:   No adenopathy, supple, trachea midline, no thyromegaly, no   carotid bruit, no JVD   Lungs:     Clear to auscultation,respirations regular, even and                  unlabored    Heart:    Regular rhythm and tachycardia, normal S1 and S2, no            murmur, no gallop, no rub, no click   Chest Wall:    No abnormalities observed   Abdomen:     Normal bowel sounds, no masses, no organomegaly, soft        non-tender, non-distended, no guarding, no rebound                tenderness   Extremities:   No movement of right upper and lower extremity, no edema, no cyanosis, no             redness   Pulses:   Pulses palpable and equal bilaterally   Skin:   No bleeding, bruising or rash       Neurologic:   Alert      Results review       Results Review:   Results from last 7 days   Lab Units 02/07/22  0121 02/06/22  0150 02/05/22  0046 02/04/22  0246 02/03/22  0025 02/02/22  0008 01/31/22  2326   WBC 10*3/mm3 14.32* 12.17* 11.63* 12.22* 14.09* 12.67* 12.23*   HEMOGLOBIN g/dL 11.8* 11.9* 11.9* 12.6 12.0 11.6* 11.1*   PLATELETS 10*3/mm3 485* 409 343 461* 472* 421 509*     Results from last 7 days   Lab Units 02/07/22  0121 02/06/22  1726 02/06/22  0150 02/05/22  0046 02/04/22  0246 02/03/22  0025 02/02/22  1118 02/02/22  0008 02/02/22  0008 01/31/22  2331  01/31/22  2331 01/31/22  2326 01/31/22 2326   SODIUM mmol/L 143  --  147* 146* 144 143  --   --  143  --  144   < > 143   POTASSIUM mmol/L 3.5 3.6 3.1* 3.1* 3.7 3.7 3.9   < > 3.4*   < > 4.0   < > 3.9   CHLORIDE mmol/L 113*  --  116* 114* 109* 110*  --   --  107  --  107   < > 106   CO2 mmol/L 17.2*  --  16.9* 17.8* 20.9* 20.2*  --   --  22.8  --  25.8   < > 26.3   BUN mg/dL 9  --  10 11 13 15  --   --  15  --  15   < > 15   CREATININE mg/dL 0.50*  --  0.56* 0.52* 0.56* 0.54*  --   --  0.61  --  0.58   < > 0.55*   CALCIUM mg/dL 9.5  --  9.7 9.9 10.4 10.2  --   --  10.1  --  10.3   < > 10.4   GLUCOSE mg/dL 153*  --  158* 159* 183* 261*  --   --  232*  --  259*   < > 255*   ALT (SGPT) U/L 24  --  27 29 33 31  --   --  40*  --   --   --  37*   AST (SGOT) U/L 14  --  20 19 22 23  --   --  31  --   --   --  23    < > = values in this interval not displayed.         Lab Results   Component Value Date    INR 1.18 (H) 01/22/2022    INR 1.07 01/19/2022    INR 0.89 12/01/2016     Lab Results   Component Value Date    MG 2.0 02/07/2022    MG 2.0 02/04/2022    MG 2.1 01/31/2022     Lab Results   Component Value Date    TSH 2.290 01/19/2022    TRIG 335 (H) 01/22/2022    HDL 32 (L) 01/22/2022    LDL 96 01/22/2022      Imaging Results (Last 48 Hours)     ** No results found for the last 48 hours. **        Echo   Results for orders placed during the hospital encounter of 01/22/22    Adult Transthoracic Echo Limited W/ Cont if Necessary Per Protocol    Interpretation Summary  · Left ventricular wall thickness is consistent with mild concentric hypertrophy.  · Left ventricular ejection fraction appears to be 61 - 65%. Left ventricular systolic function is normal.  · Left ventricular diastolic dysfunction is noted.  · Saline test results are negative.  · This is a technically limited study with poor echo windows and no carotid Doppler.     I reviewed the patient's new clinical results.    Telemetry: Sinus tachycardia 120's       Medication Review:   cefepime, 2 g, Intravenous, Q8H  cloNIDine, 1 patch, Transdermal, Weekly  hydrALAZINE, 10 mg, Oral, Q8H  insulin aspart, 0-14 Units, Subcutaneous, TID AC  insulin detemir, 30 Units, Subcutaneous, Daily  losartan, 100 mg, Oral, Daily  metoprolol tartrate, 100 mg, Nasogastric, Q12H  metroNIDAZOLE, 500 mg, Intravenous, Q8H  pantoprazole, 40 mg, Intravenous, Q AM  potassium chloride, 10 mEq, Intravenous, Q1H  pravastatin, 40 mg, Oral, Daily  pregabalin, 75 mg, Nasogastric, Q12H  QUEtiapine, 50 mg, Oral, Nightly  sodium chloride, 10 mL, Intravenous, Q12H  sodium chloride, 10 mL, Intravenous, Q12H  sodium chloride, 10 mL, Intravenous, Q12H  sodium chloride, 10 mL, Intravenous, Q12H  sodium chloride, 10 mL, Intravenous, Q12H  spironolactone, 25 mg, Oral, Daily  vancomycin, 1,500 mg, Intravenous, Q12H        dexmedetomidine, 0.2-1.5 mcg/kg/hr, Last Rate: Stopped (02/06/22 1436)  heparin (porcine), 10.2 Units/kg/hr, Last Rate: 24.6 Units/kg/hr (02/06/22 2127)  hold, 1 each  Pharmacy Consult,   Pharmacy to dose vancomycin,         Assessment      Assessment:  1. Paroxysmal atrial fibrillation, patient is maintaining sinus rhythm  2. 1 episode of 12 beat run of wide-complex tachycardia with no recurrence  3. Acute HFpEF, resolved  4. Poorly controlled hypertension  5. Possible acute CVA with right hemiparesis possible embolic  6. Acute respiratory failure with hypoxia, resolved  7.  Concern for possible tetanus syndrome        Plan     Recommendations:  1. Patient continues to have labile blood pressure and sinus tachycardia currently blood pressures controlled we will closely monitor his blood pressure as well as heart rate and adjust medications as required, less likely but is a possibility of possible tetanus as the patient was admitted with several wounds on her lower extremities, tetanus immunization history is unavailable she also has intermittent spasms this was discussed with   Marline  2. Continue with heparin for now as she is unable to take p.o. medications once this is possible can switch to p.o. medication    I discussed the patients findings and my recommendations with patient and family    Electronically signed by ALDEN Do, 02/07/22, 9:03 AM EST.  Electronically signed by Roney Pringle MD, 02/07/22, 11:54 AM EST.    Please note that portions of this note were completed with a voice recognition program.

## 2022-02-07 NOTE — PROGRESS NOTES
PROGRESS NOTE         Patient Identification:  Name:  Lynette Stein  Age:  56 y.o.  Sex:  female  :  1965  MRN:  [id number]  Visit Number:  [id number]  Primary Care Provider:  Orville Wu PA         LOS: 16 days       ----------------------------------------------------------------------------------------------------------------------  Subjective       Chief Complaints:    No chief complaint on file.        Interval History:      Patient continues on room air today.  Continues to reach with left arm.  Still does not follow commands or move right side of body.  WBC elevated at 14.32.  Afebrile.    Review of Systems:    Unable to obtain.  Altered mental status.  ----------------------------------------------------------------------------------------------------------------      Objective       Current Hospital Meds:  ceFAZolin, 2 g, Intravenous, Q8H  cloNIDine, 1 patch, Transdermal, Weekly  hydrALAZINE, 10 mg, Oral, Q8H  insulin aspart, 0-14 Units, Subcutaneous, TID AC  insulin detemir, 30 Units, Subcutaneous, Daily  levETIRAcetam, 500 mg, Intravenous, Q12H  losartan, 100 mg, Oral, Daily  metoprolol tartrate, 100 mg, Nasogastric, Q12H  metroNIDAZOLE, 500 mg, Intravenous, Q8H  pantoprazole, 40 mg, Intravenous, Q AM  pravastatin, 40 mg, Oral, Daily  pregabalin, 75 mg, Nasogastric, Q12H  QUEtiapine, 50 mg, Oral, Nightly  sodium chloride, 10 mL, Intravenous, Q12H  sodium chloride, 10 mL, Intravenous, Q12H  sodium chloride, 10 mL, Intravenous, Q12H  sodium chloride, 10 mL, Intravenous, Q12H  sodium chloride, 10 mL, Intravenous, Q12H  spironolactone, 25 mg, Oral, Daily  verapamil, 80 mg, Oral, Q8H      dexmedetomidine, 0.2-1.5 mcg/kg/hr, Last Rate: Stopped (22 1436)  heparin (porcine), 10.2 Units/kg/hr, Last Rate: 24.6 Units/kg/hr (22 2127)  hold, 1 each  niCARdipine, 5-15 mg/hr, Last Rate: 5 mg/hr (22 1210)  Pharmacy Consult,        ----------------------------------------------------------------------------------------------------------------------    Vital Signs:  Temp:  [98.1 °F (36.7 °C)-99.4 °F (37.4 °C)] 98.7 °F (37.1 °C)  Heart Rate:  [104-134] 128  Resp:  [17-24] 24  BP: (140-198)/() 158/68  Mean Arterial Pressure (Non-Invasive) for the past 24 hrs (Last 3 readings):   Noninvasive MAP (mmHg)   02/07/22 1215 95   02/07/22 1200 113   02/07/22 1008 107     SpO2 Percentage    02/07/22 1008 02/07/22 1200 02/07/22 1215   SpO2: 95% 94% 96%     SpO2:  [93 %-97 %] 96 %  on   ;   Device (Oxygen Therapy): room air    Body mass index is 30.1 kg/m².  Wt Readings from Last 3 Encounters:   02/07/22 101 kg (222 lb 0.1 oz)   01/19/22 106 kg (233 lb)   12/14/21 106 kg (233 lb 9.6 oz)        Intake/Output Summary (Last 24 hours) at 2/7/2022 1235  Last data filed at 2/7/2022 0847  Gross per 24 hour   Intake 1280.26 ml   Output 1925 ml   Net -644.74 ml     NPO Diet  ----------------------------------------------------------------------------------------------------------------------      Physical Exam:    Constitutional: Chronically ill-appearing.  Awake and agitated, but not following commands.  Drooling and experiencing oral automatisms.  HENT:  Head: Normocephalic and atraumatic.  Mouth:  Moist mucous membranes.    Eyes:  Pupils equal.  Left eye chemosis.   Neck:  Neck supple.  No JVD present.    Cardiovascular:  Normal rate, regular rhythm and normal heart sounds with no murmur. No edema.  Pulmonary/Chest: Coarse breath sounds bilaterally.  Abdominal:  Soft.  Bowel sounds are normal.  No distension and no tenderness.   Musculoskeletal:  No tenderness, and no deformity.  No swelling or redness of joints.  Mild bilateral lower extremity edema.  Neurological:  Awake and agitated.  Not following commands.  Skin:  Skin is warm and dry.  No rash noted.  No pallor.  Scattered bruises and scabs to bilateral upper extremities, trunk, bilateral lower  extremities and feet.  Bilateral feet ulcers in dressings today.  Psychiatric: Awake and agitated.  Not following commands.  ----------------------------------------------------------------------------------------------------------------------              Results from last 7 days   Lab Units 02/03/22  1112   PH, ARTERIAL pH units 7.480*   PO2 ART mm Hg 88.7   PCO2, ARTERIAL mm Hg 32.6*   HCO3 ART mmol/L 24.2     Results from last 7 days   Lab Units 02/07/22  0121 02/06/22  0150 02/05/22  0046 02/04/22  0246 02/03/22  1111   LACTATE mmol/L  --   --   --   --  0.9   WBC 10*3/mm3 14.32* 12.17* 11.63*   < >  --    HEMOGLOBIN g/dL 11.8* 11.9* 11.9*   < >  --    HEMATOCRIT % 39.7 40.2 40.5   < >  --    MCV fL 82.5 82.7 83.5   < >  --    MCHC g/dL 29.7* 29.6* 29.4*   < >  --    PLATELETS 10*3/mm3 485* 409 343   < >  --     < > = values in this interval not displayed.     Results from last 7 days   Lab Units 02/07/22  0121 02/06/22  1726 02/06/22  0150 02/05/22  0046 02/05/22  0046 02/04/22  1249 02/04/22  0246 02/02/22  0008 01/31/22  2331   SODIUM mmol/L 143  --  147*  --  146*  --    < >   < > 144   POTASSIUM mmol/L 3.5 3.6 3.1*   < > 3.1*  --    < >   < > 4.0   MAGNESIUM mg/dL 2.0  --   --   --   --  2.0  --   --  2.1   CHLORIDE mmol/L 113*  --  116*  --  114*  --    < >   < > 107   CO2 mmol/L 17.2*  --  16.9*  --  17.8*  --    < >   < > 25.8   BUN mg/dL 9  --  10  --  11  --    < >   < > 15   CREATININE mg/dL 0.50*  --  0.56*  --  0.52*  --    < >   < > 0.58   EGFR IF NONAFRICN AM mL/min/1.73 128  --  112  --  122  --    < >   < > 108   CALCIUM mg/dL 9.5  --  9.7  --  9.9  --    < >   < > 10.3   GLUCOSE mg/dL 153*  --  158*  --  159*  --    < >   < > 259*   ALBUMIN g/dL 3.51  --  3.36*  --  3.45*  --    < >   < >  --    BILIRUBIN mg/dL 0.2  --  0.2  --  0.3  --    < >   < >  --    ALK PHOS U/L 110  --  109  --  107  --    < >   < >  --    AST (SGOT) U/L 14  --  20  --  19  --    < >   < >  --    ALT (SGPT) U/L 24  --   27  --  29  --    < >   < >  --     < > = values in this interval not displayed.   Estimated Creatinine Clearance: 169.2 mL/min (A) (by C-G formula based on SCr of 0.5 mg/dL (L)).  No results found for: AMMONIA    Glucose   Date/Time Value Ref Range Status   02/07/2022 1213 150 (H) 70 - 130 mg/dL Final     Comment:     Meter: [id number] : [id number] GERI BRANSTUTTER   02/07/2022 0612 157 (H) 70 - 130 mg/dL Final     Comment:     Meter: [id number] : [id number] summer butain   02/07/2022 0022 132 (H) 70 - 130 mg/dL Final     Comment:     Meter: [id number] : [id number] summer butain   02/06/2022 1707 133 (H) 70 - 130 mg/dL Final     Comment:     Meter: [id number] : [id number] GERI BRANSTUTTER   02/06/2022 1103 156 (H) 70 - 130 mg/dL Final     Comment:     Meter: [id number] : [id number] GERI BRANSTUTTER   02/06/2022 0635 153 (H) 70 - 130 mg/dL Final     Comment:     Meter: [id number] : [id number] summer butain   02/06/2022 0011 136 (H) 70 - 130 mg/dL Final     Comment:     Meter: [id number] : [id number] summer butain   02/05/2022 1622 125 70 - 130 mg/dL Final     Comment:     Meter: [id number] : [id number] Suresh Lindsey     Lab Results   Component Value Date    HGBA1C 6.50 (H) 01/22/2022     Lab Results   Component Value Date    TSH 2.290 01/19/2022       Blood Culture   Date Value Ref Range Status   01/22/2022 No growth at 4 days  Preliminary   01/22/2022 No growth at 4 days  Preliminary     No results found for: URINECX  Wound Culture   Date Value Ref Range Status   01/24/2022 Rare Staphylococcus aureus (A)  Final   01/24/2022 Rare Normal Skin Melva  Final     No results found for: STOOLCX  Respiratory Culture   Date Value Ref Range Status   01/25/2022 Scant growth (1+) Candida albicans (A)  Final   01/25/2022 No Normal Respiratory Melva (A)  Final     Pain Management Panel       Pain Management Panel Latest Ref Rng & Units 1/19/2022    AMPHETAMINES SCREEN, URINE Negative  Negative    BARBITURATES SCREEN Negative Negative    BENZODIAZEPINE SCREEN, URINE Negative Positive(A)    BUPRENORPHINEUR Negative Negative    COCAINE SCREEN, URINE Negative Negative    METHADONE SCREEN, URINE Negative Negative    METHAMPHETAMINEUR Negative Negative              ----------------------------------------------------------------------------------------------------------------------  Imaging Results (Last 24 Hours)       Procedure Component Value Units Date/Time    MRI Brain With & Without Contrast [[id number]] Resulted: 02/07/22 1054     Updated: 02/07/22 1205            ----------------------------------------------------------------------------------------------------------------------    Assessment/Plan       Assessment/Plan     ASSESSMENT:    1.  Severe sepsis with acute respiratory failure requiring mechanical ventilation  2.  Pneumonia  3.  Left great toe osteomyelitis  4.  Enterocolitis    PLAN:    Patient continues on room air today.  Continues to reach with left arm.  Still does not follow commands or move right side of body.  WBC elevated at 14.32.  Afebrile.    CT of the head from 2/3/2022 reports no evidence of acute ischemic event or parenchymal hemorrhage.    Chest x-ray on 2/1/2022 shows mild bibasilar infiltrates.  Respiratory culture on 1/29/2022 finalized as no growth.    Bone scan on 1/28/2022 showed focal area of increased tracer uptake localizing to the left great toe.  If ulceration or infection in this location, osteomyelitis should be considered.  Periarticular type uptake throughout the right foot as detailed above in a pattern and distribution that is more favorable for reactive changes and neuropathic joint changes.  Soft tissue type uptake suggestive of cellulitis.      CT chest on 1/28/2022 showed new or worsening left basilar parenchymal opacity likely representing combination of atelectasis and/or infiltrate.  Continued right lower lobe atelectasis and likely infiltrate.   Patchy groundglass opacity left upper lobe and along the anterior aspect right upper lobe could represent very mild pneumonitis.  Increased tracheal and bronchial secretions may reflect underlying bronchitis.  This may be contributing factor of atelectasis.  Respiratory therapy may be of benefit.  Endotracheal tube in place.  Nasogastric tube appears in satisfactory position.  A second 2 passes alongside the nasogastric tube terminates in the distal esophagus.  Following discussion with patient's nurse it represents a temperature probe.  Increased colonic fluid with air-fluid levels.  Correlate for enteric colitis.  No focal inflammatory changes or obstruction.  CT abdomen pelvis on 1/28/2022 showed the same as CT chest.     COVID-19 and influenza PCR negative.  Lumbar puncture culture and meningitis encephalitis panel negative. Legionella negative.  Strep pneumo antigen negative.  MRSA PCR positive.  [place] spotted fever IgM equivocal at 1.06. Wound culture of the right foot from 1/24/2022 preliminary reports growth of Staph aureus.  Respiratory culture from 1/25/2022 reports growth of Candida albicans.  CT of the bilateral lower extremities reports no evidence of acute fracture, osteomyelitis, soft tissue gas or fluid collection. Wound culture of the right foot on 1/24/2022 finalized as MSSA. Respiratory culture on 1/25/2022 finalized as Candida albicans, likely a colonization. Blood cultures on 1/22/2022 finalized as no growth. Respiratory culture on 1/29/2022 is finalized as no growth.     As bone scan shows evidence of left great toe osteomyelitis, recommended surgical evaluation and intraoperative cultures from the left great toe to help direct antibiotic therapy. Surgeon has reevaluated the patient and does not recommend debridement or amputation at this time.      The setting of overall clinical and laboratory improvement vancomycin, cefepime, Flagyl were discontinued.  For now we will continue  cefazolin 2 g IV every 8 hours for treatment of MSSA foot osteomyelitis.  We will continue to follow closely and adjust antibiotic therapy as needed.     Code Status:   Code Status and Medical Interventions:   Ordered at: 01/22/22 0039     Level Of Support Discussed With:    Health Care Surrogate     Code Status (Patient has no pulse and is not breathing):    CPR (Attempt to Resuscitate)     Medical Interventions (Patient has pulse or is breathing):    Full Support         ALDEN Nails  02/07/22  12:35 EST

## 2022-02-07 NOTE — PLAN OF CARE
Problem: Adult Inpatient Plan of Care  Goal: Plan of Care Review  Outcome: Ongoing, Progressing  Flowsheets (Taken 2/7/2022 0537)  Plan of Care Reviewed With: patient  Goal: Patient-Specific Goal (Individualized)  Outcome: Ongoing, Progressing  Goal: Absence of Hospital-Acquired Illness or Injury  Outcome: Ongoing, Progressing  Intervention: Identify and Manage Fall Risk  Recent Flowsheet Documentation  Taken 2/7/2022 0500 by Erika Ritchie RN  Safety Promotion/Fall Prevention:   safety round/check completed   fall prevention program maintained  Taken 2/7/2022 0400 by Erika Ritchie RN  Safety Promotion/Fall Prevention:   safety round/check completed   fall prevention program maintained  Taken 2/7/2022 0300 by Erika Ritchie RN  Safety Promotion/Fall Prevention:   safety round/check completed   fall prevention program maintained  Taken 2/7/2022 0200 by Erika Ritchie RN  Safety Promotion/Fall Prevention:   safety round/check completed   fall prevention program maintained  Taken 2/7/2022 0100 by Erika Ritchie RN  Safety Promotion/Fall Prevention:   safety round/check completed   fall prevention program maintained  Taken 2/7/2022 0000 by Erika Ritchie RN  Safety Promotion/Fall Prevention:   safety round/check completed   fall prevention program maintained  Taken 2/6/2022 2300 by Erika Ritchie RN  Safety Promotion/Fall Prevention:   safety round/check completed   fall prevention program maintained  Taken 2/6/2022 2200 by Erika Ritchie RN  Safety Promotion/Fall Prevention:   safety round/check completed   fall prevention program maintained  Taken 2/6/2022 2100 by Erika Ritchie RN  Safety Promotion/Fall Prevention:   safety round/check completed   fall prevention program maintained  Taken 2/6/2022 2000 by Erika Ritchie RN  Safety Promotion/Fall Prevention:   safety round/check completed   fall prevention program maintained  Taken 2/6/2022 1900 by Erika Ritchie RN  Safety  Promotion/Fall Prevention:   safety round/check completed   fall prevention program maintained  Intervention: Prevent Skin Injury  Recent Flowsheet Documentation  Taken 2/7/2022 0500 by Erika Ritchie RN  Body Position: position maintained  Taken 2/7/2022 0400 by Erika Ritchie RN  Body Position: turned  Taken 2/7/2022 0300 by Erika Ritchie RN  Body Position: position maintained  Taken 2/7/2022 0200 by Erika Ritchie RN  Body Position: turned  Taken 2/7/2022 0100 by Erika Ritchie RN  Body Position: position maintained  Taken 2/7/2022 0000 by Erika Ritchie RN  Body Position: turned  Taken 2/6/2022 2300 by Erika Ritchie RN  Body Position: position maintained  Taken 2/6/2022 2200 by Erika Ritchie RN  Body Position: turned  Taken 2/6/2022 2100 by Erika Ritchie RN  Body Position: position maintained  Taken 2/6/2022 2000 by Erika Ritchie RN  Body Position: turned  Taken 2/6/2022 1935 by Erika Ritchie RN  Skin Protection:   adhesive use limited   tubing/devices free from skin contact   electrode sites changed   pulse oximeter probe site changed  Taken 2/6/2022 1900 by Erika Ritchie RN  Body Position: position maintained  Intervention: Prevent and Manage VTE (venous thromboembolism) Risk  Recent Flowsheet Documentation  Taken 2/6/2022 1935 by Erkia Ritchie RN  VTE Prevention/Management: (heparin drip) other (see comments)  Goal: Optimal Comfort and Wellbeing  Outcome: Ongoing, Progressing  Intervention: Provide Person-Centered Care  Recent Flowsheet Documentation  Taken 2/6/2022 1935 by Erika Ritchie RN  Trust Relationship/Rapport:   care explained   reassurance provided  Goal: Readiness for Transition of Care  Outcome: Ongoing, Progressing     Problem: Skin Injury Risk Increased  Goal: Skin Health and Integrity  Outcome: Ongoing, Progressing  Intervention: Optimize Skin Protection  Recent Flowsheet Documentation  Taken 2/6/2022 1935 by Erika Ritchie RN  Pressure  Reduction Techniques: weight shift assistance provided  Pressure Reduction Devices:   heel offloading device utilized   positioning supports utilized   pressure-redistributing mattress utilized  Skin Protection:   adhesive use limited   tubing/devices free from skin contact   electrode sites changed   pulse oximeter probe site changed     Problem: Diabetes Comorbidity  Goal: Blood Glucose Level Within Desired Range  Outcome: Ongoing, Progressing  Intervention: Maintain Glycemic Control  Recent Flowsheet Documentation  Taken 2/6/2022 1935 by Erika Ritchie RN  Glycemic Management: blood glucose monitoring     Problem: Hypertension Comorbidity  Goal: Blood Pressure in Desired Range  Outcome: Ongoing, Progressing     Problem: Airway Clearance Ineffective  Goal: Effective Airway Clearance  Outcome: Ongoing, Progressing  Intervention: Promote Airway Secretion Clearance  Recent Flowsheet Documentation  Taken 2/6/2022 1935 by Erika Ritchie RN  Cough And Deep Breathing: unable to perform     Problem: Fall Injury Risk  Goal: Absence of Fall and Fall-Related Injury  Outcome: Ongoing, Progressing  Intervention: Promote Injury-Free Environment  Recent Flowsheet Documentation  Taken 2/7/2022 0500 by Erika Ritchie RN  Safety Promotion/Fall Prevention:   safety round/check completed   fall prevention program maintained  Taken 2/7/2022 0400 by Erika Ritchie RN  Safety Promotion/Fall Prevention:   safety round/check completed   fall prevention program maintained  Taken 2/7/2022 0300 by Erika Ritchie RN  Safety Promotion/Fall Prevention:   safety round/check completed   fall prevention program maintained  Taken 2/7/2022 0200 by Erika Ritchie RN  Safety Promotion/Fall Prevention:   safety round/check completed   fall prevention program maintained  Taken 2/7/2022 0100 by Erika Ritchie RN  Safety Promotion/Fall Prevention:   safety round/check completed   fall prevention program maintained  Taken 2/7/2022 0000  by [place], Erika, RN  Safety Promotion/Fall Prevention:   safety round/check completed   fall prevention program maintained  Taken 2/6/2022 2300 by Erika Ritchie RN  Safety Promotion/Fall Prevention:   safety round/check completed   fall prevention program maintained  Taken 2/6/2022 2200 by Erika Ritchie RN  Safety Promotion/Fall Prevention:   safety round/check completed   fall prevention program maintained  Taken 2/6/2022 2100 by Erika Ritchie RN  Safety Promotion/Fall Prevention:   safety round/check completed   fall prevention program maintained  Taken 2/6/2022 2000 by Erika Ritchie RN  Safety Promotion/Fall Prevention:   safety round/check completed   fall prevention program maintained  Taken 2/6/2022 1900 by Erika Ritchie RN  Safety Promotion/Fall Prevention:   safety round/check completed   fall prevention program maintained     Problem: Infection  Goal: Infection Symptom Resolution  Outcome: Ongoing, Progressing  Intervention: Prevent or Manage Infection  Recent Flowsheet Documentation  Taken 2/6/2022 1935 by Erika Ritchie RN  Infection Management: aseptic technique maintained  Fever Reduction/Comfort Measures:   lightweight bedding   lightweight clothing   Goal Outcome Evaluation:  Plan of Care Reviewed With: patient

## 2022-02-07 NOTE — PROGRESS NOTES
[facility] HOSPITALIST PROGRESS NOTE     Patient Identification:  Name:  Lynette Stein  Age:  56 y.o.  Sex:  female  :  1965  MRN:  [id number]  Visit Number:  [id number]  ROOM: [address]     Primary Care Provider:  Orville Wu PA    Length of stay in inpatient status:  16    Subjective     Chief Compliant:  No chief complaint on file.      History of Presenting Illness:     supportive bedside. Notes patient less interactive than last week. He noted she would track with her eyes before. Now she is not interactive and is continuously moving left side. He was unsure when her last tetanus shot was but thinks it was a few years ago.     ROS:  Otherwise 10 point ROS negative other than documented above in HPI.     Objective     Current Hospital Meds:ceFAZolin, 2 g, Intravenous, Q8H  cloNIDine, 1 patch, Transdermal, Weekly  hydrALAZINE, 10 mg, Oral, Q8H  insulin aspart, 0-14 Units, Subcutaneous, TID AC  insulin detemir, 30 Units, Subcutaneous, Daily  levETIRAcetam, 1,000 mg, Intravenous, Once  levETIRAcetam, 500 mg, Intravenous, Q12H  losartan, 100 mg, Oral, Daily  metoprolol tartrate, 100 mg, Nasogastric, Q12H  metroNIDAZOLE, 500 mg, Intravenous, Q8H  pantoprazole, 40 mg, Intravenous, Q AM  pravastatin, 40 mg, Oral, Daily  pregabalin, 75 mg, Nasogastric, Q12H  QUEtiapine, 50 mg, Oral, Nightly  sodium chloride, 10 mL, Intravenous, Q12H  sodium chloride, 10 mL, Intravenous, Q12H  sodium chloride, 10 mL, Intravenous, Q12H  sodium chloride, 10 mL, Intravenous, Q12H  sodium chloride, 10 mL, Intravenous, Q12H  spironolactone, 25 mg, Oral, Daily  verapamil, 80 mg, Oral, Q8H    dexmedetomidine, 0.2-1.5 mcg/kg/hr, Last Rate: Stopped (22 1436)  heparin (porcine), 10.2 Units/kg/hr, Last Rate: 24.6 Units/kg/hr (227)  hold, 1 each  niCARdipine, 5-15 mg/hr, Last Rate: 5 mg/hr (22 1210)  Pharmacy Consult,         Current Antimicrobial Therapy:  Anti-Infectives (From admission,  "onward)    Ordered     Dose/Rate Route Frequency Start Stop    02/07/22 1143  metroNIDAZOLE (FLAGYL) 500 mg/100mL IVPB        Ordering Provider: Avery Horan MD    500 mg  over 60 Minutes Intravenous Every 8 Hours 02/07/22 1400 02/12/22 1359    02/07/22 0921  ceFAZolin Sodium-Dextrose (ANCEF) IVPB (duplex) 2 g        Ordering Provider: To Christopher MD    2 g Intravenous Every 8 Hours 02/07/22 1100 03/07/22 1059    02/02/22 1036  cefepime 2 gm IVPB in 100 ml NS (VTB)        Ordering Provider: To Christopher MD    2 g  over 30 Minutes Intravenous Once 02/02/22 1300 02/02/22 1231    01/29/22 1259  metroNIDAZOLE (FLAGYL) 500 mg/100mL IVPB        Ordering Provider: Mercedes Weber PA    500 mg  over 30 Minutes Intravenous Every 8 Hours 01/29/22 1500 02/05/22 1459    01/26/22 1039  Cefepime HCl (MAXIPIME) 1,778 mg in sodium chloride 0.9 % 100 mL IVPB        Ordering Provider: To Christopher MD   \"Followed by\" Linked Group Details    200 mL/hr over 30 Minutes Intravenous Once 01/26/22 1600 01/26/22 1605    01/26/22 1039  Cefepime HCl (MAXIPIME) 200 mg in sodium chloride 0.9 % 50 mL IVPB        Ordering Provider: To Christopher MD   \"Followed by\" Linked Group Details    100 mL/hr over 30 Minutes Intravenous Once 01/26/22 1400 01/26/22 1512    01/26/22 1039  Cefepime HCl (MAXIPIME) 20 mg in sodium chloride 0.9 % 50 mL IVPB        Ordering Provider: To Christopher MD   \"Followed by\" Linked Group Details    100 mL/hr over 30 Minutes Intravenous Once 01/26/22 1300 01/26/22 1400    01/26/22 1039  Cefepime HCl (MAXIPIME) 2 mg in sodium chloride 0.9 % 50 mL IVPB        Ordering Provider: To Christopher MD   \"Followed by\" Linked Group Details    100 mL/hr over 30 Minutes Intravenous Once 01/26/22 1200 01/26/22 1316    01/22/22 0039  aztreonam (AZACTAM) 2 g in sodium chloride 0.9 % 100 mL IVPB-VTB        Ordering Provider: Sebastian Baker MD    2 g  200 mL/hr over 30 Minutes " Intravenous Once 01/22/22 0130 01/22/22 0144        Current Diuretic Therapy:  Diuretics (From admission, onward)    Ordered     Dose/Rate Route Frequency Start Stop    01/30/22 1001  furosemide (LASIX) injection 40 mg        Ordering Provider: Ayana Sterling APRN    40 mg Intravenous Once 01/30/22 1100 01/30/22 1234    01/28/22 1141  spironolactone (ALDACTONE) tablet 25 mg        Ordering Provider: Elkin Collins MD    25 mg Oral Daily 01/28/22 1300      01/28/22 1141  furosemide (LASIX) injection 40 mg        Ordering Provider: Elkin Collins MD    40 mg Intravenous Every 12 Hours 01/28/22 1230 01/29/22 0018    01/27/22 1037  furosemide (LASIX) injection 40 mg        Ordering Provider: Elkin Collins MD    40 mg Intravenous Every 12 Hours 01/27/22 1130 01/27/22 2357    01/25/22 1552  furosemide (LASIX) injection 60 mg        Ordering Provider: Elkin Collins MD    60 mg Intravenous Every 12 Hours 01/25/22 1645 01/26/22 1644    01/25/22 1604  furosemide (LASIX) 10 MG/ML injection  - ADS Override Pull        Note to Pharmacy: Created by cabinet override   Ordering Provider: Kavitha Capone RN       01/25/22 1604 01/25/22 1609        ----------------------------------------------------------------------------------------------------------------------  Vital Signs:  Temp:  [98.1 °F (36.7 °C)-99.4 °F (37.4 °C)] 98.7 °F (37.1 °C)  Heart Rate:  [104-134] 128  Resp:  [17-24] 24  BP: (140-198)/() 158/68  SpO2:  [93 %-97 %] 96 %  on   ;   Device (Oxygen Therapy): room air  Body mass index is 30.1 kg/m².    Wt Readings from Last 3 Encounters:   02/07/22 101 kg (222 lb 0.1 oz)   01/19/22 106 kg (233 lb)   12/14/21 106 kg (233 lb 9.6 oz)     Intake & Output (last 3 days)       02/04 0701 02/05 0700 02/05 0701  02/06 0700 02/06 0701  02/07 0700 02/07 0701  02/08 0700    I.V. (mL/kg) 784 (7.9) 1416 (14.3) 450.3 (4.5)     Other  30 30     IV Piggyback 1400 100 700 100    Total Intake(mL/kg) 2184 (22.1) 1546  (15.6) 1180.3 (11.7) 100 (1)    Urine (mL/kg/hr) 1575 (0.7) 1750 (0.7) 1925 (0.8)     Stool 0 0 0     Total Output 1575 1750 1925     Net +609 -204 -744.7 +100            Stool Unmeasured Occurrence 0 x  1 x         NPO Diet  ----------------------------------------------------------------------------------------------------------------------  Physical exam:  Constitutional:  Well-developed and well-nourished.  No respiratory distress.      HENT:  Head:  Normocephalic and atraumatic.  Mouth:  Moist mucous membranes.    Eyes:  Conjunctivae and EOM are normal. No scleral icterus.    Neck:  Neck supple.  No JVD present.    Cardiovascular:  Normal rate, regular rhythm and normal heart sounds with no murmur.  Pulmonary/Chest:  No respiratory distress, no wheezes, no crackles, with normal breath sounds and good air movement.  Abdominal:  Soft.  Bowel sounds are normal.  No distension and no tenderness.   Musculoskeletal:  No edema, no tenderness, and no deformity.  No red or swollen joints anywhere.    Neurological:  Not oriented or interactive. Eyes open. Does not track to movements or sounds.   Skin:  Skin is warm and dry. No rash noted. No pallor.   Peripheral vascular:  Pulses in all 4 extremities with no clubbing, no cyanosis, no edema.  ----------------------------------------------------------------------------------------------------------------------  Tele:    ----------------------------------------------------------------------------------------------------------------------  Results from last 7 days   Lab Units 02/07/22  0121 02/06/22  0150 02/05/22  0046 02/04/22  0246 02/03/22  1111   LACTATE mmol/L  --   --   --   --  0.9   WBC 10*3/mm3 14.32* 12.17* 11.63*   < >  --    HEMOGLOBIN g/dL 11.8* 11.9* 11.9*   < >  --    HEMATOCRIT % 39.7 40.2 40.5   < >  --    MCV fL 82.5 82.7 83.5   < >  --    MCHC g/dL 29.7* 29.6* 29.4*   < >  --    PLATELETS 10*3/mm3 485* 409 343   < >  --     < > = values in this interval  not displayed.     Results from last 7 days   Lab Units 02/03/22  1112   PH, ARTERIAL pH units 7.480*   PO2 ART mm Hg 88.7   PCO2, ARTERIAL mm Hg 32.6*   HCO3 ART mmol/L 24.2     Results from last 7 days   Lab Units 02/07/22  0121 02/06/22  1726 02/06/22  0150 02/05/22  0046 02/05/22  0046 02/04/22  1249 02/04/22  0246 02/02/22  0008 01/31/22  2331   SODIUM mmol/L 143  --  147*  --  146*  --    < >   < > 144   POTASSIUM mmol/L 3.5 3.6 3.1*   < > 3.1*  --    < >   < > 4.0   MAGNESIUM mg/dL 2.0  --   --   --   --  2.0  --   --  2.1   CHLORIDE mmol/L 113*  --  116*  --  114*  --    < >   < > 107   CO2 mmol/L 17.2*  --  16.9*  --  17.8*  --    < >   < > 25.8   BUN mg/dL 9  --  10  --  11  --    < >   < > 15   CREATININE mg/dL 0.50*  --  0.56*  --  0.52*  --    < >   < > 0.58   EGFR IF NONAFRICN AM mL/min/1.73 128  --  112  --  122  --    < >   < > 108   CALCIUM mg/dL 9.5  --  9.7  --  9.9  --    < >   < > 10.3   PHOSPHORUS mg/dL 4.1  --   --   --   --   --   --   --  4.6*   GLUCOSE mg/dL 153*  --  158*  --  159*  --    < >   < > 259*   ALBUMIN g/dL 3.51  --  3.36*  --  3.45*  --    < >   < >  --    BILIRUBIN mg/dL 0.2  --  0.2  --  0.3  --    < >   < >  --    ALK PHOS U/L 110  --  109  --  107  --    < >   < >  --    AST (SGOT) U/L 14  --  20  --  19  --    < >   < >  --    ALT (SGPT) U/L 24  --  27  --  29  --    < >   < >  --     < > = values in this interval not displayed.   Estimated Creatinine Clearance: 169.2 mL/min (A) (by C-G formula based on SCr of 0.5 mg/dL (L)).  No results found for: AMMONIA              Glucose   Date/Time Value Ref Range Status   02/07/2022 1213 150 (H) 70 - 130 mg/dL Final     Comment:     Meter: [id number] : [id number] GERI HOOVER   02/07/2022 0612 157 (H) 70 - 130 mg/dL Final     Comment:     Meter: [id number] : [id number] summer amanda   02/07/2022 0022 132 (H) 70 - 130 mg/dL Final     Comment:     Meter: [id number] : [id number] summer amanda   02/06/2022 1707 133  (H) 70 - 130 mg/dL Final     Comment:     Meter: [id number] : [id number] GERI HAYUTTER   02/06/2022 1103 156 (H) 70 - 130 mg/dL Final     Comment:     Meter: [id number] : [id number] GERI HAYUTTER   02/06/2022 0635 153 (H) 70 - 130 mg/dL Final     Comment:     Meter: [id number] : [id number] summer butain   02/06/2022 0011 136 (H) 70 - 130 mg/dL Final     Comment:     Meter: [id number] : [id number] summer butain   02/05/2022 1622 125 70 - 130 mg/dL Final     Comment:     Meter: [id number] : [id number] Suresh Rosalia     Lab Results   Component Value Date    TSH 2.290 01/19/2022     No results found for: PREGTESTUR, PREGSERUM, HCG, HCGQUANT  Pain Management Panel     Pain Management Panel Latest Ref Rng & Units 1/19/2022    AMPHETAMINES SCREEN, URINE Negative Negative    BARBITURATES SCREEN Negative Negative    BENZODIAZEPINE SCREEN, URINE Negative Positive(A)    BUPRENORPHINEUR Negative Negative    COCAINE SCREEN, URINE Negative Negative    METHADONE SCREEN, URINE Negative Negative    METHAMPHETAMINEUR Negative Negative        Brief Urine Lab Results  (Last result in the past 365 days)      Color   Clarity   Blood   Leuk Est   Nitrite   Protein   CREAT   Urine HCG        01/19/22 2010 Dark Yellow   Clear   Negative   Trace   Negative   100 mg/dL (2+)               No results found for: BLOODCX  No results found for: URINECX  No results found for: WOUNDCX  No results found for: STOOLCX  No results found for: RESPCX  No results found for: AFBCX  Results from last 7 days   Lab Units 02/03/22  1111   LACTATE mmol/L 0.9       I have personally looked at the labs and they are summarized above.  ----------------------------------------------------------------------------------------------------------------------  Detailed radiology reports for the last 24 hours:    Imaging Results (Last 24 Hours)     Procedure Component Value Units Date/Time    MRI Brain With & Without Contrast [[id number]]  Resulted: 02/07/22 1054     Updated: 02/07/22 1205        Assessment & Plan    #Septic Shock, Acute Metabolic Encephalopathy & Acute Hypoxic Respiratory Failure requiring Intubation and Mechanical Ventilation 2/2 PNA, Bacterial, treating for MDR Organisms in setting of underlying COPD without acute exacerbation and Obstructive Sleep Apnea - Acute Metabolic Encephalopathy Persisting   - Patient presents w/ abrupt onset altered mental status, WBC count > 12K, heart rate > 90, Pneumonia  on imaging, SBP < 90 requiring IV pressors, intubated for airway protection, etiology of altered mental status suspected infectious/sepsis though has had persisting encephalopathy while on ventilator, repeat head CT from 1/19 to 1/28 was stable, no acute intracranial abnormalities, EEG's without epileptiform activity only generalized slowing.  1/31 off sedation thought to have lateral gaze deficits, repeat CTA's and EEGs without definitive etiology  - LP not consistent with infection. PCR negative. THEO negative. CSF NMDA negative.   - No obvious drug overdose causing toxic encephalopathy. Gabapentin level on admission not detected.   - Pulmonary consulted; Following at a distance now, extubated 2/1  - Infectious Disease consulted; Following  - TeleNeuro consulted; Following. Recommended to empirically start keppra.   - Highest on differential currently is PRES, paraneoplastic process, myelitis of some kind, stroke not seen on CT or even tetanus.   - Unclear when last vaccine was and significant SE wounds. Has not been treated with consistent flagyl. Will restart.   - Will start cardene gtt for closer BP control. Goal -140.   - Lumbar puncture without growth, encephalitis panel negative, cytology with elevated glucose and protein, unable to perform Cellblock; hopefully with improvement can repeat in a day or so pending MRI and improvement with BP control.   - Continue Vancomycin, Cefepime and Flagyl per Infectious Disease   -  Continue APAP PRN for fevers, Duonebs as needed  - Monitor in CCU, monitor on telemetry, off oxygen, awaiting mentation improvement,       #Relapsing and Remitting Lower Extremity Wounds/Ulcers now with Osteomyelitis L great toe and bilateral cellulitis   - Patient reported 2 year history lower extremity wounds, follows in wound care clinic, cultures from initial biopsy grew MSSA  - Rheumatology panel negative with normal C and P-ANCA, Anti-CCP, SSA/SSB, Atypical P-ANCA, Anti-Hu Ab, paraneoplastic panel pending but is send out test  - Bone scan concern for osteomyelitis  - General Surgery consulted; Followed, status post debridement 1/22, signed off, re-evaluated 1/30 and recommended no surgical intervention.  - Infectious Disease consulted; Following as per above, continue antibiotics as per above     #Hypertension/Hyperlipidemia/Peripheral Vascular Disease   #Atrial Fibrillation w/ RVR, New onset (CHADsVasc = 3)  #NSTEMI Type II due to Atrial Fibrillation   - Patient with noted Atrial Fibrillation with RVR in emergency room on EKG, recurrent heart rate up to 160's.  - Labs showed troponins <0.010 -> 0.036 -> <0.010, proBNP 4800  - EKG showed Atrial Fibrillation with RVR  - Echo showed mild concentric left ventricular hypertrophy, normal LV function.   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 A BCR ABL PCR was obtained which was <0.001% as well as a flow cytometry which was concerning for a  monotypical CD5+ B cell population with nonspecific immunophenotype, but significant for a variant B cell SLL or mantle cell lymphoma that could not be ruled out. CLL FISH was then obtained which was negative and CCND1/IGH - t(11:14) was not detected, JAK2 V617 and JAK2 exon 12 mutation which were negative as well.  - Pt had follow up scheduled 6/2020 and no showed appointment.    - Peripheral smear without evidence of acute leukemia  - Hematology/Oncology consulted; Evaluated and did not suspect current condition related to hematological condition, recommended follow up Dr. Menezes outpatient      #NIDDM Type II, controlled, unknown complications, New diagnosis  - Hgb A1c = 6.5%  - No home oral agents, continue FSBG and SSI, continue new Levemir 30U nightly, titrate as indicated      #Anxiety/Depression  - Psychiatry consulted in emergency room and felt altered mental status not related to underlying psychiatric illness; Continue home Seroquel and Ativan     #Tobacco Dependence  - Rec'd cessation, NRT as needed     #Obesity by BMI  - BMI 30, complicates all aspects of care     F: NPO  E: Monitor & Replace PRN  N: NPO Diet; Tube Feeds if can place NG  Ppx: on Hep gtt  Code: Full Code     Dispo: Pending workup and clinical improvement     *This patient is considered high risk due to sepsis, acute respiratory failure, PNA, intubation and mechanical ventilation, persisting encephalopathy, History monoclonal B cell lymphocytosis, NSTEMI, Atrial Fibrillation.            VTE Prophylaxis:   Mechanical Order History:     None      Pharmalogical Order History:      Ordered     Dose Route Frequency Stop    01/22/22 1507  heparin (porcine) 5000 UNIT/ML injection 5,000 Units         5,000 Units IV Once 01/22/22 1713    01/22/22 1507  heparin 09406 units/250 mL (100 units/mL) in 0.45 % NaCl infusion  9.91 mL/hr         10.2 Units/kg/hr IV Titrated --    01/22/22 1507  heparin (porcine) 5000 UNIT/ML injection 5,000 Units          5,000 Units IV As Needed --    01/22/22 1507  heparin (porcine) 5000 UNIT/ML injection 2,500 Units         2,500 Units IV As Needed --    01/22/22 0039  heparin (porcine) 5000 UNIT/ML injection 5,000 Units  Status:  Discontinued         5,000 Units SC Every 12 Hours Scheduled 01/22/22 1507                Avery Horan MD  [facility]  02/07/22  12:21 EST

## 2022-02-07 NOTE — PROGRESS NOTES
Stroke Progress Note       Chief Complaint: Altered mental status, acute encephalopathy    Subjective    Subjective     Subjective:  No acute events overnight, patient is ill but stable.  Patient appears to have less movements today, hopefully will be able to obtain MRI brain.  When awake she does begin moving left arm again.  No movement noted in any other extremities.  Patient does track me on left side of room.    Review of Systems   Unable to perform ROS: Patient nonverbal            Objective    Objective      Temp:  [98.1 °F (36.7 °C)-99.4 °F (37.4 °C)] 98.4 °F (36.9 °C)  Heart Rate:  [104-134] 133  Resp:  [17-24] 20  BP: (140-198)/() 157/81        Neurological Exam  Mental Status  Arousable to verbal stimuli. Patient is nonverbal.  Patient is alert, makes eye contact, regards me only on left side of the room..    Cranial Nerves  CN II: Patient blinks to visual threat on the left, no response to visual threat on the right.  CN III, IV, VI: Pupils equal round and reactive to light bilaterally. Patient has left gaze preference, appears to regard me on her left side, does not cross midline to the right.  CN V:  Right: Normal corneal reflex.  Left: Normal corneal reflex.  CN VII: No obvious facial droop.    Motor  Normal muscle bulk throughout. Decreased muscle tone. No abnormal involuntary movements.  Patient occasionally moves left arm when awake, she pushes against me when I raise her left arm.  She does not squeeze my hand on command.  Patient appears to keep her neck extended.    Sensory  Patient grimaces to nailbed pressure bilaterally, she withdraws left hand in response to tactile stimulation.    Reflexes                                           Right                      Left  Patellar                                2+                         1+      Physical Exam  Vitals and nursing note reviewed.   Constitutional:       Appearance: She is obese. She is ill-appearing.   HENT:      Head:  Normocephalic and atraumatic.      Mouth/Throat:      Mouth: Mucous membranes are dry.      Pharynx: Oropharynx is clear.   Eyes:      Pupils: Pupils are equal, round, and reactive to light.   Cardiovascular:      Rate and Rhythm: Regular rhythm. Tachycardia present.   Pulmonary:      Effort: Pulmonary effort is normal. No respiratory distress.   Skin:     General: Skin is warm and dry.   Neurological:      Deep Tendon Reflexes:      Reflex Scores:       Patellar reflexes are 2+ on the right side and 1+ on the left side.        Hospital Meds:  Scheduled- ceFAZolin, 2 g, Intravenous, Q8H  cloNIDine, 1 patch, Transdermal, Weekly  hydrALAZINE, 10 mg, Oral, Q8H  insulin aspart, 0-14 Units, Subcutaneous, TID AC  insulin detemir, 30 Units, Subcutaneous, Daily  levETIRAcetam, 1,000 mg, Intravenous, Once  levETIRAcetam, 500 mg, Intravenous, Q12H  losartan, 100 mg, Oral, Daily  metoprolol tartrate, 100 mg, Nasogastric, Q12H  pantoprazole, 40 mg, Intravenous, Q AM  potassium chloride, 10 mEq, Intravenous, Q1H  pravastatin, 40 mg, Oral, Daily  pregabalin, 75 mg, Nasogastric, Q12H  QUEtiapine, 50 mg, Oral, Nightly  sodium chloride, 10 mL, Intravenous, Q12H  sodium chloride, 10 mL, Intravenous, Q12H  sodium chloride, 10 mL, Intravenous, Q12H  sodium chloride, 10 mL, Intravenous, Q12H  sodium chloride, 10 mL, Intravenous, Q12H  spironolactone, 25 mg, Oral, Daily  verapamil, 80 mg, Oral, Q8H      Infusions- dexmedetomidine, 0.2-1.5 mcg/kg/hr, Last Rate: Stopped (02/06/22 1436)  heparin (porcine), 10.2 Units/kg/hr, Last Rate: 24.6 Units/kg/hr (02/06/22 2127)  hold, 1 each  niCARdipine, 5-15 mg/hr  Pharmacy Consult,        PRNs- •  acetaminophen  •  artificial tears  •  dextrose  •  dextrose  •  glucagon (human recombinant)  •  heparin (porcine)  •  heparin (porcine)  •  hold  •  hydrALAZINE  •  LORazepam  •  magnesium sulfate **OR** magnesium sulfate **OR** magnesium sulfate  •  metoprolol tartrate  •  Pharmacy Consult  •   potassium chloride **OR** potassium chloride **OR** [DISCONTINUED] potassium chloride  •  potassium chloride  •  sodium chloride  •  sodium chloride  •  sodium chloride  •  sodium chloride    Results Review:    I reviewed the patient's new clinical results.    Transthoracic Echo:    Interpretation Summary    · Left ventricular wall thickness is consistent with mild concentric hypertrophy.  · Left ventricular ejection fraction appears to be 61 - 65%. Left ventricular systolic function is normal.  · Left ventricular diastolic dysfunction is noted.  · Saline test results are negative.  · This is a technically limited study with poor echo windows and no carotid Doppler.              Assessment/Plan     Assessment/Plan:      1. Stroke-like symptoms, left gaze preference and right-sided hemiplegia noted when sedation was weaned,based, left hemisphere embolic stroke vs. PRES.  Initial CT of head was highly degraded by motion, however patient was then sedated and repeat head CT did not show any acute abnormalities.  Unable to obtain MRI due to restlessness. Patient currently being treated for pneumonia and septic shock, however this is out of proportion to her encephalopathy.  Family repeatedly denies any recreational drug use, urine toxicology was unremarkable.   Patient found to be in proximal A. fib while in ED,  was on heparin drip which was paused pending LP, and has now been resumed, cardiology is following.  Initial and repeat EEG showed moderate diffuse cerebral dysfunction, no epilepsy.  Lumbar puncture on 1/20/2022 ruled out meningitis/encephalitis. Repeat LP has been considered however patient unlikely to tolerate at present.  Repeat TTE was okay, results above.  2. Proximal atrial fibrillation  3. Obesity, BMI 30.10  4. Diabetes mellitus type 2  5. Mixed hyperlipidemia  6. Essential hypertension  7. Osteomyelitis  -MRI brain today  -Heparin drip restarted  -Repeat CT head without on 2/3/2022 was stable, no  evidence of ischemic event, scan degraded by motion  -Functional prognosis unfortunately remains guarded  -Keep blood pressure less than 140 systolic  -Keppra 1000 mg IV once as a loading dose, then Keppra 500 mg IV twice daily  -Verapamil 80 mg IV 3 times daily      The case was discussed with the hospitalist team.  Neurology will continue to follow.      Jayce Avelar, ALDEN  02/07/22  10:44 EST    This was an audio/video enabled encounter.  Dr. Schroeder present during encounter via telemedicine.

## 2022-02-07 NOTE — CASE MANAGEMENT/SOCIAL WORK
Discharge Planning Assessment   [facility]     Patient Name: Lynette Stein  MRN: [id number]  Today's Date: 2/7/2022    Admit Date: 1/22/2022     Discharge Plan     Row Name 02/07/22 1737       Plan    Plan Pt admitted 1/22/22. Pt remains on room air. Pt is from home and does not utilize home health services. [facility] utilized for home oxygen. Pt will likely need rehab services prior to returning home, pt still unable to move right side and has not been interactive. Discharge plan and needs pending improvement and PT/OT evaluations when pt is medically stable. SS to continue to follow and assist.              BUSTER Bradford

## 2022-02-08 NOTE — PROGRESS NOTES
Antimicrobial Length of Therapy:    Day 2 of 28 cefazolin (day 12 including initial vancomycin)  Day 4 of 7 metronidazole    Thank you.  Pricilla Kline, Pharm.D.  2/8/2022  13:03 EST

## 2022-02-08 NOTE — PROGRESS NOTES
PROGRESS NOTE         Patient Identification:  Name:  Lynette Stein  Age:  56 y.o.  Sex:  female  :  1965  MRN:  [id number]  Visit Number:  [id number]  Primary Care Provider:  Orville Wu PA         LOS: 17 days       ----------------------------------------------------------------------------------------------------------------------  Subjective       Chief Complaints:    No chief complaint on file.        Interval History:      Patient resting in bed this morning.  Continues to move left arm.  Low-grade fever 100.7 this morning.  On room air with no apparent distress.  WBC normal.  Chest x-ray from 2022 unremarkable.  MRI of the brain from 2022 reports areas of enhancement involving cortical subcortical and periventricular white matter consistent with subacute infarction, no restricted diffusion to indicate acute infarct, chronic small vessel ischemic disease, mild bilateral mastoid effusions.    Review of Systems:    Unable to obtain.  Altered mental status.  ----------------------------------------------------------------------------------------------------------------      Objective       Current Hospital Meds:  ceFAZolin, 2 g, Intravenous, Q8H  cloNIDine, 1 patch, Transdermal, Weekly  hydrALAZINE, 10 mg, Oral, Q8H  insulin aspart, 0-14 Units, Subcutaneous, TID AC  insulin detemir, 30 Units, Subcutaneous, Daily  levETIRAcetam, 500 mg, Intravenous, Q12H  losartan, 100 mg, Oral, Daily  metoprolol tartrate, 100 mg, Nasogastric, Q12H  metroNIDAZOLE, 500 mg, Intravenous, Q8H  pantoprazole, 40 mg, Intravenous, Q AM  pravastatin, 40 mg, Oral, Daily  pregabalin, 75 mg, Nasogastric, Q12H  QUEtiapine, 50 mg, Oral, Nightly  sodium chloride, 10 mL, Intravenous, Q12H  sodium chloride, 10 mL, Intravenous, Q12H  sodium chloride, 10 mL, Intravenous, Q12H  sodium chloride, 10 mL, Intravenous, Q12H  sodium chloride, 10 mL, Intravenous, Q12H  spironolactone, 25 mg, Oral, Daily  verapamil, 80 mg,  Oral, Q8H      dexmedetomidine, 0.2-1.5 mcg/kg/hr, Last Rate: Stopped (02/06/22 1436)  heparin (porcine), 10.2 Units/kg/hr, Last Rate: 24.6 Units/kg/hr (02/08/22 0357)  hold, 1 each  niCARdipine, 5-15 mg/hr, Last Rate: 12.5 mg/hr (02/08/22 1150)  Pharmacy Consult,       ----------------------------------------------------------------------------------------------------------------------    Vital Signs:  Temp:  [98.1 °F (36.7 °C)-100.7 °F (38.2 °C)] 99.1 °F (37.3 °C)  Heart Rate:  [] 93  Resp:  [20-26] 22  BP: ()/(50-83) 115/50  Mean Arterial Pressure (Non-Invasive) for the past 24 hrs (Last 3 readings):   Noninvasive MAP (mmHg)   02/08/22 1215 76   02/08/22 1203 74   02/08/22 1148 74     SpO2 Percentage    02/08/22 1148 02/08/22 1203 02/08/22 1215   SpO2: 94% 94% 95%     SpO2:  [90 %-97 %] 95 %  on   ;   Device (Oxygen Therapy): room air    Body mass index is 30.19 kg/m².  Wt Readings from Last 3 Encounters:   02/08/22 101 kg (222 lb 10.6 oz)   01/19/22 106 kg (233 lb)   12/14/21 106 kg (233 lb 9.6 oz)        Intake/Output Summary (Last 24 hours) at 2/8/2022 1252  Last data filed at 2/8/2022 1156  Gross per 24 hour   Intake 1739.84 ml   Output 2975 ml   Net -1235.16 ml     NPO Diet  ----------------------------------------------------------------------------------------------------------------------      Physical Exam:    Constitutional: Chronically ill-appearing.  Awake and agitated, but not following commands.  Drooling and experiencing oral automatisms.  HENT:  Head: Normocephalic and atraumatic.  Mouth:  Moist mucous membranes.    Eyes:  Pupils equal.  Left eye chemosis.   Neck:  Neck supple.  No JVD present.    Cardiovascular:  Normal rate, regular rhythm and normal heart sounds with no murmur. No edema.  Pulmonary/Chest: Coarse breath sounds bilaterally.  Abdominal:  Soft.  Bowel sounds are normal.  No distension and no tenderness.   Musculoskeletal:  No tenderness, and no deformity.  No swelling  or redness of joints.  Mild bilateral lower extremity edema.  Neurological:  Awake and agitated.  Not following commands.  Skin:  Skin is warm and dry.  No rash noted.  No pallor.  Scattered bruises and scabs to bilateral upper extremities, trunk, bilateral lower extremities and feet.  Bilateral feet ulcers in dressings today.  Psychiatric: Awake and agitated.  Not following commands.  ----------------------------------------------------------------------------------------------------------------------              Results from last 7 days   Lab Units 02/03/22  1112   PH, ARTERIAL pH units 7.480*   PO2 ART mm Hg 88.7   PCO2, ARTERIAL mm Hg 32.6*   HCO3 ART mmol/L 24.2     Results from last 7 days   Lab Units 02/08/22 0229 02/07/22  0121 02/06/22  0150 02/04/22  0246 02/03/22  1111   LACTATE mmol/L  --   --   --   --  0.9   WBC 10*3/mm3 9.10 14.32* 12.17*   < >  --    HEMOGLOBIN g/dL 11.6* 11.8* 11.9*   < >  --    HEMATOCRIT % 39.0 39.7 40.2   < >  --    MCV fL 82.1 82.5 82.7   < >  --    MCHC g/dL 29.7* 29.7* 29.6*   < >  --    PLATELETS 10*3/mm3 401 485* 409   < >  --     < > = values in this interval not displayed.     Results from last 7 days   Lab Units 02/08/22 0229 02/07/22  1719 02/07/22 0121 02/06/22  1726 02/06/22  0150 02/05/22  0046 02/04/22  1249   SODIUM mmol/L 145  --  143  --  147*   < >  --    POTASSIUM mmol/L 3.4* 3.6 3.5   < > 3.1*   < >  --    MAGNESIUM mg/dL  --   --  2.0  --   --   --  2.0   CHLORIDE mmol/L 113*  --  113*  --  116*   < >  --    CO2 mmol/L 18.2*  --  17.2*  --  16.9*   < >  --    BUN mg/dL 8  --  9  --  10   < >  --    CREATININE mg/dL 0.50*  --  0.50*  --  0.56*   < >  --    EGFR IF NONAFRICN AM mL/min/1.73 128  --  128  --  112   < >  --    CALCIUM mg/dL 9.3  --  9.5  --  9.7   < >  --    GLUCOSE mg/dL 167*  --  153*  --  158*   < >  --    ALBUMIN g/dL 3.52  --  3.51  --  3.36*   < >  --    BILIRUBIN mg/dL 0.3  --  0.2  --  0.2   < >  --    ALK PHOS U/L 104  --  110  --  109    < >  --    AST (SGOT) U/L 21  --  14  --  20   < >  --    ALT (SGPT) U/L 27  --  24  --  27   < >  --     < > = values in this interval not displayed.   Estimated Creatinine Clearance: 169.2 mL/min (A) (by C-G formula based on SCr of 0.5 mg/dL (L)).  No results found for: AMMONIA    Glucose   Date/Time Value Ref Range Status   02/08/2022 1214 143 (H) 70 - 130 mg/dL Final     Comment:     Meter: [id number] : [id number] GERI BRANSTUTTER   02/08/2022 0648 157 (H) 70 - 130 mg/dL Final     Comment:     Meter: [id number] : [id number] summer butain   02/08/2022 0115 159 (H) 70 - 130 mg/dL Final     Comment:     Meter: [id number] : [id number] summer butain   02/07/2022 1736 158 (H) 70 - 130 mg/dL Final     Comment:     Meter: [id number] : [id number] GERI BRANSTUTTER   02/07/2022 1213 150 (H) 70 - 130 mg/dL Final     Comment:     Meter: [id number] : [id number] GERI BRANSTUTTER   02/07/2022 0612 157 (H) 70 - 130 mg/dL Final     Comment:     Meter: [id number] : [id number] summer butain   02/07/2022 0022 132 (H) 70 - 130 mg/dL Final     Comment:     Meter: [id number] : [id number] summer butain   02/06/2022 1707 133 (H) 70 - 130 mg/dL Final     Comment:     Meter: [id number] : [id number] GERI BRANSTUTTER     Lab Results   Component Value Date    HGBA1C 6.50 (H) 01/22/2022     Lab Results   Component Value Date    TSH 2.290 01/19/2022       Blood Culture   Date Value Ref Range Status   01/22/2022 No growth at 4 days  Preliminary   01/22/2022 No growth at 4 days  Preliminary     No results found for: URINECX  Wound Culture   Date Value Ref Range Status   01/24/2022 Rare Staphylococcus aureus (A)  Final   01/24/2022 Rare Normal Skin Melva  Final     No results found for: STOOLCX  Respiratory Culture   Date Value Ref Range Status   01/25/2022 Scant growth (1+) Candida albicans (A)  Final   01/25/2022 No Normal Respiratory Melva (A)  Final     Pain Management Panel       Pain  Management Panel Latest Ref Rng & Units 1/19/2022    AMPHETAMINES SCREEN, URINE Negative Negative    BARBITURATES SCREEN Negative Negative    BENZODIAZEPINE SCREEN, URINE Negative Positive(A)    BUPRENORPHINEUR Negative Negative    COCAINE SCREEN, URINE Negative Negative    METHADONE SCREEN, URINE Negative Negative    METHAMPHETAMINEUR Negative Negative              ----------------------------------------------------------------------------------------------------------------------  Imaging Results (Last 24 Hours)       Procedure Component Value Units Date/Time    XR Chest 1 View [[id number]] Collected: 02/08/22 0831     Updated: 02/08/22 0834    Narrative:      EXAM:    XR Chest, 1 View     EXAM DATE:    2/8/2022 7:11 AM     CLINICAL HISTORY:    NG tube placement     TECHNIQUE:    Frontal view of the chest.     COMPARISON:    02/07/2022     FINDINGS:    Lungs:  Minimal right basilar atelectasis.    Pleural space:  Unremarkable.  No pneumothorax.    Heart:  Unremarkable.  No cardiomegaly.    Mediastinum:  Unremarkable.    Bones/joints:  Unremarkable.    Tubes, lines and devices:  Feeding tube is been repositioned with tip  in the mid stomach region.  Portions of left PICC line are again seen at  the cavoatrial junction.    Upper abdomen:  Eventration of the right hemidiaphragm is stable.       Impression:      1.  Tip of feeding tube which projects in the mid stomach region.  2.  Otherwise stable chest.     This report was finalized on 2/8/2022 8:31 AM by Dr. Jayce Lord MD.       XR Chest 1 View [[id number]] Collected: 02/08/22 0829     Updated: 02/08/22 0833    Narrative:      EXAM:    XR Chest, 1 View     EXAM DATE:    2/8/2022 7:56 AM     CLINICAL HISTORY:    Please get better view of right lung to rule out pneumo     TECHNIQUE:    Frontal view of the chest.     COMPARISON:    02/08/2022     FINDINGS:    Lungs:  Lungs are clear.    Pleural space:  Unremarkable.  No pneumothorax.    Heart:  Unremarkable.  No  cardiomegaly.    Mediastinum:  Unremarkable.    Bones/joints:  Unremarkable.    Tubes, lines and devices:  Feeding tube extends into the stomach.   Left PICC line positioning is stable.       Impression:      1.  Support devices as detailed above.  2.  No acute cardiopulmonary findings. No radiographic evidence of  pneumothorax.     This report was finalized on 2/8/2022 8:31 AM by Dr. Jayce Lord MD.       XR Chest 1 View [[id number]] Collected: 02/07/22 1953     Updated: 02/07/22 1955    Narrative:      CR Chest 1 Vw    INDICATION:   Feeding tube placement     COMPARISON:    Chest x-ray 2/4/2022    FINDINGS:  Portable AP view(s) of the chest.    There is a feeding tube that terminates in the right lung fairly distally in a right lower lobe bronchus.    Portion of the right lung is not the field-of-view so I cannot exclude pneumothorax.. There is persistent elevation of the right hemidiaphragm. There is breathing motion and likely some atelectasis at least at the left base. No change in the visualized  left upper extremity PICC line.      Impression:      1. Feeding tube terminates in the right lower lobe of the lung and should be removed. Please note that this film does not exclude the entirety the right hemithorax and therefore pneumothorax cannot be excluded. Follow-up chest film is recommended to  exclude pneumothorax after the feeding tube is removed.      NOTIFICATION: Critical Value/emergent results were called by telephone at the time of interpretation on 2/7/2022 7:51 PM to CCU nurse who verbally acknowledged these results. The patient's nurse is in the room with the patient. The nurse I spoke to  indicated that she would indicate to her that the feeding tube is in the wrong position in the right lung and should be removed.    Signer Name: Ora Corbett MD   Signed: 2/7/2022 7:53 PM   Workstation Name: [facility]    [facility]             ----------------------------------------------------------------------------------------------------------------------    Assessment/Plan       Assessment/Plan     ASSESSMENT:    1.  Severe sepsis with acute respiratory failure requiring mechanical ventilation  2.  Pneumonia  3.  Left great toe osteomyelitis  4.  Enterocolitis    PLAN:    Patient resting in bed this morning.  Continues to move left arm.  Low-grade fever 100.7 this morning.  On room air with no apparent distress.  WBC normal.  Chest x-ray from 2/8/2022 unremarkable.  MRI of the brain from 2/7/2022 reports areas of enhancement involving cortical subcortical and periventricular white matter consistent with subacute infarction, no restricted diffusion to indicate acute infarct, chronic small vessel ischemic disease, mild bilateral mastoid effusions.    CT of the head from 2/3/2022 reports no evidence of acute ischemic event or parenchymal hemorrhage.    Chest x-ray on 2/1/2022 shows mild bibasilar infiltrates.  Respiratory culture on 1/29/2022 finalized as no growth.    Bone scan on 1/28/2022 showed focal area of increased tracer uptake localizing to the left great toe.  If ulceration or infection in this location, osteomyelitis should be considered.  Periarticular type uptake throughout the right foot as detailed above in a pattern and distribution that is more favorable for reactive changes and neuropathic joint changes.  Soft tissue type uptake suggestive of cellulitis.      CT chest on 1/28/2022 showed new or worsening left basilar parenchymal opacity likely representing combination of atelectasis and/or infiltrate.  Continued right lower lobe atelectasis and likely infiltrate.  Patchy groundglass opacity left upper lobe and along the anterior aspect right upper lobe could represent very mild pneumonitis.  Increased tracheal and bronchial secretions may reflect underlying bronchitis.  This may be contributing factor of atelectasis.   Respiratory therapy may be of benefit.  Endotracheal tube in place.  Nasogastric tube appears in satisfactory position.  A second 2 passes alongside the nasogastric tube terminates in the distal esophagus.  Following discussion with patient's nurse it represents a temperature probe.  Increased colonic fluid with air-fluid levels.  Correlate for enteric colitis.  No focal inflammatory changes or obstruction.  CT abdomen pelvis on 1/28/2022 showed the same as CT chest.     COVID-19 and influenza PCR negative.  Lumbar puncture culture and meningitis encephalitis panel negative. Legionella negative.  Strep pneumo antigen negative.  MRSA PCR positive.  [place] spotted fever IgM equivocal at 1.06. Wound culture of the right foot from 1/24/2022 preliminary reports growth of Staph aureus.  Respiratory culture from 1/25/2022 reports growth of Candida albicans.  CT of the bilateral lower extremities reports no evidence of acute fracture, osteomyelitis, soft tissue gas or fluid collection. Wound culture of the right foot on 1/24/2022 finalized as MSSA. Respiratory culture on 1/25/2022 finalized as Candida albicans, likely a colonization. Blood cultures on 1/22/2022 finalized as no growth. Respiratory culture on 1/29/2022 is finalized as no growth.     As bone scan shows evidence of left great toe osteomyelitis, recommended surgical evaluation and intraoperative cultures from the left great toe to help direct antibiotic therapy. Surgeon has reevaluated the patient and does not recommend debridement or amputation at this time.      For now we will continue cefazolin 2 g IV every 8 hours for treatment of MSSA foot osteomyelitis.  Flagyl was initiated by primary team for concerns of tetanus. We will continue to follow closely and adjust antibiotic therapy as needed.     Code Status:   Code Status and Medical Interventions:   Ordered at: 01/22/22 0039     Level Of Support Discussed With:    Health Care Surrogate     Code Status  (Patient has no pulse and is not breathing):    CPR (Attempt to Resuscitate)     Medical Interventions (Patient has pulse or is breathing):    Full Support         ALDEN Nails  02/08/22  12:52 EST

## 2022-02-08 NOTE — PAYOR COMM NOTE
"CONTACT:  Rosalie Cr RN    Utilization Management Dept.   [facility]  [address] [place] [address]    Phone:[phone]  Fax: [phone]    REQUEST FOR ADDITIONAL DAYS  REF # [id number]        Elkin Yuan (56 y.o. Female)             Date of Birth Social Security Number Address Home Phone MRN    1965  [address] [address] [phone] [id number]    Church Marital Status             [employer]        Admission Date Admission Type Admitting Provider Attending Provider Department, Room/Bed    1/22/22 Urgent Sebastian Baker MD Hacker, Bill J, MD [facility] CRITICAL CARE, 04/    Discharge Date Discharge Disposition Discharge Destination                         Attending Provider: Avery Horan MD    Allergies: Penicillins    Isolation: None   Infection: MRSA No Isolation this Admit (01/23/22)   Code Status: CPR   Advance Care Planning Activity    Ht: 182.9 cm (72.01\")   Wt: 101 kg (222 lb 10.6 oz)    Admission Cmt: None   Principal Problem: None                Active Insurance as of 1/22/2022     Primary Coverage     Payor Plan Insurance Group Employer/Plan Group    [employer] BLUE [employer]OSS [employer] BLUE [employer]OSS BLUE SHIELD PPO [id number]     Payor Plan Address Payor Plan Phone Number Payor Plan Fax Number Effective Dates    [address] [phone]  1/8/2012 - None Entered    [facility], [place] [address]       Subscriber Name Subscriber Birth Date Member ID       TAMARA YUAN 4/10/1966 [id number]           Secondary Coverage     Payor Plan Insurance Group Employer/Plan Group    [facility] MEDICARE REPLACEMENT [employer] MEDICARE REPLACEMENT      Payor Plan Address Payor Plan Phone Number Payor Plan Fax Number Effective Dates    [address] [phone]  9/29/2021 - None Entered    [facility] [address]       Subscriber Name Subscriber Birth Date Member ID       ELKIN YUAN 1965 [id number]                 Emergency Contacts     Contact " Person (Rel.) Home Phone Work Phone Mobile Phone    Shine Stein (Spouse) [phone] -- --    Trisha Baxter (Sister) [phone] -- [phone]            Vital Signs (last day)     Date/Time Temp Temp src Pulse Resp BP Patient Position SpO2    02/08/22 0933 -- -- 95 -- 117/67 -- 94    02/08/22 0903 -- -- 121 24 112/72 -- 96    02/08/22 0815 -- -- -- -- -- -- 96    02/08/22 0803 -- -- 122 -- 130/56 -- 95    02/08/22 0800 99.3 (37.4) Rectal -- -- -- -- --    02/08/22 0732 -- -- 122 -- 115/69 -- 95    02/08/22 0703 -- -- 124 -- 133/76 -- 96    02/08/22 0600 100.4 (38) Rectal 121 26 135/62 -- 95    02/08/22 0517 -- -- 126 24 137/64 -- 94    02/08/22 0503 -- -- 125 22 139/65 -- 94    02/08/22 0500 100.6 (38.1) Rectal -- -- -- -- --    02/08/22 0446 -- -- 126 20 136/69 -- 94    02/08/22 0435 100.7 (38.2) Rectal -- -- -- -- --    02/08/22 0433 -- -- 123 24 138/64 -- 94    02/08/22 0418 -- -- 125 22 142/67 -- 96    02/08/22 0403 -- -- 126 24 135/69 -- 94    02/08/22 0346 -- -- 121 22 123/65 -- 96    02/08/22 0332 -- -- 128 22 138/79 -- 94    02/08/22 0317 -- -- 126 22 138/62 -- 94    02/08/22 0302 -- -- 126 24 139/63 -- 94    02/08/22 0300 99.5 (37.5) Axillary -- -- -- -- --    02/08/22 0247 -- -- 126 22 136/61 -- 94    02/08/22 0232 -- -- 125 22 142/67 -- 94    02/08/22 0217 -- -- 124 22 136/69 -- 95    02/08/22 0203 -- -- 124 24 135/64 -- 94    02/08/22 0146 -- -- 124 22 131/62 -- 94    02/08/22 0132 -- -- 125 22 136/62 -- 93    02/08/22 0118 -- -- 123 22 131/57 -- 94    02/08/22 0101 -- -- 122 22 131/58 -- 94    02/08/22 0046 -- -- 125 24 137/59 -- 93    02/08/22 0033 -- -- 124 20 140/63 -- 94    02/08/22 0018 -- -- 126 24 144/63 -- 94    02/08/22 0001 -- -- 124 24 145/65 -- 94    02/08/22 0000 99.3 (37.4) Axillary 124 24 145/65 -- 94    02/07/22 2346 -- -- 125 22 141/72 -- 92    02/07/22 2332 -- -- 124 22 145/73 -- 91    02/07/22 2318 -- -- 123 20 141/67 -- 91    02/07/22 2301 -- -- 122 20 144/75 -- 90     02/07/22 2247 -- -- 115 24 143/72 -- 91    02/07/22 2234 -- -- 133 -- 146/73 -- --    02/07/22 2232 -- -- 133 20 146/73 -- 90    02/07/22 2217 -- -- 133 24 146/66 -- 93    02/07/22 2202 -- -- 134 22 142/67 -- 95    02/07/22 2146 -- -- 133 20 131/83 -- 95    02/07/22 2132 -- -- 133 -- 149/72 -- 93    02/07/22 2118 -- -- 133 24 150/69 -- 95    02/07/22 2102 -- -- 133 24 150/72 -- 95    02/07/22 2046 -- -- 133 20 157/70 -- 94    02/07/22 2032 -- -- 133 24 139/70 -- 94    02/07/22 2018 -- -- 133 24 141/65 -- 95 02/07/22 2002 -- -- 134 24 142/69 -- 95    02/07/22 2000 99 (37.2) Axillary -- -- -- -- --    02/07/22 1947 -- -- 134 20 146/71 -- 95 02/07/22 1933 -- -- 133 22 144/71 -- 95 02/07/22 1918 -- -- 134 24 147/70 -- 96    02/07/22 1902 -- -- 133 20 153/67 -- 95 02/07/22 1845 -- -- 130 -- 156/71 -- 97    02/07/22 1841 -- -- 131 -- 153/69 -- 95    02/07/22 1832 -- -- 131 -- 153/74 -- 94    02/07/22 1818 -- -- 131 -- 158/70 -- 96    02/07/22 1800 -- -- 131 -- 143/69 -- 96    02/07/22 1747 -- -- 129 -- 139/63 -- 96    02/07/22 1730 -- -- 130 -- 149/77 -- 95    02/07/22 1648 -- -- 130 -- 143/67 -- 96    02/07/22 1633 -- -- 128 -- 125/60 -- 95 02/07/22 1618 -- -- 126 -- 136/64 -- 95    02/07/22 1602 -- -- 129 -- 146/67 -- 96 02/07/22 1600 98.1 (36.7) Axillary -- -- -- -- --    02/07/22 1548 -- -- 134 -- 134/71 -- 96 02/07/22 1530 -- -- 131 -- 110/65 -- 96 02/07/22 1515 -- -- 133 -- 141/69 -- 96    02/07/22 1500 -- -- 131 -- 137/69 -- 96    02/07/22 1448 -- -- 128 -- 122/66 -- 95 02/07/22 1444 -- -- 128 -- 140/69 -- 97    02/07/22 1415 -- -- 129 -- 149/63 -- 95 02/07/22 1401 -- -- 131 -- 144/63 -- 93    02/07/22 1302 -- -- 128 -- 145/64 -- 94    02/07/22 1215 -- -- 128 -- 158/68 -- 96    02/07/22 1200 98.7 (37.1) Axillary 126 24 173/85 -- 94    02/07/22 1008 -- -- 133 -- 157/81 -- 95 02/07/22 0931 -- -- 126 -- 164/80 -- 95 02/07/22 0849 -- -- 126 -- 166/83 -- 94    02/07/22 0815 98.4  (36.9) Rectal 123 20 163/86 -- 95    02/07/22 0740 -- -- 122 -- 179/100 -- 96    02/07/22 0736 -- -- 120 -- 179/100 -- --    02/07/22 0706 -- -- 117 -- 182/97 -- 96    02/07/22 0640 98.1 (36.7) Rectal -- -- -- -- --    02/07/22 0634 -- -- -- -- -- -- 97    02/07/22 0604 -- -- 112 20 175/98 -- 97    02/07/22 0556 -- -- 112 -- 170/76 -- --    02/07/22 0502 -- -- 121 20 181/88 -- 97    02/07/22 0432 -- -- 110 18 165/76 -- 96    02/07/22 0404 -- -- 115 17 164/85 -- 97    02/07/22 0400 98.6 (37) Rectal -- -- -- -- --    02/07/22 0333 -- -- 116 20 140/78 -- 96    02/07/22 0304 -- -- 115 18 145/59 -- 94    02/07/22 0233 -- -- 131 18 162/77 -- 95    02/07/22 0204 -- -- 122 20 154/72 -- 95    02/07/22 0135 -- -- 125 -- 193/106 -- --    02/07/22 0134 -- -- 125 22 193/106 -- 95    02/07/22 0103 -- -- 124 18 196/106 -- 95    02/07/22 0033 -- -- 117 22 198/100 -- 95    02/07/22 0016 -- -- 131 -- 188/103 -- --    02/07/22 0003 -- -- 133 18 188/103 -- 95    02/07/22 0000 99.4 (37.4) Rectal -- -- -- -- --          Current Facility-Administered Medications   Medication Dose Route Frequency Provider Last Rate Last Admin   • acetaminophen (TYLENOL) tablet 650 mg  650 mg Oral Q6H Sebastian Aguero MD   650 mg at 02/02/22 0626   • artificial tears ophthalmic ointment   Both Eyes Q1H PRN Avery Horan MD   Given at 02/08/22 0807   • ceFAZolin Sodium-Dextrose (ANCEF) IVPB (duplex) 2 g  2 g Intravenous Q8H To Christopher MD   2 g at 02/08/22 0245   • cloNIDine (CATAPRES-TTS) 0.1 MG/24HR patch 1 patch  1 patch Transdermal Weekly Dick Whiting MD   1 patch at 02/06/22 2036   • DEXMEDETOMIDINE 1000 MCG/250ML INFUSION infusion  0.2-1.5 mcg/kg/hr Intravenous Titrated Sebastian Baker MD   Stopped at 02/06/22 1436   • dextrose (D50W) (25 g/50 mL) IV injection 25 g  25 g Intravenous Q15 Min PRN Mario West MD       • dextrose (GLUTOSE) oral gel 15 g  15 g Oral Q15 Min PRN Mario West MD       • glucagon (human  recombinant) (GLUCAGEN DIAGNOSTIC) injection 1 mg  1 mg Subcutaneous Q15 Min PRN Mario West MD       • heparin (porcine) 5000 UNIT/ML injection 2,500 Units  2,500 Units Intravenous PRN Dick Whiting MD   2,500 Units at 02/03/22 1034   • heparin (porcine) 5000 UNIT/ML injection 5,000 Units  5,000 Units Intravenous PRN Dick Whiting MD   5,000 Units at 02/02/22 0205   • heparin 78269 units/250 mL (100 units/mL) in 0.45 % NaCl infusion  10.2 Units/kg/hr Intravenous Titrated Dick Whiting MD 23.9 mL/hr at 02/08/22 0357 24.6 Units/kg/hr at 02/08/22 0357   • Hold medication  1 each Does not apply Continuous PRN Dick Whiting MD       • hydrALAZINE (APRESOLINE) injection 10 mg  10 mg Intravenous Q4H PRN Joe Martinez MD   10 mg at 02/07/22 0736   • hydrALAZINE (APRESOLINE) tablet 10 mg  10 mg Oral Q8H Dick Whiting MD   10 mg at 02/02/22 2128   • insulin aspart (novoLOG) injection 0-14 Units  0-14 Units Subcutaneous TID AC Mario West MD   3 Units at 02/08/22 0806   • insulin detemir (LEVEMIR) injection 30 Units  30 Units Subcutaneous Daily Miquel Mora MD   30 Units at 02/08/22 0808   • levETIRAcetam in NaCl 0.82% (KEPPRA) IVPB 500 mg  500 mg Intravenous Q12H Avery Horan MD   500 mg at 02/08/22 0807   • LORazepam (ATIVAN) injection 0.5 mg  0.5 mg Intravenous Q4H PRN Tyler Lopez MD   0.5 mg at 02/07/22 0136   • losartan (COZAAR) tablet 100 mg  100 mg Oral Daily Roney Pringle MD   100 mg at 02/08/22 0859   • Magnesium Sulfate 2 gram Bolus, followed by 8 gram infusion (total Mg dose 10 grams)- Mg less than or equal to 1mg/dL  2 g Intravenous PRN Dick Whiting MD        Or   • Magnesium Sulfate 2 gram / 50mL Infusion (GIVE X 3 BAGS TO EQUAL 6GM TOTAL DOSE) - Mg 1.1 - 1.5 mg/dl  2 g Intravenous PRN Dick Whiting MD        Or   • Magnesium Sulfate 4 gram infusion- Mg 1.6-1.9 mg/dL  4 g Intravenous PRN Dick Whiting MD       • metoprolol tartrate (LOPRESSOR) injection 5 mg  5 mg  Intravenous Q6H PRN Sebastian Baker MD   5 mg at 02/07/22 2234   • metoprolol tartrate (LOPRESSOR) tablet 100 mg  100 mg Nasogastric Q12H Elkin Collins MD   100 mg at 02/08/22 0859   • metroNIDAZOLE (FLAGYL) 500 mg/100mL IVPB  500 mg Intravenous Q8H Avery Horan MD   500 mg at 02/08/22 0517   • niCARdipine (CARDENE) 25 mg in 250 mL NS infusion  5-15 mg/hr Intravenous Titrated Avery Horan  mL/hr at 02/08/22 0807 15 mg/hr at 02/08/22 0807   • pantoprazole (PROTONIX) injection 40 mg  40 mg Intravenous Q AM Sebastian Baker MD   40 mg at 02/08/22 0519   • Pharmacy Consult   Does not apply Continuous PRN Roney Pringle MD       • potassium chloride (K-DUR,KLOR-CON) ER tablet 40 mEq  40 mEq Oral PRN Dick Whiting MD        Or   • potassium chloride (KLOR-CON) packet 40 mEq  40 mEq Oral PRN Dick Whiting MD   40 mEq at 01/29/22 0828   • potassium chloride 10 mEq in 100 mL IVPB  10 mEq Intravenous Q1H PRN Dick Whiting MD       • pravastatin (PRAVACHOL) tablet 40 mg  40 mg Oral Daily Dick Whiting MD   40 mg at 02/08/22 0859   • pregabalin (LYRICA) capsule 75 mg  75 mg Nasogastric Q12H Yaya Forman MD   75 mg at 02/08/22 0859   • QUEtiapine (SEROquel) tablet 50 mg  50 mg Oral Nightly Yaya Forman MD   50 mg at 02/02/22 2128   • sodium chloride 0.9 % flush 10 mL  10 mL Intravenous Q12H Sebastian Baker MD   10 mL at 02/08/22 0808   • sodium chloride 0.9 % flush 10 mL  10 mL Intravenous PRN Sebastian Baker MD       • sodium chloride 0.9 % flush 10 mL  10 mL Intravenous Q12H Sebastian Baker MD   10 mL at 02/08/22 0808   • sodium chloride 0.9 % flush 10 mL  10 mL Intravenous PRN Jhon Bakerny Jerzy, MD       • sodium chloride 0.9 % flush 10 mL  10 mL Intravenous Q12H Dick Whiting MD   10 mL at 02/08/22 0808   • sodium chloride 0.9 % flush 10 mL  10 mL Intravenous Q12H Dick Whiting MD   10 mL at 02/08/22 0808   • sodium chloride 0.9 % flush 10 mL  10 mL  Intravenous Q12H Dick Whiting MD   10 mL at 02/08/22 0807   • sodium chloride 0.9 % flush 10 mL  10 mL Intravenous PRN Dick Whiting MD       • sodium chloride 0.9 % flush 20 mL  20 mL Intravenous PRN Dick Whiting MD       • spironolactone (ALDACTONE) tablet 25 mg  25 mg Oral Daily Elkin Collins MD   25 mg at 02/08/22 0859   • verapamil (CALAN) tablet 80 mg  80 mg Oral Q8H Avery Horan MD         Lab Results (last 24 hours)     Procedure Component Value Units Date/Time    Blood Culture - Blood, Arm, Left [[id number]] Collected: 02/08/22 0829    Specimen: Blood from Arm, Left Updated: 02/08/22 0907    Blood Culture - Blood, Hand, Left [[id number]] Collected: 02/08/22 0829    Specimen: Blood from Hand, Left Updated: 02/08/22 0907    aPTT [[id number]]  (Abnormal) Collected: 02/08/22 0736    Specimen: Blood Updated: 02/08/22 0832     PTT 66.3 seconds     Narrative:      PTT Heparin Therapeutic Range:  59 - 95 seconds      POC Glucose Once [[id number]]  (Abnormal) Collected: 02/08/22 0648    Specimen: Blood Updated: 02/08/22 0700     Glucose 157 mg/dL      Comment: Meter: [id number] : [id number] summer amanda       CBC & Differential [[id number]]  (Abnormal) Collected: 02/08/22 0229    Specimen: Blood Updated: 02/08/22 0330    Narrative:      The following orders were created for panel order CBC & Differential.  Procedure                               Abnormality         Status                     ---------                               -----------         ------                     CBC Auto Differential[[id number]]        Abnormal            Final result                 Please view results for these tests on the individual orders.    CBC Auto Differential [[id number]]  (Abnormal) Collected: 02/08/22 0229    Specimen: Blood Updated: 02/08/22 0330     WBC 9.10 10*3/mm3      RBC 4.75 10*6/mm3      Hemoglobin 11.6 g/dL      Hematocrit 39.0 %      MCV 82.1 fL      MCH 24.4 pg      MCHC 29.7 g/dL      RDW 20.4 %       RDW-SD 61.2 fl      MPV 10.4 fL      Platelets 401 10*3/mm3      Neutrophil % 68.7 %      Lymphocyte % 15.9 %      Monocyte % 10.8 %      Eosinophil % 2.0 %      Basophil % 1.5 %      Immature Grans % 1.1 %      Neutrophils, Absolute 6.25 10*3/mm3      Lymphocytes, Absolute 1.45 10*3/mm3      Monocytes, Absolute 0.98 10*3/mm3      Eosinophils, Absolute 0.18 10*3/mm3      Basophils, Absolute 0.14 10*3/mm3      Immature Grans, Absolute 0.10 10*3/mm3      nRBC 0.0 /100 WBC     aPTT [[id number]]  (Abnormal) Collected: 02/08/22 0229    Specimen: Blood Updated: 02/08/22 0323     PTT 73.3 seconds     Narrative:      PTT Heparin Therapeutic Range:  59 - 95 seconds      Comprehensive Metabolic Panel [[id number]]  (Abnormal) Collected: 02/08/22 0229    Specimen: Blood Updated: 02/08/22 0301     Glucose 167 mg/dL      BUN 8 mg/dL      Creatinine 0.50 mg/dL      Sodium 145 mmol/L      Potassium 3.4 mmol/L      Chloride 113 mmol/L      CO2 18.2 mmol/L      Calcium 9.3 mg/dL      Total Protein 6.4 g/dL      Albumin 3.52 g/dL      ALT (SGPT) 27 U/L      AST (SGOT) 21 U/L      Alkaline Phosphatase 104 U/L      Total Bilirubin 0.3 mg/dL      eGFR Non African Amer 128 mL/min/1.73      Globulin 2.9 gm/dL      A/G Ratio 1.2 g/dL      BUN/Creatinine Ratio 16.0     Anion Gap 13.8 mmol/L     Narrative:      GFR Normal >60  Chronic Kidney Disease <60  Kidney Failure <15      POC Glucose Once [[id number]]  (Abnormal) Collected: 02/08/22 0115    Specimen: Blood Updated: 02/08/22 0123     Glucose 159 mg/dL      Comment: Meter: [id number] : [id number] summer amanda       aPTT [[id number]]  (Abnormal) Collected: 02/07/22 1914    Specimen: Blood Updated: 02/07/22 2005     PTT 55.9 seconds     Narrative:      PTT Heparin Therapeutic Range:  59 - 95 seconds      Potassium [[id number]]  (Normal) Collected: 02/07/22 1719    Specimen: Blood Updated: 02/07/22 3909     Potassium 3.6 mmol/L      Comment: Slight hemolysis detected by analyzer. Results  may be affected.       POC Glucose Once [[id number]]  (Abnormal) Collected: 02/07/22 1736    Specimen: Blood Updated: 02/07/22 1743     Glucose 158 mg/dL      Comment: Meter: [id number] : [id number] GERI HOOVER       Vancomycin, Trough [[id number]]  (Normal) Collected: 02/07/22 1248    Specimen: Blood Updated: 02/07/22 1322     Vancomycin Trough 13.50 mcg/mL     Narrative:      Therapeutic Ranges for Vancomycin    Vancomycin Random   5.0-40.0 mcg/mL  Vancomycin Trough   5.0-20.0 mcg/mL  Vancomycin Peak     20.0-40.0 mcg/mL    POC Glucose Once [[id number]]  (Abnormal) Collected: 02/07/22 1213    Specimen: Blood Updated: 02/07/22 1220     Glucose 150 mg/dL      Comment: Meter: [id number] : [id number] GERI HOOVER           Imaging Results (Last 24 Hours)     Procedure Component Value Units Date/Time    XR Chest 1 View [[id number]] Collected: 02/08/22 0831     Updated: 02/08/22 0834    Narrative:      EXAM:    XR Chest, 1 View     EXAM DATE:    2/8/2022 7:11 AM     CLINICAL HISTORY:    NG tube placement     TECHNIQUE:    Frontal view of the chest.     COMPARISON:    02/07/2022     FINDINGS:    Lungs:  Minimal right basilar atelectasis.    Pleural space:  Unremarkable.  No pneumothorax.    Heart:  Unremarkable.  No cardiomegaly.    Mediastinum:  Unremarkable.    Bones/joints:  Unremarkable.    Tubes, lines and devices:  Feeding tube is been repositioned with tip  in the mid stomach region.  Portions of left PICC line are again seen at  the cavoatrial junction.    Upper abdomen:  Eventration of the right hemidiaphragm is stable.       Impression:      1.  Tip of feeding tube which projects in the mid stomach region.  2.  Otherwise stable chest.     This report was finalized on 2/8/2022 8:31 AM by Dr. Jayce Lord MD.       XR Chest 1 View [[id number]] Collected: 02/08/22 0829     Updated: 02/08/22 0833    Narrative:      EXAM:    XR Chest, 1 View     EXAM DATE:    2/8/2022 7:56 AM     CLINICAL  HISTORY:    Please get better view of right lung to rule out pneumo     TECHNIQUE:    Frontal view of the chest.     COMPARISON:    02/08/2022     FINDINGS:    Lungs:  Lungs are clear.    Pleural space:  Unremarkable.  No pneumothorax.    Heart:  Unremarkable.  No cardiomegaly.    Mediastinum:  Unremarkable.    Bones/joints:  Unremarkable.    Tubes, lines and devices:  Feeding tube extends into the stomach.   Left PICC line positioning is stable.       Impression:      1.  Support devices as detailed above.  2.  No acute cardiopulmonary findings. No radiographic evidence of  pneumothorax.     This report was finalized on 2/8/2022 8:31 AM by Dr. Jayce Lord MD.       XR Chest 1 View [[id number]] Collected: 02/07/22 1953     Updated: 02/07/22 1955    Narrative:      CR Chest 1 Vw    INDICATION:   Feeding tube placement     COMPARISON:    Chest x-ray 2/4/2022    FINDINGS:  Portable AP view(s) of the chest.    There is a feeding tube that terminates in the right lung fairly distally in a right lower lobe bronchus.    Portion of the right lung is not the field-of-view so I cannot exclude pneumothorax.. There is persistent elevation of the right hemidiaphragm. There is breathing motion and likely some atelectasis at least at the left base. No change in the visualized  left upper extremity PICC line.      Impression:      1. Feeding tube terminates in the right lower lobe of the lung and should be removed. Please note that this film does not exclude the entirety the right hemithorax and therefore pneumothorax cannot be excluded. Follow-up chest film is recommended to  exclude pneumothorax after the feeding tube is removed.      NOTIFICATION: Critical Value/emergent results were called by telephone at the time of interpretation on 2/7/2022 7:51 PM to CCU nurse who verbally acknowledged these results. The patient's nurse is in the room with the patient. The nurse I spoke to  indicated that she would indicate to her that  the feeding tube is in the wrong position in the right lung and should be removed.    Signer Name: Ora Corbett MD   Signed: 2/7/2022 7:53 PM   Workstation Name: [email or web address]    [facility]    MRI Brain With & Without Contrast [[id number]] Collected: 02/07/22 1230     Updated: 02/07/22 1237    Narrative:      EXAM:    MR Head Without and With Intravenous Contrast     EXAM DATE:    2/7/2022 10:54 AM     CLINICAL HISTORY:    Neuro deficit, acute, stroke suspected     TECHNIQUE:    Magnetic resonance images of the head/brain without and with  intravenous contrast in multiple planes.     COMPARISON:    CT 02/03/2022     FINDINGS:    Brain:  Subcortical enhancement is noted of the right occipital lobe  and is consistent with subacute infarct. No diffusion restriction  identified.  There is cortical and subcortical enhancement of the  superior parietal lobe at the level of the cerebral vertex also  consistent with subacute infarct etiology.  Left periventricular  enhancement is noted most consistent with subacute infarct etiology.  T2  shine through is noted but no restricted diffusion is identified.  No  hemorrhage.    Ventricles:  Unremarkable.  No ventriculomegaly.    Bones/joints:  Unremarkable.    Sinuses:  Unremarkable as visualized.  No acute sinusitis.    Mastoid air cells:  Mild bilateral mastoid effusions.    Orbits:  Unremarkable as visualized.       Impression:      1.  Areas of enhancement involving cortical, subcortical, and  periventricular white matter as described above in a pattern and  distribution that is most consistent with subacute infarction. Findings  are most pronounced in the parietal lobes and right occipital region.  2.  No restricted diffusion to indicate acute infarct.  3.  Chronic small vessel ischemic disease.  4.  Mild bilateral mastoid effusions.     This report was finalized on 2/7/2022 12:34 PM by Dr. Jayce Lord MD.           ECG/EMG Results (last 24 hours)      Procedure Component Value Units Date/Time    SCANNED - TELEMETRY   [[id number]] Resulted: 22     Updated: 22             Physician Progress Notes (last 24 hours)      To Christopher MD at 22 1235                     PROGRESS NOTE         Patient Identification:  Name:  Lynette Stein  Age:  56 y.o.  Sex:  female  :  1965  MRN:  [id number]  Visit Number:  [id number]  Primary Care Provider:  Orville Wu PA         LOS: 16 days       ----------------------------------------------------------------------------------------------------------------------  Subjective       Chief Complaints:    No chief complaint on file.        Interval History:      Patient continues on room air today.  Continues to reach with left arm.  Still does not follow commands or move right side of body.  WBC elevated at 14.32.  Afebrile.    Review of Systems:    Unable to obtain.  Altered mental status.  ----------------------------------------------------------------------------------------------------------------      Objective       Current Hospital Meds:  ceFAZolin, 2 g, Intravenous, Q8H  cloNIDine, 1 patch, Transdermal, Weekly  hydrALAZINE, 10 mg, Oral, Q8H  insulin aspart, 0-14 Units, Subcutaneous, TID AC  insulin detemir, 30 Units, Subcutaneous, Daily  levETIRAcetam, 500 mg, Intravenous, Q12H  losartan, 100 mg, Oral, Daily  metoprolol tartrate, 100 mg, Nasogastric, Q12H  metroNIDAZOLE, 500 mg, Intravenous, Q8H  pantoprazole, 40 mg, Intravenous, Q AM  pravastatin, 40 mg, Oral, Daily  pregabalin, 75 mg, Nasogastric, Q12H  QUEtiapine, 50 mg, Oral, Nightly  sodium chloride, 10 mL, Intravenous, Q12H  sodium chloride, 10 mL, Intravenous, Q12H  sodium chloride, 10 mL, Intravenous, Q12H  sodium chloride, 10 mL, Intravenous, Q12H  sodium chloride, 10 mL, Intravenous, Q12H  spironolactone, 25 mg, Oral, Daily  verapamil, 80 mg, Oral, Q8H      dexmedetomidine, 0.2-1.5 mcg/kg/hr, Last Rate: Stopped (22  9456)  heparin (porcine), 10.2 Units/kg/hr, Last Rate: 24.6 Units/kg/hr (02/06/22 2127)  hold, 1 each  niCARdipine, 5-15 mg/hr, Last Rate: 5 mg/hr (02/07/22 1210)  Pharmacy Consult,       ----------------------------------------------------------------------------------------------------------------------    Vital Signs:  Temp:  [98.1 °F (36.7 °C)-99.4 °F (37.4 °C)] 98.7 °F (37.1 °C)  Heart Rate:  [104-134] 128  Resp:  [17-24] 24  BP: (140-198)/() 158/68  Mean Arterial Pressure (Non-Invasive) for the past 24 hrs (Last 3 readings):   Noninvasive MAP (mmHg)   02/07/22 1215 95   02/07/22 1200 113   02/07/22 1008 107     SpO2 Percentage    02/07/22 1008 02/07/22 1200 02/07/22 1215   SpO2: 95% 94% 96%     SpO2:  [93 %-97 %] 96 %  on   ;   Device (Oxygen Therapy): room air    Body mass index is 30.1 kg/m².  Wt Readings from Last 3 Encounters:   02/07/22 101 kg (222 lb 0.1 oz)   01/19/22 106 kg (233 lb)   12/14/21 106 kg (233 lb 9.6 oz)        Intake/Output Summary (Last 24 hours) at 2/7/2022 1235  Last data filed at 2/7/2022 0847  Gross per 24 hour   Intake 1280.26 ml   Output 1925 ml   Net -644.74 ml     NPO Diet  ----------------------------------------------------------------------------------------------------------------------      Physical Exam:    Constitutional: Chronically ill-appearing.  Awake and agitated, but not following commands.  Drooling and experiencing oral automatisms.  HENT:  Head: Normocephalic and atraumatic.  Mouth:  Moist mucous membranes.    Eyes:  Pupils equal.  Left eye chemosis.   Neck:  Neck supple.  No JVD present.    Cardiovascular:  Normal rate, regular rhythm and normal heart sounds with no murmur. No edema.  Pulmonary/Chest: Coarse breath sounds bilaterally.  Abdominal:  Soft.  Bowel sounds are normal.  No distension and no tenderness.   Musculoskeletal:  No tenderness, and no deformity.  No swelling or redness of joints.  Mild bilateral lower extremity edema.  Neurological:  Awake  and agitated.  Not following commands.  Skin:  Skin is warm and dry.  No rash noted.  No pallor.  Scattered bruises and scabs to bilateral upper extremities, trunk, bilateral lower extremities and feet.  Bilateral feet ulcers in dressings today.  Psychiatric: Awake and agitated.  Not following commands.  ----------------------------------------------------------------------------------------------------------------------              Results from last 7 days   Lab Units 02/03/22  1112   PH, ARTERIAL pH units 7.480*   PO2 ART mm Hg 88.7   PCO2, ARTERIAL mm Hg 32.6*   HCO3 ART mmol/L 24.2     Results from last 7 days   Lab Units 02/07/22  0121 02/06/22  0150 02/05/22  0046 02/04/22  0246 02/03/22  1111   LACTATE mmol/L  --   --   --   --  0.9   WBC 10*3/mm3 14.32* 12.17* 11.63*   < >  --    HEMOGLOBIN g/dL 11.8* 11.9* 11.9*   < >  --    HEMATOCRIT % 39.7 40.2 40.5   < >  --    MCV fL 82.5 82.7 83.5   < >  --    MCHC g/dL 29.7* 29.6* 29.4*   < >  --    PLATELETS 10*3/mm3 485* 409 343   < >  --     < > = values in this interval not displayed.     Results from last 7 days   Lab Units 02/07/22  0121 02/06/22  1726 02/06/22  0150 02/05/22  0046 02/05/22  0046 02/04/22  1249 02/04/22  0246 02/02/22  0008 01/31/22  2331   SODIUM mmol/L 143  --  147*  --  146*  --    < >   < > 144   POTASSIUM mmol/L 3.5 3.6 3.1*   < > 3.1*  --    < >   < > 4.0   MAGNESIUM mg/dL 2.0  --   --   --   --  2.0  --   --  2.1   CHLORIDE mmol/L 113*  --  116*  --  114*  --    < >   < > 107   CO2 mmol/L 17.2*  --  16.9*  --  17.8*  --    < >   < > 25.8   BUN mg/dL 9  --  10  --  11  --    < >   < > 15   CREATININE mg/dL 0.50*  --  0.56*  --  0.52*  --    < >   < > 0.58   EGFR IF NONAFRICN AM mL/min/1.73 128  --  112  --  122  --    < >   < > 108   CALCIUM mg/dL 9.5  --  9.7  --  9.9  --    < >   < > 10.3   GLUCOSE mg/dL 153*  --  158*  --  159*  --    < >   < > 259*   ALBUMIN g/dL 3.51  --  3.36*  --  3.45*  --    < >   < >  --    BILIRUBIN mg/dL 0.2   --  0.2  --  0.3  --    < >   < >  --    ALK PHOS U/L 110  --  109  --  107  --    < >   < >  --    AST (SGOT) U/L 14  --  20  --  19  --    < >   < >  --    ALT (SGPT) U/L 24  --  27  --  29  --    < >   < >  --     < > = values in this interval not displayed.   Estimated Creatinine Clearance: 169.2 mL/min (A) (by C-G formula based on SCr of 0.5 mg/dL (L)).  No results found for: AMMONIA    Glucose   Date/Time Value Ref Range Status   02/07/2022 1213 150 (H) 70 - 130 mg/dL Final     Comment:     Meter: [id number] : [id number] GERI BRANSTUTTER   02/07/2022 0612 157 (H) 70 - 130 mg/dL Final     Comment:     Meter: [id number] : [id number] summer butain   02/07/2022 0022 132 (H) 70 - 130 mg/dL Final     Comment:     Meter: [id number] : [id number] summer butain   02/06/2022 1707 133 (H) 70 - 130 mg/dL Final     Comment:     Meter: [id number] : [id number] GERI BRANSTUTTER   02/06/2022 1103 156 (H) 70 - 130 mg/dL Final     Comment:     Meter: [id number] : [id number] GERI BRANSTUTTER   02/06/2022 0635 153 (H) 70 - 130 mg/dL Final     Comment:     Meter: [id number] : [id number] summer butain   02/06/2022 0011 136 (H) 70 - 130 mg/dL Final     Comment:     Meter: [id number] : [id number] summer butain   02/05/2022 1622 125 70 - 130 mg/dL Final     Comment:     Meter: [id number] : [id number] Suresh Lindsey     Lab Results   Component Value Date    HGBA1C 6.50 (H) 01/22/2022     Lab Results   Component Value Date    TSH 2.290 01/19/2022       Blood Culture   Date Value Ref Range Status   01/22/2022 No growth at 4 days  Preliminary   01/22/2022 No growth at 4 days  Preliminary     No results found for: URINECX  Wound Culture   Date Value Ref Range Status   01/24/2022 Rare Staphylococcus aureus (A)  Final   01/24/2022 Rare Normal Skin Emlva  Final     No results found for: STOOLCX  Respiratory Culture   Date Value Ref Range Status   01/25/2022 Scant growth (1+) Candida albicans (A)   Final   01/25/2022 No Normal Respiratory Melva (A)  Final     Pain Management Panel       Pain Management Panel Latest Ref Rng & Units 1/19/2022    AMPHETAMINES SCREEN, URINE Negative Negative    BARBITURATES SCREEN Negative Negative    BENZODIAZEPINE SCREEN, URINE Negative Positive(A)    BUPRENORPHINEUR Negative Negative    COCAINE SCREEN, URINE Negative Negative    METHADONE SCREEN, URINE Negative Negative    METHAMPHETAMINEUR Negative Negative              ----------------------------------------------------------------------------------------------------------------------  Imaging Results (Last 24 Hours)       Procedure Component Value Units Date/Time    MRI Brain With & Without Contrast [[id number]] Resulted: 02/07/22 1054     Updated: 02/07/22 1205            ----------------------------------------------------------------------------------------------------------------------    Assessment/Plan       Assessment/Plan     ASSESSMENT:    1.  Severe sepsis with acute respiratory failure requiring mechanical ventilation  2.  Pneumonia  3.  Left great toe osteomyelitis  4.  Enterocolitis    PLAN:    Patient continues on room air today.  Continues to reach with left arm.  Still does not follow commands or move right side of body.  WBC elevated at 14.32.  Afebrile.    CT of the head from 2/3/2022 reports no evidence of acute ischemic event or parenchymal hemorrhage.    Chest x-ray on 2/1/2022 shows mild bibasilar infiltrates.  Respiratory culture on 1/29/2022 finalized as no growth.    Bone scan on 1/28/2022 showed focal area of increased tracer uptake localizing to the left great toe.  If ulceration or infection in this location, osteomyelitis should be considered.  Periarticular type uptake throughout the right foot as detailed above in a pattern and distribution that is more favorable for reactive changes and neuropathic joint changes.  Soft tissue type uptake suggestive of cellulitis.      CT chest on 1/28/2022  showed new or worsening left basilar parenchymal opacity likely representing combination of atelectasis and/or infiltrate.  Continued right lower lobe atelectasis and likely infiltrate.  Patchy groundglass opacity left upper lobe and along the anterior aspect right upper lobe could represent very mild pneumonitis.  Increased tracheal and bronchial secretions may reflect underlying bronchitis.  This may be contributing factor of atelectasis.  Respiratory therapy may be of benefit.  Endotracheal tube in place.  Nasogastric tube appears in satisfactory position.  A second 2 passes alongside the nasogastric tube terminates in the distal esophagus.  Following discussion with patient's nurse it represents a temperature probe.  Increased colonic fluid with air-fluid levels.  Correlate for enteric colitis.  No focal inflammatory changes or obstruction.  CT abdomen pelvis on 1/28/2022 showed the same as CT chest.     COVID-19 and influenza PCR negative.  Lumbar puncture culture and meningitis encephalitis panel negative. Legionella negative.  Strep pneumo antigen negative.  MRSA PCR positive.  [place] spotted fever IgM equivocal at 1.06. Wound culture of the right foot from 1/24/2022 preliminary reports growth of Staph aureus.  Respiratory culture from 1/25/2022 reports growth of Candida albicans.  CT of the bilateral lower extremities reports no evidence of acute fracture, osteomyelitis, soft tissue gas or fluid collection. Wound culture of the right foot on 1/24/2022 finalized as MSSA. Respiratory culture on 1/25/2022 finalized as Candida albicans, likely a colonization. Blood cultures on 1/22/2022 finalized as no growth. Respiratory culture on 1/29/2022 is finalized as no growth.     As bone scan shows evidence of left great toe osteomyelitis, recommended surgical evaluation and intraoperative cultures from the left great toe to help direct antibiotic therapy. Surgeon has reevaluated the patient and does not recommend  debridement or amputation at this time.      The setting of overall clinical and laboratory improvement vancomycin, cefepime, Flagyl were discontinued.  For now we will continue cefazolin 2 g IV every 8 hours for treatment of MSSA foot osteomyelitis.  We will continue to follow closely and adjust antibiotic therapy as needed.     Code Status:   Code Status and Medical Interventions:   Ordered at: 22 0039     Level Of Support Discussed With:    Health Care Surrogate     Code Status (Patient has no pulse and is not breathing):    CPR (Attempt to Resuscitate)     Medical Interventions (Patient has pulse or is breathing):    Full Support         ALDEN Nails  22  12:35 EST        Electronically signed by To Christopher MD at 22 1929     Avery Horan MD at 22 1221              [facility] HOSPITALIST PROGRESS NOTE     Patient Identification:  Name:  Lynette Stein  Age:  56 y.o.  Sex:  female  :  1965  MRN:  [id number]  Visit Number:  [id number]  ROOM: [address]     Primary Care Provider:  Orville Wu PA    Length of stay in inpatient status:  16    Subjective     Chief Compliant:  No chief complaint on file.      History of Presenting Illness:     supportive bedside. Notes patient less interactive than last week. He noted she would track with her eyes before. Now she is not interactive and is continuously moving left side. He was unsure when her last tetanus shot was but thinks it was a few years ago.     ROS:  Otherwise 10 point ROS negative other than documented above in HPI.     Objective     Current Hospital Meds:ceFAZolin, 2 g, Intravenous, Q8H  cloNIDine, 1 patch, Transdermal, Weekly  hydrALAZINE, 10 mg, Oral, Q8H  insulin aspart, 0-14 Units, Subcutaneous, TID AC  insulin detemir, 30 Units, Subcutaneous, Daily  levETIRAcetam, 1,000 mg, Intravenous, Once  levETIRAcetam, 500 mg, Intravenous, Q12H  losartan, 100 mg, Oral, Daily  metoprolol  "tartrate, 100 mg, Nasogastric, Q12H  metroNIDAZOLE, 500 mg, Intravenous, Q8H  pantoprazole, 40 mg, Intravenous, Q AM  pravastatin, 40 mg, Oral, Daily  pregabalin, 75 mg, Nasogastric, Q12H  QUEtiapine, 50 mg, Oral, Nightly  sodium chloride, 10 mL, Intravenous, Q12H  sodium chloride, 10 mL, Intravenous, Q12H  sodium chloride, 10 mL, Intravenous, Q12H  sodium chloride, 10 mL, Intravenous, Q12H  sodium chloride, 10 mL, Intravenous, Q12H  spironolactone, 25 mg, Oral, Daily  verapamil, 80 mg, Oral, Q8H    dexmedetomidine, 0.2-1.5 mcg/kg/hr, Last Rate: Stopped (02/06/22 1436)  heparin (porcine), 10.2 Units/kg/hr, Last Rate: 24.6 Units/kg/hr (02/06/22 2127)  hold, 1 each  niCARdipine, 5-15 mg/hr, Last Rate: 5 mg/hr (02/07/22 1210)  Pharmacy Consult,         Current Antimicrobial Therapy:  Anti-Infectives (From admission, onward)    Ordered     Dose/Rate Route Frequency Start Stop    02/07/22 1143  metroNIDAZOLE (FLAGYL) 500 mg/100mL IVPB        Ordering Provider: Avery Horan MD    500 mg  over 60 Minutes Intravenous Every 8 Hours 02/07/22 1400 02/12/22 1359    02/07/22 0921  ceFAZolin Sodium-Dextrose (ANCEF) IVPB (duplex) 2 g        Ordering Provider: To Christopher MD    2 g Intravenous Every 8 Hours 02/07/22 1100 03/07/22 1059    02/02/22 1036  cefepime 2 gm IVPB in 100 ml NS (VTB)        Ordering Provider: To Christopher MD    2 g  over 30 Minutes Intravenous Once 02/02/22 1300 02/02/22 1231    01/29/22 1259  metroNIDAZOLE (FLAGYL) 500 mg/100mL IVPB        Ordering Provider: Mercedes Weber PA    500 mg  over 30 Minutes Intravenous Every 8 Hours 01/29/22 1500 02/05/22 1459    01/26/22 1039  Cefepime HCl (MAXIPIME) 1,778 mg in sodium chloride 0.9 % 100 mL IVPB        Ordering Provider: To Christopher MD   \"Followed by\" Linked Group Details    200 mL/hr over 30 Minutes Intravenous Once 01/26/22 1600 01/26/22 1605    01/26/22 1039  Cefepime HCl (MAXIPIME) 200 mg in sodium chloride 0.9 % 50 mL IVPB   " "     Ordering Provider: To Christopher MD   \"Followed by\" Linked Group Details    100 mL/hr over 30 Minutes Intravenous Once 01/26/22 1400 01/26/22 1512    01/26/22 1039  Cefepime HCl (MAXIPIME) 20 mg in sodium chloride 0.9 % 50 mL IVPB        Ordering Provider: To Christopher MD   \"Followed by\" Linked Group Details    100 mL/hr over 30 Minutes Intravenous Once 01/26/22 1300 01/26/22 1400    01/26/22 1039  Cefepime HCl (MAXIPIME) 2 mg in sodium chloride 0.9 % 50 mL IVPB        Ordering Provider: To Christopher MD   \"Followed by\" Linked Group Details    100 mL/hr over 30 Minutes Intravenous Once 01/26/22 1200 01/26/22 1316    01/22/22 0039  aztreonam (AZACTAM) 2 g in sodium chloride 0.9 % 100 mL IVPB-VTB        Ordering Provider: Sebastian Baker MD    2 g  200 mL/hr over 30 Minutes Intravenous Once 01/22/22 0130 01/22/22 0144        Current Diuretic Therapy:  Diuretics (From admission, onward)    Ordered     Dose/Rate Route Frequency Start Stop    01/30/22 1001  furosemide (LASIX) injection 40 mg        Ordering Provider: Ayana Sterling APRN    40 mg Intravenous Once 01/30/22 1100 01/30/22 1234    01/28/22 1141  spironolactone (ALDACTONE) tablet 25 mg        Ordering Provider: Elkin Collins MD    25 mg Oral Daily 01/28/22 1300      01/28/22 1141  furosemide (LASIX) injection 40 mg        Ordering Provider: Elkin Collins MD    40 mg Intravenous Every 12 Hours 01/28/22 1230 01/29/22 0018    01/27/22 1037  furosemide (LASIX) injection 40 mg        Ordering Provider: Elkin Collins MD    40 mg Intravenous Every 12 Hours 01/27/22 1130 01/27/22 2357    01/25/22 1552  furosemide (LASIX) injection 60 mg        Ordering Provider: Elkin Collins MD    60 mg Intravenous Every 12 Hours 01/25/22 1645 01/26/22 1644    01/25/22 1604  furosemide (LASIX) 10 MG/ML injection  - ADS Override Pull        Note to Pharmacy: Created by cabinet override   Ordering Provider: Kavitha Capone RN       " 01/25/22 1604 01/25/22 1609        ----------------------------------------------------------------------------------------------------------------------  Vital Signs:  Temp:  [98.1 °F (36.7 °C)-99.4 °F (37.4 °C)] 98.7 °F (37.1 °C)  Heart Rate:  [104-134] 128  Resp:  [17-24] 24  BP: (140-198)/() 158/68  SpO2:  [93 %-97 %] 96 %  on   ;   Device (Oxygen Therapy): room air  Body mass index is 30.1 kg/m².    Wt Readings from Last 3 Encounters:   02/07/22 101 kg (222 lb 0.1 oz)   01/19/22 106 kg (233 lb)   12/14/21 106 kg (233 lb 9.6 oz)     Intake & Output (last 3 days)       02/04 0701  02/05 0700 02/05 0701  02/06 0700 02/06 0701  02/07 0700 02/07 0701  02/08 0700    I.V. (mL/kg) 784 (7.9) 1416 (14.3) 450.3 (4.5)     Other  30 30     IV Piggyback 1400 100 700 100    Total Intake(mL/kg) 2184 (22.1) 1546 (15.6) 1180.3 (11.7) 100 (1)    Urine (mL/kg/hr) 1575 (0.7) 1750 (0.7) 1925 (0.8)     Stool 0 0 0     Total Output 1575 1750 1925     Net +776 -204 -304.7 +100            Stool Unmeasured Occurrence 0 x  1 x         NPO Diet  ----------------------------------------------------------------------------------------------------------------------  Physical exam:  Constitutional:  Well-developed and well-nourished.  No respiratory distress.      HENT:  Head:  Normocephalic and atraumatic.  Mouth:  Moist mucous membranes.    Eyes:  Conjunctivae and EOM are normal. No scleral icterus.    Neck:  Neck supple.  No JVD present.    Cardiovascular:  Normal rate, regular rhythm and normal heart sounds with no murmur.  Pulmonary/Chest:  No respiratory distress, no wheezes, no crackles, with normal breath sounds and good air movement.  Abdominal:  Soft.  Bowel sounds are normal.  No distension and no tenderness.   Musculoskeletal:  No edema, no tenderness, and no deformity.  No red or swollen joints anywhere.    Neurological:  Not oriented or interactive. Eyes open. Does not track to movements or sounds.   Skin:  Skin is warm and  dry. No rash noted. No pallor.   Peripheral vascular:  Pulses in all 4 extremities with no clubbing, no cyanosis, no edema.  ----------------------------------------------------------------------------------------------------------------------  Tele:    ----------------------------------------------------------------------------------------------------------------------  Results from last 7 days   Lab Units 02/07/22  0121 02/06/22  0150 02/05/22  0046 02/04/22  0246 02/03/22  1111   LACTATE mmol/L  --   --   --   --  0.9   WBC 10*3/mm3 14.32* 12.17* 11.63*   < >  --    HEMOGLOBIN g/dL 11.8* 11.9* 11.9*   < >  --    HEMATOCRIT % 39.7 40.2 40.5   < >  --    MCV fL 82.5 82.7 83.5   < >  --    MCHC g/dL 29.7* 29.6* 29.4*   < >  --    PLATELETS 10*3/mm3 485* 409 343   < >  --     < > = values in this interval not displayed.     Results from last 7 days   Lab Units 02/03/22  1112   PH, ARTERIAL pH units 7.480*   PO2 ART mm Hg 88.7   PCO2, ARTERIAL mm Hg 32.6*   HCO3 ART mmol/L 24.2     Results from last 7 days   Lab Units 02/07/22  0121 02/06/22  1726 02/06/22  0150 02/05/22  0046 02/05/22  0046 02/04/22  1249 02/04/22  0246 02/02/22  0008 01/31/22  2331   SODIUM mmol/L 143  --  147*  --  146*  --    < >   < > 144   POTASSIUM mmol/L 3.5 3.6 3.1*   < > 3.1*  --    < >   < > 4.0   MAGNESIUM mg/dL 2.0  --   --   --   --  2.0  --   --  2.1   CHLORIDE mmol/L 113*  --  116*  --  114*  --    < >   < > 107   CO2 mmol/L 17.2*  --  16.9*  --  17.8*  --    < >   < > 25.8   BUN mg/dL 9  --  10  --  11  --    < >   < > 15   CREATININE mg/dL 0.50*  --  0.56*  --  0.52*  --    < >   < > 0.58   EGFR IF NONAFRICN AM mL/min/1.73 128  --  112  --  122  --    < >   < > 108   CALCIUM mg/dL 9.5  --  9.7  --  9.9  --    < >   < > 10.3   PHOSPHORUS mg/dL 4.1  --   --   --   --   --   --   --  4.6*   GLUCOSE mg/dL 153*  --  158*  --  159*  --    < >   < > 259*   ALBUMIN g/dL 3.51  --  3.36*  --  3.45*  --    < >   < >  --    BILIRUBIN mg/dL 0.2   --  0.2  --  0.3  --    < >   < >  --    ALK PHOS U/L 110  --  109  --  107  --    < >   < >  --    AST (SGOT) U/L 14  --  20  --  19  --    < >   < >  --    ALT (SGPT) U/L 24  --  27  --  29  --    < >   < >  --     < > = values in this interval not displayed.   Estimated Creatinine Clearance: 169.2 mL/min (A) (by C-G formula based on SCr of 0.5 mg/dL (L)).  No results found for: AMMONIA              Glucose   Date/Time Value Ref Range Status   02/07/2022 1213 150 (H) 70 - 130 mg/dL Final     Comment:     Meter: [id number] : [id number] GERI BRANSTUTTER   02/07/2022 0612 157 (H) 70 - 130 mg/dL Final     Comment:     Meter: [id number] : [id number] summer butain   02/07/2022 0022 132 (H) 70 - 130 mg/dL Final     Comment:     Meter: [id number] : [id number] summer butain   02/06/2022 1707 133 (H) 70 - 130 mg/dL Final     Comment:     Meter: [id number] : [id number] GERI BRANSTUTTER   02/06/2022 1103 156 (H) 70 - 130 mg/dL Final     Comment:     Meter: [id number] : [id number] GERI BRANSTUTTER   02/06/2022 0635 153 (H) 70 - 130 mg/dL Final     Comment:     Meter: [id number] : [id number] summer butain   02/06/2022 0011 136 (H) 70 - 130 mg/dL Final     Comment:     Meter: [id number] : [id number] summer butain   02/05/2022 1622 125 70 - 130 mg/dL Final     Comment:     Meter: [id number] : [id number] Suresh Lindsey     Lab Results   Component Value Date    TSH 2.290 01/19/2022     No results found for: PREGTESTUR, PREGSERUM, HCG, HCGQUANT  Pain Management Panel     Pain Management Panel Latest Ref Rng & Units 1/19/2022    AMPHETAMINES SCREEN, URINE Negative Negative    BARBITURATES SCREEN Negative Negative    BENZODIAZEPINE SCREEN, URINE Negative Positive(A)    BUPRENORPHINEUR Negative Negative    COCAINE SCREEN, URINE Negative Negative    METHADONE SCREEN, URINE Negative Negative    METHAMPHETAMINEUR Negative Negative        Brief Urine Lab Results  (Last result in the past 365  days)      Color   Clarity   Blood   Leuk Est   Nitrite   Protein   CREAT   Urine HCG        01/19/22 2010 Dark Yellow   Clear   Negative   Trace   Negative   100 mg/dL (2+)               No results found for: BLOODCX  No results found for: URINECX  No results found for: WOUNDCX  No results found for: STOOLCX  No results found for: RESPCX  No results found for: AFBCX  Results from last 7 days   Lab Units 02/03/22  1111   LACTATE mmol/L 0.9       I have personally looked at the labs and they are summarized above.  ----------------------------------------------------------------------------------------------------------------------  Detailed radiology reports for the last 24 hours:    Imaging Results (Last 24 Hours)     Procedure Component Value Units Date/Time    MRI Brain With & Without Contrast [[id number]] Resulted: 02/07/22 1054     Updated: 02/07/22 1205        Assessment & Plan    #Septic Shock, Acute Metabolic Encephalopathy & Acute Hypoxic Respiratory Failure requiring Intubation and Mechanical Ventilation 2/2 PNA, Bacterial, treating for MDR Organisms in setting of underlying COPD without acute exacerbation and Obstructive Sleep Apnea - Acute Metabolic Encephalopathy Persisting   - Patient presents w/ abrupt onset altered mental status, WBC count > 12K, heart rate > 90, Pneumonia  on imaging, SBP < 90 requiring IV pressors, intubated for airway protection, etiology of altered mental status suspected infectious/sepsis though has had persisting encephalopathy while on ventilator, repeat head CT from 1/19 to 1/28 was stable, no acute intracranial abnormalities, EEG's without epileptiform activity only generalized slowing.  1/31 off sedation thought to have lateral gaze deficits, repeat CTA's and EEGs without definitive etiology  - LP not consistent with infection. PCR negative. THEO negative. CSF NMDA negative.   - No obvious drug overdose causing toxic encephalopathy. Gabapentin level on admission not detected.    - Pulmonary consulted; Following at a distance now, extubated 2/1  - Infectious Disease consulted; Following  - TeleNeuro consulted; Following. Recommended to empirically start keppra.   - Highest on differential currently is PRES, paraneoplastic process, myelitis of some kind, stroke not seen on CT or even tetanus.   - Unclear when last vaccine was and significant SE wounds. Has not been treated with consistent flagyl. Will restart.   - Will start cardene gtt for closer BP control. Goal -140.   - Lumbar puncture without growth, encephalitis panel negative, cytology with elevated glucose and protein, unable to perform Cellblock; hopefully with improvement can repeat in a day or so pending MRI and improvement with BP control.   - Continue Vancomycin, Cefepime and Flagyl per Infectious Disease   - Continue APAP PRN for fevers, Duonebs as needed  - Monitor in CCU, monitor on telemetry, off oxygen, awaiting mentation improvement,       #Relapsing and Remitting Lower Extremity Wounds/Ulcers now with Osteomyelitis L great toe and bilateral cellulitis   - Patient reported 2 year history lower extremity wounds, follows in wound care clinic, cultures from initial biopsy grew MSSA  - Rheumatology panel negative with normal C and P-ANCA, Anti-CCP, SSA/SSB, Atypical P-ANCA, Anti-Hu Ab, paraneoplastic panel pending but is send out test  - Bone scan concern for osteomyelitis  - General Surgery consulted; Followed, status post debridement 1/22, signed off, re-evaluated 1/30 and recommended no surgical intervention.  - Infectious Disease consulted; Following as per above, continue antibiotics as per above     #Hypertension/Hyperlipidemia/Peripheral Vascular Disease   #Atrial Fibrillation w/ RVR, New onset (CHADsVasc = 3)  #NSTEMI Type II due to Atrial Fibrillation   - Patient with noted Atrial Fibrillation with RVR in emergency room on EKG, recurrent heart rate up to 160's.  - Labs showed troponins <0.010 -> 0.036 ->  <0.010, proBNP 4800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

## 2022-02-08 NOTE — PROGRESS NOTES
LOS: 17 days     Name: Lynette Stein  Age/Sex: 56 y.o. female  :  1965        PCP: Orville Wu PA  REF: No Known Provider    Active Problems:    Encephalopathy acute      Reason for follow-up: Uncontrolled hypertension and atrial fibrillation     Subjective       Subjective     Lynette Stein is a 56 y.o. female with a past medical history significant for hypertension, dyslipidemia, COPD, type 2 diabetes, anxiety/depression, leukemia, obstructive sleep apnea, and bilateral leg ulcerations.  She initially presented to the [facility] ED on 2022 with altered mental status.  Patient developed atrial fibrillation with RVR are in the ED    Interval History: Patient alert but does not follow commands. Currently in normal sinus rhythm with no recurrence of atrial fibrillation. On IV heparin and IV Cardene. Blood pressure stable. MRI of the brain showed areas of enhancement along cortical, subcortical and periventricular white matter consistent with subacute infarcts.      Vital Signs  Temp:  [98.1 °F (36.7 °C)-100.7 °F (38.2 °C)] 99.3 °F (37.4 °C)  Heart Rate:  [] 95  Resp:  [20-26] 24  BP: (110-173)/(56-85) 117/67     Vital Signs (last 72 hrs)       02/ 0700  02/06 0659  0700   0659  0700   0659  0700   1030   Most Recent      Temp (°F) 98.3 -  99.3    98.1 -  99.4    98.1 -  100.7      99.3     99.3 (37.4)  0800    Heart Rate 96 -  130    104 -  134    115 -  134    95 -  124     95  0933    Resp 15 -  26    17 -  24    20 -  26      24     24 / 0903    /69 -  197/121    140/78 -  198/100    110/65 -  182/97    112/72 -  133/76     117/67  0933    SpO2 (%) 91 -  98    93 -  97    90 -  97    94 -  96     94 933        Documented weights    22 0029 22 0602 22 0602 22 05   Weight: 96.6 kg (212 lb 15.4 oz) 97.2 kg (214 lb 4.6 oz) 97 kg (213 lb 13.5 oz) 97.8 kg (215 lb 9.8 oz)    22 0506  01/26/22 0700 01/28/22 0600 01/29/22 0600   Weight: 99.8 kg (220 lb 1.6 oz) 98.9 kg (218 lb) 101 kg (223 lb 12.3 oz) 98.6 kg (217 lb 6 oz)    01/30/22 0535 02/02/22 0602 02/03/22 0600 02/04/22 0400   Weight: 102 kg (224 lb 3.3 oz) 97.6 kg (215 lb 2.7 oz) 101 kg (223 lb 1.7 oz) 99.7 kg (219 lb 12.8 oz)    02/05/22 0600 02/06/22 0600 02/07/22 0600 02/08/22 0600   Weight: 99 kg (218 lb 4.1 oz) 99 kg (218 lb 4.1 oz) 101 kg (222 lb 0.1 oz) 101 kg (222 lb 10.6 oz)      Body mass index is 30.19 kg/m².    Intake/Output Summary (Last 24 hours) at 2/8/2022 1030  Last data filed at 2/8/2022 0400  Gross per 24 hour   Intake 1739.84 ml   Output 2425 ml   Net -685.16 ml     Objective    Objective     I have seen and examined Ms. Stein today.  Physical Exam:     General Appearance:    Alert, but confused.  In no acute respiratory distress.   Head:    Normocephalic, without obvious abnormality, atraumatic   Neck:   No adenopathy, supple, trachea midline, no thyromegaly, no   carotid bruit, no JVD   Lungs:     Clear to auscultation,respirations regular, even and                  unlabored    Heart:    Regular rhythm and normal rate, normal S1 and S2, no            murmur, no gallop, no rub, no click   Chest Wall:    No abnormalities observed   Abdomen:     Normal bowel sounds, no masses, no organomegaly, soft        non-tender, non-distended, no guarding, no rebound                tenderness   Extremities:   Flaccid on right upper and lower extremity, no edema, has dressing on both feet.        Neurologic:   Alert but unable to follow commands.  Has weakness of the right upper and lower extremity with high-grade 0/5 power in the right upper extremity.     Results review       Results Review:   Results from last 7 days   Lab Units 02/08/22  0229 02/07/22  0121 02/06/22  0150 02/05/22  0046 02/04/22  0246 02/03/22  0025 02/02/22  0008   WBC 10*3/mm3 9.10 14.32* 12.17* 11.63* 12.22* 14.09* 12.67*   HEMOGLOBIN g/dL 11.6* 11.8* 11.9*  11.9* 12.6 12.0 11.6*   PLATELETS 10*3/mm3 401 485* 409 343 461* 472* 421     Results from last 7 days   Lab Units 02/08/22  0229 02/07/22  1719 02/07/22  0121 02/06/22  1726 02/06/22  0150 02/05/22  0046 02/04/22  0246 02/03/22  0025 02/03/22  0025 02/02/22  1118 02/02/22  0008   SODIUM mmol/L 145  --  143  --  147* 146* 144  --  143  --  143   POTASSIUM mmol/L 3.4* 3.6 3.5 3.6 3.1* 3.1* 3.7   < > 3.7   < > 3.4*   CHLORIDE mmol/L 113*  --  113*  --  116* 114* 109*  --  110*  --  107   CO2 mmol/L 18.2*  --  17.2*  --  16.9* 17.8* 20.9*  --  20.2*  --  22.8   BUN mg/dL 8  --  9  --  10 11 13  --  15  --  15   CREATININE mg/dL 0.50*  --  0.50*  --  0.56* 0.52* 0.56*  --  0.54*  --  0.61   CALCIUM mg/dL 9.3  --  9.5  --  9.7 9.9 10.4  --  10.2  --  10.1   GLUCOSE mg/dL 167*  --  153*  --  158* 159* 183*  --  261*  --  232*   ALT (SGPT) U/L 27  --  24  --  27 29 33  --  31  --  40*   AST (SGOT) U/L 21  --  14  --  20 19 22  --  23  --  31    < > = values in this interval not displayed.         Lab Results   Component Value Date    INR 1.18 (H) 01/22/2022    INR 1.07 01/19/2022    INR 0.89 12/01/2016     Lab Results   Component Value Date    MG 2.0 02/07/2022    MG 2.0 02/04/2022    MG 2.1 01/31/2022     Lab Results   Component Value Date    TSH 2.290 01/19/2022    TRIG 335 (H) 01/22/2022    HDL 32 (L) 01/22/2022    LDL 96 01/22/2022      Imaging Results (Last 48 Hours)     Procedure Component Value Units Date/Time    XR Chest 1 View [[id number]] Collected: 02/08/22 0831     Updated: 02/08/22 0834    Narrative:      EXAM:    XR Chest, 1 View     EXAM DATE:    2/8/2022 7:11 AM     CLINICAL HISTORY:    NG tube placement     TECHNIQUE:    Frontal view of the chest.     COMPARISON:    02/07/2022     FINDINGS:    Lungs:  Minimal right basilar atelectasis.    Pleural space:  Unremarkable.  No pneumothorax.    Heart:  Unremarkable.  No cardiomegaly.    Mediastinum:  Unremarkable.    Bones/joints:  Unremarkable.    Tubes, lines  and devices:  Feeding tube is been repositioned with tip  in the mid stomach region.  Portions of left PICC line are again seen at  the cavoatrial junction.    Upper abdomen:  Eventration of the right hemidiaphragm is stable.       Impression:      1.  Tip of feeding tube which projects in the mid stomach region.  2.  Otherwise stable chest.     This report was finalized on 2/8/2022 8:31 AM by Dr. Jayce Lord MD.       XR Chest 1 View [[id number]] Collected: 02/08/22 0829     Updated: 02/08/22 0833    Narrative:      EXAM:    XR Chest, 1 View     EXAM DATE:    2/8/2022 7:56 AM     CLINICAL HISTORY:    Please get better view of right lung to rule out pneumo     TECHNIQUE:    Frontal view of the chest.     COMPARISON:    02/08/2022     FINDINGS:    Lungs:  Lungs are clear.    Pleural space:  Unremarkable.  No pneumothorax.    Heart:  Unremarkable.  No cardiomegaly.    Mediastinum:  Unremarkable.    Bones/joints:  Unremarkable.    Tubes, lines and devices:  Feeding tube extends into the stomach.   Left PICC line positioning is stable.       Impression:      1.  Support devices as detailed above.  2.  No acute cardiopulmonary findings. No radiographic evidence of  pneumothorax.     This report was finalized on 2/8/2022 8:31 AM by Dr. Jayce Lord MD.       XR Chest 1 View [[id number]] Collected: 02/07/22 1953     Updated: 02/07/22 1955    Narrative:      CR Chest 1 Vw    INDICATION:   Feeding tube placement     COMPARISON:    Chest x-ray 2/4/2022    FINDINGS:  Portable AP view(s) of the chest.    There is a feeding tube that terminates in the right lung fairly distally in a right lower lobe bronchus.    Portion of the right lung is not the field-of-view so I cannot exclude pneumothorax.. There is persistent elevation of the right hemidiaphragm. There is breathing motion and likely some atelectasis at least at the left base. No change in the visualized  left upper extremity PICC line.      Impression:      1. Feeding  tube terminates in the right lower lobe of the lung and should be removed. Please note that this film does not exclude the entirety the right hemithorax and therefore pneumothorax cannot be excluded. Follow-up chest film is recommended to  exclude pneumothorax after the feeding tube is removed.      NOTIFICATION: Critical Value/emergent results were called by telephone at the time of interpretation on 2/7/2022 7:51 PM to CCU nurse who verbally acknowledged these results. The patient's nurse is in the room with the patient. The nurse I spoke to  indicated that she would indicate to her that the feeding tube is in the wrong position in the right lung and should be removed.    Signer Name: Ora Corbett MD   Signed: 2/7/2022 7:53 PM   Workstation Name: [email or web address]    [facility]    MRI Brain With & Without Contrast [[id number]] Collected: 02/07/22 1230     Updated: 02/07/22 1237    Narrative:      EXAM:    MR Head Without and With Intravenous Contrast     EXAM DATE:    2/7/2022 10:54 AM     CLINICAL HISTORY:    Neuro deficit, acute, stroke suspected     TECHNIQUE:    Magnetic resonance images of the head/brain without and with  intravenous contrast in multiple planes.     COMPARISON:    CT 02/03/2022     FINDINGS:    Brain:  Subcortical enhancement is noted of the right occipital lobe  and is consistent with subacute infarct. No diffusion restriction  identified.  There is cortical and subcortical enhancement of the  superior parietal lobe at the level of the cerebral vertex also  consistent with subacute infarct etiology.  Left periventricular  enhancement is noted most consistent with subacute infarct etiology.  T2  shine through is noted but no restricted diffusion is identified.  No  hemorrhage.    Ventricles:  Unremarkable.  No ventriculomegaly.    Bones/joints:  Unremarkable.    Sinuses:  Unremarkable as visualized.  No acute sinusitis.    Mastoid air cells:  Mild bilateral mastoid  effusions.    Orbits:  Unremarkable as visualized.       Impression:      1.  Areas of enhancement involving cortical, subcortical, and  periventricular white matter as described above in a pattern and  distribution that is most consistent with subacute infarction. Findings  are most pronounced in the parietal lobes and right occipital region.  2.  No restricted diffusion to indicate acute infarct.  3.  Chronic small vessel ischemic disease.  4.  Mild bilateral mastoid effusions.     This report was finalized on 2/7/2022 12:34 PM by Dr. Jayce Lord MD.           Echo   Results for orders placed during the hospital encounter of 01/22/22    Adult Transthoracic Echo Limited W/ Cont if Necessary Per Protocol    Interpretation Summary  · Left ventricular wall thickness is consistent with mild concentric hypertrophy.  · Left ventricular ejection fraction appears to be 61 - 65%. Left ventricular systolic function is normal.  · Left ventricular diastolic dysfunction is noted.  · Saline test results are negative.  · This is a technically limited study with poor echo windows and no carotid Doppler.     I reviewed the patient's new clinical results.    Telemetry: NSR 80' s    Medication Review:   ceFAZolin, 2 g, Intravenous, Q8H  cloNIDine, 1 patch, Transdermal, Weekly  hydrALAZINE, 10 mg, Oral, Q8H  insulin aspart, 0-14 Units, Subcutaneous, TID AC  insulin detemir, 30 Units, Subcutaneous, Daily  levETIRAcetam, 500 mg, Intravenous, Q12H  losartan, 100 mg, Oral, Daily  metoprolol tartrate, 100 mg, Nasogastric, Q12H  metroNIDAZOLE, 500 mg, Intravenous, Q8H  pantoprazole, 40 mg, Intravenous, Q AM  pravastatin, 40 mg, Oral, Daily  pregabalin, 75 mg, Nasogastric, Q12H  QUEtiapine, 50 mg, Oral, Nightly  sodium chloride, 10 mL, Intravenous, Q12H  sodium chloride, 10 mL, Intravenous, Q12H  sodium chloride, 10 mL, Intravenous, Q12H  sodium chloride, 10 mL, Intravenous, Q12H  sodium chloride, 10 mL, Intravenous, Q12H  spironolactone,  25 mg, Oral, Daily  verapamil, 80 mg, Oral, Q8H        dexmedetomidine, 0.2-1.5 mcg/kg/hr, Last Rate: Stopped (02/06/22 1436)  heparin (porcine), 10.2 Units/kg/hr, Last Rate: 24.6 Units/kg/hr (02/08/22 0357)  hold, 1 each  niCARdipine, 5-15 mg/hr, Last Rate: 15 mg/hr (02/08/22 1020)  Pharmacy Consult,         Assessment      Assessment:  Paroxysmal atrial fibrillation, has been maintaining sinus rhythm.  1 episode of 12 beat run of wide-complex tachycardia about a week ago with no recurrence.   Acute encephalopathy of unclear etiology, being managed by the teleneurology services.  Uncontrolled hypertension, on IV nicardipine infusion with adequate blood pressure control at this time.    Plan     Recommendations:  Continue with IV heparin for stroke prevention and metoprolol for heart rate for her atrial and ventricular arrhythmias.  Continue with hydralazine, losartan, spironolactone and clonidine patch for hypertension.  Continue to adjust the dose of spironolactone as needed for hypertension.  Since patient's cardiac status otherwise appears relatively stable, will see her as needed.    Electronically signed by ALDEN Do, 02/08/22, 10:30 AM EST.    Electronically signed by Elkin Collins MD, 02/08/22, 12:28 PM EST.      Please note that portions of this note were completed with a voice recognition program.

## 2022-02-08 NOTE — PROGRESS NOTES
Stroke Progress Note       Chief Complaint: Altered mental status, acute encephalopathy    Subjective    Subjective     Subjective:  No acute events overnight, patient is ill but stable. When awake she does begin moving left arm again.  No movement noted in any other extremities.  Patient does track me on left side of room.  MRI brain obtained yesterday shows areas of enhancement along cortical, subcortical and periventricular white matter consistent with subacute infarcts.     Review of Systems   Unable to perform ROS: Patient nonverbal            Objective    Objective      Temp:  [98.1 °F (36.7 °C)-100.7 °F (38.2 °C)] 99.3 °F (37.4 °C)  Heart Rate:  [] 95  Resp:  [20-26] 24  BP: (110-173)/(56-85) 117/67        Neurological Exam  Mental Status  Awake. Patient is nonverbal.  Patient is alert, makes eye contact, regards me only on left side of the room..    Cranial Nerves  CN II: Patient blinks to visual threat on the left, no response to visual threat on the right.  CN III, IV, VI: Pupils equal round and reactive to light bilaterally. Patient has left gaze preference, appears to regard me on her left side, does not cross midline to the right.  CN V:  Right: Normal corneal reflex.  Left: Normal corneal reflex.  CN VII: No obvious facial droop.    Motor  Normal muscle bulk throughout. Decreased muscle tone. No abnormal involuntary movements.  Patient occasionally moves left arm when awake, she pushes against me when I raise her left arm.  She does not squeeze my hand on command. .    Sensory  Patient grimaces to nailbed pressure bilaterally, she withdraws left hand in response to nailbed pressure.    Reflexes                                           Right                      Left  Patellar                                2+                         1+      Physical Exam  Vitals and nursing note reviewed.   Constitutional:       General: She is awake.      Appearance: She is obese. She is ill-appearing.   HENT:       Head: Normocephalic and atraumatic.      Mouth/Throat:      Mouth: Mucous membranes are dry.      Pharynx: Oropharynx is clear.   Eyes:      Pupils: Pupils are equal, round, and reactive to light.   Cardiovascular:      Rate and Rhythm: Regular rhythm. Tachycardia present.   Pulmonary:      Effort: Pulmonary effort is normal. No respiratory distress.   Skin:     General: Skin is warm and dry.   Neurological:      Deep Tendon Reflexes:      Reflex Scores:       Patellar reflexes are 2+ on the right side and 1+ on the left side.        Hospital Meds:  Scheduled- ceFAZolin, 2 g, Intravenous, Q8H  cloNIDine, 1 patch, Transdermal, Weekly  hydrALAZINE, 10 mg, Oral, Q8H  insulin aspart, 0-14 Units, Subcutaneous, TID AC  insulin detemir, 30 Units, Subcutaneous, Daily  levETIRAcetam, 500 mg, Intravenous, Q12H  losartan, 100 mg, Oral, Daily  metoprolol tartrate, 100 mg, Nasogastric, Q12H  metroNIDAZOLE, 500 mg, Intravenous, Q8H  pantoprazole, 40 mg, Intravenous, Q AM  pravastatin, 40 mg, Oral, Daily  pregabalin, 75 mg, Nasogastric, Q12H  QUEtiapine, 50 mg, Oral, Nightly  sodium chloride, 10 mL, Intravenous, Q12H  sodium chloride, 10 mL, Intravenous, Q12H  sodium chloride, 10 mL, Intravenous, Q12H  sodium chloride, 10 mL, Intravenous, Q12H  sodium chloride, 10 mL, Intravenous, Q12H  spironolactone, 25 mg, Oral, Daily  verapamil, 80 mg, Oral, Q8H      Infusions- dexmedetomidine, 0.2-1.5 mcg/kg/hr, Last Rate: Stopped (02/06/22 1436)  heparin (porcine), 10.2 Units/kg/hr, Last Rate: 24.6 Units/kg/hr (02/08/22 0357)  hold, 1 each  niCARdipine, 5-15 mg/hr, Last Rate: 15 mg/hr (02/08/22 0807)  Pharmacy Consult,        PRNs- •  acetaminophen  •  artificial tears  •  dextrose  •  dextrose  •  glucagon (human recombinant)  •  heparin (porcine)  •  heparin (porcine)  •  hold  •  hydrALAZINE  •  LORazepam  •  magnesium sulfate **OR** magnesium sulfate **OR** magnesium sulfate  •  metoprolol tartrate  •  Pharmacy Consult  •  potassium  chloride **OR** potassium chloride **OR** [DISCONTINUED] potassium chloride  •  potassium chloride  •  sodium chloride  •  sodium chloride  •  sodium chloride  •  sodium chloride    Results Review:    I reviewed the patient's new clinical results.    MRI Brain With & Without Contrast [[id number]] Kvng as Reviewed   Order Status: Completed Collected: 02/07/22 1230    Updated: 02/07/22 1237   Narrative:     EXAM:     MR Head Without and With Intravenous Contrast       EXAM DATE:     2/7/2022 10:54 AM       CLINICAL HISTORY:     Neuro deficit, acute, stroke suspected       TECHNIQUE:     Magnetic resonance images of the head/brain without and with   intravenous contrast in multiple planes.       COMPARISON:     CT 02/03/2022       FINDINGS:     Brain:  Subcortical enhancement is noted of the right occipital lobe   and is consistent with subacute infarct. No diffusion restriction   identified.  There is cortical and subcortical enhancement of the   superior parietal lobe at the level of the cerebral vertex also   consistent with subacute infarct etiology.  Left periventricular   enhancement is noted most consistent with subacute infarct etiology.  T2   shine through is noted but no restricted diffusion is identified.  No   hemorrhage.     Ventricles:  Unremarkable.  No ventriculomegaly.     Bones/joints:  Unremarkable.     Sinuses:  Unremarkable as visualized.  No acute sinusitis.     Mastoid air cells:  Mild bilateral mastoid effusions.     Orbits:  Unremarkable as visualized.       Impression:     1.  Areas of enhancement involving cortical, subcortical, and   periventricular white matter as described above in a pattern and   distribution that is most consistent with subacute infarction. Findings   are most pronounced in the parietal lobes and right occipital region.   2.  No restricted diffusion to indicate acute infarct.   3.  Chronic small vessel ischemic disease.   4.  Mild bilateral mastoid effusions.           Transthoracic Echo:    Interpretation Summary    Left ventricular wall thickness is consistent with mild concentric hypertrophy.  Left ventricular ejection fraction appears to be 61 - 65%. Left ventricular systolic function is normal.  Left ventricular diastolic dysfunction is noted.  Saline test results are negative.  This is a technically limited study with poor echo windows and no carotid Doppler.              Assessment/Plan     Assessment/Plan:      Subacute strokes right occipital lobe, cortical and subcortical stroke superior parietal lobe.  Left gaze preference and right-sided hemiplegia noted when sedation was weaned, still concern for PRES.  Initial CT of head was highly degraded by motion, however patient was then sedated and repeat head CT did not show any acute abnormalities.  Unable to obtain MRI due to restlessness. Patient currently being treated for pneumonia and septic shock, however this is out of proportion to her encephalopathy.  Family repeatedly denies any recreational drug use, urine toxicology was unremarkable.   Patient found to be in proximal A. fib while in ED,  was on heparin drip which was paused pending LP, and has now been resumed, cardiology is following.  Initial and repeat EEG showed moderate diffuse cerebral dysfunction, no epilepsy.  Lumbar puncture on 1/20/2022 ruled out meningitis/encephalitis.   Paroxysmal atrial fibrillation  Obesity, BMI 30.10  Diabetes mellitus type 2  Mixed hyperlipidemia  Essential hypertension  Osteomyelitis  -Heparin drip restarted  -Repeat CT head without on 2/3/2022 was stable, no evidence of ischemic event, scan degraded by motion  -Functional prognosis unfortunately remains guarded  -Continue pravastatin 40 mg daily  -Keep blood pressure between 100 and 140 systolic  -Continue Keppra 500 mg twice daily for 3 to 6 months  -Verapamil 80 mg 3 times daily  -Recommend transfer for continuous EEG monitoring  -Tetanus unlikely based on physical  exam, no obvious trismus nor diffuse muscle spasm, recent MRI results show etiology consistent with her symptoms.  Currently do not recommend tetanus immunoglobulin, however we do recommend immunization due to her wounds  -Continue anticoagulation  -Continue supportive care  -Functional prognosis remains guarded there is concern for chronic encephalopathy however patient may continue to slowly improve  -We will attempt to speak with her  this afternoon      The case was discussed with the hospitalist team.  Neurology will continue to follow.      Jayce Avelar, ALDEN  02/08/22  10:08 EST    This was an audio/video enabled encounter.  Dr. Schroeder present during encounter via telemedicine.

## 2022-02-08 NOTE — PROGRESS NOTES
[facility] HOSPITALIST PROGRESS NOTE     Patient Identification:  Name:  Lynette Stein  Age:  56 y.o.  Sex:  female  :  1965  MRN:  [id number]  Visit Number:  [id number]  ROOM: [address]     Primary Care Provider:  Orville Wu PA    Length of stay in inpatient status:  17    Subjective     Chief Compliant:  No chief complaint on file.      History of Presenting Illness:    Patient still nonverbal and not interactive. Did open her eyes up to sound. Not having rhythmic motion of left arm today. LE also did seem hyper reflexive today.     I discussed case with neurology who recommended transfer for continuous EEG. Accepted by [facility] and .  neurologist, Dr. Peguero, noted that it may not  from his perspective. These calls took around a total of 1 hour.     I Discussed plan of care with patient's . He was agreeable to potential transfer to [facility] or .     ROS:  Otherwise 10 point ROS negative other than documented above in HPI.     Objective     Current Hospital Meds:ceFAZolin, 2 g, Intravenous, Q8H  cloNIDine, 1 patch, Transdermal, Weekly  hydrALAZINE, 10 mg, Oral, Q8H  insulin aspart, 0-14 Units, Subcutaneous, TID AC  insulin detemir, 30 Units, Subcutaneous, Daily  levETIRAcetam, 500 mg, Intravenous, Q12H  losartan, 100 mg, Oral, Daily  metoprolol tartrate, 100 mg, Nasogastric, Q12H  metroNIDAZOLE, 500 mg, Intravenous, Q8H  pantoprazole, 40 mg, Intravenous, Q AM  pravastatin, 40 mg, Oral, Daily  pregabalin, 75 mg, Nasogastric, Q12H  QUEtiapine, 50 mg, Oral, Nightly  sodium chloride, 10 mL, Intravenous, Q12H  sodium chloride, 10 mL, Intravenous, Q12H  sodium chloride, 10 mL, Intravenous, Q12H  sodium chloride, 10 mL, Intravenous, Q12H  sodium chloride, 10 mL, Intravenous, Q12H  spironolactone, 25 mg, Oral, Daily  verapamil, 80 mg, Oral, Q8H    dexmedetomidine, 0.2-1.5 mcg/kg/hr, Last Rate: Stopped (22 1436)  heparin (porcine), 10.2  "Units/kg/hr, Last Rate: 24.6 Units/kg/hr (02/08/22 0357)  hold, 1 each  niCARdipine, 5-15 mg/hr, Last Rate: 15 mg/hr (02/08/22 1020)  Pharmacy Consult,         Current Antimicrobial Therapy:  Anti-Infectives (From admission, onward)    Ordered     Dose/Rate Route Frequency Start Stop    02/07/22 1143  metroNIDAZOLE (FLAGYL) 500 mg/100mL IVPB        Ordering Provider: Avery Horan MD    500 mg  over 60 Minutes Intravenous Every 8 Hours 02/07/22 1400 02/12/22 1359    02/07/22 0921  ceFAZolin Sodium-Dextrose (ANCEF) IVPB (duplex) 2 g        Ordering Provider: To Christopher MD    2 g Intravenous Every 8 Hours 02/07/22 1100 03/07/22 1059    02/02/22 1036  cefepime 2 gm IVPB in 100 ml NS (VTB)        Ordering Provider: To Christopher MD    2 g  over 30 Minutes Intravenous Once 02/02/22 1300 02/02/22 1231    01/29/22 1259  metroNIDAZOLE (FLAGYL) 500 mg/100mL IVPB        Ordering Provider: Mercedes Weber PA    500 mg  over 30 Minutes Intravenous Every 8 Hours 01/29/22 1500 02/05/22 1459    01/26/22 1039  Cefepime HCl (MAXIPIME) 1,778 mg in sodium chloride 0.9 % 100 mL IVPB        Ordering Provider: To Christopher MD   \"Followed by\" Linked Group Details    200 mL/hr over 30 Minutes Intravenous Once 01/26/22 1600 01/26/22 1605    01/26/22 1039  Cefepime HCl (MAXIPIME) 200 mg in sodium chloride 0.9 % 50 mL IVPB        Ordering Provider: To Christopher MD   \"Followed by\" Linked Group Details    100 mL/hr over 30 Minutes Intravenous Once 01/26/22 1400 01/26/22 1512    01/26/22 1039  Cefepime HCl (MAXIPIME) 20 mg in sodium chloride 0.9 % 50 mL IVPB        Ordering Provider: To Christopher MD   \"Followed by\" Linked Group Details    100 mL/hr over 30 Minutes Intravenous Once 01/26/22 1300 01/26/22 1400    01/26/22 1039  Cefepime HCl (MAXIPIME) 2 mg in sodium chloride 0.9 % 50 mL IVPB        Ordering Provider: To Christopher MD   \"Followed by\" Linked Group Details    100 mL/hr over 30 " Minutes Intravenous Once 01/26/22 1200 01/26/22 1316    01/22/22 0039  aztreonam (AZACTAM) 2 g in sodium chloride 0.9 % 100 mL IVPB-VTB        Ordering Provider: Sebastian Baker MD    2 g  200 mL/hr over 30 Minutes Intravenous Once 01/22/22 0130 01/22/22 0144        Current Diuretic Therapy:  Diuretics (From admission, onward)    Ordered     Dose/Rate Route Frequency Start Stop    01/30/22 1001  furosemide (LASIX) injection 40 mg        Ordering Provider: Ayana Sterling APRN    40 mg Intravenous Once 01/30/22 1100 01/30/22 1234    01/28/22 1141  spironolactone (ALDACTONE) tablet 25 mg        Ordering Provider: Elkin Collins MD    25 mg Oral Daily 01/28/22 1300      01/28/22 1141  furosemide (LASIX) injection 40 mg        Ordering Provider: Elkin Collins MD    40 mg Intravenous Every 12 Hours 01/28/22 1230 01/29/22 0018    01/27/22 1037  furosemide (LASIX) injection 40 mg        Ordering Provider: Elkin Collins MD    40 mg Intravenous Every 12 Hours 01/27/22 1130 01/27/22 2357    01/25/22 1552  furosemide (LASIX) injection 60 mg        Ordering Provider: Elkin Collins MD    60 mg Intravenous Every 12 Hours 01/25/22 1645 01/26/22 1644    01/25/22 1604  furosemide (LASIX) 10 MG/ML injection  - ADS Override Pull        Note to Pharmacy: Created by cabinet override   Ordering Provider: Kavitha Capone RN       01/25/22 1604 01/25/22 1609        ----------------------------------------------------------------------------------------------------------------------  Vital Signs:  Temp:  [98.1 °F (36.7 °C)-100.7 °F (38.2 °C)] 99.3 °F (37.4 °C)  Heart Rate:  [] 95  Resp:  [20-26] 24  BP: (110-173)/(56-85) 117/67  SpO2:  [90 %-97 %] 94 %  on   ;   Device (Oxygen Therapy): room air  Body mass index is 30.19 kg/m².    Wt Readings from Last 3 Encounters:   02/08/22 101 kg (222 lb 10.6 oz)   01/19/22 106 kg (233 lb)   12/14/21 106 kg (233 lb 9.6 oz)     Intake & Output (last 3 days)       02/05  0701 02/06 0700 02/06 0701  02/07 0700 02/07 0701  02/08 0700 02/08 0701 02/09 0700    I.V. (mL/kg) 1416 (14.3) 450.3 (4.5) 1089.8 (10.8)     Other 30 30      IV Piggyback 100 700 750     Total Intake(mL/kg) 1546 (15.6) 1180.3 (11.7) 1839.8 (18.2)     Urine (mL/kg/hr) 1750 (0.7) 1925 (0.8) 2425 (1)     Stool 0 0 0     Total Output 1750 1925 2425     Net -204 -744.7 -585.2             Stool Unmeasured Occurrence  1 x 0 x         NPO Diet  ----------------------------------------------------------------------------------------------------------------------  Physical exam:  Constitutional:  Well-developed and well-nourished.  No respiratory distress.      HENT:  Head:  Normocephalic and atraumatic.  Mouth:  Moist mucous membranes.    Eyes:  Conjunctivae and EOM are normal. No scleral icterus.    Neck:  Neck supple.  No JVD present.    Cardiovascular:  Normal rate, regular rhythm and normal heart sounds with no murmur.  Pulmonary/Chest:  No respiratory distress, no wheezes, no crackles, with normal breath sounds and good air movement.  Abdominal:  Soft.  Bowel sounds are normal.  No distension and no tenderness.   Musculoskeletal:  No edema, no tenderness, and no deformity.  No red or swollen joints anywhere.    Neurological:  Wakes up easily but not oriented. Rivera any new complaints.   Skin:  Skin is warm and dry. No rash noted. No pallor.   Peripheral vascular:  Pulses in all 4 extremities with no clubbing, no cyanosis, no edema.  ----------------------------------------------------------------------------------------------------------------------  Tele:    ----------------------------------------------------------------------------------------------------------------------  Results from last 7 days   Lab Units 02/08/22  0229 02/07/22  0121 02/06/22  0150 02/04/22  0246 02/03/22  1111   LACTATE mmol/L  --   --   --   --  0.9   WBC 10*3/mm3 9.10 14.32* 12.17*   < >  --    HEMOGLOBIN g/dL 11.6* 11.8* 11.9*   < >  --     HEMATOCRIT % 39.0 39.7 40.2   < >  --    MCV fL 82.1 82.5 82.7   < >  --    MCHC g/dL 29.7* 29.7* 29.6*   < >  --    PLATELETS 10*3/mm3 401 485* 409   < >  --     < > = values in this interval not displayed.     Results from last 7 days   Lab Units 02/03/22  1112   PH, ARTERIAL pH units 7.480*   PO2 ART mm Hg 88.7   PCO2, ARTERIAL mm Hg 32.6*   HCO3 ART mmol/L 24.2     Results from last 7 days   Lab Units 02/08/22  0229 02/07/22  1719 02/07/22  0121 02/06/22  1726 02/06/22  0150 02/05/22  0046 02/04/22  1249   SODIUM mmol/L 145  --  143  --  147*   < >  --    POTASSIUM mmol/L 3.4* 3.6 3.5   < > 3.1*   < >  --    MAGNESIUM mg/dL  --   --  2.0  --   --   --  2.0   CHLORIDE mmol/L 113*  --  113*  --  116*   < >  --    CO2 mmol/L 18.2*  --  17.2*  --  16.9*   < >  --    BUN mg/dL 8  --  9  --  10   < >  --    CREATININE mg/dL 0.50*  --  0.50*  --  0.56*   < >  --    EGFR IF NONAFRICN AM mL/min/1.73 128  --  128  --  112   < >  --    CALCIUM mg/dL 9.3  --  9.5  --  9.7   < >  --    PHOSPHORUS mg/dL  --   --  4.1  --   --   --   --    GLUCOSE mg/dL 167*  --  153*  --  158*   < >  --    ALBUMIN g/dL 3.52  --  3.51  --  3.36*   < >  --    BILIRUBIN mg/dL 0.3  --  0.2  --  0.2   < >  --    ALK PHOS U/L 104  --  110  --  109   < >  --    AST (SGOT) U/L 21  --  14  --  20   < >  --    ALT (SGPT) U/L 27  --  24  --  27   < >  --     < > = values in this interval not displayed.   Estimated Creatinine Clearance: 169.2 mL/min (A) (by C-G formula based on SCr of 0.5 mg/dL (L)).  No results found for: AMMONIA              Glucose   Date/Time Value Ref Range Status   02/08/2022 0648 157 (H) 70 - 130 mg/dL Final     Comment:     Meter: [id number] : [id number] summer amanda   02/08/2022 0115 159 (H) 70 - 130 mg/dL Final     Comment:     Meter: [id number] : [id number] summer amanda   02/07/2022 1736 158 (H) 70 - 130 mg/dL Final     Comment:     Meter: [id number] : [id number] GERI HOOVER   02/07/2022 1213 150 (H)  70 - 130 mg/dL Final     Comment:     Meter: [id number] : [id number] GERI HAYUTTER   02/07/2022 0612 157 (H) 70 - 130 mg/dL Final     Comment:     Meter: [id number] : [id number] summer amanda   02/07/2022 0022 132 (H) 70 - 130 mg/dL Final     Comment:     Meter: [id number] : [id number] summer butain   02/06/2022 1707 133 (H) 70 - 130 mg/dL Final     Comment:     Meter: [id number] : [id number] GERI HOOVER   02/06/2022 1103 156 (H) 70 - 130 mg/dL Final     Comment:     Meter: [id number] : [id number] GERI HOOVER     Lab Results   Component Value Date    TSH 2.290 01/19/2022     No results found for: PREGTESTUR, PREGSERUM, HCG, HCGQUANT  Pain Management Panel     Pain Management Panel Latest Ref Rng & Units 1/19/2022    AMPHETAMINES SCREEN, URINE Negative Negative    BARBITURATES SCREEN Negative Negative    BENZODIAZEPINE SCREEN, URINE Negative Positive(A)    BUPRENORPHINEUR Negative Negative    COCAINE SCREEN, URINE Negative Negative    METHADONE SCREEN, URINE Negative Negative    METHAMPHETAMINEUR Negative Negative        Brief Urine Lab Results  (Last result in the past 365 days)      Color   Clarity   Blood   Leuk Est   Nitrite   Protein   CREAT   Urine HCG        01/19/22 2010 Dark Yellow   Clear   Negative   Trace   Negative   100 mg/dL (2+)               No results found for: BLOODCX  No results found for: URINECX  No results found for: WOUNDCX  No results found for: STOOLCX  No results found for: RESPCX  No results found for: AFBCX  Results from last 7 days   Lab Units 02/03/22  1111   LACTATE mmol/L 0.9       I have personally looked at the labs and they are summarized above.  ----------------------------------------------------------------------------------------------------------------------  Detailed radiology reports for the last 24 hours:    Imaging Results (Last 24 Hours)     Procedure Component Value Units Date/Time    XR Chest 1 View [[id number]] Collected:  02/08/22 0831     Updated: 02/08/22 0834    Narrative:      EXAM:    XR Chest, 1 View     EXAM DATE:    2/8/2022 7:11 AM     CLINICAL HISTORY:    NG tube placement     TECHNIQUE:    Frontal view of the chest.     COMPARISON:    02/07/2022     FINDINGS:    Lungs:  Minimal right basilar atelectasis.    Pleural space:  Unremarkable.  No pneumothorax.    Heart:  Unremarkable.  No cardiomegaly.    Mediastinum:  Unremarkable.    Bones/joints:  Unremarkable.    Tubes, lines and devices:  Feeding tube is been repositioned with tip  in the mid stomach region.  Portions of left PICC line are again seen at  the cavoatrial junction.    Upper abdomen:  Eventration of the right hemidiaphragm is stable.       Impression:      1.  Tip of feeding tube which projects in the mid stomach region.  2.  Otherwise stable chest.     This report was finalized on 2/8/2022 8:31 AM by Dr. Jayce Lord MD.       XR Chest 1 View [[id number]] Collected: 02/08/22 0829     Updated: 02/08/22 0833    Narrative:      EXAM:    XR Chest, 1 View     EXAM DATE:    2/8/2022 7:56 AM     CLINICAL HISTORY:    Please get better view of right lung to rule out pneumo     TECHNIQUE:    Frontal view of the chest.     COMPARISON:    02/08/2022     FINDINGS:    Lungs:  Lungs are clear.    Pleural space:  Unremarkable.  No pneumothorax.    Heart:  Unremarkable.  No cardiomegaly.    Mediastinum:  Unremarkable.    Bones/joints:  Unremarkable.    Tubes, lines and devices:  Feeding tube extends into the stomach.   Left PICC line positioning is stable.       Impression:      1.  Support devices as detailed above.  2.  No acute cardiopulmonary findings. No radiographic evidence of  pneumothorax.     This report was finalized on 2/8/2022 8:31 AM by Dr. Jayce Lord MD.       XR Chest 1 View [[id number]] Collected: 02/07/22 1953     Updated: 02/07/22 1955    Narrative:      CR Chest 1 Vw    INDICATION:   Feeding tube placement     COMPARISON:    Chest x-ray  2/4/2022    FINDINGS:  Portable AP view(s) of the chest.    There is a feeding tube that terminates in the right lung fairly distally in a right lower lobe bronchus.    Portion of the right lung is not the field-of-view so I cannot exclude pneumothorax.. There is persistent elevation of the right hemidiaphragm. There is breathing motion and likely some atelectasis at least at the left base. No change in the visualized  left upper extremity PICC line.      Impression:      1. Feeding tube terminates in the right lower lobe of the lung and should be removed. Please note that this film does not exclude the entirety the right hemithorax and therefore pneumothorax cannot be excluded. Follow-up chest film is recommended to  exclude pneumothorax after the feeding tube is removed.      NOTIFICATION: Critical Value/emergent results were called by telephone at the time of interpretation on 2/7/2022 7:51 PM to CCU nurse who verbally acknowledged these results. The patient's nurse is in the room with the patient. The nurse I spoke to  indicated that she would indicate to her that the feeding tube is in the wrong position in the right lung and should be removed.    Signer Name: Ora Corbett MD   Signed: 2/7/2022 7:53 PM   Workstation Name: [email or web address]    [facility]    MRI Brain With & Without Contrast [[id number]] Collected: 02/07/22 1230     Updated: 02/07/22 1237    Narrative:      EXAM:    MR Head Without and With Intravenous Contrast     EXAM DATE:    2/7/2022 10:54 AM     CLINICAL HISTORY:    Neuro deficit, acute, stroke suspected     TECHNIQUE:    Magnetic resonance images of the head/brain without and with  intravenous contrast in multiple planes.     COMPARISON:    CT 02/03/2022     FINDINGS:    Brain:  Subcortical enhancement is noted of the right occipital lobe  and is consistent with subacute infarct. No diffusion restriction  identified.  There is cortical and subcortical enhancement of  the  superior parietal lobe at the level of the cerebral vertex also  consistent with subacute infarct etiology.  Left periventricular  enhancement is noted most consistent with subacute infarct etiology.  T2  shine through is noted but no restricted diffusion is identified.  No  hemorrhage.    Ventricles:  Unremarkable.  No ventriculomegaly.    Bones/joints:  Unremarkable.    Sinuses:  Unremarkable as visualized.  No acute sinusitis.    Mastoid air cells:  Mild bilateral mastoid effusions.    Orbits:  Unremarkable as visualized.       Impression:      1.  Areas of enhancement involving cortical, subcortical, and  periventricular white matter as described above in a pattern and  distribution that is most consistent with subacute infarction. Findings  are most pronounced in the parietal lobes and right occipital region.  2.  No restricted diffusion to indicate acute infarct.  3.  Chronic small vessel ischemic disease.  4.  Mild bilateral mastoid effusions.     This report was finalized on 2/7/2022 12:34 PM by Dr. Jayce Lord MD.           Assessment & Plan    #Septic Shock, Acute Metabolic Encephalopathy & Acute Hypoxic Respiratory Failure requiring Intubation and Mechanical Ventilation 2/2 PNA, Bacterial, treating for MDR Organisms in setting of underlying COPD without acute exacerbation and Obstructive Sleep Apnea - Acute Metabolic Encephalopathy Persisting   - Patient presents w/ abrupt onset altered mental status, WBC count > 12K, heart rate > 90, Pneumonia  on imaging, SBP < 90 requiring IV pressors, intubated for airway protection, etiology of altered mental status suspected infectious/sepsis though has had persisting encephalopathy while on ventilator, repeat head CT from 1/19 to 1/28 was stable, no acute intracranial abnormalities, EEG's w ithout epileptiform activity only generalized slowing.  1/31 off sedation thought to have lateral gaze deficits, repeat CTA's and EEGs without definitive etiology  - LP  not consistent with infection. PCR negative. THEO negative. CSF NMDA negative.   - No obvious drug overdose causing toxic encephalopathy. Gabapentin level on admission not detected.   - Pulmonary consulted; Following at a distance now, extubated 2/1  - Infectious Disease consulted; Following  - TeleNeuro consulted; Following. Recommended to empirically start keppra.   - Highest on differential currently is PRES, paraneoplastic process, myelitis of some kind, stroke not seen on CT or even tetanus.   - Unclear when last vaccine was and significant SE wounds. Has not been treated with consistent flagyl. Will restart.   - Will start cardene gtt for closer BP control. Goal -140. Improved from yesterday but   - Lumbar puncture without growth, encephalitis panel negative, cytology with elevated glucose and protein, unable to perform Cellblock; Will consider repeat LP depending on clinical course. Tele neurology reporting it is unlikely to be high yield.   - MRI on 2/8 revealed areas of enhancement involving cortical, subcortical, and periventricular white matter as described above in a pattern and distribution that is most consistent with subacute infarction. Findings are most pronounced in the parietal lobes and right occipital region. Neurology reporting could be manifestation of PRES.   - Continue abx per Infectious Disease. Cefepime stopped.   - Continue APAP PRN for fevers, Duonebs as needed  - Monitor in CCU, monitor on telemetry, off oxygen, awaiting mentation improvement  - Attempting to transfer for continuous EEG. Complicated by lack of bed availability due to COVID pandemic.      #Relapsing and Remitting Lower Extremity Wounds/Ulcers now with Osteomyelitis L great toe and bilateral cellulitis   - Patient reported 2 year history lower extremity wounds, follows in wound care clinic, cultures from initial biopsy grew MSSA  - Rheumatology panel negative with normal C and P-ANCA, Anti-CCP, SSA/SSB, Atypical  P-ANCA, Anti-Hu Ab, paraneoplastic panel results discussed with lab. Labs that have returned are negative thus far.   - Bone scan concern for osteomyelitis  - General Surgery consulted; Followed, status post debridement 1/22, signed off, re-evaluated 1/30 and recommended no surgical intervention.  - Infectious Disease consulted; Following as per above, continue antibiotics as per above     #Hypertension/Hyperlipidemia/Peripheral Vascular Disease   #Atrial Fibrillation w/ RVR, New onset (CHADsVasc = 3)  #NSTEMI Type II due to Atrial Fibrillation   - Patient with noted Atrial Fibrillation with RVR in emergency room on EKG, recurrent heart rate up to 160's.  - Labs showed troponins <0.010 -> 0.036 -> <0.010, proBNP 4800  - EKG showed Atrial Fibrillation with RVR  - Echo showed mild concentric left ventricular hypertrophy, normal LV function.   - Cards consulted; Following, recommended consider ischemic workup when improved  - Continue home statin, no home ASA 81  - Continue lower than home beta blocker, new ARB  - Continue Hep gtt, transition to Eliquis when appropriate, NG has bene pulled out, remains NPO.   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 A BCR ABL PCR was obtained which was <0.001% as well as a flow cytometry which was concerning for a monotypical CD5+ B cell population with nonspecific immunophenotype, but significant for a variant B cell SLL or mantle cell lymphoma that could not be ruled out. CLL FISH was then obtained which was negative and CCND1/IGH - t(11:14) was not detected, JAK2 V617 and JAK2 exon 12 mutation which were negative as well.  - Pt had follow up scheduled 6/2020 and no showed appointment.    - Peripheral smear without evidence of  acute leukemia  - Hematology/Oncology consulted; Evaluated and did not suspect current condition related to hematological condition, recommended follow up Dr. Menezes outpatient      #NIDDM Type II, controlled, unknown complications, New diagnosis  - Hgb A1c = 6.5%  - No home oral agents, continue FSBG and SSI, continue new Levemir 30U nightly, titrate as indicated      #Anxiety/Depression  - Psychiatry consulted in emergency room and felt altered mental status not related to underlying psychiatric illness; Continue home Seroquel and Ativan     #Tobacco Dependence  - Rec'd cessation, NRT as needed     #Obesity by BMI  - BMI 30, complicates all aspects of care     F: Dobhoff tube placed. Initially in lung and was replaced. Repeat plain film without abnormality. Will start TFs.   E: Monitor & Replace PRN  N: TFs  Ppx: on Hep gtt  Code: Full Code    Lines: PICC 2/4.      Dispo: Pending workup and clinical improvement     *This patient is considered high risk due to sepsis, acute respiratory failure, PNA, intubation and mechanical ventilation, persisting encephalopathy, History monoclonal B cell lymphocytosis, NSTEMI, Atrial Fibrillation.     60 minutes of critical care spent on patient. Patient critically ill with severe encephalopathy of unknown etiology.            VTE Prophylaxis:   Mechanical Order History:     None      Pharmalogical Order History:      Ordered     Dose Route Frequency Stop    01/22/22 1507  heparin (porcine) 5000 UNIT/ML injection 5,000 Units         5,000 Units IV Once 01/22/22 1713    01/22/22 1507  heparin 96691 units/250 mL (100 units/mL) in 0.45 % NaCl infusion  9.91 mL/hr         10.2 Units/kg/hr IV Titrated --    01/22/22 1507  heparin (porcine) 5000 UNIT/ML injection 5,000 Units         5,000 Units IV As Needed --    01/22/22 1507  heparin (porcine) 5000 UNIT/ML injection 2,500 Units         2,500 Units IV As Needed --    01/22/22 0039  heparin (porcine) 5000 UNIT/ML injection 5,000  Units  Status:  Discontinued         5,000 Units SC Every 12 Hours Scheduled 01/22/22 1507                Avery Horan MD  [facility] Hospitalist  02/08/22  11:11 EST

## 2022-02-09 NOTE — PLAN OF CARE
Problem: Adult Inpatient Plan of Care  Goal: Plan of Care Review  Outcome: Ongoing, Progressing  Flowsheets (Taken 2/9/2022 0617)  Plan of Care Reviewed With: patient  Goal: Patient-Specific Goal (Individualized)  Outcome: Ongoing, Progressing  Goal: Absence of Hospital-Acquired Illness or Injury  Outcome: Ongoing, Progressing  Intervention: Identify and Manage Fall Risk  Recent Flowsheet Documentation  Taken 2/9/2022 0600 by Erika Ritchie RN  Safety Promotion/Fall Prevention:   safety round/check completed   fall prevention program maintained  Taken 2/9/2022 0500 by Erika Ritchie RN  Safety Promotion/Fall Prevention:   safety round/check completed   fall prevention program maintained  Taken 2/9/2022 0400 by Erika Ritchie RN  Safety Promotion/Fall Prevention:   safety round/check completed   fall prevention program maintained  Taken 2/9/2022 0300 by Erika Ritchie RN  Safety Promotion/Fall Prevention:   safety round/check completed   fall prevention program maintained  Taken 2/9/2022 0200 by Erika Ritchie RN  Safety Promotion/Fall Prevention:   safety round/check completed   fall prevention program maintained  Taken 2/9/2022 0100 by Erika Ritchie RN  Safety Promotion/Fall Prevention:   safety round/check completed   fall prevention program maintained  Taken 2/9/2022 0000 by Erika Ritchie RN  Safety Promotion/Fall Prevention:   safety round/check completed   fall prevention program maintained  Taken 2/8/2022 2300 by Erika Ritchie RN  Safety Promotion/Fall Prevention:   safety round/check completed   fall prevention program maintained  Taken 2/8/2022 2200 by Erika Ritchie RN  Safety Promotion/Fall Prevention:   safety round/check completed   fall prevention program maintained  Taken 2/8/2022 2100 by Erika Ritchie RN  Safety Promotion/Fall Prevention:   safety round/check completed   fall prevention program maintained  Taken 2/8/2022 2000 by Erika Ritchie RN  Safety  Promotion/Fall Prevention:   safety round/check completed   fall prevention program maintained  Taken 2/8/2022 1900 by Erika Ritchie RN  Safety Promotion/Fall Prevention:   safety round/check completed   fall prevention program maintained  Intervention: Prevent Skin Injury  Recent Flowsheet Documentation  Taken 2/9/2022 0600 by Erika Ritchie RN  Body Position: turned  Taken 2/9/2022 0500 by Erika Ritchie RN  Body Position: position maintained  Taken 2/9/2022 0400 by Erika Ritchie RN  Body Position: turned  Taken 2/9/2022 0300 by Erika Ritchie RN  Body Position: position maintained  Taken 2/9/2022 0205 by Erika Ritchie RN  Skin Protection:   adhesive use limited   tubing/devices free from skin contact  Taken 2/9/2022 0200 by Erika Ritchie RN  Body Position: turned  Taken 2/9/2022 0100 by Erika Ritchie RN  Body Position: position maintained  Taken 2/9/2022 0000 by Erika Ritchie RN  Body Position: turned  Taken 2/8/2022 2300 by Erika Ritchie RN  Body Position: position maintained  Taken 2/8/2022 2200 by Erika Ritchie RN  Body Position: turned  Taken 2/8/2022 2100 by Erika Ritchie RN  Body Position: position maintained  Taken 2/8/2022 2000 by Erika Ritchie RN  Body Position: turned  Taken 2/8/2022 1935 by Erika Ritchie RN  Skin Protection:   adhesive use limited   electrode sites changed   incontinence pads utilized   pulse oximeter probe site changed   tubing/devices free from skin contact  Taken 2/8/2022 1900 by Erika Ritchie RN  Body Position: position maintained  Intervention: Prevent and Manage VTE (venous thromboembolism) Risk  Recent Flowsheet Documentation  Taken 2/9/2022 0205 by Erika Ritchie RN  VTE Prevention/Management: (heparin gtt) other (see comments)  Taken 2/8/2022 1935 by Erika Ritchie RN  VTE Prevention/Management: (heparin gtt) other (see comments)  Intervention: Prevent Infection  Recent Flowsheet Documentation  Taken 2/9/2022 0600  by Erika Ritchie RN  Infection Prevention:   environmental surveillance performed   hand hygiene promoted   personal protective equipment utilized   rest/sleep promoted   single patient room provided  Taken 2/9/2022 0400 by Erika Ritchie RN  Infection Prevention:   environmental surveillance performed   hand hygiene promoted   personal protective equipment utilized   rest/sleep promoted   single patient room provided  Taken 2/9/2022 0200 by Erika Ritchie RN  Infection Prevention:   environmental surveillance performed   hand hygiene promoted   personal protective equipment utilized   rest/sleep promoted   single patient room provided  Taken 2/9/2022 0000 by Erika Ritchie RN  Infection Prevention:   environmental surveillance performed   hand hygiene promoted   personal protective equipment utilized   rest/sleep promoted   single patient room provided  Taken 2/8/2022 2200 by Erika Ritchie RN  Infection Prevention:   environmental surveillance performed   hand hygiene promoted   personal protective equipment utilized   rest/sleep promoted   single patient room provided  Taken 2/8/2022 2000 by Erika Ritchie RN  Infection Prevention:   environmental surveillance performed   hand hygiene promoted   personal protective equipment utilized   rest/sleep promoted   single patient room provided  Goal: Optimal Comfort and Wellbeing  Outcome: Ongoing, Progressing  Intervention: Provide Person-Centered Care  Recent Flowsheet Documentation  Taken 2/9/2022 0205 by Erika Ritchie RN  Trust Relationship/Rapport:   care explained   reassurance provided  Taken 2/8/2022 1935 by Erika Ritchie RN  Trust Relationship/Rapport:   care explained   reassurance provided  Goal: Readiness for Transition of Care  Outcome: Ongoing, Progressing     Problem: Skin Injury Risk Increased  Goal: Skin Health and Integrity  Outcome: Ongoing, Progressing  Intervention: Optimize Skin Protection  Recent Flowsheet  Documentation  Taken 2/9/2022 0600 by Erika Ritchie RN  Head of Bed (HOB):   HOB elevated   HOB at 30-45 degrees  Taken 2/9/2022 0400 by Erika Ritchie RN  Head of Bed (HOB):   HOB elevated   HOB at 30-45 degrees  Taken 2/9/2022 0205 by Erika Ritchie RN  Pressure Reduction Techniques: weight shift assistance provided  Pressure Reduction Devices:   heel offloading device utilized   positioning supports utilized   pressure-redistributing mattress utilized  Skin Protection:   adhesive use limited   tubing/devices free from skin contact  Taken 2/9/2022 0200 by Erika Ritchie RN  Head of Bed (HOB):   HOB elevated   HOB at 30-45 degrees  Taken 2/9/2022 0000 by Erika Ritchie RN  Head of Bed (HOB):   HOB elevated   HOB at 30-45 degrees  Taken 2/8/2022 2200 by Erika Ritchie RN  Head of Bed (HOB):   HOB elevated   HOB at 30-45 degrees  Taken 2/8/2022 2000 by Erika Ritchie RN  Head of Bed (HOB):   HOB elevated   HOB at 30-45 degrees  Taken 2/8/2022 1935 by Erika Ritchie RN  Pressure Reduction Techniques: weight shift assistance provided  Pressure Reduction Devices:   heel offloading device utilized   positioning supports utilized   pressure-redistributing mattress utilized  Skin Protection:   adhesive use limited   electrode sites changed   incontinence pads utilized   pulse oximeter probe site changed   tubing/devices free from skin contact     Problem: Diabetes Comorbidity  Goal: Blood Glucose Level Within Desired Range  Outcome: Ongoing, Progressing  Intervention: Maintain Glycemic Control  Recent Flowsheet Documentation  Taken 2/9/2022 0205 by Erika Ritchie RN  Glycemic Management: blood glucose monitoring  Taken 2/8/2022 1935 by Erika Ritchie RN  Glycemic Management: blood glucose monitoring     Problem: Hypertension Comorbidity  Goal: Blood Pressure in Desired Range  Outcome: Ongoing, Progressing  Intervention: Maintain Hypertension-Management Strategies  Recent Flowsheet  Documentation  Taken 2/9/2022 0600 by Erika Ritchie RN  Medication Review/Management: medications reviewed  Taken 2/9/2022 0400 by Erika Ritchie RN  Medication Review/Management: medications reviewed  Taken 2/9/2022 0200 by Erika Ritchie RN  Medication Review/Management: medications reviewed  Taken 2/9/2022 0000 by Erika Ritchie RN  Medication Review/Management: medications reviewed  Taken 2/8/2022 2200 by Erika Ritchie RN  Medication Review/Management: medications reviewed  Taken 2/8/2022 2000 by Erika Ritchie RN  Medication Review/Management: medications reviewed     Problem: Airway Clearance Ineffective  Goal: Effective Airway Clearance  Outcome: Ongoing, Progressing  Intervention: Promote Airway Secretion Clearance  Recent Flowsheet Documentation  Taken 2/9/2022 0205 by Erika Ritchie RN  Activity Management: bedrest  Taken 2/8/2022 1935 by Erika Ritchie RN  Activity Management: bedrest     Problem: Fall Injury Risk  Goal: Absence of Fall and Fall-Related Injury  Outcome: Ongoing, Progressing  Intervention: Identify and Manage Contributors to Fall Injury Risk  Recent Flowsheet Documentation  Taken 2/9/2022 0600 by Erika Ritchie RN  Medication Review/Management: medications reviewed  Taken 2/9/2022 0400 by Erika Ritchie RN  Medication Review/Management: medications reviewed  Taken 2/9/2022 0200 by Erika Ritchie RN  Medication Review/Management: medications reviewed  Taken 2/9/2022 0000 by Erika Ritchie RN  Medication Review/Management: medications reviewed  Taken 2/8/2022 2200 by Erika Ritchie RN  Medication Review/Management: medications reviewed  Taken 2/8/2022 2000 by Erika Ritchie RN  Medication Review/Management: medications reviewed  Intervention: Promote Injury-Free Environment  Recent Flowsheet Documentation  Taken 2/9/2022 0600 by Erika Ritchie RN  Safety Promotion/Fall Prevention:   safety round/check completed   fall prevention program  maintained  Taken 2/9/2022 0500 by Erika Ritchie RN  Safety Promotion/Fall Prevention:   safety round/check completed   fall prevention program maintained  Taken 2/9/2022 0400 by Erika Ritchie RN  Safety Promotion/Fall Prevention:   safety round/check completed   fall prevention program maintained  Taken 2/9/2022 0300 by Erika Ritchie RN  Safety Promotion/Fall Prevention:   safety round/check completed   fall prevention program maintained  Taken 2/9/2022 0200 by Erika Ritchie RN  Safety Promotion/Fall Prevention:   safety round/check completed   fall prevention program maintained  Taken 2/9/2022 0100 by Erika Ritchie RN  Safety Promotion/Fall Prevention:   safety round/check completed   fall prevention program maintained  Taken 2/9/2022 0000 by Erika Ritchie RN  Safety Promotion/Fall Prevention:   safety round/check completed   fall prevention program maintained  Taken 2/8/2022 2300 by Erika Ritchie RN  Safety Promotion/Fall Prevention:   safety round/check completed   fall prevention program maintained  Taken 2/8/2022 2200 by Erika Ritchie RN  Safety Promotion/Fall Prevention:   safety round/check completed   fall prevention program maintained  Taken 2/8/2022 2100 by Erika Ritchie RN  Safety Promotion/Fall Prevention:   safety round/check completed   fall prevention program maintained  Taken 2/8/2022 2000 by Erika Ritchie RN  Safety Promotion/Fall Prevention:   safety round/check completed   fall prevention program maintained  Taken 2/8/2022 1900 by Erika Ritchie RN  Safety Promotion/Fall Prevention:   safety round/check completed   fall prevention program maintained     Problem: Infection  Goal: Infection Symptom Resolution  Outcome: Ongoing, Progressing  Intervention: Prevent or Manage Infection  Recent Flowsheet Documentation  Taken 2/9/2022 0600 by Erika Ritchie RN  Infection Management: aseptic technique maintained  Taken 2/9/2022 0400 by Erika Ritchie  RN  Infection Management: aseptic technique maintained  Fever Reduction/Comfort Measures: (100.4 rectal) cooling blanket applied  Taken 2/9/2022 0205 by Erika Ritchie RN  Infection Management: aseptic technique maintained  Taken 2/9/2022 0200 by Erika Ritchie RN  Infection Management: aseptic technique maintained  Taken 2/9/2022 0000 by Erika Ritchie RN  Infection Management: aseptic technique maintained  Taken 2/8/2022 2200 by Erika Ritchie RN  Infection Management: aseptic technique maintained  Taken 2/8/2022 2000 by Erika Ritchie RN  Infection Management: aseptic technique maintained  Taken 2/8/2022 1935 by Erika Ritchie RN  Infection Management: aseptic technique maintained   Goal Outcome Evaluation:  Plan of Care Reviewed With: patient

## 2022-02-09 NOTE — NURSING NOTE
Procedure completed. Pt transferred back to bed and transported back to CCU per transport team. Pt placed back on 4L O2 and head elevated to position in which she arrived.

## 2022-02-09 NOTE — NURSING NOTE
[facility] called to get vitals and updates on pt. States they have pt on list for a neuro stroke bed but no beds are available at this time.

## 2022-02-09 NOTE — CASE MANAGEMENT/SOCIAL WORK
Discharge Planning Assessment   [facility]     Patient Name: Lynette Stein  MRN: [id number]  Today's Date: 2/9/2022    Admit Date: 1/22/2022     Discharge Plan     Row Name 02/09/22 1043       Plan    Plan Pt admitted 1/22/22. Pt remains on room air, still not following commands and unable to move right side of body. Pt is from home and does not utilize home health services. [facility] utilized for home oxygen. Pt is on wait list at  and [facility]. Discharge plan and needs pending clinical improvement. SS to continue to follow and assist.              BUSTER Bradford

## 2022-02-09 NOTE — PROGRESS NOTES
[facility] HOSPITALIST PROGRESS NOTE     Patient Identification:  Name:  Lynette Stein  Age:  56 y.o.  Sex:  female  :  1965  MRN:  [id number]  Visit Number:  [id number]  ROOM: [address]     Primary Care Provider:  Orville Wu PA    Length of stay in inpatient status:  18    Subjective     Chief Compliant:  No chief complaint on file.      History of Presenting Illness:    Patient tracked with eyes earlier this morning briefly per nursing staff. Otherwise no purposeful movements or following directions. Left arm moving spontaneously but no other extremities.     ROS:  Otherwise 10 point ROS negative other than documented above in HPI.     Objective     Current Hospital Meds:ceFAZolin, 2 g, Intravenous, Q8H  cloNIDine, 1 patch, Transdermal, Weekly  hydrALAZINE, 10 mg, Oral, Q8H  insulin aspart, 0-14 Units, Subcutaneous, TID AC  insulin detemir, 30 Units, Subcutaneous, Daily  levETIRAcetam, 500 mg, Intravenous, Q12H  losartan, 100 mg, Oral, Daily  metoprolol tartrate, 100 mg, Nasogastric, Q12H  metroNIDAZOLE, 500 mg, Intravenous, Q8H  pantoprazole, 40 mg, Intravenous, Q AM  pravastatin, 40 mg, Oral, Daily  pregabalin, 75 mg, Nasogastric, Q12H  QUEtiapine, 50 mg, Oral, Nightly  sodium chloride, 10 mL, Intravenous, Q12H  sodium chloride, 10 mL, Intravenous, Q12H  sodium chloride, 10 mL, Intravenous, Q12H  sodium chloride, 10 mL, Intravenous, Q12H  sodium chloride, 10 mL, Intravenous, Q12H  spironolactone, 25 mg, Oral, Daily  verapamil, 80 mg, Oral, Q8H    dexmedetomidine, 0.2-1.5 mcg/kg/hr, Last Rate: Stopped (22 1436)  heparin (porcine), 10.2 Units/kg/hr, Last Rate: 21.6 Units/kg/hr (22 0539)  hold, 1 each  hold, 1 each  Pharmacy Consult,         Current Antimicrobial Therapy:  Anti-Infectives (From admission, onward)    Ordered     Dose/Rate Route Frequency Start Stop    22 1143  metroNIDAZOLE (FLAGYL) 500 mg/100mL IVPB        Ordering Provider: Avery Horan   "mg  over 60 Minutes Intravenous Every 8 Hours 02/07/22 1400 02/12/22 1359    02/07/22 0921  ceFAZolin Sodium-Dextrose (ANCEF) IVPB (duplex) 2 g        Ordering Provider: To Christopher MD    2 g Intravenous Every 8 Hours 02/07/22 1100 03/07/22 1059    02/02/22 1036  cefepime 2 gm IVPB in 100 ml NS (VTB)        Ordering Provider: To Christopher MD    2 g  over 30 Minutes Intravenous Once 02/02/22 1300 02/02/22 1231    01/29/22 1259  metroNIDAZOLE (FLAGYL) 500 mg/100mL IVPB        Ordering Provider: Mercedes Weber PA    500 mg  over 30 Minutes Intravenous Every 8 Hours 01/29/22 1500 02/05/22 1459    01/26/22 1039  Cefepime HCl (MAXIPIME) 1,778 mg in sodium chloride 0.9 % 100 mL IVPB        Ordering Provider: To Christopher MD   \"Followed by\" Linked Group Details    200 mL/hr over 30 Minutes Intravenous Once 01/26/22 1600 01/26/22 1605    01/26/22 1039  Cefepime HCl (MAXIPIME) 200 mg in sodium chloride 0.9 % 50 mL IVPB        Ordering Provider: To Christopher MD   \"Followed by\" Linked Group Details    100 mL/hr over 30 Minutes Intravenous Once 01/26/22 1400 01/26/22 1512    01/26/22 1039  Cefepime HCl (MAXIPIME) 20 mg in sodium chloride 0.9 % 50 mL IVPB        Ordering Provider: To Christopher MD   \"Followed by\" Linked Group Details    100 mL/hr over 30 Minutes Intravenous Once 01/26/22 1300 01/26/22 1400    01/26/22 1039  Cefepime HCl (MAXIPIME) 2 mg in sodium chloride 0.9 % 50 mL IVPB        Ordering Provider: To Christopher MD   \"Followed by\" Linked Group Details    100 mL/hr over 30 Minutes Intravenous Once 01/26/22 1200 01/26/22 1316    01/22/22 0039  aztreonam (AZACTAM) 2 g in sodium chloride 0.9 % 100 mL IVPB-VTB        Ordering Provider: Sebastian Baker MD    2 g  200 mL/hr over 30 Minutes Intravenous Once 01/22/22 0130 01/22/22 0144        Current Diuretic Therapy:  Diuretics (From admission, onward)    Ordered     Dose/Rate Route Frequency Start Stop    01/30/22 " 1001  furosemide (LASIX) injection 40 mg        Ordering Provider: Ayana Sterling APRN    40 mg Intravenous Once 01/30/22 1100 01/30/22 1234    01/28/22 1141  spironolactone (ALDACTONE) tablet 25 mg        Ordering Provider: Elkin Collins MD    25 mg Oral Daily 01/28/22 1300      01/28/22 1141  furosemide (LASIX) injection 40 mg        Ordering Provider: Elkin Collins MD    40 mg Intravenous Every 12 Hours 01/28/22 1230 01/29/22 0018    01/27/22 1037  furosemide (LASIX) injection 40 mg        Ordering Provider: Elkin Collins MD    40 mg Intravenous Every 12 Hours 01/27/22 1130 01/27/22 2357    01/25/22 1552  furosemide (LASIX) injection 60 mg        Ordering Provider: Elkin Collins MD    60 mg Intravenous Every 12 Hours 01/25/22 1645 01/26/22 1644    01/25/22 1604  furosemide (LASIX) 10 MG/ML injection  - ADS Override Pull        Note to Pharmacy: Created by cabinet override   Ordering Provider: Kavitha Capone RN       01/25/22 1604 01/25/22 1609        ----------------------------------------------------------------------------------------------------------------------  Vital Signs:  Temp:  [98.7 °F (37.1 °C)-100.4 °F (38 °C)] 99.2 °F (37.3 °C)  Heart Rate:  [] 103  Resp:  [15-26] 20  BP: ()/(48-95) 122/54  SpO2:  [91 %-96 %] 95 %  on   ;   Device (Oxygen Therapy): room air  Body mass index is 30.4 kg/m².    Wt Readings from Last 3 Encounters:   02/09/22 102 kg (224 lb 3.3 oz)   01/19/22 106 kg (233 lb)   12/14/21 106 kg (233 lb 9.6 oz)     Intake & Output (last 3 days)       02/06 0701  02/07 0700 02/07 0701  02/08 0700 02/08 0701  02/09 0700 02/09 0701  02/10 0700    I.V. (mL/kg) 450.3 (4.5) 1089.8 (10.8) 1700.5 (16.7)     Other 30  511     NG/GT   583     IV Piggyback 700 750 700     Total Intake(mL/kg) 1180.3 (11.7) 1839.8 (18.2) 3494.5 (34.3)     Urine (mL/kg/hr) 1925 (0.8) 2425 (1) 1800 (0.7)     Stool 0 0 0     Total Output 1925 2425 1800     Net -744.7 -585.2 +1694.5              Urine Unmeasured Occurrence   1 x     Stool Unmeasured Occurrence 1 x 0 x 2 x         NPO Diet  ----------------------------------------------------------------------------------------------------------------------  Physical exam:  Constitutional:  Well-developed and well-nourished.  No respiratory distress.      HENT:  Head:  Normocephalic and atraumatic.  Mouth:  Moist mucous membranes.    Eyes:  Conjunctivae and EOM are normal. No scleral icterus.    Neck:  Neck supple.  No JVD present.    Cardiovascular:  Normal rate, regular rhythm and normal heart sounds with no murmur.  Pulmonary/Chest:  No respiratory distress, no wheezes, no crackles, with normal breath sounds and good air movement.  Abdominal:  Soft.  Bowel sounds are normal.  No distension and no tenderness.   Musculoskeletal:  No edema, no tenderness, and no deformity.  No red or swollen joints anywhere.    Neurological:  Wakes up easily but not oriented. Moves left arm spontaneously but not other limbs.    Skin:  Skin is warm and dry. No rash noted. No pallor.   Peripheral vascular:  Pulses in all 4 extremities with no clubbing, no cyanosis, no edema.  ----------------------------------------------------------------------------------------------------------------------  Tele:    ----------------------------------------------------------------------------------------------------------------------  Results from last 7 days   Lab Units 02/09/22  0342 02/08/22  0229 02/07/22  0121 02/04/22  0246 02/03/22  1111   LACTATE mmol/L  --   --   --   --  0.9   WBC 10*3/mm3 8.73 9.10 14.32*   < >  --    HEMOGLOBIN g/dL 10.9* 11.6* 11.8*   < >  --    HEMATOCRIT % 38.0 39.0 39.7   < >  --    MCV fL 85.4 82.1 82.5   < >  --    MCHC g/dL 28.7* 29.7* 29.7*   < >  --    PLATELETS 10*3/mm3 369 401 485*   < >  --     < > = values in this interval not displayed.     Results from last 7 days   Lab Units 02/03/22  1112   PH, ARTERIAL pH units 7.480*   PO2 ART mm Hg 88.7    PCO2, ARTERIAL mm Hg 32.6*   HCO3 ART mmol/L 24.2     Results from last 7 days   Lab Units 02/09/22  0342 02/08/22  1926 02/08/22  0229 02/07/22  1719 02/07/22  0121 02/05/22  0046 02/04/22  1249   SODIUM mmol/L 143  --  145  --  143   < >  --    POTASSIUM mmol/L 3.7 3.1* 3.4*   < > 3.5   < >  --    MAGNESIUM mg/dL  --   --   --   --  2.0  --  2.0   CHLORIDE mmol/L 114*  --  113*  --  113*   < >  --    CO2 mmol/L 18.6*  --  18.2*  --  17.2*   < >  --    BUN mg/dL 11  --  8  --  9   < >  --    CREATININE mg/dL 0.46*  --  0.50*  --  0.50*   < >  --    EGFR IF NONAFRICN AM mL/min/1.73 141  --  128  --  128   < >  --    CALCIUM mg/dL 9.3  --  9.3  --  9.5   < >  --    PHOSPHORUS mg/dL  --   --   --   --  4.1  --   --    GLUCOSE mg/dL 148*  --  167*  --  153*   < >  --    ALBUMIN g/dL 3.26*  --  3.52  --  3.51   < >  --    BILIRUBIN mg/dL <0.2  --  0.3  --  0.2   < >  --    ALK PHOS U/L 100  --  104  --  110   < >  --    AST (SGOT) U/L 41*  --  21  --  14   < >  --    ALT (SGPT) U/L 38*  --  27  --  24   < >  --     < > = values in this interval not displayed.   Estimated Creatinine Clearance: 184.8 mL/min (A) (by C-G formula based on SCr of 0.46 mg/dL (L)).  No results found for: AMMONIA              Glucose   Date/Time Value Ref Range Status   02/09/2022 0755 139 (H) 70 - 130 mg/dL Final     Comment:     Meter: [id number] : [id number] Jasiel Cole PHILLIP   02/09/2022 0625 130 70 - 130 mg/dL Final     Comment:     Meter: [id number] : [id number] summer butosmar   02/09/2022 0031 132 (H) 70 - 130 mg/dL Final     Comment:     Meter: [id number] : [id number] summer butosmar   02/08/2022 1749 119 70 - 130 mg/dL Final     Comment:     Meter: [id number] : [id number] GERI BRANSTUTTER   02/08/2022 1214 143 (H) 70 - 130 mg/dL Final     Comment:     Meter: [id number] : [id number] GERI BRANSTUTTER   02/08/2022 0648 157 (H) 70 - 130 mg/dL Final     Comment:     Meter: [id number] : [id number] summer amanda    02/08/2022 0115 159 (H) 70 - 130 mg/dL Final     Comment:     Meter: [id number] : [id number] smumer amanda   02/07/2022 1736 158 (H) 70 - 130 mg/dL Final     Comment:     Meter: [id number] : [id number] GERI HOOVER     Lab Results   Component Value Date    TSH 2.290 01/19/2022     No results found for: PREGTESTUR, PREGSERUM, HCG, HCGQUANT  Pain Management Panel     Pain Management Panel Latest Ref Rng & Units 1/19/2022    AMPHETAMINES SCREEN, URINE Negative Negative    BARBITURATES SCREEN Negative Negative    BENZODIAZEPINE SCREEN, URINE Negative Positive(A)    BUPRENORPHINEUR Negative Negative    COCAINE SCREEN, URINE Negative Negative    METHADONE SCREEN, URINE Negative Negative    METHAMPHETAMINEUR Negative Negative        Brief Urine Lab Results  (Last result in the past 365 days)      Color   Clarity   Blood   Leuk Est   Nitrite   Protein   CREAT   Urine HCG        01/19/22 2010 Dark Yellow   Clear   Negative   Trace   Negative   100 mg/dL (2+)               Blood Culture   Date Value Ref Range Status   02/08/2022 No growth at 24 hours  Preliminary   02/08/2022 No growth at 24 hours  Preliminary     No results found for: URINECX  No results found for: WOUNDCX  No results found for: STOOLCX  No results found for: RESPCX  No results found for: AFBCX  Results from last 7 days   Lab Units 02/03/22  1111   LACTATE mmol/L 0.9       I have personally looked at the labs and they are summarized above.  ----------------------------------------------------------------------------------------------------------------------  Detailed radiology reports for the last 24 hours:    Imaging Results (Last 24 Hours)     ** No results found for the last 24 hours. **        Assessment & Plan    #Septic Shock, Acute Metabolic Encephalopathy & Acute Hypoxic Respiratory Failure requiring Intubation and Mechanical Ventilation 2/2 PNA, Bacterial, treating for MDR Organisms in setting of underlying COPD without acute  exacerbation and Obstructive Sleep Apnea - Acute Metabolic Encephalopathy Persisting   - Patient presents w/ abrupt onset altered mental status, WBC count > 12K, heart rate > 90, Pneumonia  on imaging, SBP < 90 requiring IV pressors, intubated for airway protection, etiology of altered mental status suspected infectious/sepsis though has had persisting encephalopathy while on ventilator, repeat head CT from 1/19 to 1/28 was stable, no acute intracranial abnormalities, EEG's w ithout epileptiform activity only generalized slowing.  1/31 off sedation thought to have lateral gaze deficits, repeat CTA's and EEGs without definitive etiology  - LP not consistent with infection. PCR negative. THEO negative. CSF NMDA negative.   - No obvious drug overdose causing toxic encephalopathy. Gabapentin level on admission not detected.   - Pulmonary consulted; Following at a distance now, extubated 2/1  - Infectious Disease consulted; Following  - TeleNeuro consulted; Following. Recommended to empirically start keppra.   - Highest on differential currently is PRES, Stroke, paraneoplastic process, myelitis of some kind.   - Unclear when last vaccine was and significant SE wounds. Has not been treated with consistent flagyl. Will restart.   - Will start cardene gtt for closer BP control. Goal -140. Improved from yesterday and now has PO access. Will d/c cardene.   - Lumbar puncture without growth, encephalitis panel negative, cytology with elevated glucose and protein, unable to perform Cellblock; Will repeat LP today to get further cytologic studies. Radiology reported heparin gtt to be stopped 4-6 hours prior.   - MRI on 2/8 revealed areas of enhancement involving cortical, subcortical, and periventricular white matter as described above in a pattern and distribution that is most consistent with subacute infarction. Findings are most pronounced in the parietal lobes and right occipital region. Neurology reporting could be  manifestation of PRES.   - Continue abx per Infectious Disease. Cefepime stopped.   - Continue APAP PRN for fevers, Duonebs as needed  - Monitor in CCU, monitor on telemetry, off oxygen, awaiting mentation improvement  - Attempting to transfer for continuous EEG. Complicated by lack of bed availability due to COVID pandemic.      #Relapsing and Remitting Lower Extremity Wounds/Ulcers now with Osteomyelitis L great toe and bilateral cellulitis   - Patient reported 2 year history lower extremity wounds, follows in wound care clinic, cultures from initial biopsy grew MSSA  - Rheumatology panel negative with normal C and P-ANCA, Anti-CCP, SSA/SSB, Atypical P-ANCA, Anti-Hu Ab, paraneoplastic panel results discussed with lab. Labs that have returned are negative thus far.   - Bone scan concern for osteomyelitis  - General Surgery consulted; Followed, status post debridement 1/22, signed off, re-evaluated 1/30 and recommended no surgical intervention.  - Infectious Disease consulted; Following as per above, continue antibiotics as per above     #Hypertension/Hyperlipidemia/Peripheral Vascular Disease   #Atrial Fibrillation w/ RVR, New onset (CHADsVasc = 3)  #NSTEMI Type II due to Atrial Fibrillation   - Patient with noted Atrial Fibrillation with RVR in emergency room on EKG, recurrent heart rate up to 160's.  - Labs showed troponins <0.010 -> 0.036 -> <0.010, proBNP 4800  - EKG showed Atrial Fibrillation with RVR  - Echo showed mild concentric left ventricular hypertrophy, normal LV function.   - Cards consulted; Following, recommended consider ischemic workup when improved  - Continue home statin, no home ASA 81  - Continue lower than home beta blocker, new ARB  - Continue Hep gtt, transition to Eliquis when appropriate, NG has bene pulled out.   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 A BCR ABL PCR was obtained which was <0.001% as well as a flow cytometry which was concerning for a monotypical CD5+ B cell population with nonspecific immunophenotype, but significant for a variant B cell SLL or mantle cell lymphoma that could not be ruled out. CLL FISH was then obtained which was negative and CCND1/IGH - t(11:14) was not detected, JAK2 V617 and JAK2 exon 12 mutation which were negative as well.  - Pt had follow up scheduled 6/2020 and no showed appointment.    - Peripheral smear without evidence of acute leukemia  - Hematology/Oncology consulted; Evaluated and did not suspect current condition related to hematological condition, recommended follow up Dr. Menezes outpatient      #NIDDM Type II, controlled, unknown complications, New diagnosis  - Hgb A1c = 6.5%  - No home oral agents, continue FSBG and SSI, continue new Levemir 30U nightly, titrate as indicated      #Anxiety/Depression  - Psychiatry consulted in emergency room and felt altered mental status not related to underlying psychiatric illness; Continue home Seroquel and Ativan     #Tobacco Dependence  - Rec'd cessation, NRT as needed     #Obesity by BMI  - BMI 30, complicates all aspects of care     F: Dobhoff tube placed. Initially in lung and was replaced. Repeat plain film without abnormality. Will start TFs.   E: Monitor & Replace PRN  N: TFs  Ppx: on Hep gtt  Code: Full Code    Lines: PICC 2/4.      Dispo: Pending workup and clinical improvement. Accepted to [facility], and . Awaiting bed.      *This patient is considered high risk due to sepsis, acute respiratory failure, PNA, intubation and mechanical ventilation, persisting encephalopathy, History monoclonal B cell lymphocytosis, NSTEMI, Atrial Fibrillation.     40 minutes of critical care spent on patient. Patient critically ill with severe  encephalopathy of unknown etiology.         VTE Prophylaxis:   Mechanical Order History:     None      Pharmalogical Order History:      Ordered     Dose Route Frequency Stop    01/22/22 1507  heparin (porcine) 5000 UNIT/ML injection 5,000 Units         5,000 Units IV Once 01/22/22 1713    01/22/22 1507  heparin 06097 units/250 mL (100 units/mL) in 0.45 % NaCl infusion  9.91 mL/hr         10.2 Units/kg/hr IV Titrated --    01/22/22 1507  heparin (porcine) 5000 UNIT/ML injection 5,000 Units         5,000 Units IV As Needed --    01/22/22 1507  heparin (porcine) 5000 UNIT/ML injection 2,500 Units         2,500 Units IV As Needed --    01/22/22 0039  heparin (porcine) 5000 UNIT/ML injection 5,000 Units  Status:  Discontinued         5,000 Units SC Every 12 Hours Scheduled 01/22/22 1507                  Avery Horan MD  [facility]  02/09/22  09:36 EST

## 2022-02-09 NOTE — PROGRESS NOTES
PROGRESS NOTE         Patient Identification:  Name:  Lynette Stein  Age:  56 y.o.  Sex:  female  :  1965  MRN:  [id number]  Visit Number:  [id number]  Primary Care Provider:  Orville Wu PA         LOS: 18 days       ----------------------------------------------------------------------------------------------------------------------  Subjective       Chief Complaints:    No chief complaint on file.        Interval History:      Patient resting in bed this morning.  On room air with no apparent distress.  Low-grade fever 100.4.  Continues to raise left arm intermittently.  Continues to neglect right side.  Eyes are open but patient is not blinking or tracking movement in the room with eyes.  WBC normal.  Blood cultures from 2022 show no growth thus far.    Review of Systems:    Unable to obtain.  Altered mental status.  ----------------------------------------------------------------------------------------------------------------      Objective       Current [facility] Meds:  ceFAZolin, 2 g, Intravenous, Q8H  cloNIDine, 1 patch, Transdermal, Weekly  hydrALAZINE, 10 mg, Oral, Q8H  insulin aspart, 0-14 Units, Subcutaneous, TID AC  insulin detemir, 30 Units, Subcutaneous, Daily  levETIRAcetam, 500 mg, Intravenous, Q12H  losartan, 100 mg, Oral, Daily  metoprolol tartrate, 100 mg, Nasogastric, Q12H  metroNIDAZOLE, 500 mg, Intravenous, Q8H  pantoprazole, 40 mg, Intravenous, Q AM  pravastatin, 40 mg, Oral, Daily  pregabalin, 75 mg, Nasogastric, Q12H  QUEtiapine, 50 mg, Oral, Nightly  sodium chloride, 10 mL, Intravenous, Q12H  sodium chloride, 10 mL, Intravenous, Q12H  sodium chloride, 10 mL, Intravenous, Q12H  sodium chloride, 10 mL, Intravenous, Q12H  sodium chloride, 10 mL, Intravenous, Q12H  spironolactone, 25 mg, Oral, Daily  verapamil, 80 mg, Oral, Q8H      dexmedetomidine, 0.2-1.5 mcg/kg/hr, Last Rate: Stopped (22 1436)  heparin (porcine), 10.2 Units/kg/hr, Last Rate: Stopped  (02/09/22 0902)  hold, 1 each  hold, 1 each  Pharmacy Consult,       ----------------------------------------------------------------------------------------------------------------------    Vital Signs:  Temp:  [98.7 °F (37.1 °C)-100.4 °F (38 °C)] 99.2 °F (37.3 °C)  Heart Rate:  [] 103  Resp:  [15-26] 20  BP: ()/(48-95) 122/54  Mean Arterial Pressure (Non-Invasive) for the past 24 hrs (Last 3 readings):   Noninvasive MAP (mmHg)   02/09/22 0602 78   02/09/22 0532 92   02/09/22 0516 91     SpO2 Percentage    02/09/22 0600 02/09/22 0602 02/09/22 0751   SpO2: 91% 92% 95%     SpO2:  [91 %-96 %] 95 %  on   ;   Device (Oxygen Therapy): room air    Body mass index is 30.4 kg/m².  Wt Readings from Last 3 Encounters:   02/09/22 102 kg (224 lb 3.3 oz)   01/19/22 106 kg (233 lb)   12/14/21 106 kg (233 lb 9.6 oz)        Intake/Output Summary (Last 24 hours) at 2/9/2022 1341  Last data filed at 2/9/2022 0652  Gross per 24 hour   Intake 3494.46 ml   Output 1250 ml   Net 2244.46 ml     NPO Diet  ----------------------------------------------------------------------------------------------------------------------      Physical Exam:    Constitutional: Chronically ill-appearing.  Awake and agitated, but not following commands.  Drooling and experiencing oral automatisms.  HENT:  Head: Normocephalic and atraumatic.  Mouth:  Moist mucous membranes.    Eyes:  Pupils equal.  Left eye chemosis.   Neck:  Neck supple.  No JVD present.    Cardiovascular:  Normal rate, regular rhythm and normal heart sounds with no murmur. No edema.  Pulmonary/Chest: Coarse breath sounds bilaterally.  Abdominal:  Soft.  Bowel sounds are normal.  No distension and no tenderness.   Musculoskeletal:  No tenderness, and no deformity.  No swelling or redness of joints.  Mild bilateral lower extremity edema.  Neurological:  Awake and agitated.  Not following commands.  Skin:  Skin is warm and dry.  No rash noted.  No pallor.  Scattered bruises and  scabs to bilateral upper extremities, trunk, bilateral lower extremities and feet.  Bilateral feet ulcers in dressings today.  Psychiatric: Awake and agitated.  Not following commands.  ----------------------------------------------------------------------------------------------------------------------              Results from last 7 days   Lab Units 02/03/22  1112   PH, ARTERIAL pH units 7.480*   PO2 ART mm Hg 88.7   PCO2, ARTERIAL mm Hg 32.6*   HCO3 ART mmol/L 24.2     Results from last 7 days   Lab Units 02/09/22  0342 02/08/22 0229 02/07/22  0121 02/04/22  0246 02/03/22  1111   LACTATE mmol/L  --   --   --   --  0.9   WBC 10*3/mm3 8.73 9.10 14.32*   < >  --    HEMOGLOBIN g/dL 10.9* 11.6* 11.8*   < >  --    HEMATOCRIT % 38.0 39.0 39.7   < >  --    MCV fL 85.4 82.1 82.5   < >  --    MCHC g/dL 28.7* 29.7* 29.7*   < >  --    PLATELETS 10*3/mm3 369 401 485*   < >  --     < > = values in this interval not displayed.     Results from last 7 days   Lab Units 02/09/22  0342 02/08/22  1926 02/08/22 0229 02/07/22  1719 02/07/22  0121 02/05/22  0046 02/04/22  1249   SODIUM mmol/L 143  --  145  --  143   < >  --    POTASSIUM mmol/L 3.7 3.1* 3.4*   < > 3.5   < >  --    MAGNESIUM mg/dL  --   --   --   --  2.0  --  2.0   CHLORIDE mmol/L 114*  --  113*  --  113*   < >  --    CO2 mmol/L 18.6*  --  18.2*  --  17.2*   < >  --    BUN mg/dL 11  --  8  --  9   < >  --    CREATININE mg/dL 0.46*  --  0.50*  --  0.50*   < >  --    EGFR IF NONAFRICN AM mL/min/1.73 141  --  128  --  128   < >  --    CALCIUM mg/dL 9.3  --  9.3  --  9.5   < >  --    GLUCOSE mg/dL 148*  --  167*  --  153*   < >  --    ALBUMIN g/dL 3.26*  --  3.52  --  3.51   < >  --    BILIRUBIN mg/dL <0.2  --  0.3  --  0.2   < >  --    ALK PHOS U/L 100  --  104  --  110   < >  --    AST (SGOT) U/L 41*  --  21  --  14   < >  --    ALT (SGPT) U/L 38*  --  27  --  24   < >  --     < > = values in this interval not displayed.   Estimated Creatinine Clearance: 184.8 mL/min  (A) (by C-G formula based on SCr of 0.46 mg/dL (L)).  No results found for: AMMONIA    Glucose   Date/Time Value Ref Range Status   02/09/2022 1133 145 (H) 70 - 130 mg/dL Final     Comment:     Meter: [id number] : [id number] Jasiel Spencera A   02/09/2022 0755 139 (H) 70 - 130 mg/dL Final     Comment:     Meter: [id number] : [id number] Jasiel Kelsey A   02/09/2022 0625 130 70 - 130 mg/dL Final     Comment:     Meter: [id number] : [id number] summer butain   02/09/2022 0031 132 (H) 70 - 130 mg/dL Final     Comment:     Meter: [id number] : [id number] summer butain   02/08/2022 1749 119 70 - 130 mg/dL Final     Comment:     Meter: [id number] : [id number] GERI BRANSTUTTER   02/08/2022 1214 143 (H) 70 - 130 mg/dL Final     Comment:     Meter: [id number] : [id number] GERI BRANSTUTTER   02/08/2022 0648 157 (H) 70 - 130 mg/dL Final     Comment:     Meter: [id number] : [id number] summer butain   02/08/2022 0115 159 (H) 70 - 130 mg/dL Final     Comment:     Meter: [id number] : [id number] summer butain     Lab Results   Component Value Date    HGBA1C 6.50 (H) 01/22/2022     Lab Results   Component Value Date    TSH 2.290 01/19/2022       Blood Culture   Date Value Ref Range Status   01/22/2022 No growth at 4 days  Preliminary   01/22/2022 No growth at 4 days  Preliminary     No results found for: URINECX  Wound Culture   Date Value Ref Range Status   01/24/2022 Rare Staphylococcus aureus (A)  Final   01/24/2022 Rare Normal Skin Melva  Final     No results found for: STOOLCX  Respiratory Culture   Date Value Ref Range Status   01/25/2022 Scant growth (1+) Candida albicans (A)  Final   01/25/2022 No Normal Respiratory Melva (A)  Final     Pain Management Panel       Pain Management Panel Latest Ref Rng & Units 1/19/2022    AMPHETAMINES SCREEN, URINE Negative Negative    BARBITURATES SCREEN Negative Negative    BENZODIAZEPINE SCREEN, URINE Negative Positive(A)    BUPRENORPHINEUR Negative Negative     COCAINE SCREEN, URINE Negative Negative    METHADONE SCREEN, URINE Negative Negative    METHAMPHETAMINEUR Negative Negative              ----------------------------------------------------------------------------------------------------------------------  Imaging Results (Last 24 Hours)       ** No results found for the last 24 hours. **            ----------------------------------------------------------------------------------------------------------------------    Assessment/Plan       Assessment/Plan     ASSESSMENT:    1.  Severe sepsis with acute respiratory failure requiring mechanical ventilation  2.  Pneumonia  3.  Left great toe osteomyelitis  4.  Enterocolitis    PLAN:      Patient resting in bed this morning.  On room air with no apparent distress.  Low-grade fever 100.4.  Continues to raise left arm intermittently.  Continues to neglect right side.  Eyes are open but patient is not blinking or tracking movement in the room with eyes.  WBC normal.  Blood cultures from 2/8/2022 show no growth thus far.    MRI of the brain from 2/7/2022 reports areas of enhancement involving cortical subcortical and periventricular white matter consistent with subacute infarction, no restricted diffusion to indicate acute infarct, chronic small vessel ischemic disease, mild bilateral mastoid effusions.    CT of the head from 2/3/2022 reports no evidence of acute ischemic event or parenchymal hemorrhage.    Chest x-ray on 2/1/2022 shows mild bibasilar infiltrates.  Respiratory culture on 1/29/2022 finalized as no growth.    Bone scan on 1/28/2022 showed focal area of increased tracer uptake localizing to the left great toe.  If ulceration or infection in this location, osteomyelitis should be considered.  Periarticular type uptake throughout the right foot as detailed above in a pattern and distribution that is more favorable for reactive changes and neuropathic joint changes.  Soft tissue type uptake suggestive of  cellulitis.      CT chest on 1/28/2022 showed new or worsening left basilar parenchymal opacity likely representing combination of atelectasis and/or infiltrate.  Continued right lower lobe atelectasis and likely infiltrate.  Patchy groundglass opacity left upper lobe and along the anterior aspect right upper lobe could represent very mild pneumonitis.  Increased tracheal and bronchial secretions may reflect underlying bronchitis.  This may be contributing factor of atelectasis.  Respiratory therapy may be of benefit.  Endotracheal tube in place.  Nasogastric tube appears in satisfactory position.  A second 2 passes alongside the nasogastric tube terminates in the distal esophagus.  Following discussion with patient's nurse it represents a temperature probe.  Increased colonic fluid with air-fluid levels.  Correlate for enteric colitis.  No focal inflammatory changes or obstruction.  CT abdomen pelvis on 1/28/2022 showed the same as CT chest.     COVID-19 and influenza PCR negative.  Lumbar puncture culture and meningitis encephalitis panel negative. Legionella negative.  Strep pneumo antigen negative.  MRSA PCR positive.  [place] spotted fever IgM equivocal at 1.06. Wound culture of the right foot from 1/24/2022 preliminary reports growth of Staph aureus.  Respiratory culture from 1/25/2022 reports growth of Candida albicans.  CT of the bilateral lower extremities reports no evidence of acute fracture, osteomyelitis, soft tissue gas or fluid collection. Wound culture of the right foot on 1/24/2022 finalized as MSSA. Respiratory culture on 1/25/2022 finalized as Candida albicans, likely a colonization. Blood cultures on 1/22/2022 finalized as no growth. Respiratory culture on 1/29/2022 is finalized as no growth.     As bone scan shows evidence of left great toe osteomyelitis, recommended surgical evaluation and intraoperative cultures from the left great toe to help direct antibiotic therapy. Surgeon has  reevaluated the patient and does not recommend debridement or amputation at this time.      For now we will continue cefazolin 2 g IV every 8 hours for treatment of MSSA foot osteomyelitis.  Flagyl was initiated by primary team for concerns of tetanus. We will continue to follow closely and adjust antibiotic therapy as needed.     Code Status:   Code Status and Medical Interventions:   Ordered at: 01/22/22 0039     Level Of Support Discussed With:    Health Care Surrogate     Code Status (Patient has no pulse and is not breathing):    CPR (Attempt to Resuscitate)     Medical Interventions (Patient has pulse or is breathing):    Full Support         Rubina Hannon, ALDEN  02/09/22  13:41 EST

## 2022-02-10 PROBLEM — I63.9 CVA (CEREBRAL VASCULAR ACCIDENT) (HCC): Status: ACTIVE | Noted: 2022-01-01

## 2022-02-10 NOTE — DISCHARGE SUMMARY
[facility] HOSPITALISTS DISCHARGE SUMMARY    Patient Identification:  Name:  Lynette Stein  Age:  56 y.o.  Sex:  female  :  1965  MRN:  [id number]  Visit Number:  [id number]    Date of Admission: 2022  Date of Discharge:  2/10/2022    PCP: Orville Wu PA    DISCHARGE DIAGNOSIS  #Septic Shock, Acute Metabolic Encephalopathy & Acute Hypoxic Respiratory Failure requiring Intubation and Mechanical Ventilation 2/ PNA, Bacterial, treating for MDR Organisms in setting of underlying COPD without acute exacerbation and Obstructive Sleep Apnea   #Subacute stroke   #Acute Metabolic Encephalopathy Persisting   #Relapsing and Remitting Lower Extremity Wounds/Ulcers now with Osteomyelitis L great toe and bilateral cellulitis   #Hypertension/Hyperlipidemia/Peripheral Vascular Disease   #Atrial Fibrillation w/ RVR, New onset (CHADsVasc = 3)  #NSTEMI Type II due to Atrial Fibrillation   #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 female presented on  to [facility] complaining of AMS.  Please see the admitting history and physical for further details.      Ms. Stein is our 55 yo with hx anxiety/depression, B-cell lymphocytosis of undetermined significance , chronic pain, COPD, type II DM, HLD, HTN, PVD with bilateral foot ulcers followed by wound care, and DARON who presented with acute profound agitation and confusion. Patient had prolonged stay in ED due to lack of beds and w/u was essentially benign for medical cause. Patient [facility] but quickly became worse with lethargy and was intubated for airway  protection and transferred to the ICU. Patient found to have RLL pneumonia and bone scan consistent with osteo of bilateral LE. Patient initially required pressors but they were quickly weaned off. LE wounds debrided per surgery. Only abnoromality on CSF serologies was initial cytoology was abnormal but they did not have sample to further evaluate on cellblock. CT head and CTA head on admission unrevealing. Patient had 3 EEGs that did not reveal seizure activity. Patient's BP has been on higher side, PRES was thought as potential etiology but with strict control with Cardene mental status has not improved. Patient was extubated on 2/1 and since then she has not been interactive. She is awake and only does rhythmic motions of her left arm with nothing noted this week to be purposeful. We were finally able to get MRI of patient's head that was suggestive of subacute stroke of the right occipital lobe, and superior parietal lobe. Do not suspect this is what caused patient's presentation. Repeat LP cytology investigation pending. Also added on tests for west nile and prion disease. Tetanus was also enterertained but thought to be less likely by tele-neurology given exam.      Last 24 hours patient has developed worsening fevers. In particular after patient pulled out dobhoff tube with concern for aspiration. Plain film revealed worsening bilateral LL infiltrates. Patient has been on abx for LE osteomyelitis and was already on cefazolin and flagyl. Repeat COVID pending. Patient now on 4L NC and otherwise hemodynamically stable. ID has been following. Left arm movement more prominent and repeat routine EEG ordered and pending.     Tele-neurology recommended transfer to larger facility that can do continuous EEG monitoring and has in house neurology services. Complicated by lack of bed availability from COVID pandemic. [facility] had bed availability and accepted patient under the care of Dr. Forman. Alerted him of  fevers and pending COVID test, hopeful to be back prior to patient's arrival.     Addendum: Patient test positive for COVID. Unable to get up with Psychiatric house supervisor after multiple attempts and no answer. I was able to get up with ICU nursing station where patient was going and alerted the nursing staff she was COVID positive.     VITAL SIGNS:  Temp:  [100.7 °F (38.2 °C)-102 °F (38.9 °C)] 101.7 °F (38.7 °C)  Heart Rate:  [] 107  Resp:  [18-22] 20  BP: (112-172)/(54-97) 127/75  SpO2:  [90 %-99 %] 93 %  on  Flow (L/min):  [4] 4;   Device (Oxygen Therapy): nasal cannula    Body mass index is 29.24 kg/m².  Wt Readings from Last 3 Encounters:   02/10/22 97.8 kg (215 lb 9.8 oz)   01/19/22 106 kg (233 lb)   12/14/21 106 kg (233 lb 9.6 oz)       PHYSICAL EXAM:  Constitutional:  Well-developed and well-nourished.  No respiratory distress.      HENT:  Head:  Normocephalic and atraumatic.  Mouth:  Moist mucous membranes.    Eyes:  Conjunctivae and EOM are normal.  Pupils are equal, round, and reactive to light.  No scleral icterus.    Cardiovascular:  Normal rate, regular rhythm and normal heart sounds with no murmur.  Pulmonary/Chest:  Coarse breath sounds bilaterally.   Abdominal:  Soft.  Bowel sounds are normal.  No distension and no tenderness.   Musculoskeletal:  Feet with bandaging on with no noted drainage.   Neurological:  Alert but not oriented or interactive. Left arm with increased rhythmic motion.   Skin:  Skin is warm and dry. No rash noted. No pallor.   Peripheral vascular:  Strong pulses in all 4 extremities with no clubbing, no cyanosis, no edema.    DISCHARGE DISPOSITION   Stable    DISCHARGE MEDICATIONS:     Discharge Medications      New Medications      Instructions Start Date   acetaminophen 325 MG tablet  Commonly known as: TYLENOL   650 mg, Oral, Every 6 Hours PRN      cloNIDine 0.1 MG/24HR patch  Commonly known as: CATAPRES-TTS   1 patch, Transdermal, Weekly   Start Date:  February 13, 2022     DEXMEDETOMIDINE 1000 MCG/250ML INFUSION   0.2-1.5 mcg/kg/hr (19..9 mcg/hr), Intravenous, Titrated      dextrose 40 % gel  Commonly known as: GLUTOSE   15 g, Oral, Every 15 Minutes PRN      dextrose 50 % solution  Commonly known as: D50W   25 g, Intravenous, Every 15 Minutes PRN      heparin (porcine) 100-0.45 UNIT/ML-% infusion   10.2 Units/kg/hr (991 Units/hr), Intravenous, Titrated      heparin (porcine) 5000 UNIT/ML injection   5,000 Units, Intravenous, As Needed      heparin (porcine) 5000 UNIT/ML injection   2,500 Units, Intravenous, As Needed      hold   1 each, Does not apply, Continuous PRN      hold   1 each, Does not apply, Continuous PRN      hydrALAZINE 10 MG tablet  Commonly known as: APRESOLINE   10 mg, Oral, Every 8 Hours Scheduled      hydrALAZINE 20 MG/ML injection  Commonly known as: APRESOLINE   10 mg, Intravenous, Every 4 Hours PRN      insulin aspart 100 UNIT/ML injection  Commonly known as: novoLOG   0-14 Units, Subcutaneous, 3 Times Daily Before Meals      insulin detemir 100 UNIT/ML injection  Commonly known as: LEVEMIR   30 Units, Subcutaneous, Daily   Start Date: February 11, 2022     levETIRAcetam in NaCl 0.82% 500 MG/100ML solution IVPB  Commonly known as: KEPPRA   500 mg, Intravenous, Every 12 Hours Scheduled      LORazepam 2 MG/ML injection  Commonly known as: ATIVAN  Replaces: LORazepam 1 MG tablet   0.5 mg, Intravenous, Every 4 Hours PRN      losartan 100 MG tablet  Commonly known as: COZAAR   100 mg, Oral, Daily   Start Date: February 11, 2022     magnesium sulfate 2 GM/50ML infusion   2 g, Intravenous, As Needed      meropenem (MERREM) 1gm IVPB in 100ml NS (VTB)   1 g, Intravenous, Once      niCARdipine  Commonly known as: CARDENE   5-15 mg/hr (5-15 mg/hr), Intravenous, Titrated      pantoprazole 40 MG injection  Commonly known as: PROTONIX   40 mg, Intravenous, Every Early Morning   Start Date: February 11, 2022     spironolactone 25 MG tablet  Commonly  known as: ALDACTONE   25 mg, Oral, Daily   Start Date: February 11, 2022     verapamil 80 MG tablet  Commonly known as: CALAN   80 mg, Oral, Every 8 Hours Scheduled         Changes to Medications      Instructions Start Date   pregabalin 75 MG capsule  Commonly known as: LYRICA  What changed:   · medication strength  · how much to take  · when to take this   75 mg, Oral, Every 12 Hours Scheduled         Stop These Medications    albuterol sulfate  (90 Base) MCG/ACT inhaler  Commonly known as: PROVENTIL HFA;VENTOLIN HFA;PROAIR HFA     Budesonide 3 MG 24 hr capsule  Commonly known as: ENTOCORT EC     Diclofenac Sodium 1 % gel gel  Commonly known as: VOLTAREN     estradiol 2 MG tablet  Commonly known as: ESTRACE     linaclotide 145 MCG capsule capsule  Commonly known as: LINZESS     loratadine-pseudoephedrine 5-120 MG per 12 hr tablet  Commonly known as: CLARITIN-D 12-hour     LORazepam 1 MG tablet  Commonly known as: ATIVAN  Replaced by: LORazepam 2 MG/ML injection     metFORMIN 1000 MG tablet  Commonly known as: GLUCOPHAGE     metoprolol tartrate 50 MG tablet  Commonly known as: LOPRESSOR     omeprazole 40 MG capsule  Commonly known as: priLOSEC     oxybutynin XL 10 MG 24 hr tablet  Commonly known as: DITROPAN-XL     pravastatin 40 MG tablet  Commonly known as: PRAVACHOL     QUEtiapine 200 MG tablet  Commonly known as: SEROquel     sertraline 100 MG tablet  Commonly known as: ZOLOFT     silver sulfadiazine 1 % cream  Commonly known as: SILVADENE, SSD     traMADol 50 MG tablet  Commonly known as: ULTRAM     vitamin D 1.25 MG (54434 UT) capsule capsule  Commonly known as: ERGOCALCIFEROL               Contact information for follow-up providers     Orville Wu PA .    Specialty: Physician Assistant  Contact information:  [address] [place] [id number]  [phone]                   Contact information for after-discharge care     Destination     [facility] .    Service: Acute [facility]  Contact  information:  [address] [facility] [address] [phone]                              TEST  RESULTS PENDING AT DISCHARGE  Pending Labs     Order Current Status    Anaerobic Culture - Cerebrospinal Fluid, Spine, Lumbar In process    Blood Culture - Blood, Arm, Left In process    Blood Culture - Blood, Wrist, Right In process    COVID-19, APTIMA PANTHER [facility] IN-HOUSE NP/OP SWAB IN UTM/VTM/SALINE TRANSPORT MEDIA 24HR TAT - Swab, Nasopharynx In process    Creutzfeldt-Otto Disease, Western Blot - Cerebrospinal Fluid, Lumbar Puncture In process    Cytomegalovirus DNA Probe, Amplified In process    Enterovirus RT-PCR - Cerebrospinal Fluid, Lumbar Puncture In process    Flow Cytometry In process    MS Profile & MBP, CSF - Cerebrospinal Fluid, Lumbar Puncture In process    Non-gynecologic Cytology In process    Oligoclonal Banding (COLLECT RED TUBE) In process    Oligoclonal Banding - Cerebrospinal Fluid, Lumbar Puncture In process    Procalcitonin In process    VDRL, CSF - Cerebrospinal Fluid, Lumbar Puncture In process    West Nile Virus, CSF In process    Blood Culture - Blood, Arm, Left Preliminary result    Blood Culture - Blood, Hand, Left Preliminary result    Body Fluid Culture - Cerebrospinal Fluid, Lumbar Puncture Preliminary result           CODE STATUS  Code Status and Medical Interventions:   Ordered at: 01/22/22 0039     Level Of Support Discussed With:    Health Care Surrogate     Code Status (Patient has no pulse and is not breathing):    CPR (Attempt to Resuscitate)     Medical Interventions (Patient has pulse or is breathing):    Full Support       Avery Horan MD  [facility]  02/10/22  13:20 EST    Please note that this discharge summary required more than 30 minutes to complete.

## 2022-02-10 NOTE — PROGRESS NOTES
PROGRESS NOTE         Patient Identification:  Name:  Lynette Stein  Age:  56 y.o.  Sex:  female  :  1965  MRN:  [id number]  Visit Number:  [id number]  Primary Care Provider:  Orville Wu PA         LOS: 19 days       ----------------------------------------------------------------------------------------------------------------------  Subjective       Chief Complaints:    No chief complaint on file.        Interval History:      The patient is resting in bed this morning on 4 L nasal cannula in no apparent distress.  Febrile overnight with a T-max of 102 °F.  WBC remains normal.  Chest x-ray on 2022 shows increasing lung markings in both lung bases may be developing infiltrate.  Lumbar puncture on 2022 with meningitis/encephalitis panel negative.  Blood cultures on 2022 are so far showing no growth.    Review of Systems:    Unable to obtain.  Altered mental status.  ----------------------------------------------------------------------------------------------------------------      Objective       Current Hospital Meds:  ceFAZolin, 2 g, Intravenous, Q8H  cloNIDine, 1 patch, Transdermal, Weekly  hydrALAZINE, 10 mg, Oral, Q8H  insulin aspart, 0-14 Units, Subcutaneous, TID AC  insulin detemir, 30 Units, Subcutaneous, Daily  levETIRAcetam, 500 mg, Intravenous, Q12H  losartan, 100 mg, Oral, Daily  metoprolol tartrate, 100 mg, Nasogastric, Q12H  metroNIDAZOLE, 500 mg, Intravenous, Q8H  pantoprazole, 40 mg, Intravenous, Q AM  pravastatin, 40 mg, Oral, Daily  pregabalin, 75 mg, Nasogastric, Q12H  QUEtiapine, 50 mg, Oral, Nightly  sodium chloride, 10 mL, Intravenous, Q12H  sodium chloride, 10 mL, Intravenous, Q12H  sodium chloride, 10 mL, Intravenous, Q12H  sodium chloride, 10 mL, Intravenous, Q12H  sodium chloride, 10 mL, Intravenous, Q12H  spironolactone, 25 mg, Oral, Daily  verapamil, 80 mg, Oral, Q8H      dexmedetomidine, 0.2-1.5 mcg/kg/hr, Last Rate: Stopped (22  2076)  heparin (porcine), 10.2 Units/kg/hr, Last Rate: 25.6 Units/kg/hr (02/10/22 1115)  hold, 1 each  hold, 1 each  niCARdipine, 5-15 mg/hr, Last Rate: 5 mg/hr (02/10/22 1016)  Pharmacy Consult,       ----------------------------------------------------------------------------------------------------------------------    Vital Signs:  Temp:  [100.7 °F (38.2 °C)-102 °F (38.9 °C)] 101.7 °F (38.7 °C)  Heart Rate:  [] 107  Resp:  [18-22] 20  BP: (112-172)/(54-97) 127/75  Mean Arterial Pressure (Non-Invasive) for the past 24 hrs (Last 3 readings):   Noninvasive MAP (mmHg)   02/10/22 1100 90   02/10/22 1045 90   02/10/22 1015 91     SpO2 Percentage    02/10/22 1015 02/10/22 1045 02/10/22 1100   SpO2: 95% 93% 93%     SpO2:  [90 %-99 %] 93 %  on  Flow (L/min):  [4] 4;   Device (Oxygen Therapy): nasal cannula    Body mass index is 29.24 kg/m².  Wt Readings from Last 3 Encounters:   02/10/22 97.8 kg (215 lb 9.8 oz)   01/19/22 106 kg (233 lb)   12/14/21 106 kg (233 lb 9.6 oz)        Intake/Output Summary (Last 24 hours) at 2/10/2022 1307  Last data filed at 2/10/2022 0515  Gross per 24 hour   Intake 372.76 ml   Output 2150 ml   Net -1777.24 ml     NPO Diet  ----------------------------------------------------------------------------------------------------------------------      Physical Exam:    Constitutional: Chronically ill-appearing.  Awake and agitated, but not following commands.    HENT:  Head: Normocephalic and atraumatic.  Mouth:  Moist mucous membranes.    Eyes:  Pupils equal.  Left eye chemosis.   Neck:  Neck supple.  No JVD present.    Cardiovascular:  Normal rate, regular rhythm and normal heart sounds with no murmur. No edema.  Pulmonary/Chest: Coarse breath sounds bilaterally.  Abdominal:  Soft.  Bowel sounds are normal.  No distension and no tenderness.   Musculoskeletal:  No tenderness, and no deformity.  No swelling or redness of joints.  Mild bilateral lower extremity edema.  Neurological:  Awake and  agitated.  Not following commands.  Skin:  Skin is warm and dry.  No rash noted.  No pallor.  Scattered bruises and scabs to bilateral upper extremities, trunk, bilateral lower extremities and feet.  Bilateral feet ulcers in dressings today.  Psychiatric: Awake and agitated.  Not following commands.  ----------------------------------------------------------------------------------------------------------------------              Results from last 7 days   Lab Units 02/09/22  1843   PH, ARTERIAL pH units 7.444   PO2 ART mm Hg 64.9*   PCO2, ARTERIAL mm Hg 31.2*   HCO3 ART mmol/L 21.4     Results from last 7 days   Lab Units 02/10/22  0358 02/09/22 0342 02/08/22 0229   WBC 10*3/mm3 8.16 8.73 9.10   HEMOGLOBIN g/dL 11.5* 10.9* 11.6*   HEMATOCRIT % 39.4 38.0 39.0   MCV fL 82.6 85.4 82.1   MCHC g/dL 29.2* 28.7* 29.7*   PLATELETS 10*3/mm3 353 369 401     Results from last 7 days   Lab Units 02/10/22  0358 02/09/22 0342 02/08/22  1926 02/08/22  0229 02/08/22  0229 02/07/22  1719 02/07/22  0121 02/05/22  0046 02/04/22  1249   SODIUM mmol/L 140 143  --   --  145  --  143   < >  --    POTASSIUM mmol/L 3.7 3.7 3.1*   < > 3.4*   < > 3.5   < >  --    MAGNESIUM mg/dL  --   --   --   --   --   --  2.0  --  2.0   CHLORIDE mmol/L 108* 114*  --   --  113*  --  113*   < >  --    CO2 mmol/L 20.8* 18.6*  --   --  18.2*  --  17.2*   < >  --    BUN mg/dL 10 11  --   --  8  --  9   < >  --    CREATININE mg/dL 0.60 0.46*  --   --  0.50*  --  0.50*   < >  --    EGFR IF NONAFRICN AM mL/min/1.73 104 141  --   --  128  --  128   < >  --    CALCIUM mg/dL 9.6 9.3  --   --  9.3  --  9.5   < >  --    GLUCOSE mg/dL 118* 148*  --   --  167*  --  153*   < >  --    ALBUMIN g/dL 3.62 3.26*  --   --  3.52  --  3.51   < >  --    BILIRUBIN mg/dL 0.2 <0.2  --   --  0.3  --  0.2   < >  --    ALK PHOS U/L 111 100  --   --  104  --  110   < >  --    AST (SGOT) U/L 45* 41*  --   --  21  --  14   < >  --    ALT (SGPT) U/L 44* 38*  --   --  27  --  24   < >   --     < > = values in this interval not displayed.   Estimated Creatinine Clearance: 138.8 mL/min (by C-G formula based on SCr of 0.6 mg/dL).  No results found for: AMMONIA    Glucose   Date/Time Value Ref Range Status   02/10/2022 1123 112 70 - 130 mg/dL Final     Comment:     Treated Patient Meter: [id number] : [id number] Jasiel Spencera A   02/10/2022 0526 112 70 - 130 mg/dL Final     Comment:     Meter: [id number] : [id number] PIPPA MICHELLE   02/10/2022 0031 110 70 - 130 mg/dL Final     Comment:     Meter: [id number] : [id number] PIPPA MICHELLE   02/09/2022 1831 115 70 - 130 mg/dL Final     Comment:     Meter: [id number] : [id number] Jasiel Kelsey A   02/09/2022 1133 145 (H) 70 - 130 mg/dL Final     Comment:     Meter: [id number] : [id number] Jasiel Kelsey A   02/09/2022 0755 139 (H) 70 - 130 mg/dL Final     Comment:     Meter: [id number] : [id number] Jasiel Kelsey A   02/09/2022 0625 130 70 - 130 mg/dL Final     Comment:     Meter: [id number] : [id number] summer amanda   02/09/2022 0031 132 (H) 70 - 130 mg/dL Final     Comment:     Meter: [id number] : [id number] summer amanda     Lab Results   Component Value Date    HGBA1C 6.50 (H) 01/22/2022     Lab Results   Component Value Date    TSH 2.290 01/19/2022       Blood Culture   Date Value Ref Range Status   01/22/2022 No growth at 4 days  Preliminary   01/22/2022 No growth at 4 days  Preliminary     No results found for: URINECX  Wound Culture   Date Value Ref Range Status   01/24/2022 Rare Staphylococcus aureus (A)  Final   01/24/2022 Rare Normal Skin Melva  Final     No results found for: STOOLCX  Respiratory Culture   Date Value Ref Range Status   01/25/2022 Scant growth (1+) Candida albicans (A)  Final   01/25/2022 No Normal Respiratory Melva (A)  Final     Pain Management Panel       Pain Management Panel Latest Ref Rng & Units 1/19/2022    AMPHETAMINES SCREEN, URINE Negative Negative    BARBITURATES SCREEN Negative Negative    BENZODIAZEPINE  "SCREEN, URINE Negative Positive(A)    BUPRENORPHINEUR Negative Negative    COCAINE SCREEN, URINE Negative Negative    METHADONE SCREEN, URINE Negative Negative    METHAMPHETAMINEUR Negative Negative              ----------------------------------------------------------------------------------------------------------------------  Imaging Results (Last 24 Hours)       Procedure Component Value Units Date/Time    IR LUMBAR PUNCTURE DIAGNOSTIC [[id number]] Collected: 02/10/22 1255     Updated: 02/10/22 1258    Narrative:      EXAMINATION: IR LUMBAR PUNCTURE DIAGNOSTIC-      CLINICAL INDICATION:     AMS     COMPARISON: None     TECHNIQUE/FINDINGS:   Informed consent obtained regarding risks associated with the procedure  including infection, bleeding, and injury to adjacent structures.  Timeout performed. Patient was placed in the prone position on the  fluoroscopy table. Preprocedural fluoroscopy used to localize level for  lumbar puncture. The Approximately L3 level was chosen for lumbar  puncture. Under fluoroscopy guidance, a 3.5\" 22-gauge spinal needle was  advanced into the intrathecal space. Return of clear CSF fluid noted.  Opening pressure of: Less than 10 cm. CSF appeared clear and colorless.  Approximately 6 cc was obtained and sent for laboratory analysis. No  complication encountered during or immediately following the procedure.  The puncture site was cleansed and bandaged.     Fluoroscopy time: 0.48 minutes        Impression:      Fluoroscopic guided diagnostic lumbar puncture as detailed above.     This report was finalized on 2/10/2022 12:56 PM by Dr. Jayce Lord MD.       XR Chest 1 View [[id number]] Collected: 02/09/22 1823     Updated: 02/09/22 1825    Narrative:      PROCEDURE: CR Chest 1 Vw    COMPARISON:  feb 8th 2022, 7:42am    INDICATIONS: DOBBHOFF PLACEMENT  TECHNIQUE: Single AP  view of the chest    FINDINGS &     Impression:      Single frontal torso demonstrates tube tip to be in the body " the stomach. Second AP view of the chest performed portable upright technique shows left PICC line tip to be in the region of the right atrium unchanged. Cardiac mediastinal  silhouette stable. Increasing lung markings in both lung bases may be developing infiltrate.         Signer Name: Shari Mera MD   Signed: 2/9/2022 6:23 PM   Workstation Name: [id number]    [facility]            ----------------------------------------------------------------------------------------------------------------------    Assessment/Plan       Assessment/Plan     ASSESSMENT:    1.  Severe sepsis with acute respiratory failure requiring mechanical ventilation  2.  Pneumonia  3.  Left great toe osteomyelitis  4.  Enterocolitis    PLAN:    The patient is resting in bed this morning on 4 L nasal cannula in no apparent distress.  Febrile overnight with a T-max of 102 °F.  WBC remains normal.  Chest x-ray on 2/9/2022 shows increasing lung markings in both lung bases may be developing infiltrate.  Lumbar puncture on 2/9/2022 with meningitis/encephalitis panel negative.  Blood cultures on 2/8/2022 are so far showing no growth.    MRI of the brain from 2/7/2022 reports areas of enhancement involving cortical subcortical and periventricular white matter consistent with subacute infarction, no restricted diffusion to indicate acute infarct, chronic small vessel ischemic disease, mild bilateral mastoid effusions.    CT of the head from 2/3/2022 reports no evidence of acute ischemic event or parenchymal hemorrhage.    Chest x-ray on 2/1/2022 shows mild bibasilar infiltrates.  Respiratory culture on 1/29/2022 finalized as no growth.    Bone scan on 1/28/2022 showed focal area of increased tracer uptake localizing to the left great toe.  If ulceration or infection in this location, osteomyelitis should be considered.  Periarticular type uptake throughout the right foot as detailed above in a pattern and distribution  that is more favorable for reactive changes and neuropathic joint changes.  Soft tissue type uptake suggestive of cellulitis.      CT chest on 1/28/2022 showed new or worsening left basilar parenchymal opacity likely representing combination of atelectasis and/or infiltrate.  Continued right lower lobe atelectasis and likely infiltrate.  Patchy groundglass opacity left upper lobe and along the anterior aspect right upper lobe could represent very mild pneumonitis.  Increased tracheal and bronchial secretions may reflect underlying bronchitis.  This may be contributing factor of atelectasis.  Respiratory therapy may be of benefit.  Endotracheal tube in place.  Nasogastric tube appears in satisfactory position.  A second 2 passes alongside the nasogastric tube terminates in the distal esophagus.  Following discussion with patient's nurse it represents a temperature probe.  Increased colonic fluid with air-fluid levels.  Correlate for enteric colitis.  No focal inflammatory changes or obstruction.  CT abdomen pelvis on 1/28/2022 showed the same as CT chest.     COVID-19 and influenza PCR negative.  Lumbar puncture culture and meningitis encephalitis panel negative. Legionella negative.  Strep pneumo antigen negative.  MRSA PCR positive.  McCalla spotted fever IgM equivocal at 1.06. Wound culture of the right foot from 1/24/2022 preliminary reports growth of Staph aureus.  Respiratory culture from 1/25/2022 reports growth of Candida albicans.  CT of the bilateral lower extremities reports no evidence of acute fracture, osteomyelitis, soft tissue gas or fluid collection. Wound culture of the right foot on 1/24/2022 finalized as MSSA. Respiratory culture on 1/25/2022 finalized as Candida albicans, likely a colonization. Blood cultures on 1/22/2022 finalized as no growth. Respiratory culture on 1/29/2022 is finalized as no growth.     As bone scan shows evidence of left great toe osteomyelitis, recommended surgical  evaluation and intraoperative cultures from the left great toe to help direct antibiotic therapy. Surgeon has reevaluated the patient and does not recommend debridement or amputation at this time.      As patient was febrile overnight and early this morning, with chest x-ray showing concern of developing infiltrate, cefazolin and Flagyl were discontinued in favor of Merrem 1 g IV every 8 hours for 7-day course.  After that time, patient should be placed back on cefazolin 2 g IV every 8 hours for treatment of MSSA foot osteomyelitis. We will continue to follow closely and adjust antibiotic therapy as needed.     Code Status:   Code Status and Medical Interventions:   Ordered at: 01/22/22 0039     Level Of Support Discussed With:    Health Care Surrogate     Code Status (Patient has no pulse and is not breathing):    CPR (Attempt to Resuscitate)     Medical Interventions (Patient has pulse or is breathing):    Full Support         YENIFER Green  02/10/22  13:07 EST

## 2022-02-10 NOTE — PROGRESS NOTES
Stroke Progress Note       Chief Complaint: Altered mental status, acute encephalopathy    Subjective    Subjective     Subjective:  No acute events overnight, patient is ill but stable. When awake she does begin moving left arm again.  No movement noted in any other extremities.  Patient does track me on left side of room intermittently.  MRI brain shows subacute strokes bilaterally.  LP performed yesterday without any significant preliminary results, protein slightly elevated.    Review of Systems   Unable to perform ROS: Patient nonverbal            Objective    Objective      Temp:  [100.5 °F (38.1 °C)-102 °F (38.9 °C)] 101.7 °F (38.7 °C)  Heart Rate:  [] 107  Resp:  [18-22] 20  BP: (112-172)/(54-97) 127/75        Neurological Exam  Mental Status  Awake. Patient is nonverbal.  Patient is alert, makes eye contact, regards me only on left side of the room..    Cranial Nerves  CN II: Patient blinks to visual threat on the left, no response to visual threat on the right.  CN III, IV, VI: Pupils equal round and reactive to light bilaterally. No gaze deviation, appears to regard me on her left side, does not cross midline to the right.  CN V:  Right: Normal corneal reflex.  Left: Normal corneal reflex.  CN VII: No obvious facial droop.    Motor  Normal muscle bulk throughout. Decreased muscle tone. No abnormal involuntary movements.  Patient frequently keeps left arm extended over her head.  She does not squeeze my hand or following any commands.    Sensory  Patient grimaces to nailbed pressure bilaterally, she withdraws left hand in response to nailbed pressure.    Reflexes                                           Right                      Left  Patellar                                2+                         1+  Plantar                           Mute                Mute    Right pathological reflexes: Ender's absent.  Left pathological reflexes: Ender's absent.      Physical Exam  Vitals and nursing  note reviewed.   Constitutional:       General: She is awake.      Appearance: She is obese. She is ill-appearing.   HENT:      Head: Normocephalic and atraumatic.      Mouth/Throat:      Mouth: Mucous membranes are dry.      Pharynx: Oropharynx is clear.   Eyes:      Pupils: Pupils are equal, round, and reactive to light.   Cardiovascular:      Rate and Rhythm: Regular rhythm. Tachycardia present.   Pulmonary:      Effort: Pulmonary effort is normal. No respiratory distress.   Skin:     General: Skin is warm and dry.   Neurological:      Deep Tendon Reflexes:      Reflex Scores:       Patellar reflexes are 2+ on the right side and 1+ on the left side.        Hospital Meds:  Scheduled- ceFAZolin, 2 g, Intravenous, Q8H  cloNIDine, 1 patch, Transdermal, Weekly  hydrALAZINE, 10 mg, Oral, Q8H  insulin aspart, 0-14 Units, Subcutaneous, TID AC  insulin detemir, 30 Units, Subcutaneous, Daily  levETIRAcetam, 500 mg, Intravenous, Q12H  losartan, 100 mg, Oral, Daily  metoprolol tartrate, 100 mg, Nasogastric, Q12H  metroNIDAZOLE, 500 mg, Intravenous, Q8H  pantoprazole, 40 mg, Intravenous, Q AM  pravastatin, 40 mg, Oral, Daily  pregabalin, 75 mg, Nasogastric, Q12H  QUEtiapine, 50 mg, Oral, Nightly  sodium chloride, 10 mL, Intravenous, Q12H  sodium chloride, 10 mL, Intravenous, Q12H  sodium chloride, 10 mL, Intravenous, Q12H  sodium chloride, 10 mL, Intravenous, Q12H  sodium chloride, 10 mL, Intravenous, Q12H  spironolactone, 25 mg, Oral, Daily  verapamil, 80 mg, Oral, Q8H      Infusions- dexmedetomidine, 0.2-1.5 mcg/kg/hr, Last Rate: Stopped (02/06/22 1436)  heparin (porcine), 10.2 Units/kg/hr, Last Rate: 25.6 Units/kg/hr (02/10/22 1115)  hold, 1 each  hold, 1 each  niCARdipine, 5-15 mg/hr, Last Rate: 5 mg/hr (02/10/22 1016)  Pharmacy Consult,        PRNs- •  acetaminophen  •  artificial tears  •  dextrose  •  dextrose  •  glucagon (human recombinant)  •  heparin (porcine)  •  heparin (porcine)  •  hold  •  hold  •   hydrALAZINE  •  LORazepam  •  magnesium sulfate **OR** magnesium sulfate **OR** magnesium sulfate  •  metoprolol tartrate  •  Pharmacy Consult  •  potassium chloride **OR** potassium chloride **OR** [DISCONTINUED] potassium chloride  •  potassium chloride  •  sodium chloride  •  sodium chloride  •  sodium chloride  •  sodium chloride    Results Review:    I reviewed the patient's new clinical results.    MRI Brain With & Without Contrast [[id number]] Kvng as Reviewed   Order Status: Completed Collected: 02/07/22 1230    Updated: 02/07/22 1237   Narrative:     EXAM:     MR Head Without and With Intravenous Contrast       EXAM DATE:     2/7/2022 10:54 AM       CLINICAL HISTORY:     Neuro deficit, acute, stroke suspected       TECHNIQUE:     Magnetic resonance images of the head/brain without and with   intravenous contrast in multiple planes.       COMPARISON:     CT 02/03/2022       FINDINGS:     Brain:  Subcortical enhancement is noted of the right occipital lobe   and is consistent with subacute infarct. No diffusion restriction   identified.  There is cortical and subcortical enhancement of the   superior parietal lobe at the level of the cerebral vertex also   consistent with subacute infarct etiology.  Left periventricular   enhancement is noted most consistent with subacute infarct etiology.  T2   shine through is noted but no restricted diffusion is identified.  No   hemorrhage.     Ventricles:  Unremarkable.  No ventriculomegaly.     Bones/joints:  Unremarkable.     Sinuses:  Unremarkable as visualized.  No acute sinusitis.     Mastoid air cells:  Mild bilateral mastoid effusions.     Orbits:  Unremarkable as visualized.       Impression:     1.  Areas of enhancement involving cortical, subcortical, and   periventricular white matter as described above in a pattern and   distribution that is most consistent with subacute infarction. Findings   are most pronounced in the parietal lobes and right occipital  region.   2.  No restricted diffusion to indicate acute infarct.   3.  Chronic small vessel ischemic disease.   4.  Mild bilateral mastoid effusions.          Transthoracic Echo:    Interpretation Summary    · Left ventricular wall thickness is consistent with mild concentric hypertrophy.  · Left ventricular ejection fraction appears to be 61 - 65%. Left ventricular systolic function is normal.  · Left ventricular diastolic dysfunction is noted.  · Saline test results are negative.  · This is a technically limited study with poor echo windows and no carotid Doppler.              Assessment/Plan     Assessment/Plan:      1. Subacute strokes right occipital lobe, cortical and subcortical stroke superior parietal lobe.  Given her presentation and all imaging labs available patient likely has stroke secondary to PRES, which can cause chronic encephalopathy.  Initial CT of head was highly degraded by motion, however patient was then sedated and repeat head CT did not show any acute abnormalities.  Unable to obtain MRI due to restlessness. Patient currently being treated for pneumonia and septic shock, however this is out of proportion to her encephalopathy.  Family repeatedly denies any recreational drug use, urine toxicology was unremarkable.   Patient found to be in proximal A. fib while in ED,  was on heparin drip which was paused pending LP, and has now been resumed, cardiology is following.  Initial and repeat EEG showed moderate diffuse cerebral dysfunction, no epilepsy.    Was some concern for tetanus, but her clinical exam does not support this.  2. Paroxysmal atrial fibrillation  3. Obesity, BMI 30.10  4. Diabetes mellitus type 2  5. Mixed hyperlipidemia  6. Essential hypertension  7. Osteomyelitis  -Heparin drip restarted  -Repeat CT head without on 2/3/2022 was stable, no evidence of ischemic event, scan degraded by motion  -Functional prognosis unfortunately remains guarded  -Continue pravastatin 40 mg  daily  -Keep blood pressure between 100 and 140 systolic  -Continue Keppra 500 mg twice daily for 3 to 6 months  -Verapamil 80 mg 3 times daily  -Recommend transfer for continuous EEG monitoring  -Continue anticoagulation  -Continue supportive care  -Functional prognosis remains guarded there is concern for chronic encephalopathy however patient may continue to slowly improve  -We will follow up with flow cytometry results      The case was discussed with Dr. Schroeder and the hospitalist team.  Neurology will continue to follow peripherally.  Please call with any questions or changes.    Jayce Avelar, [place]  02/10/22  11:22 EST

## 2022-02-23 PROBLEM — U07.1 CLINICAL DIAGNOSIS OF COVID-19: Status: ACTIVE | Noted: 2022-01-01

## 2022-02-24 PROBLEM — D72.820 MONOCLONAL B-CELL LYMPHOCYTOSIS OF UNDETERMINED SIGNIFICANCE: Status: ACTIVE | Noted: 2022-01-01

## 2022-02-24 PROBLEM — J12.82 PNEUMONIA DUE TO COVID-19 VIRUS: Status: ACTIVE | Noted: 2022-01-01

## 2022-02-24 PROBLEM — M86.9 OSTEOMYELITIS OF GREAT TOE OF LEFT FOOT (HCC): Status: ACTIVE | Noted: 2022-01-01

## 2022-02-24 PROBLEM — D47.2 MGUS (MONOCLONAL GAMMOPATHY OF UNKNOWN SIGNIFICANCE): Status: ACTIVE | Noted: 2022-01-01

## 2022-02-24 PROBLEM — I67.83 PRES (POSTERIOR REVERSIBLE ENCEPHALOPATHY SYNDROME): Status: ACTIVE | Noted: 2022-01-01
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
## 2022-02-24 PROBLEM — J96.21 ACUTE ON CHRONIC RESPIRATORY FAILURE WITH HYPOXIA (HCC): Status: ACTIVE | Noted: 2022-01-01

## 2022-02-24 PROBLEM — I51.9 LEFT VENTRICULAR DIASTOLIC DYSFUNCTION: Status: ACTIVE | Noted: 2022-01-01

## 2022-02-24 PROBLEM — D63.8 ANEMIA, CHRONIC DISEASE: Status: ACTIVE | Noted: 2022-01-01

## 2022-02-24 PROBLEM — E11.65 TYPE 2 DIABETES MELLITUS WITH HYPERGLYCEMIA (HCC): Status: ACTIVE | Noted: 2022-01-01

## 2022-02-24 PROBLEM — Z51.5 HOSPICE CARE PATIENT: Status: ACTIVE | Noted: 2022-01-01

## 2022-02-25 LAB
BACTERIA SPEC AEROBE CULT: NORMAL
BACTERIA SPEC AEROBE CULT: NORMAL

## 2022-03-18 LAB — FUNGUS WND CULT: NORMAL

## 2022-04-01 LAB
MYCOBACTERIUM SPEC CULT: NORMAL
NIGHT BLUE STAIN TISS: NORMAL

## (undated) DEVICE — PACK,LITHOTOMY,PK III,SIRUS: Brand: MEDLINE

## (undated) DEVICE — JELLY,LUBE,STERILE,FLIP TOP,TUBE,4-OZ: Brand: MEDLINE

## (undated) DEVICE — HOLDER: Brand: DEROYAL

## (undated) DEVICE — SPNG LAP PREWSH SFTPK 18X18IN STRL PK/5

## (undated) DEVICE — PK BASIC 70

## (undated) DEVICE — ENCORE® LATEX MICRO SIZE 7.5, STERILE LATEX POWDER-FREE SURGICAL GLOVE: Brand: ENCORE

## (undated) DEVICE — TRY SKINPREP PVP SCRB W PAINT

## (undated) DEVICE — DRSNG PAD ABD 8X10IN STRL